# Patient Record
Sex: FEMALE | Race: WHITE | NOT HISPANIC OR LATINO | Employment: OTHER | URBAN - METROPOLITAN AREA
[De-identification: names, ages, dates, MRNs, and addresses within clinical notes are randomized per-mention and may not be internally consistent; named-entity substitution may affect disease eponyms.]

---

## 2017-01-03 DIAGNOSIS — M54.16 CHRONIC LUMBAR RADICULOPATHY: ICD-10-CM

## 2017-01-03 RX ORDER — HYDROCODONE BITARTRATE AND ACETAMINOPHEN 7.5; 325 MG/1; MG/1
1 TABLET ORAL EVERY 6 HOURS PRN
Qty: 60 TABLET | Refills: 0 | OUTPATIENT
Start: 2017-01-03

## 2017-01-03 NOTE — TELEPHONE ENCOUNTER
----- Message from Trice Glass sent at 1/3/2017  1:12 PM EST -----  Regarding: FW: Prescription Question  Contact: [phone]      ----- Message -----     From: Brooklyn Johns     Sent: 1/3/2017  12:33 PM       To: Tai Ordaz [facility]  Subject: Prescription Question                            Please this is a reminder to fill my [facility]. It will be due next week  Thanks  Brooklyn Johns  [id number]  1950

## 2017-01-05 ENCOUNTER — OFFICE VISIT (OUTPATIENT)
Dept: INTERNAL MEDICINE | Facility: CLINIC | Age: 67
End: 2017-01-05

## 2017-01-05 VITALS
BODY MASS INDEX: 47.15 KG/M2 | WEIGHT: 283 LBS | SYSTOLIC BLOOD PRESSURE: 160 MMHG | HEIGHT: 65 IN | DIASTOLIC BLOOD PRESSURE: 98 MMHG

## 2017-01-05 DIAGNOSIS — G47.33 OBSTRUCTIVE SLEEP APNEA SYNDROME: ICD-10-CM

## 2017-01-05 DIAGNOSIS — I10 ESSENTIAL HYPERTENSION: Primary | ICD-10-CM

## 2017-01-05 DIAGNOSIS — M54.16 LUMBAR RADICULOPATHY, CHRONIC: ICD-10-CM

## 2017-01-05 DIAGNOSIS — M54.16 CHRONIC LUMBAR RADICULOPATHY: ICD-10-CM

## 2017-01-05 PROCEDURE — 99213 OFFICE O/P EST LOW 20 MIN: CPT | Performed by: INTERNAL MEDICINE

## 2017-01-05 RX ORDER — HYDROCODONE BITARTRATE AND ACETAMINOPHEN 7.5; 325 MG/1; MG/1
1 TABLET ORAL EVERY 6 HOURS PRN
Qty: 60 TABLET | Refills: 0 | Status: SHIPPED | OUTPATIENT
Start: 2017-01-05 | End: 2017-01-24 | Stop reason: SDUPTHER

## 2017-01-05 NOTE — PROGRESS NOTES
Subjective   Brooklyn Johns is a 66 y.o. female.     Hyperlipidemia   This is a chronic problem. The current episode started more than 1 year ago.   Hypertension   This is a chronic problem. The current episode started more than 1 year ago.        The following portions of the patient's history were reviewed and updated as appropriate: allergies, current medications, past family history, past medical history, past social history, past surgical history and problem list.    Review of Systems   Constitutional: Positive for unexpected weight change.        Only doing fair  Darrick  suddenly last summer aftyer a fall & subarachnoid hemorrhage Having to take care of his estate Very stressful Has new job as  @ [employer] [facility] [place] several days /week   HENT: Negative.    Respiratory: Negative.    Cardiovascular:        BP has been running high   Gastrointestinal: Negative.    Genitourinary: Negative.    Musculoskeletal: Positive for arthralgias (getting more pain & numbnees in hands ) and back pain (Back pain is chronic).       Objective   Physical Exam   Constitutional: She appears well-developed.   HENT:   Head: Normocephalic.   Eyes: EOM are normal.   Neck: Neck supple.   Cardiovascular: Normal rate, regular rhythm and normal heart sounds.    Repeat 170/110   Pulmonary/Chest: Effort normal and breath sounds normal.   Musculoskeletal: Normal range of motion.   Neurological: She is alert.   Vitals reviewed.      Assessment/Plan   Brooklyn was seen today for med management, hyperlipidemia and hypertension.    Diagnoses and all orders for this visit:    Essential hypertension    Lumbar radiculopathy, chronic    Obstructive sleep apnea syndrome      Increase metoprolol to 25 MG QD & increase Altace  to 20 MG QD

## 2017-01-05 NOTE — MR AVS SNAPSHOT
Brooklyn Johns   1/5/2017 2:30 PM   Office Visit    Dept Phone:  [phone]   Encounter #:  [id number]    Provider:  David Dial MD   Department:  [facility] INTERNAL MEDICINE                Your Full Care Plan              Today's Medication Changes          These changes are accurate as of: 1/5/17  3:14 PM.  If you have any questions, ask your nurse or doctor.               Stop taking medication(s)listed here:     cetirizine 10 MG tablet   Commonly known as:  zyrTEC   Stopped by:  David Dial MD                Where to Get Your Medications      You can get these medications from any pharmacy     Bring a paper prescription for each of these medications     HYDROcodone-acetaminophen 7.5-325 MG per tablet                  Your Updated Medication List          This list is accurate as of: 1/5/17  3:14 PM.  Always use your most recent med list.                allopurinol 100 MG tablet   Commonly known as:  ZYLOPRIM   Take 1 tablet by mouth Daily.       aspirin 325 MG tablet       cimetidine 400 MG tablet   Commonly known as:  TAGAMET   TAKE 1 TABLET TWICE A DAY       escitalopram 10 MG tablet   Commonly known as:  LEXAPRO   Take 1 tablet by mouth daily.       HYDROcodone-acetaminophen 7.5-325 MG per tablet   Commonly known as:  NORCO   Take 1 tablet by mouth Every 6 (Six) Hours As Needed for moderate pain (4-6). 1-2 tablet every 6 hours prn moderate pain (4-6)       meloxicam 15 MG tablet   Commonly known as:  MOBIC   Take 1 tablet by mouth Daily.       metoprolol succinate XL 25 MG 24 hr tablet   Commonly known as:  TOPROL-XL   Take 1 tablet by mouth Daily.       ramipril 10 MG capsule   Commonly known as:  ALTACE       rOPINIRole 1 MG tablet   Commonly known as:  REQUIP   Take 1 tablet by mouth 3 (Three) Times a Day. TAKE 3 TABLETS BY MOUTH AT BEDTIME       simvastatin 40 MG tablet   Commonly known as:  ZOCOR       zolpidem 5 MG tablet   Commonly known as:   AMBIEN   Take 1 tablet by mouth Daily.               You Were Diagnosed With        Codes Comments    Essential hypertension    -  Primary ICD-10-CM: I10  ICD-9-CM: 401.9     Lumbar radiculopathy, chronic     ICD-10-CM: M54.16  ICD-9-CM: 724.4     Obstructive sleep apnea syndrome     ICD-10-CM: G47.33  ICD-9-CM: 327.23     Chronic lumbar radiculopathy     ICD-10-CM: M54.16  ICD-9-CM: 724.4       Instructions     None    Patient Instructions History      Upcoming Appointments     Visit Type Date Time Department    OFFICE VISIT 1/5/2017  2:30 PM [facility]    FOLLOW UP 2/7/2017  3:45 PM [employer] Signup     Our records indicate that you have an active [facility] account.    You can view your After Visit Summary by going to [employer] and logging in with your [employer] username and password.  If you don't have a [employer] username and password but a parent or guardian has access to your record, the parent or guardian should login with their own [employer] username and password and access your record to view the After Visit Summary.    If you have questions, you can email [email or web address] or call [phone] to talk to our [employer] staff.  Remember, [employer] is NOT to be used for urgent needs.  For medical emergencies, dial 911.               Other Info from Your Visit           Your Appointments     Feb 07, 2017  3:45 PM EST   Follow Up with David Dial MD   [facility] (--)    [address] [facility]. [address] [place] [id number] [phone]           Arrive 15 minutes prior to appointment.              Allergies     No Known Allergies      Reason for Visit     Med Management Follow-up for continued used of a controlled substance    Hyperlipidemia Follow-up    Hypertension Follow-up      Vital Signs     Blood Pressure Height Weight Breastfeeding? Body Mass Index Smoking Status    160/98 (BP Location: Left arm, Patient Position:  "Sitting, Cuff Size: Large Adult) 65\" (165.1 cm) 283 lb (128 kg) No 47.09 kg/m2 Never Smoker      Problems and Diagnoses Noted     High blood pressure    Sleep apnea    Lumbar radiculopathy, chronic        Chronic lumbar radiculopathy            "

## 2017-01-23 ENCOUNTER — PATIENT MESSAGE (OUTPATIENT)
Dept: INTERNAL MEDICINE | Facility: CLINIC | Age: 67
End: 2017-01-23

## 2017-01-23 DIAGNOSIS — M54.16 CHRONIC LUMBAR RADICULOPATHY: ICD-10-CM

## 2017-01-23 DIAGNOSIS — G47.00 INSOMNIA, UNSPECIFIED TYPE: ICD-10-CM

## 2017-01-24 RX ORDER — ZOLPIDEM TARTRATE 5 MG/1
5 TABLET ORAL DAILY
Qty: 90 TABLET | Refills: 0 | Status: SHIPPED | OUTPATIENT
Start: 2017-01-24 | End: 2017-01-31 | Stop reason: SDUPTHER

## 2017-01-24 RX ORDER — HYDROCODONE BITARTRATE AND ACETAMINOPHEN 7.5; 325 MG/1; MG/1
1 TABLET ORAL EVERY 6 HOURS PRN
Qty: 60 TABLET | Refills: 0 | Status: SHIPPED | OUTPATIENT
Start: 2017-01-24 | End: 2017-02-17 | Stop reason: SDUPTHER

## 2017-01-24 NOTE — TELEPHONE ENCOUNTER
From: Brooklyn Johns  To: David Dial MD  Sent: 1/23/2017 2:03 PM EST  Subject: Prescription Question    My Amb[facility]. They said they need a new rx and a letter from you for need. Just like last time    Also a reminder that I need a refill for hydrocodone for next week. Can I get that [facility]? Let me know.  Thanks  Priya Johns  [phone]

## 2017-01-31 DIAGNOSIS — G47.00 INSOMNIA, UNSPECIFIED TYPE: ICD-10-CM

## 2017-01-31 RX ORDER — METOPROLOL SUCCINATE 25 MG/1
25 TABLET, EXTENDED RELEASE ORAL DAILY
Qty: 90 TABLET | Refills: 0 | Status: SHIPPED | OUTPATIENT
Start: 2017-01-31 | End: 2018-04-24 | Stop reason: SDUPTHER

## 2017-01-31 RX ORDER — RAMIPRIL 10 MG/1
10 CAPSULE ORAL DAILY
Qty: 90 CAPSULE | Refills: 1 | Status: SHIPPED | OUTPATIENT
Start: 2017-01-31 | End: 2017-03-28 | Stop reason: SDUPTHER

## 2017-01-31 RX ORDER — ZOLPIDEM TARTRATE 5 MG/1
5 TABLET ORAL DAILY
Qty: 90 TABLET | Refills: 0 | Status: SHIPPED | OUTPATIENT
Start: 2017-01-31 | End: 2017-04-07 | Stop reason: SDUPTHER

## 2017-02-10 RX ORDER — CIMETIDINE 400 MG/1
TABLET, FILM COATED ORAL
Qty: 180 TABLET | Refills: 1 | Status: SHIPPED | OUTPATIENT
Start: 2017-02-10 | End: 2017-08-02 | Stop reason: SDUPTHER

## 2017-02-17 DIAGNOSIS — M54.16 CHRONIC LUMBAR RADICULOPATHY: ICD-10-CM

## 2017-02-17 RX ORDER — HYDROCODONE BITARTRATE AND ACETAMINOPHEN 7.5; 325 MG/1; MG/1
1 TABLET ORAL EVERY 6 HOURS PRN
Qty: 60 TABLET | Refills: 0 | Status: SHIPPED | OUTPATIENT
Start: 2017-02-17 | End: 2017-03-10 | Stop reason: SDUPTHER

## 2017-02-17 NOTE — TELEPHONE ENCOUNTER
Pt calling would like refill on rx. Please call when ready for pickup.       Last OV  01/05/17 Last R 01/24/17 # 60   please review med refill and advice         Pt#[phone]

## 2017-02-23 RX ORDER — SIMVASTATIN 40 MG
TABLET ORAL
Qty: 90 TABLET | Refills: 2 | Status: SHIPPED | OUTPATIENT
Start: 2017-02-23 | End: 2018-01-14 | Stop reason: SDUPTHER

## 2017-03-10 ENCOUNTER — OFFICE VISIT (OUTPATIENT)
Dept: INTERNAL MEDICINE | Facility: CLINIC | Age: 67
End: 2017-03-10

## 2017-03-10 VITALS
HEIGHT: 65 IN | OXYGEN SATURATION: 98 % | DIASTOLIC BLOOD PRESSURE: 90 MMHG | BODY MASS INDEX: 47.82 KG/M2 | WEIGHT: 287 LBS | HEART RATE: 64 BPM | SYSTOLIC BLOOD PRESSURE: 160 MMHG

## 2017-03-10 DIAGNOSIS — M51.06 INTERVERTEBRAL DISC DISORDER OF LUMBAR REGION WITH MYELOPATHY: ICD-10-CM

## 2017-03-10 DIAGNOSIS — I10 ESSENTIAL HYPERTENSION: Primary | ICD-10-CM

## 2017-03-10 DIAGNOSIS — M54.16 CHRONIC LUMBAR RADICULOPATHY: ICD-10-CM

## 2017-03-10 PROCEDURE — 99214 OFFICE O/P EST MOD 30 MIN: CPT | Performed by: INTERNAL MEDICINE

## 2017-03-10 RX ORDER — HYDROCODONE BITARTRATE AND ACETAMINOPHEN 7.5; 325 MG/1; MG/1
1 TABLET ORAL EVERY 6 HOURS PRN
Qty: 60 TABLET | Refills: 0 | Status: SHIPPED | OUTPATIENT
Start: 2017-03-10 | End: 2017-03-28 | Stop reason: SDUPTHER

## 2017-03-10 NOTE — PROGRESS NOTES
Subjective   Brooklyn Johns is a 66 y.o. female.     Hypertension   This is a chronic problem. The current episode started more than 1 year ago.   Hyperlipidemia   This is a chronic problem. She is currently on no antihyperlipidemic treatment.   Back Pain          The following portions of the patient's history were reviewed and updated as appropriate: allergies, current medications, past family history, past medical history, past social history, past surgical history and problem list.    Review of Systems   Constitutional:        Doing about the same   HENT: Negative.    Eyes: Negative.    Respiratory: Negative.    Cardiovascular:        BP remains elevated   Gastrointestinal: Negative.    Genitourinary: Negative.    Musculoskeletal: Positive for back pain ( Having more low back & leg pain  Using heat & hydrocodone Hasn't ever tried exercises or PT).       Objective   Physical Exam   Constitutional: She appears well-developed.   HENT:   Head: Normocephalic.   Eyes: EOM are normal.   Neck: Neck supple.   Cardiovascular: Normal rate, regular rhythm and normal heart sounds.    [id number]/90   Pulmonary/Chest: Effort normal and breath sounds normal.   Musculoskeletal: Normal range of motion.   Vitals reviewed.      Assessment/Plan   Brooklyn was seen today for hypertension, hyperlipidemia and back pain.    Diagnoses and all orders for this visit:    Essential hypertension  -     triamterene-hydrochlorothiazide (DYAZIDE) 50-25 MG per capsule; Take 1 capsule by mouth Every Morning.    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 1-2 tablet every 6 hours prn moderate pain (4-6)    Given back exercises

## 2017-03-27 ENCOUNTER — TELEPHONE (OUTPATIENT)
Dept: INTERNAL MEDICINE | Facility: CLINIC | Age: 67
End: 2017-03-27

## 2017-03-27 NOTE — TELEPHONE ENCOUNTER
----- Message from Korin MONI French sent at 3/27/2017 10:41 AM EDT -----  Contact: pt - Dr Dial's pt - RE: Rx refills  Pt calling and would like a refill on Rx      HYDROcodone-acetaminophen (NORCO) 7.5-325 MG per tablet 60 tablet 0 3/10/2017      Sig - Route: Take 1 tablet by mouth Every 6 (Six) Hours As Needed for moderate pain (4-6). 1-2 tablet every 6 hours prn moderate pain (4-6) - Oral    ramipril (ALTACE) 10 MG capsule 90 capsule 1 1/31/2017      Sig - Route: Take 1 capsule by mouth Daily. - Oral    Pt informs that Rx Ramipril was increased to 2 capsules daily. Do no see in chart where Rx was increased. Pt informs is not out of Rx due to taking 2 daily. Please advise. Thanks    Pt # [phone]

## 2017-03-28 DIAGNOSIS — I10 ESSENTIAL HYPERTENSION: Primary | ICD-10-CM

## 2017-03-28 DIAGNOSIS — M54.16 CHRONIC LUMBAR RADICULOPATHY: ICD-10-CM

## 2017-03-28 RX ORDER — RAMIPRIL 10 MG/1
10 CAPSULE ORAL 2 TIMES DAILY
Qty: 60 CAPSULE | Refills: 1 | Status: SHIPPED | OUTPATIENT
Start: 2017-03-28 | End: 2017-07-11 | Stop reason: SDUPTHER

## 2017-03-28 RX ORDER — RAMIPRIL 10 MG/1
10 CAPSULE ORAL DAILY
Qty: 60 CAPSULE | Refills: 1 | Status: SHIPPED | OUTPATIENT
Start: 2017-03-28 | End: 2017-03-28 | Stop reason: SDUPTHER

## 2017-03-28 RX ORDER — HYDROCODONE BITARTRATE AND ACETAMINOPHEN 7.5; 325 MG/1; MG/1
1 TABLET ORAL EVERY 6 HOURS PRN
Qty: 60 TABLET | Refills: 0 | Status: SHIPPED | OUTPATIENT
Start: 2017-03-28 | End: 2017-04-19 | Stop reason: SDUPTHER

## 2017-03-28 NOTE — TELEPHONE ENCOUNTER
Pt informs that Rx Ramipril was increased to 2 capsules daily. Do no see in chart where Rx was increased. Have  you increased her med  ??    Please advise. Thanks     Pt # [phone]   Last OV 03/17   Last R 03/10/17 # 60   please review med refill and advice

## 2017-04-05 ENCOUNTER — TELEPHONE (OUTPATIENT)
Dept: INTERNAL MEDICINE | Facility: CLINIC | Age: 67
End: 2017-04-05

## 2017-04-05 NOTE — TELEPHONE ENCOUNTER
Pharmacy called , they said pt's med  triamterene-hydrochlorothiazide (DYAZIDE) 50-25 MG was not filled, instead they filled 37.5-25 because that what they have available and they said the 50-25 is very rare , asking if you want to keep the dosage or it's ok to switch her to 37.5-25    Please advise

## 2017-04-07 DIAGNOSIS — G47.00 INSOMNIA, UNSPECIFIED TYPE: ICD-10-CM

## 2017-04-07 DIAGNOSIS — I10 ESSENTIAL HYPERTENSION: Primary | ICD-10-CM

## 2017-04-07 RX ORDER — ZOLPIDEM TARTRATE 5 MG/1
5 TABLET ORAL DAILY
Qty: 90 TABLET | Refills: 0
Start: 2017-04-07 | End: 2017-11-10 | Stop reason: SDUPTHER

## 2017-04-07 RX ORDER — TRIAMTERENE AND HYDROCHLOROTHIAZIDE 37.5; 25 MG/1; MG/1
1 TABLET ORAL DAILY
Qty: 30 TABLET | Refills: 3 | Status: SHIPPED | OUTPATIENT
Start: 2017-04-07 | End: 2017-09-11 | Stop reason: SDUPTHER

## 2017-04-19 DIAGNOSIS — M54.16 CHRONIC LUMBAR RADICULOPATHY: ICD-10-CM

## 2017-04-19 RX ORDER — HYDROCODONE BITARTRATE AND ACETAMINOPHEN 7.5; 325 MG/1; MG/1
1 TABLET ORAL EVERY 6 HOURS PRN
Qty: 60 TABLET | Refills: 0 | Status: SHIPPED | OUTPATIENT
Start: 2017-04-19 | End: 2017-05-22 | Stop reason: SDUPTHER

## 2017-04-19 NOTE — TELEPHONE ENCOUNTER
----- Message from Prisca Garcia sent at 4/19/2017 10:11 AM EDT -----  Contact: pt  Pt calling would like refill on rx. Please call when ready for pickup.    HYDROcodone-acetaminophen (NORCO) 7.5-325 MG per tablet    Pt#  [phone]  Last R 03/28/17 # 60  Last OV 03/10/17  FLORY DONE TODAY

## 2017-05-22 DIAGNOSIS — M54.16 CHRONIC LUMBAR RADICULOPATHY: ICD-10-CM

## 2017-05-22 RX ORDER — HYDROCODONE BITARTRATE AND ACETAMINOPHEN 7.5; 325 MG/1; MG/1
1 TABLET ORAL EVERY 6 HOURS PRN
Qty: 60 TABLET | Refills: 0 | Status: SHIPPED | OUTPATIENT
Start: 2017-05-22 | End: 2017-06-20 | Stop reason: SDUPTHER

## 2017-06-20 DIAGNOSIS — M54.16 CHRONIC LUMBAR RADICULOPATHY: ICD-10-CM

## 2017-06-20 RX ORDER — HYDROCODONE BITARTRATE AND ACETAMINOPHEN 7.5; 325 MG/1; MG/1
1 TABLET ORAL EVERY 6 HOURS PRN
Qty: 60 TABLET | Refills: 0 | Status: SHIPPED | OUTPATIENT
Start: 2017-06-20 | End: 2017-07-17 | Stop reason: SDUPTHER

## 2017-06-20 NOTE — TELEPHONE ENCOUNTER
Pt calling and would like a refill on Rx  HYDROcodone-acetaminophen (NORCO) 7.5-325 MG per      Last OV  03/10/17  Last refill 05/22/17   please review med refill and advice     Pt # [phone]

## 2017-06-21 DIAGNOSIS — G25.81 RESTLESS LEGS: ICD-10-CM

## 2017-06-21 RX ORDER — ROPINIROLE 1 MG/1
TABLET, FILM COATED ORAL
Qty: 270 TABLET | Refills: 0 | Status: SHIPPED | OUTPATIENT
Start: 2017-06-21 | End: 2017-09-19 | Stop reason: SDUPTHER

## 2017-06-27 ENCOUNTER — APPOINTMENT (OUTPATIENT)
Dept: WOMENS IMAGING | Facility: HOSPITAL | Age: 67
End: 2017-06-27

## 2017-06-27 ENCOUNTER — OFFICE VISIT (OUTPATIENT)
Dept: INTERNAL MEDICINE | Facility: CLINIC | Age: 67
End: 2017-06-27

## 2017-06-27 VITALS
BODY MASS INDEX: 47.82 KG/M2 | SYSTOLIC BLOOD PRESSURE: 124 MMHG | HEIGHT: 65 IN | WEIGHT: 287 LBS | DIASTOLIC BLOOD PRESSURE: 66 MMHG

## 2017-06-27 DIAGNOSIS — G47.30 SLEEP APNEA, UNSPECIFIED TYPE: ICD-10-CM

## 2017-06-27 DIAGNOSIS — Z00.00 WELCOME TO MEDICARE PREVENTIVE VISIT: Primary | ICD-10-CM

## 2017-06-27 DIAGNOSIS — I10 ESSENTIAL HYPERTENSION: ICD-10-CM

## 2017-06-27 DIAGNOSIS — Z12.31 SCREENING MAMMOGRAM, ENCOUNTER FOR: ICD-10-CM

## 2017-06-27 DIAGNOSIS — M51.16 LUMBAR DISC DISEASE WITH RADICULOPATHY: ICD-10-CM

## 2017-06-27 DIAGNOSIS — G25.81 RLS (RESTLESS LEGS SYNDROME): ICD-10-CM

## 2017-06-27 DIAGNOSIS — I25.10 CHRONIC CORONARY ARTERY DISEASE: ICD-10-CM

## 2017-06-27 LAB
ALBUMIN SERPL-MCNC: 4.1 G/DL (ref 3.4–4.6)
ALBUMIN/GLOB SERPL: 1.6 G/DL
ALP SERPL-CCNC: 143 U/L (ref 46–116)
ALT SERPL W P-5'-P-CCNC: 27 U/L (ref 14–59)
ANION GAP SERPL CALCULATED.3IONS-SCNC: 10 MMOL/L
AST SERPL-CCNC: 29 U/L (ref 7–37)
BASOPHILS # BLD AUTO: 0.01 10*3/MM3 (ref 0–0.2)
BASOPHILS NFR BLD AUTO: 0.2 % (ref 0–2)
BILIRUB SERPL-MCNC: 0.4 MG/DL (ref 0.2–1)
BUN BLD-MCNC: 32 MG/DL (ref 6–22)
BUN/CREAT SERPL: 18.5 (ref 7–25)
CALCIUM SPEC-SCNC: 9.1 MG/DL (ref 8.6–10.5)
CHLORIDE SERPL-SCNC: 109 MMOL/L (ref 95–107)
CHOLEST SERPL-MCNC: 159 MG/DL (ref 0–200)
CO2 SERPL-SCNC: 23 MMOL/L (ref 23–32)
CREAT BLD-MCNC: 1.73 MG/DL (ref 0.55–1.02)
DEPRECATED RDW RBC AUTO: 52.4 FL (ref 37–54)
EOSINOPHIL # BLD AUTO: 0.13 10*3/MM3 (ref 0–0.7)
EOSINOPHIL NFR BLD AUTO: 2.9 % (ref 0–5)
ERYTHROCYTE [DISTWIDTH] IN BLOOD BY AUTOMATED COUNT: 15.3 % (ref 11.5–15)
GFR SERPL CREATININE-BSD FRML MDRD: 29 ML/MIN/1.73
GLOBULIN UR ELPH-MCNC: 2.5 GM/DL
GLUCOSE BLD-MCNC: 105 MG/DL (ref 70–100)
HCT VFR BLD AUTO: 35.9 % (ref 34.1–44.9)
HDLC SERPL-MCNC: 57 MG/DL (ref 40–81)
HGB BLD-MCNC: 10.9 G/DL (ref 11.2–15.7)
LDLC SERPL CALC-MCNC: 81 MG/DL (ref 0–100)
LDLC/HDLC SERPL: 1.41 {RATIO}
LYMPHOCYTES # BLD AUTO: 1.16 10*3/MM3 (ref 0.8–7)
LYMPHOCYTES NFR BLD AUTO: 26 % (ref 10–60)
MCH RBC QN AUTO: 29.1 PG (ref 26–34)
MCHC RBC AUTO-ENTMCNC: 30.4 G/DL (ref 31–37)
MCV RBC AUTO: 95.7 FL (ref 80–100)
MONOCYTES # BLD AUTO: 0.44 10*3/MM3 (ref 0–1)
MONOCYTES NFR BLD AUTO: 9.9 % (ref 0–13)
NEUTROPHILS # BLD AUTO: 2.72 10*3/MM3 (ref 1–11)
NEUTROPHILS NFR BLD AUTO: 61 % (ref 30–85)
PLATELET # BLD AUTO: 162 10*3/MM3 (ref 150–450)
PMV BLD AUTO: 11.7 FL (ref 6–12)
POTASSIUM BLD-SCNC: 5.6 MMOL/L (ref 3.3–5.3)
PROT SERPL-MCNC: 6.6 G/DL (ref 6.3–8.4)
RBC # BLD AUTO: 3.75 10*6/MM3 (ref 3.93–5.22)
SODIUM BLD-SCNC: 142 MMOL/L (ref 136–145)
T4 FREE SERPL-MCNC: 0.92 NG/DL (ref 0.93–1.7)
TRIGL SERPL-MCNC: 107 MG/DL (ref 0–150)
TSH SERPL DL<=0.05 MIU/L-ACNC: 2.62 MIU/ML (ref 0.4–4.2)
VLDLC SERPL-MCNC: 21.4 MG/DL
WBC NRBC COR # BLD: 4.46 10*3/MM3 (ref 5–10)

## 2017-06-27 PROCEDURE — 71020 XR CHEST 2 VW: CPT | Performed by: INTERNAL MEDICINE

## 2017-06-27 PROCEDURE — 71020 CHG CHEST X-RAY 2 VW: CPT | Performed by: RADIOLOGY

## 2017-06-27 PROCEDURE — 93000 ELECTROCARDIOGRAM COMPLETE: CPT | Performed by: INTERNAL MEDICINE

## 2017-06-27 PROCEDURE — 80053 COMPREHEN METABOLIC PANEL: CPT | Performed by: INTERNAL MEDICINE

## 2017-06-27 PROCEDURE — 80061 LIPID PANEL: CPT | Performed by: INTERNAL MEDICINE

## 2017-06-27 PROCEDURE — G0438 PPPS, INITIAL VISIT: HCPCS | Performed by: INTERNAL MEDICINE

## 2017-06-27 PROCEDURE — 85025 COMPLETE CBC W/AUTO DIFF WBC: CPT | Performed by: INTERNAL MEDICINE

## 2017-06-27 PROCEDURE — 36415 COLL VENOUS BLD VENIPUNCTURE: CPT | Performed by: INTERNAL MEDICINE

## 2017-06-27 PROCEDURE — 84443 ASSAY THYROID STIM HORMONE: CPT | Performed by: INTERNAL MEDICINE

## 2017-07-03 ENCOUNTER — TELEPHONE (OUTPATIENT)
Dept: INTERNAL MEDICINE | Facility: CLINIC | Age: 67
End: 2017-07-03

## 2017-07-11 ENCOUNTER — TELEPHONE (OUTPATIENT)
Dept: INTERNAL MEDICINE | Facility: CLINIC | Age: 67
End: 2017-07-11

## 2017-07-11 DIAGNOSIS — M54.16 CHRONIC LUMBAR RADICULOPATHY: ICD-10-CM

## 2017-07-11 DIAGNOSIS — I10 ESSENTIAL HYPERTENSION: ICD-10-CM

## 2017-07-11 RX ORDER — RAMIPRIL 10 MG/1
10 CAPSULE ORAL 2 TIMES DAILY
Qty: 180 CAPSULE | Refills: 3 | Status: SHIPPED | OUTPATIENT
Start: 2017-07-11 | End: 2018-03-12 | Stop reason: SDUPTHER

## 2017-07-11 NOTE — TELEPHONE ENCOUNTER
Contact: Patient  Patient called needing refill on     ramipril (ALTACE) 10 MG capsule;    Patient is to have follow-up labs on or around 07/27/2017.  Will need orders in chart.  Dr. Dial noted to recheck in a month on her lab report but no orders in there.     Mild anemia is present.  There is mild impairment of your kidney function with a creatinine  being 1.7.  Stop taking meloxicam and make sure  you are well hydrated.  Would like to  recheck these values in approximately a month.     Patient:  [phone]

## 2017-07-17 DIAGNOSIS — M54.16 CHRONIC LUMBAR RADICULOPATHY: ICD-10-CM

## 2017-07-17 RX ORDER — HYDROCODONE BITARTRATE AND ACETAMINOPHEN 7.5; 325 MG/1; MG/1
1 TABLET ORAL EVERY 6 HOURS PRN
Qty: 60 TABLET | Refills: 0 | Status: SHIPPED | OUTPATIENT
Start: 2017-07-17 | End: 2017-08-16 | Stop reason: SDUPTHER

## 2017-07-17 NOTE — TELEPHONE ENCOUNTER
Patient called requesting refill on     HYDROcodone-acetaminophen (NORCO) 7.5-325 MG per tablet  Last OV  06/27  Last refill 06/20  zach done 07/20  please review med refill and advise     Patient:  [phone]

## 2017-07-21 DIAGNOSIS — N18.30 CHRONIC KIDNEY DISEASE, STAGE 3 (MODERATE): ICD-10-CM

## 2017-07-21 DIAGNOSIS — D64.9 ANEMIA, UNSPECIFIED TYPE: Primary | ICD-10-CM

## 2017-07-26 ENCOUNTER — LAB (OUTPATIENT)
Dept: INTERNAL MEDICINE | Facility: CLINIC | Age: 67
End: 2017-07-26

## 2017-07-26 DIAGNOSIS — N18.30 CHRONIC KIDNEY DISEASE, STAGE 3 (MODERATE): ICD-10-CM

## 2017-07-26 DIAGNOSIS — D64.9 ANEMIA, UNSPECIFIED TYPE: ICD-10-CM

## 2017-07-26 LAB
ALBUMIN SERPL-MCNC: 3.9 G/DL (ref 3.4–4.6)
ALBUMIN/GLOB SERPL: 1.5 G/DL
ALP SERPL-CCNC: 135 U/L (ref 46–116)
ALT SERPL W P-5'-P-CCNC: 19 U/L (ref 14–59)
ANION GAP SERPL CALCULATED.3IONS-SCNC: 12 MMOL/L
AST SERPL-CCNC: 17 U/L (ref 7–37)
BASOPHILS # BLD AUTO: 0.03 10*3/MM3 (ref 0–0.2)
BASOPHILS NFR BLD AUTO: 0.7 % (ref 0–2)
BILIRUB SERPL-MCNC: 0.4 MG/DL (ref 0.2–1)
BUN BLD-MCNC: 26 MG/DL (ref 6–22)
BUN/CREAT SERPL: 18.3 (ref 7–25)
CALCIUM SPEC-SCNC: 8.8 MG/DL (ref 8.6–10.5)
CHLORIDE SERPL-SCNC: 106 MMOL/L (ref 95–107)
CO2 SERPL-SCNC: 21 MMOL/L (ref 23–32)
CREAT BLD-MCNC: 1.42 MG/DL (ref 0.55–1.02)
DEPRECATED RDW RBC AUTO: 45.1 FL (ref 37–54)
EOSINOPHIL # BLD AUTO: 0.14 10*3/MM3 (ref 0–0.7)
EOSINOPHIL NFR BLD AUTO: 3.4 % (ref 0–5)
ERYTHROCYTE [DISTWIDTH] IN BLOOD BY AUTOMATED COUNT: 13.6 % (ref 11.5–15)
FERRITIN SERPL-MCNC: 63.49 NG/ML (ref 13–150)
FOLATE SERPL-MCNC: 8.05 NG/ML (ref 4.78–24.2)
GFR SERPL CREATININE-BSD FRML MDRD: 37 ML/MIN/1.73
GLOBULIN UR ELPH-MCNC: 2.6 GM/DL
GLUCOSE BLD-MCNC: 99 MG/DL (ref 70–100)
HCT VFR BLD AUTO: 33.7 % (ref 34.1–44.9)
HGB BLD-MCNC: 10.7 G/DL (ref 11.2–15.7)
IRON SATN MFR SERPL: 28 % (ref 20–50)
IRON SERPL-MCNC: 114 MCG/DL (ref 37–145)
LOPINAVIR ISLT PHENOTYP: 287 MCG/DL
LYMPHOCYTES # BLD AUTO: 1.17 10*3/MM3 (ref 0.8–7)
LYMPHOCYTES NFR BLD AUTO: 28.2 % (ref 10–60)
MCH RBC QN AUTO: 30.2 PG (ref 26–34)
MCHC RBC AUTO-ENTMCNC: 31.8 G/DL (ref 31–37)
MCV RBC AUTO: 95.2 FL (ref 80–100)
MONOCYTES # BLD AUTO: 0.33 10*3/MM3 (ref 0–1)
MONOCYTES NFR BLD AUTO: 8 % (ref 0–13)
NEUTROPHILS # BLD AUTO: 2.48 10*3/MM3 (ref 1–11)
NEUTROPHILS NFR BLD AUTO: 59.7 % (ref 30–85)
PLATELET # BLD AUTO: 163 10*3/MM3 (ref 150–450)
PMV BLD AUTO: 11 FL (ref 6–12)
POTASSIUM BLD-SCNC: 4.8 MMOL/L (ref 3.3–5.3)
PROT SERPL-MCNC: 6.5 G/DL (ref 6.3–8.4)
RBC # BLD AUTO: 3.54 10*6/MM3 (ref 3.93–5.22)
SODIUM BLD-SCNC: 139 MMOL/L (ref 136–145)
TIBC SERPL-MCNC: 401 MCG/DL
VIT B12 SERPL-MCNC: 148 PG/ML (ref 211–946)
WBC NRBC COR # BLD: 4.15 10*3/MM3 (ref 5–10)

## 2017-07-26 PROCEDURE — 85025 COMPLETE CBC W/AUTO DIFF WBC: CPT | Performed by: INTERNAL MEDICINE

## 2017-07-26 PROCEDURE — 80053 COMPREHEN METABOLIC PANEL: CPT | Performed by: INTERNAL MEDICINE

## 2017-08-02 RX ORDER — CIMETIDINE 400 MG/1
TABLET, FILM COATED ORAL
Qty: 180 TABLET | Refills: 0 | Status: SHIPPED | OUTPATIENT
Start: 2017-08-02 | End: 2017-10-31 | Stop reason: SDUPTHER

## 2017-08-14 ENCOUNTER — TELEPHONE (OUTPATIENT)
Dept: INTERNAL MEDICINE | Facility: CLINIC | Age: 67
End: 2017-08-14

## 2017-08-14 NOTE — TELEPHONE ENCOUNTER
Pt calling and would like a refill on Rx      HYDROcodone-acetaminophen (NORCO) 7.5-325 MG per tablet 60 tablet      Last OV  06/27  Last refill 07/17  zach done 07/20  please review med refill and advise       Pt # [phone]

## 2017-08-16 DIAGNOSIS — M54.16 CHRONIC LUMBAR RADICULOPATHY: ICD-10-CM

## 2017-08-16 RX ORDER — HYDROCODONE BITARTRATE AND ACETAMINOPHEN 7.5; 325 MG/1; MG/1
1 TABLET ORAL EVERY 6 HOURS PRN
Qty: 60 TABLET | Refills: 0 | Status: SHIPPED | OUTPATIENT
Start: 2017-08-16 | End: 2017-09-18 | Stop reason: SDUPTHER

## 2017-09-11 DIAGNOSIS — I10 ESSENTIAL HYPERTENSION: ICD-10-CM

## 2017-09-11 RX ORDER — TRIAMTERENE AND HYDROCHLOROTHIAZIDE 37.5; 25 MG/1; MG/1
TABLET ORAL
Qty: 30 TABLET | Refills: 2 | Status: SHIPPED | OUTPATIENT
Start: 2017-09-11 | End: 2018-01-14 | Stop reason: SDUPTHER

## 2017-09-18 DIAGNOSIS — M54.16 CHRONIC LUMBAR RADICULOPATHY: ICD-10-CM

## 2017-09-18 RX ORDER — HYDROCODONE BITARTRATE AND ACETAMINOPHEN 7.5; 325 MG/1; MG/1
1 TABLET ORAL EVERY 6 HOURS PRN
Qty: 60 TABLET | Refills: 0 | Status: SHIPPED | OUTPATIENT
Start: 2017-09-18 | End: 2017-10-16 | Stop reason: SDUPTHER

## 2017-09-18 NOTE — TELEPHONE ENCOUNTER
----- Message from Crys Franklin sent at 9/18/2017 11:40 AM EDT -----  Contact: Patient  Patient called requesting refill on     HYDROcodone-acetaminophen (NORCO) 7.5-325 MG per tablet    Please call when ready to be picked up.      Patient:  [phone]  Last OV 06/27   Last refill 08/16   please review med refill and advise

## 2017-09-19 DIAGNOSIS — G25.81 RESTLESS LEGS: ICD-10-CM

## 2017-09-19 RX ORDER — ROPINIROLE 1 MG/1
TABLET, FILM COATED ORAL
Qty: 270 TABLET | Refills: 0 | Status: SHIPPED | OUTPATIENT
Start: 2017-09-19 | End: 2017-12-18 | Stop reason: SDUPTHER

## 2017-10-02 ENCOUNTER — OFFICE VISIT (OUTPATIENT)
Dept: INTERNAL MEDICINE | Facility: CLINIC | Age: 67
End: 2017-10-02

## 2017-10-02 ENCOUNTER — APPOINTMENT (OUTPATIENT)
Dept: WOMENS IMAGING | Facility: HOSPITAL | Age: 67
End: 2017-10-02

## 2017-10-02 VITALS
DIASTOLIC BLOOD PRESSURE: 72 MMHG | SYSTOLIC BLOOD PRESSURE: 130 MMHG | WEIGHT: 286 LBS | HEIGHT: 65 IN | BODY MASS INDEX: 47.65 KG/M2

## 2017-10-02 DIAGNOSIS — M15.9 PRIMARY OSTEOARTHRITIS INVOLVING MULTIPLE JOINTS: ICD-10-CM

## 2017-10-02 DIAGNOSIS — I25.10 CHRONIC CORONARY ARTERY DISEASE: ICD-10-CM

## 2017-10-02 DIAGNOSIS — G47.33 OBSTRUCTIVE SLEEP APNEA SYNDROME: ICD-10-CM

## 2017-10-02 DIAGNOSIS — E78.2 MIXED HYPERLIPIDEMIA: ICD-10-CM

## 2017-10-02 DIAGNOSIS — Z12.31 SCREENING MAMMOGRAM, ENCOUNTER FOR: ICD-10-CM

## 2017-10-02 DIAGNOSIS — I10 ESSENTIAL HYPERTENSION: Primary | ICD-10-CM

## 2017-10-02 PROCEDURE — 99214 OFFICE O/P EST MOD 30 MIN: CPT | Performed by: INTERNAL MEDICINE

## 2017-10-02 PROCEDURE — G0202 SCR MAMMO BI INCL CAD: HCPCS | Performed by: RADIOLOGY

## 2017-10-02 PROCEDURE — G0202 SCR MAMMO BI INCL CAD: HCPCS | Performed by: INTERNAL MEDICINE

## 2017-10-02 NOTE — PROGRESS NOTES
Subjective   Brooklyn Johns is a 66 y.o. female.     Hyperlipidemia   This is a chronic problem. The current episode started more than 1 year ago. Pertinent negatives include no chest pain.   Hypertension   This is a chronic problem. The current episode started more than 1 year ago. Pertinent negatives include no chest pain or palpitations.        The following portions of the patient's history were reviewed and updated as appropriate: allergies, current medications, past family history, past medical history, past social history, past surgical history and problem list.    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    Eyes: Negative.    Respiratory: Negative.    Cardiovascular: Negative for chest pain and palpitations.   Gastrointestinal: Negative.    Genitourinary: Negative.    Musculoskeletal: Positive for arthralgias (Usual aches & pains).        Having pain & decreased strength in L hand   Neurological: Negative.        Objective   Physical Exam   Constitutional: She is oriented to person, place, and time. She appears well-developed.   HENT:   Head: Normocephalic.   Eyes: EOM are normal.   Neck: Neck supple.   Cardiovascular: Normal rate, regular rhythm and normal heart sounds.    Repeat 130/70   Pulmonary/Chest: Effort normal and breath sounds normal.   Musculoskeletal: Normal range of motion.   Tender to palpation base L thumb   Neurological: She is alert and oriented to person, place, and time.   Skin: Skin is warm and dry.   Vitals reviewed.      Assessment/Plan   Brooklyn was seen today for hyperlipidemia and hypertension.    Diagnoses and all orders for this visit:    Essential hypertension    Mixed hyperlipidemia    Chronic coronary artery disease    Obstructive sleep apnea syndrome    Primary osteoarthritis involving multiple joints

## 2017-10-03 RX ORDER — ESCITALOPRAM OXALATE 10 MG/1
TABLET ORAL
Qty: 90 TABLET | Refills: 3 | Status: SHIPPED | OUTPATIENT
Start: 2017-10-03 | End: 2017-12-08 | Stop reason: SDUPTHER

## 2017-10-16 DIAGNOSIS — M54.16 CHRONIC LUMBAR RADICULOPATHY: ICD-10-CM

## 2017-10-16 NOTE — TELEPHONE ENCOUNTER
----- Message from Crys Franklin sent at 10/16/2017 12:28 PM EDT -----  Contact: Patient  Patient called requesting refill on     HYDROcodone-acetaminophen (NORCO) 7.5-325 MG per tablet    Please call when ready to be picked up.      Patient:  [phone]  Last OV  10/02  Last refill 09/18  zach done 17/14   please review med refill and advise

## 2017-10-17 RX ORDER — HYDROCODONE BITARTRATE AND ACETAMINOPHEN 7.5; 325 MG/1; MG/1
1 TABLET ORAL EVERY 6 HOURS PRN
Qty: 60 TABLET | Refills: 0 | Status: SHIPPED | OUTPATIENT
Start: 2017-10-17 | End: 2017-11-15 | Stop reason: SDUPTHER

## 2017-10-31 RX ORDER — CIMETIDINE 400 MG/1
TABLET, FILM COATED ORAL
Qty: 180 TABLET | Refills: 1 | Status: SHIPPED | OUTPATIENT
Start: 2017-10-31 | End: 2018-06-06 | Stop reason: SDUPTHER

## 2017-11-08 ENCOUNTER — TELEPHONE (OUTPATIENT)
Dept: ORTHOPEDIC SURGERY | Facility: CLINIC | Age: 67
End: 2017-11-08

## 2017-11-10 DIAGNOSIS — G47.00 INSOMNIA, UNSPECIFIED TYPE: ICD-10-CM

## 2017-11-13 RX ORDER — ZOLPIDEM TARTRATE 5 MG/1
5 TABLET ORAL DAILY
Qty: 90 TABLET | Refills: 0
Start: 2017-11-13 | End: 2017-11-28 | Stop reason: SDUPTHER

## 2017-11-15 DIAGNOSIS — M54.16 CHRONIC LUMBAR RADICULOPATHY: ICD-10-CM

## 2017-11-15 NOTE — TELEPHONE ENCOUNTER
Patient called requesting refill.  Please call when ready for .    HYDROcodone-acetaminophen (NORCO) 7.5-325 MG per tablet    Last OV  10/02  Last refill 10/17  zach done today   please review med refill and advise       Pt# [phone]

## 2017-11-16 RX ORDER — HYDROCODONE BITARTRATE AND ACETAMINOPHEN 7.5; 325 MG/1; MG/1
1 TABLET ORAL EVERY 6 HOURS PRN
Qty: 60 TABLET | Refills: 0 | Status: SHIPPED | OUTPATIENT
Start: 2017-11-16 | End: 2017-12-13 | Stop reason: SDUPTHER

## 2017-11-20 ENCOUNTER — OFFICE VISIT (OUTPATIENT)
Dept: ORTHOPEDIC SURGERY | Facility: CLINIC | Age: 67
End: 2017-11-20

## 2017-11-20 VITALS — TEMPERATURE: 98.1 F | HEIGHT: 65 IN | WEIGHT: 286 LBS | BODY MASS INDEX: 47.65 KG/M2

## 2017-11-20 DIAGNOSIS — M19.032 LOCALIZED PRIMARY OSTEOARTHROSIS OF CARPOMETACARPAL JOINT OF LEFT WRIST: ICD-10-CM

## 2017-11-20 DIAGNOSIS — S69.92XA HAND INJURY, LEFT, INITIAL ENCOUNTER: Primary | ICD-10-CM

## 2017-11-20 PROCEDURE — 20600 DRAIN/INJ JOINT/BURSA W/O US: CPT | Performed by: ORTHOPAEDIC SURGERY

## 2017-11-20 PROCEDURE — 73130 X-RAY EXAM OF HAND: CPT | Performed by: ORTHOPAEDIC SURGERY

## 2017-11-20 PROCEDURE — 99214 OFFICE O/P EST MOD 30 MIN: CPT | Performed by: ORTHOPAEDIC SURGERY

## 2017-11-21 RX ADMIN — BUPIVACAINE HYDROCHLORIDE 1 ML: 5 INJECTION, SOLUTION PERINEURAL at 14:18

## 2017-11-21 RX ADMIN — LIDOCAINE HYDROCHLORIDE 1 ML: 10 INJECTION, SOLUTION INFILTRATION; PERINEURAL at 14:18

## 2017-11-21 RX ADMIN — METHYLPREDNISOLONE ACETATE 80 MG: 80 INJECTION, SUSPENSION INTRA-ARTICULAR; INTRALESIONAL; INTRAMUSCULAR; SOFT TISSUE at 14:18

## 2017-11-21 NOTE — PROGRESS NOTES
Patient: Brooklyn Johns    YOB: 1950    Medical Record Number: [id number]    Chief Complaints:  Left hand pain    History of Present Illness:     66 y.o. female patient who presents for evaluation of her left hand.  She fell approximately 3 weeks ago and twisted the hand awkwardly.  She has been having increased discomfort at the base of the thumb ever since.  She does admit that she had some intermittent pain at the base of the thumb prior to this incident though.  She describes her pain as varying in severity from mild to severe, depending on her activity.  Her pain is intermittent and stabbing.  She has noticed some swelling at the base of her thumb.  Rest, ice, and anti-inflammatories have all helped somewhat.  Fortunately, she is right-hand-dominant.    Allergies: No Known Allergies    Home Medications:    Current Outpatient Prescriptions:   •  allopurinol (ZYLOPRIM) 100 MG tablet, Take 1 tablet by mouth Daily., Disp: 90 tablet, Rfl: 3  •  aspirin 325 MG tablet, Take 325 mg by mouth daily., Disp: , Rfl:   •  cimetidine (TAGAMET) 400 MG tablet, TAKE 1 TABLET TWICE A DAY, Disp: 180 tablet, Rfl: 1  •  escitalopram (LEXAPRO) 10 MG tablet, TAKE 1 TABLET DAILY, Disp: 90 tablet, Rfl: 3  •  HYDROcodone-acetaminophen (NORCO) 7.5-325 MG per tablet, Take 1 tablet by mouth Every 6 (Six) Hours As Needed for Moderate Pain ., Disp: 60 tablet, Rfl: 0  •  metoprolol succinate XL (TOPROL-XL) 25 MG 24 hr tablet, Take 1 tablet by mouth Daily., Disp: 90 tablet, Rfl: 0  •  ramipril (ALTACE) 10 MG capsule, Take 1 capsule by mouth 2 (Two) Times a Day., Disp: 180 capsule, Rfl: 3  •  rOPINIRole (REQUIP) 1 MG tablet, TAKE 3 TABLETS AT BEDTIME, Disp: 270 tablet, Rfl: 0  •  simvastatin (ZOCOR) 40 MG tablet, TAKE ONE TABLET BY MOUTH DAILY, Disp: 90 tablet, Rfl: 2  •  zolpidem (AMBIEN) 5 MG tablet, Take 1 tablet by mouth Daily., Disp: 90 tablet, Rfl: 0  •  triamterene-hydrochlorothiazide (MAXZIDE-25) 37.5-25 MG per  tablet, TAKE ONE TABLET BY MOUTH DAILY, Disp: 30 tablet, Rfl: 2    Past Medical History:   Diagnosis Date   • Adductor tendinitis    • Allergic    • Anemia    • Anxiety    • Asthma    • Bronchitis    • CAD (coronary artery disease)    • Degeneration of lumbar or lumbosacral intervertebral disc    • Depression    • Gout    • History of DVT (deep vein thrombosis)    • Hyperlipidemia    • Hypertension    • Insomnia    • Insomnia secondary to anxiety    • 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    • Osteoarthritis    • Osteopenia    • Rhinitis, allergic    • RLS (restless legs syndrome)    • Sinus disorder        Past Surgical History:   Procedure Laterality Date   • CHOLECYSTECTOMY     • CORONARY ANGIOPLASTY WITH STENT PLACEMENT     • GASTRIC BYPASS     • HYSTERECTOMY      Total   • KNEE ACL RECONSTRUCTION     • SINUS SURGERY      x 2   • TOTAL KNEE ARTHROPLASTY Bilateral        Social History     Occupational History   • Not on file.     Social History Main Topics   • Smoking status: Never Smoker   • Smokeless tobacco: Never Used   • Alcohol use Yes      Comment: social   • Drug use: Yes     Special: Hydrocodone   • Sexual activity: No      Social History     Social History Narrative       Family History   Problem Relation Age of Onset   • Breast cancer Mother    • Cancer Mother      bladder   • Lung cancer Father    • Breast cancer Sister    • Hypertension Sister    • Heart disease Sister    • Breast cancer Sister    • Hypertension Sister    • Heart disease Sister        Review of Systems:      Constitutional: Denies fever, shaking or chills   Eyes: Denies change in visual acuity   HEENT: Denies nasal congestion or sore throat   Respiratory: Denies cough or shortness of breath   Cardiovascular: Denies chest pain or edema  Endocrine: Denies tremors, palpitations, intolerance of heat or cold, polyuria, polydipsia.  GI: Denies abdominal pain, nausea, vomiting, bloody stools or diarrhea  : Denies  "frequency, urgency, incontinence, retention, or nocturia.  Musculoskeletal: Denies numbness tingling or loss of motor function except as above  Integument: Denies rash, lesion or ulceration   Neurologic: Denies headache or focal weakness, deficits  Heme: Denies epistaxis, spontaneous or excessive bleeding, epistaxis, hematuria, melena, fatigue, enlarged or tender lymph nodes.      All other pertinent positives and negatives as noted above in HPI.    Physical Exam: 66 y.o. female  Vitals:    11/20/17 1539   Temp: 98.1 °F (36.7 °C)   TempSrc: Temporal Artery    Weight: 286 lb (130 kg)   Height: 65\" (165.1 cm)       General:  Patient is awake and alert.  Appears in no acute distress or discomfort.    Psych:  Affect and demeanor are appropriate.    Eyes:  Conjunctiva and sclera appear grossly normal.  Eyes track well and EOM seem to be intact.    Ears:  No gross abnormalities.  Hearing adequate for the exam.    Cardiovascular:  Regular rate and rhythm.    Lungs:  Good chest expansion.  Breathing unlabored.    Extremities:  The left hand is examined.  Skin is benign.  No swellings, masses, or atrophy.  Moderate tenderness at the base of the left thumb.  Positive thumb grind test.  Good  and pinch strength.  Intact sensation throughout the hand and fingers.  Brisk capillary refill.    Imaging:  AP, oblique, and lateral views of the left hand are ordered by myself and reviewed to evaluate the patient's complaint.  No comparison films are immediately available.  The x-rays show severe carpometacarpal osteoarthritis at the base of the left thumb.  There is bone-on-bone degeneration, and subchondral cyst formation and osteophyte formation.  She also has what appears to be advanced degenerative changes of the proximal interphalangeal joints of all fingers as well as the IP joint of her thumb.    Assessment/Plan:  Left thumb carpometacarpal osteoarthritis    We discussed options for her.  I recommended an injection.  The " risks, benefits, and alternatives were discussed.  She consented to proceed.  Going forward, she will follow up with me as needed.  If her symptoms persist or recur, I told her that I will be happy to see her back at any point.    Small Joint Arthrocentesis  Site marked: site marked  Timeout: Immediately prior to procedure a time out was called to verify the correct patient, procedure, equipment, support staff and site/side marked as required   Supporting Documentation  Indications: pain   Procedure Details  Location: thumb - L thumb CMC  Preparation: Patient was prepped and draped in the usual sterile fashion  Needle size: 25 G  Approach: lateral  Medications administered: 1 mL lidocaine 1 %; 80 mg methylPREDNISolone acetate 80 MG/ML; 1 mL bupivacaine 0.5 %  Patient tolerance: patient tolerated the procedure well with no immediate complications        El Griffith MD    11/20/2017

## 2017-11-26 RX ORDER — METHYLPREDNISOLONE ACETATE 80 MG/ML
80 INJECTION, SUSPENSION INTRA-ARTICULAR; INTRALESIONAL; INTRAMUSCULAR; SOFT TISSUE
Status: COMPLETED | OUTPATIENT
Start: 2017-11-21 | End: 2017-11-21

## 2017-11-26 RX ORDER — BUPIVACAINE HYDROCHLORIDE 5 MG/ML
1 INJECTION, SOLUTION PERINEURAL
Status: COMPLETED | OUTPATIENT
Start: 2017-11-21 | End: 2017-11-21

## 2017-11-26 RX ORDER — LIDOCAINE HYDROCHLORIDE 10 MG/ML
1 INJECTION, SOLUTION INFILTRATION; PERINEURAL
Status: COMPLETED | OUTPATIENT
Start: 2017-11-21 | End: 2017-11-21

## 2017-11-28 DIAGNOSIS — G47.00 INSOMNIA, UNSPECIFIED TYPE: ICD-10-CM

## 2017-11-28 RX ORDER — ZOLPIDEM TARTRATE 5 MG/1
5 TABLET ORAL DAILY
Qty: 90 TABLET | Refills: 0 | Status: SHIPPED | OUTPATIENT
Start: 2017-11-28 | End: 2018-05-07 | Stop reason: SDUPTHER

## 2017-11-28 NOTE — TELEPHONE ENCOUNTER
. Please approve Ambien 5mg per pt request. Rx needed PA and now approved so I will [facility] it to [facility] for [facility] days supply.  We did not print on 11/13, was just refill form from [employer]    Please advise

## 2017-12-08 ENCOUNTER — OFFICE VISIT (OUTPATIENT)
Dept: ORTHOPEDIC SURGERY | Facility: CLINIC | Age: 67
End: 2017-12-08

## 2017-12-08 VITALS — HEIGHT: 65 IN | BODY MASS INDEX: 47.48 KG/M2 | TEMPERATURE: 97.8 F | WEIGHT: 285 LBS

## 2017-12-08 DIAGNOSIS — Z96.653 HISTORY OF TOTAL KNEE ARTHROPLASTY, BILATERAL: Primary | ICD-10-CM

## 2017-12-08 DIAGNOSIS — Z91.81 HISTORY OF FALL: ICD-10-CM

## 2017-12-08 DIAGNOSIS — G89.29 CHRONIC PAIN OF LEFT KNEE: ICD-10-CM

## 2017-12-08 DIAGNOSIS — M25.562 CHRONIC PAIN OF LEFT KNEE: ICD-10-CM

## 2017-12-08 DIAGNOSIS — F32.89 OTHER DEPRESSION: Primary | ICD-10-CM

## 2017-12-08 PROCEDURE — 99214 OFFICE O/P EST MOD 30 MIN: CPT | Performed by: NURSE PRACTITIONER

## 2017-12-08 PROCEDURE — 73562 X-RAY EXAM OF KNEE 3: CPT | Performed by: NURSE PRACTITIONER

## 2017-12-08 RX ORDER — ESCITALOPRAM OXALATE 10 MG/1
10 TABLET ORAL DAILY
Qty: 90 TABLET | Refills: 3 | Status: SHIPPED | OUTPATIENT
Start: 2017-12-08 | End: 2018-12-03 | Stop reason: SDUPTHER

## 2017-12-08 NOTE — PROGRESS NOTES
Patient: Brooklyn Johns  YOB: 1950 67 y.o. female  Medical Record Number: [id number]    Chief Complaints:   Chief Complaint   Patient presents with   • Left Knee - Follow-up, Edema, Pain       History of Present Illness:Brooklyn Johns is a 67 y.o. female who presents With complaints of left anterior knee pain.  Patient had a left total knee replacement in 2014 and was doing fine until a recent fall.  Presently 2 months ago she had a direct fall onto her left knee and his had continued knee pain since.  She describes the pain as a moderate to severe intermittent stabbing type pain with occasional redness bruising and swelling worse with standing driving walking and most certainly going up and down stairs.  She is tried anti-inflammatories ice and rest with minimal improvement.    Allergies: No Known Allergies    Medications:   Current Outpatient Prescriptions   Medication Sig Dispense Refill   • allopurinol (ZYLOPRIM) 100 MG tablet Take 1 tablet by mouth Daily. 90 tablet 3   • aspirin 325 MG tablet Take 325 mg by mouth daily.     • cimetidine (TAGAMET) 400 MG tablet TAKE 1 TABLET TWICE A  tablet 1   • escitalopram (LEXAPRO) 10 MG tablet Take 1 tablet by mouth Daily. 90 tablet 3   • HYDROcodone-acetaminophen (NORCO) 7.5-325 MG per tablet Take 1 tablet by mouth Every 6 (Six) Hours As Needed for Moderate Pain . 60 tablet 0   • metoprolol succinate XL (TOPROL-XL) 25 MG 24 hr tablet Take 1 tablet by mouth Daily. 90 tablet 0   • ramipril (ALTACE) 10 MG capsule Take 1 capsule by mouth 2 (Two) Times a Day. 180 capsule 3   • rOPINIRole (REQUIP) 1 MG tablet TAKE 3 TABLETS AT BEDTIME 270 tablet 0   • simvastatin (ZOCOR) 40 MG tablet TAKE ONE TABLET BY MOUTH DAILY 90 tablet 2   • triamterene-hydrochlorothiazide (MAXZIDE-25) 37.5-25 MG per tablet TAKE ONE TABLET BY MOUTH DAILY 30 tablet 2   • zolpidem (AMBIEN) 5 MG tablet Take 1 tablet by mouth Daily. 90 tablet 0     No current  "facility-administered medications for this visit.          The following portions of the patient's history were reviewed and updated as appropriate: allergies, current medications, past family history, past medical history, past social history, past surgical history and problem list.    Review of Systems:   A 14 point review of systems was performed. All systems negative except pertinent positives/negative listed in HPI above    Physical Exam:   Vitals:    12/08/17 1639   Temp: 97.8 °F (36.6 °C)   TempSrc: Temporal Artery    Weight: 129 kg (285 lb)   Height: 165.1 cm (65\")       General: A and O x 3, ASA, NAD    SCLERA:    Normal    DENTITION:   Normal  Skin clear no unusual lesions noted  Left knee patient has a well-healed midline surgical incision noted, she is nontender palpation has 110° flexion neutral in extension with no instability calf is soft and nontender    Radiology:  Xrays 3views (ap,lateral, sunrise) bilateral knees were ordered and reviewed today secondary to previous surgery and recent fall show well-placed well-positioned bilateral total knee replacements.  Comparative views are unchanged     Assessment/Plan:  Status post bilateral TKA with recent left knee contusion    We will proceed with total body bone scan attention to left knee to rule out any issues with the replacement.  If that is negative then we will try some physical therapy.  The patient is unable to take anti-inflammatories at this time since she was recently taken off of them because of medical issues.  "

## 2017-12-13 DIAGNOSIS — M54.16 CHRONIC LUMBAR RADICULOPATHY: ICD-10-CM

## 2017-12-13 RX ORDER — HYDROCODONE BITARTRATE AND ACETAMINOPHEN 7.5; 325 MG/1; MG/1
1 TABLET ORAL EVERY 6 HOURS PRN
Qty: 60 TABLET | Refills: 0 | Status: SHIPPED | OUTPATIENT
Start: 2017-12-13 | End: 2018-01-15 | Stop reason: SDUPTHER

## 2017-12-13 NOTE — TELEPHONE ENCOUNTER
----- Message from Brooklyn Johns sent at 12/12/2017 11:35 AM EST -----  Regarding: Prescription Question  Contact: [phone]  I am getting ready to got on vacation for a couple of weeks and my RX for [place] will come due while I'm gone.  Can I get it a little early?  I'll be leaving Thursday, Dec 14.  Thanks  Priya Johns

## 2017-12-18 DIAGNOSIS — G25.81 RESTLESS LEGS: ICD-10-CM

## 2017-12-18 RX ORDER — ROPINIROLE 1 MG/1
TABLET, FILM COATED ORAL
Qty: 270 TABLET | Refills: 0 | Status: SHIPPED | OUTPATIENT
Start: 2017-12-18 | End: 2018-03-12 | Stop reason: SDUPTHER

## 2017-12-20 ENCOUNTER — HOSPITAL ENCOUNTER (OUTPATIENT)
Dept: NUCLEAR MEDICINE | Facility: HOSPITAL | Age: 67
Discharge: HOME | End: 2017-12-20

## 2017-12-20 DIAGNOSIS — Z91.81 HISTORY OF FALL: ICD-10-CM

## 2017-12-20 PROCEDURE — 0 TECHNETIUM MEDRONATE KIT: Performed by: NURSE PRACTITIONER

## 2017-12-20 PROCEDURE — A9503 TC99M MEDRONATE: HCPCS | Performed by: NURSE PRACTITIONER

## 2017-12-20 PROCEDURE — 78306 BONE IMAGING WHOLE BODY: CPT

## 2017-12-20 RX ORDER — TC 99M MEDRONATE 20 MG/10ML
20.4 INJECTION, POWDER, LYOPHILIZED, FOR SOLUTION INTRAVENOUS
Status: COMPLETED | OUTPATIENT
Start: 2017-12-20 | End: 2017-12-20

## 2017-12-20 RX ADMIN — Medication 20.4 MILLICURIE: at 11:22

## 2017-12-26 ENCOUNTER — TELEPHONE (OUTPATIENT)
Dept: ORTHOPEDIC SURGERY | Facility: CLINIC | Age: 67
End: 2017-12-26

## 2017-12-26 NOTE — TELEPHONE ENCOUNTER
----- Message from Brooklyn Johns sent at 12/24/2017  1:05 PM EST -----  Regarding: Test Results Question  Contact: [phone]  Could you please call for an update on my bone scan?  I would like Dr Griffith to look at the foot bones also the hand bones. He has seen me for my hand arthritis but I really need help with my feet too  Thanks    Priya Johns

## 2017-12-26 NOTE — TELEPHONE ENCOUNTER
I left a message with Mrs. Johns's voicemail detailing her bone scan findings.  I see nothing loose or damaged based on the bone scan and x-rays.  She had voiced a desire to see Dr. Griffith over some hand or foot issues that she we could set that up if she desires.

## 2018-01-14 DIAGNOSIS — I10 ESSENTIAL HYPERTENSION: ICD-10-CM

## 2018-01-14 DIAGNOSIS — M10.9 GOUT, UNSPECIFIED CAUSE, UNSPECIFIED CHRONICITY, UNSPECIFIED SITE: ICD-10-CM

## 2018-01-15 DIAGNOSIS — M54.16 CHRONIC LUMBAR RADICULOPATHY: ICD-10-CM

## 2018-01-15 RX ORDER — HYDROCODONE BITARTRATE AND ACETAMINOPHEN 7.5; 325 MG/1; MG/1
1 TABLET ORAL EVERY 6 HOURS PRN
Qty: 60 TABLET | Refills: 0 | Status: SHIPPED | OUTPATIENT
Start: 2018-01-15 | End: 2018-02-14 | Stop reason: SDUPTHER

## 2018-01-15 RX ORDER — ALLOPURINOL 100 MG/1
TABLET ORAL
Qty: 90 TABLET | Refills: 2 | Status: SHIPPED | OUTPATIENT
Start: 2018-01-15 | End: 2018-10-26 | Stop reason: SDUPTHER

## 2018-01-15 RX ORDER — TRIAMTERENE AND HYDROCHLOROTHIAZIDE 37.5; 25 MG/1; MG/1
TABLET ORAL
Qty: 90 TABLET | Refills: 1 | Status: SHIPPED | OUTPATIENT
Start: 2018-01-15 | End: 2018-07-24 | Stop reason: SDUPTHER

## 2018-01-15 RX ORDER — SIMVASTATIN 40 MG
TABLET ORAL
Qty: 90 TABLET | Refills: 1 | Status: SHIPPED | OUTPATIENT
Start: 2018-01-15 | End: 2018-07-06 | Stop reason: SDUPTHER

## 2018-01-15 NOTE — TELEPHONE ENCOUNTER
----- Message from Brooklyn Johns sent at 1/12/2018  6:53 PM EST -----  Regarding: Prescription Question  Contact: [phone]  Can I please get a refill on my Norco next week?  Thanks  Priya Johns

## 2018-02-12 DIAGNOSIS — M54.16 CHRONIC LUMBAR RADICULOPATHY: ICD-10-CM

## 2018-02-14 ENCOUNTER — OFFICE VISIT (OUTPATIENT)
Dept: INTERNAL MEDICINE | Facility: CLINIC | Age: 68
End: 2018-02-14

## 2018-02-14 VITALS
SYSTOLIC BLOOD PRESSURE: 110 MMHG | BODY MASS INDEX: 47.48 KG/M2 | HEIGHT: 65 IN | DIASTOLIC BLOOD PRESSURE: 60 MMHG | WEIGHT: 285 LBS

## 2018-02-14 DIAGNOSIS — M54.16 CHRONIC LUMBAR RADICULOPATHY: ICD-10-CM

## 2018-02-14 DIAGNOSIS — M15.9 PRIMARY OSTEOARTHRITIS INVOLVING MULTIPLE JOINTS: ICD-10-CM

## 2018-02-14 DIAGNOSIS — I10 ESSENTIAL HYPERTENSION: Primary | ICD-10-CM

## 2018-02-14 DIAGNOSIS — I25.10 CHRONIC CORONARY ARTERY DISEASE: ICD-10-CM

## 2018-02-14 DIAGNOSIS — E78.2 MIXED HYPERLIPIDEMIA: ICD-10-CM

## 2018-02-14 LAB
ALBUMIN SERPL-MCNC: 4.4 G/DL (ref 3.5–5.2)
ALBUMIN/GLOB SERPL: 1.8 G/DL
ALP SERPL-CCNC: 133 U/L (ref 39–117)
ALT SERPL-CCNC: 13 U/L (ref 1–33)
AST SERPL-CCNC: 16 U/L (ref 1–32)
BASOPHILS # BLD AUTO: 0.02 10*3/MM3 (ref 0–0.2)
BASOPHILS NFR BLD AUTO: 0.4 % (ref 0–2)
BILIRUB SERPL-MCNC: 0.4 MG/DL (ref 0.1–1.2)
BUN SERPL-MCNC: 25 MG/DL (ref 8–23)
BUN/CREAT SERPL: 17 (ref 7–25)
CALCIUM SERPL-MCNC: 8.7 MG/DL (ref 8.6–10.5)
CHLORIDE SERPL-SCNC: 104 MMOL/L (ref 98–107)
CHOLEST SERPL-MCNC: 165 MG/DL (ref 0–200)
CO2 SERPL-SCNC: 20.3 MMOL/L (ref 22–29)
CREAT SERPL-MCNC: 1.47 MG/DL (ref 0.57–1)
DEPRECATED RDW RBC AUTO: 53.2 FL (ref 37–54)
EOSINOPHIL # BLD AUTO: 0.08 10*3/MM3 (ref 0–0.7)
EOSINOPHIL NFR BLD AUTO: 1.6 % (ref 0–5)
ERYTHROCYTE [DISTWIDTH] IN BLOOD BY AUTOMATED COUNT: 15.4 % (ref 11.5–15)
GLOBULIN SER CALC-MCNC: 2.5 GM/DL
GLUCOSE SERPL-MCNC: 101 MG/DL (ref 65–99)
HCT VFR BLD AUTO: 35.7 % (ref 34.1–44.9)
HDLC SERPL-MCNC: 68 MG/DL (ref 40–60)
HGB BLD-MCNC: 11.2 G/DL (ref 11.2–15.7)
LDLC SERPL CALC-MCNC: 78 MG/DL (ref 0–100)
LYMPHOCYTES # BLD AUTO: 1.18 10*3/MM3 (ref 0.8–7)
LYMPHOCYTES NFR BLD AUTO: 23.1 % (ref 10–60)
MCH RBC QN AUTO: 30.5 PG (ref 26–34)
MCHC RBC AUTO-ENTMCNC: 31.4 G/DL (ref 31–37)
MCV RBC AUTO: 97.3 FL (ref 80–100)
MONOCYTES # BLD AUTO: 0.43 10*3/MM3 (ref 0–1)
MONOCYTES NFR BLD AUTO: 8.4 % (ref 0–13)
NEUTROPHILS # BLD AUTO: 3.39 10*3/MM3 (ref 1–11)
NEUTROPHILS NFR BLD AUTO: 66.5 % (ref 30–85)
PLATELET # BLD AUTO: 184 10*3/MM3 (ref 150–450)
PMV BLD AUTO: 11.8 FL (ref 6–12)
POTASSIUM SERPL-SCNC: 5.2 MMOL/L (ref 3.5–5.2)
PROT SERPL-MCNC: 6.9 G/DL (ref 6–8.5)
RBC # BLD AUTO: 3.67 10*6/MM3 (ref 3.93–5.22)
SODIUM SERPL-SCNC: 138 MMOL/L (ref 136–145)
T4 FREE SERPL-MCNC: 0.97 NG/DL (ref 0.93–1.7)
TRIGL SERPL-MCNC: 96 MG/DL (ref 0–150)
TSH SERPL-ACNC: 3.12 MIU/ML (ref 0.27–4.2)
VLDLC SERPL-MCNC: 19.2 MG/DL (ref 5–40)
WBC NRBC COR # BLD: 5.1 10*3/MM3 (ref 5–10)

## 2018-02-14 PROCEDURE — 85025 COMPLETE CBC W/AUTO DIFF WBC: CPT | Performed by: INTERNAL MEDICINE

## 2018-02-14 PROCEDURE — 99214 OFFICE O/P EST MOD 30 MIN: CPT | Performed by: INTERNAL MEDICINE

## 2018-02-14 PROCEDURE — 36415 COLL VENOUS BLD VENIPUNCTURE: CPT | Performed by: INTERNAL MEDICINE

## 2018-02-14 RX ORDER — HYDROCODONE BITARTRATE AND ACETAMINOPHEN 7.5; 325 MG/1; MG/1
1 TABLET ORAL EVERY 6 HOURS PRN
Qty: 60 TABLET | Refills: 0 | Status: SHIPPED | OUTPATIENT
Start: 2018-02-14 | End: 2018-03-08 | Stop reason: SDUPTHER

## 2018-02-14 RX ORDER — AMOXICILLIN 500 MG/1
CAPSULE ORAL
COMMUNITY
Start: 2018-02-12 | End: 2018-05-15

## 2018-02-14 NOTE — PROGRESS NOTES
Subjective   Brooklyn Johns is a 67 y.o. female.     Coronary Artery Disease   Presents for follow-up visit. Pertinent negatives include no chest pain or palpitations.   Insomnia   This is a chronic problem. The current episode started more than 1 year ago. Pertinent negatives include no chest pain.        The following portions of the patient's history were reviewed and updated as appropriate: allergies, current medications, past family history, past medical history, past social history, past surgical history and problem list.    Review of Systems   Constitutional:        Here for F/U  Working as  @ [employer]   HENT: Positive for mouth sores (Just had root canal Has done well).    Eyes: Negative.    Respiratory: Negative.    Cardiovascular: Negative for chest pain and palpitations.   Gastrointestinal: Negative.    Genitourinary: Negative.    Musculoskeletal:        Seeing Dr Griffith for arthritis Taking ibuprofen OTC   Neurological: Negative.    Psychiatric/Behavioral: The patient has insomnia.        Objective   Physical Exam   Constitutional: She is oriented to person, place, and time. She appears well-developed.   HENT:   Head: Normocephalic.   Eyes: EOM are normal.   Neck: Neck supple.   Cardiovascular: Normal rate, regular rhythm and normal heart sounds.    Repeat 110/60   Pulmonary/Chest: Effort normal and breath sounds normal.   Musculoskeletal: Normal range of motion.   Neurological: She is alert and oriented to person, place, and time.   Vitals reviewed.      Assessment/Plan   Brooklyn was seen today for coronary artery disease and insomnia.    Diagnoses and all orders for this visit:    Essential hypertension  -     CBC Auto Differential; Future  -     Comprehensive Metabolic Panel; Future  -     TSH; Future  -     T4, Free; Future    Chronic coronary artery disease    Mixed hyperlipidemia  -     Lipid Panel; Future    Primary osteoarthritis involving multiple joints    Chronic lumbar  radiculopathy  -     HYDROcodone-acetaminophen (NORCO) 7.5-325 MG per tablet; Take 1 tablet by mouth Every 6 (Six) Hours As Needed for Moderate Pain .

## 2018-03-08 DIAGNOSIS — M54.16 CHRONIC LUMBAR RADICULOPATHY: ICD-10-CM

## 2018-03-08 NOTE — TELEPHONE ENCOUNTER
----- Message from Brooklyn Johns sent at 3/8/2018 11:38 AM EST -----  Regarding: Prescription Question  Contact: [phone]  Please renew my hydocodone rx.  Thanks  Priya Johns

## 2018-03-09 RX ORDER — HYDROCODONE BITARTRATE AND ACETAMINOPHEN 7.5; 325 MG/1; MG/1
1 TABLET ORAL EVERY 6 HOURS PRN
Qty: 60 TABLET | Refills: 0 | Status: SHIPPED | OUTPATIENT
Start: 2018-03-09 | End: 2018-04-10 | Stop reason: SDUPTHER

## 2018-03-11 DIAGNOSIS — M54.16 CHRONIC LUMBAR RADICULOPATHY: ICD-10-CM

## 2018-03-11 DIAGNOSIS — I10 ESSENTIAL HYPERTENSION: ICD-10-CM

## 2018-03-12 ENCOUNTER — TELEPHONE (OUTPATIENT)
Dept: ORTHOPEDIC SURGERY | Facility: CLINIC | Age: 68
End: 2018-03-12

## 2018-03-12 ENCOUNTER — TELEPHONE (OUTPATIENT)
Dept: INTERNAL MEDICINE | Facility: CLINIC | Age: 68
End: 2018-03-12

## 2018-03-12 DIAGNOSIS — I10 ESSENTIAL HYPERTENSION: ICD-10-CM

## 2018-03-12 DIAGNOSIS — M54.16 CHRONIC LUMBAR RADICULOPATHY: ICD-10-CM

## 2018-03-12 DIAGNOSIS — G25.81 RESTLESS LEGS: ICD-10-CM

## 2018-03-12 RX ORDER — ROPINIROLE 1 MG/1
3 TABLET, FILM COATED ORAL
Qty: 270 TABLET | Refills: 3 | Status: SHIPPED | OUTPATIENT
Start: 2018-03-12 | End: 2019-03-04 | Stop reason: SDUPTHER

## 2018-03-12 RX ORDER — RAMIPRIL 10 MG/1
CAPSULE ORAL
Qty: 60 CAPSULE | Refills: 0 | Status: SHIPPED | OUTPATIENT
Start: 2018-03-12 | End: 2018-03-12 | Stop reason: SDUPTHER

## 2018-03-12 NOTE — TELEPHONE ENCOUNTER
----- Message from Radha Lobato sent at 3/12/2018  9:47 AM EDT -----  Pt said she sent a my chart message requesting ROPINIROLE refill to go to her [email or web address] and [facility].  Ropinirole was not sent Ramipril  10 MG capsule was sent in error.    Pt would like Ropinirole 1 MG tablet Sig: TAKE 3 TABLETS AT BEDTIME Sent to [email or web address] for a 30 day supply and a Rx to mail order at [facility].    Pt would also like to check on her refill for HYDROcodone-acetaminophen 7.5-325 MG pt said it was supposed to be ready today.  Please advise  Pt# [phone]

## 2018-03-12 NOTE — TELEPHONE ENCOUNTER
Patient has been informed medication is ready to be picked up, we [facility] and called 14 day to local pharmacy

## 2018-03-12 NOTE — TELEPHONE ENCOUNTER
----- Message from Brooklyn Johns sent at 3/12/2018 10:36 AM EDT -----  Regarding: Visit Follow-Up Question  Contact: [phone]  MY CHART MESSAGE:      Right knee continues to get worse major  pain when trying to unbend it or stand on   even walking stairs is difficult again.  Please set up an appointment as soon as I can!.  Priya Johns

## 2018-03-23 ENCOUNTER — LAB (OUTPATIENT)
Dept: LAB | Facility: HOSPITAL | Age: 68
End: 2018-03-23

## 2018-03-23 ENCOUNTER — OFFICE VISIT (OUTPATIENT)
Dept: ORTHOPEDIC SURGERY | Facility: CLINIC | Age: 68
End: 2018-03-23

## 2018-03-23 VITALS — HEIGHT: 65 IN | TEMPERATURE: 98.1 F | BODY MASS INDEX: 48.82 KG/M2 | WEIGHT: 293 LBS

## 2018-03-23 DIAGNOSIS — Z96.653 HISTORY OF TOTAL KNEE ARTHROPLASTY, BILATERAL: Primary | ICD-10-CM

## 2018-03-23 DIAGNOSIS — M25.561 ACUTE PAIN OF RIGHT KNEE: ICD-10-CM

## 2018-03-23 LAB
CRP SERPL-MCNC: 0.16 MG/DL (ref 0–0.5)
ERYTHROCYTE [SEDIMENTATION RATE] IN BLOOD: 25 MM/HR (ref 0–30)

## 2018-03-23 PROCEDURE — 99214 OFFICE O/P EST MOD 30 MIN: CPT | Performed by: NURSE PRACTITIONER

## 2018-03-23 PROCEDURE — 85652 RBC SED RATE AUTOMATED: CPT

## 2018-03-23 PROCEDURE — 36415 COLL VENOUS BLD VENIPUNCTURE: CPT

## 2018-03-23 PROCEDURE — 86140 C-REACTIVE PROTEIN: CPT

## 2018-03-23 PROCEDURE — 73562 X-RAY EXAM OF KNEE 3: CPT | Performed by: NURSE PRACTITIONER

## 2018-03-23 NOTE — PROGRESS NOTES
Patient: Brooklyn Johns  YOB: 1950 67 y.o. female  Medical Record Number: [id number]    Chief Complaints:   Chief Complaint   Patient presents with   • Right Knee - Follow-up, Pain       History of Present Illness:Brooklyn Johns is a 67 y.o. female who presentsWith complaints of significant right knee pain.  Patient reports she had a right total knee replacement about 3 years ago with a subsequent dehiscence about a month later after fall.  She did have a repeat surgery at that point.  Patient reports right knee has been sore since but she has had increased pain over the last couple of months, it is now a severe constant pain worse with any walking or standing.  She denies any fever or chills.    Allergies: No Known Allergies    Medications:   Current Outpatient Prescriptions   Medication Sig Dispense Refill   • allopurinol (ZYLOPRIM) 100 MG tablet TAKE ONE TABLET BY MOUTH DAILY 90 tablet 2   • aspirin 325 MG tablet Take 325 mg by mouth daily.     • cimetidine (TAGAMET) 400 MG tablet TAKE 1 TABLET TWICE A  tablet 1   • escitalopram (LEXAPRO) 10 MG tablet Take 1 tablet by mouth Daily. 90 tablet 3   • HYDROcodone-acetaminophen (NORCO) 7.5-325 MG per tablet Take 1 tablet by mouth Every 6 (Six) Hours As Needed for Moderate Pain . 60 tablet 0   • metoprolol succinate XL (TOPROL-XL) 25 MG 24 hr tablet Take 1 tablet by mouth Daily. 90 tablet 0   • rOPINIRole (REQUIP) 1 MG tablet Take 3 tablets by mouth every night at bedtime. Take 1 hour before bedtime. 270 tablet 3   • simvastatin (ZOCOR) 40 MG tablet TAKE ONE TABLET BY MOUTH DAILY 90 tablet 1   • triamterene-hydrochlorothiazide (MAXZIDE-25) 37.5-25 MG per tablet TAKE ONE TABLET BY MOUTH DAILY 90 tablet 1   • zolpidem (AMBIEN) 5 MG tablet Take 1 tablet by mouth Daily. 90 tablet 0   • amoxicillin (AMOXIL) 500 MG capsule        No current facility-administered medications for this visit.          The following portions of the patient's  "history were reviewed and updated as appropriate: allergies, current medications, past family history, past medical history, past social history, past surgical history and problem list.    Review of Systems:   A 14 point review of systems was performed. All systems negative except pertinent positives/negative listed in HPI above    Physical Exam:   Vitals:    03/23/18 1415   Temp: 98.1 °F (36.7 °C)   TempSrc: Temporal Artery    Weight: 134 kg (296 lb 6.4 oz)   Height: 165.1 cm (65\")       General: A and O x 3, ASA, NAD    SCLERA:    Normal    DENTITION:   Normal  Skin clear no unusual lesions noted  Right knee patient has well-healed surgical incision noted with 110° flexion neutral and extension with no instability no unusual erythema ecchymosis or warmth.  Calf is soft and nontender    Radiology:  Xrays 3views (ap,lateral, sunrise) right knee were ordered and reviewed today secondary to pain show well-placed well-positioned right total knee replacement.  Comparative views are unchanged     Assessment/Plan:  Painful right total knee replacement with history of dehiscence    We will proceed with CRP sedimentation rate as well as total body bone scan attention to right knee and patient will follow-up with Dr. Trujillo within the next couple weeks regarding results and treatment options.  "

## 2018-03-28 ENCOUNTER — TELEPHONE (OUTPATIENT)
Dept: INTERNAL MEDICINE | Facility: CLINIC | Age: 68
End: 2018-03-28

## 2018-03-28 NOTE — TELEPHONE ENCOUNTER
----- Message from Brooklyn Johns sent at 3/28/2018  4:03 AM EDT -----  Regarding: Non-Urgent Medical Question  Contact: [phone]  Can you schedule me for labs.  I think I have a UTI   frequent urge incontinence also sometimes burning during urination.  This has been going on about a month but at first I thought it was getting better and just a fluke.  But it seems to be getting worse.i am off on Wednesday and Friday this week.  Thanks  Priya Johns

## 2018-03-29 ENCOUNTER — OFFICE VISIT (OUTPATIENT)
Dept: INTERNAL MEDICINE | Facility: CLINIC | Age: 68
End: 2018-03-29

## 2018-03-29 VITALS
WEIGHT: 293 LBS | BODY MASS INDEX: 48.82 KG/M2 | HEART RATE: 59 BPM | HEIGHT: 65 IN | SYSTOLIC BLOOD PRESSURE: 110 MMHG | OXYGEN SATURATION: 100 % | DIASTOLIC BLOOD PRESSURE: 80 MMHG

## 2018-03-29 DIAGNOSIS — I10 ESSENTIAL HYPERTENSION: ICD-10-CM

## 2018-03-29 DIAGNOSIS — M15.9 PRIMARY OSTEOARTHRITIS INVOLVING MULTIPLE JOINTS: ICD-10-CM

## 2018-03-29 DIAGNOSIS — R30.0 DYSURIA: Primary | ICD-10-CM

## 2018-03-29 LAB
BACTERIA UR QL AUTO: ABNORMAL /HPF
BILIRUB UR QL STRIP: NEGATIVE
CLARITY UR: CLEAR
COLOR UR: YELLOW
GLUCOSE UR STRIP-MCNC: NEGATIVE MG/DL
HGB UR QL STRIP.AUTO: ABNORMAL
HYALINE CASTS UR QL AUTO: ABNORMAL /LPF
KETONES UR QL STRIP: NEGATIVE
LEUKOCYTE ESTERASE UR QL STRIP.AUTO: ABNORMAL
MUCOUS THREADS URNS QL MICRO: ABNORMAL /HPF
NITRITE UR QL STRIP: NEGATIVE
PH UR STRIP.AUTO: <=5 [PH] (ref 5–8)
PROT UR QL STRIP: NEGATIVE
RBC # UR: ABNORMAL /HPF
REF LAB TEST METHOD: ABNORMAL
SP GR UR STRIP: 1.02 (ref 1–1.03)
SQUAMOUS #/AREA URNS HPF: ABNORMAL /HPF
UROBILINOGEN UR QL STRIP: ABNORMAL
WBC UR QL AUTO: ABNORMAL /HPF

## 2018-03-29 PROCEDURE — 81001 URINALYSIS AUTO W/SCOPE: CPT | Performed by: INTERNAL MEDICINE

## 2018-03-29 PROCEDURE — 99213 OFFICE O/P EST LOW 20 MIN: CPT | Performed by: INTERNAL MEDICINE

## 2018-03-29 RX ORDER — SULFAMETHOXAZOLE AND TRIMETHOPRIM 800; 160 MG/1; MG/1
1 TABLET ORAL 2 TIMES DAILY
Qty: 10 TABLET | Refills: 0 | Status: SHIPPED | OUTPATIENT
Start: 2018-03-29 | End: 2018-05-15

## 2018-03-29 NOTE — PROGRESS NOTES
Subjective   Brooklyn Johns is a 67 y.o. female.     Urinary Tract Infection    This is a new problem. The current episode started 1 to 4 weeks ago. The problem has been gradually worsening. Associated symptoms include urgency ( Has been having urgency for past 3 weeks Comes & goes  Now more persistent wearing pads). Pertinent negatives include no chills or nausea.        The following portions of the patient's history were reviewed and updated as appropriate: allergies, current medications, past family history, past medical history, past social history, past surgical history and problem list.    Review of Systems   Constitutional: Negative for chills and fever.   HENT: Negative.    Eyes: Negative.    Cardiovascular:        BP has been on low side    Gastrointestinal: Negative for nausea.   Genitourinary: Positive for urgency ( Has been having urgency for past 3 weeks Comes & goes  Now more persistent wearing pads).   Musculoskeletal: Positive for arthralgias ( Usual arthritis complaints).   Neurological: Negative.    Psychiatric/Behavioral: Negative.        Objective   Physical Exam   Constitutional: She is oriented to person, place, and time. She appears well-developed.   HENT:   Head: Normocephalic.   Eyes: EOM are normal.   Neck: Neck supple.   Cardiovascular: Normal rate and normal heart sounds.    Repeat 120/70   Pulmonary/Chest: Effort normal.   Musculoskeletal: Normal range of motion.   Neurological: She is alert and oriented to person, place, and time.   Skin: Skin is warm and dry.       Assessment/Plan   Brooklyn was seen today for urinary tract infection.    Diagnoses and all orders for this visit:    Dysuria  -     Urinalysis With / Microscopic If Indicated - Urine, Clean Catch  -     Urinalysis, Microscopic Only - Urine, Clean Catch; Future  -     Urinalysis, Microscopic Only - Urine, Clean Catch  -     sulfamethoxazole-trimethoprim (BACTRIM DS,SEPTRA DS) 800-160 MG per tablet; Take 1 tablet by  mouth 2 (Two) Times a Day.  -     Urine Culture - Urine, Urine, Clean Catch; Future  -     Cancel: Urine Culture - Urine, Urine, Clean Catch  -     Urine Culture - Urine, Urine, Clean Catch; Future  -     Urine Culture - Urine, Urine, Clean Catch    Essential hypertension    Primary osteoarthritis involving multiple joints

## 2018-04-04 ENCOUNTER — HOSPITAL ENCOUNTER (OUTPATIENT)
Dept: NUCLEAR MEDICINE | Facility: HOSPITAL | Age: 68
Discharge: HOME | End: 2018-04-04

## 2018-04-04 DIAGNOSIS — M25.561 ACUTE PAIN OF RIGHT KNEE: ICD-10-CM

## 2018-04-04 PROCEDURE — 78306 BONE IMAGING WHOLE BODY: CPT

## 2018-04-04 PROCEDURE — A9503 TC99M MEDRONATE: HCPCS | Performed by: NURSE PRACTITIONER

## 2018-04-04 PROCEDURE — 0 TECHNETIUM MEDRONATE KIT: Performed by: NURSE PRACTITIONER

## 2018-04-04 RX ORDER — TC 99M MEDRONATE 20 MG/10ML
21.1 INJECTION, POWDER, LYOPHILIZED, FOR SOLUTION INTRAVENOUS
Status: COMPLETED | OUTPATIENT
Start: 2018-04-04 | End: 2018-04-04

## 2018-04-04 RX ADMIN — Medication 21.1 MILLICURIE: at 08:32

## 2018-04-05 LAB
BACTERIA UR CULT: ABNORMAL
Lab: ABNORMAL
Lab: ABNORMAL
SUSCEPTIBILITY TESTING: ABNORMAL

## 2018-04-06 ENCOUNTER — TELEPHONE (OUTPATIENT)
Dept: INTERNAL MEDICINE | Facility: CLINIC | Age: 68
End: 2018-04-06

## 2018-04-06 NOTE — TELEPHONE ENCOUNTER
----- Message from Brooklyn Johns sent at 4/6/2018  7:09 AM EDT -----  Regarding: Non-Urgent Medical Question  Contact: [phone]  Please include in my records that I just received the Hep A and B injection from  health department.  4/5/18.     Could someone call and explain my test results?  I have not had as many incontinent  days since finishing the meds.  No major urges either.  Just a few times with leakage.  Seems I am on the mend.    Also a reminder about needing a refill for Norco next week  Thanks   Priya Johns

## 2018-04-09 DIAGNOSIS — N39.0 URINARY TRACT INFECTION WITHOUT HEMATURIA, SITE UNSPECIFIED: Primary | ICD-10-CM

## 2018-04-09 RX ORDER — CIPROFLOXACIN 500 MG/1
500 TABLET, FILM COATED ORAL EVERY 12 HOURS SCHEDULED
Qty: 14 TABLET | Refills: 0 | Status: SHIPPED | OUTPATIENT
Start: 2018-04-09 | End: 2018-05-15

## 2018-04-10 DIAGNOSIS — M54.16 CHRONIC LUMBAR RADICULOPATHY: ICD-10-CM

## 2018-04-10 RX ORDER — HYDROCODONE BITARTRATE AND ACETAMINOPHEN 7.5; 325 MG/1; MG/1
1 TABLET ORAL EVERY 6 HOURS PRN
Qty: 60 TABLET | Refills: 0 | Status: SHIPPED | OUTPATIENT
Start: 2018-04-10 | End: 2018-05-07 | Stop reason: SDUPTHER

## 2018-04-13 ENCOUNTER — OFFICE VISIT (OUTPATIENT)
Dept: ORTHOPEDIC SURGERY | Facility: CLINIC | Age: 68
End: 2018-04-13

## 2018-04-13 VITALS — BODY MASS INDEX: 47.09 KG/M2 | HEIGHT: 66 IN | TEMPERATURE: 97.6 F | WEIGHT: 293 LBS

## 2018-04-13 DIAGNOSIS — Z96.653 STATUS POST BILATERAL KNEE REPLACEMENTS: Primary | ICD-10-CM

## 2018-04-13 PROCEDURE — 99213 OFFICE O/P EST LOW 20 MIN: CPT | Performed by: ORTHOPAEDIC SURGERY

## 2018-04-13 NOTE — PROGRESS NOTES
Patient: Brooklyn Johns  YOB: 1950 67 y.o. female  Medical Record Number: [id number]    Chief Complaints:   Chief Complaint   Patient presents with   • Right Knee - Follow-up       History of Present Illness:Brooklyn Johns is a 67 y.o. female who presents for follow-up of  Both knees.  She has pain about the medial and anterior aspect of both knees.  Both knees been replaced.  She has undergone 2 bone scans for persistent discomfort.  She describes mild to moderate discomfort with ambulation or arising from a chair.  It has progressed slowly over the past year or so.  Unfortunate she has gained a considerable amount of weight at this time.    Allergies: No Known Allergies    Medications:   Current Outpatient Prescriptions   Medication Sig Dispense Refill   • allopurinol (ZYLOPRIM) 100 MG tablet TAKE ONE TABLET BY MOUTH DAILY 90 tablet 2   • amoxicillin (AMOXIL) 500 MG capsule      • aspirin 325 MG tablet Take 325 mg by mouth daily.     • cimetidine (TAGAMET) 400 MG tablet TAKE 1 TABLET TWICE A  tablet 1   • ciprofloxacin (CIPRO) 500 MG tablet Take 1 tablet by mouth Every 12 (Twelve) Hours. 14 tablet 0   • escitalopram (LEXAPRO) 10 MG tablet Take 1 tablet by mouth Daily. 90 tablet 3   • HYDROcodone-acetaminophen (NORCO) 7.5-325 MG per tablet Take 1 tablet by mouth Every 6 (Six) Hours As Needed for Moderate Pain . 60 tablet 0   • metoprolol succinate XL (TOPROL-XL) 25 MG 24 hr tablet Take 1 tablet by mouth Daily. 90 tablet 0   • rOPINIRole (REQUIP) 1 MG tablet Take 3 tablets by mouth every night at bedtime. Take 1 hour before bedtime. 270 tablet 3   • simvastatin (ZOCOR) 40 MG tablet TAKE ONE TABLET BY MOUTH DAILY 90 tablet 1   • sulfamethoxazole-trimethoprim (BACTRIM DS,SEPTRA DS) 800-160 MG per tablet Take 1 tablet by mouth 2 (Two) Times a Day. 10 tablet 0   • triamterene-hydrochlorothiazide (MAXZIDE-25) 37.5-25 MG per tablet TAKE ONE TABLET BY MOUTH DAILY 90 tablet 1   • zolpidem  "(AMBIEN) 5 MG tablet Take 1 tablet by mouth Daily. 90 tablet 0     No current facility-administered medications for this visit.          The following portions of the patient's history were reviewed and updated as appropriate: allergies, current medications, past family history, past medical history, past social history, past surgical history and problem list.    Review of Systems:   A 14 point review of systems was performed. All systems negative except pertinent positives/negative listed in HPI above    Physical Exam:   Vitals:    04/13/18 1544   Temp: 97.6 °F (36.4 °C)   Weight: 133 kg (294 lb)   Height: 167.6 cm (66\")       General: A and O x 3, ASA, NAD    SCLERA:    Normal    DENTITION:   Normal  Knee:  bilateral    ALIGNMENT:     Neutral  ,   Patella  tracks  Midline without crepitance    GAIT:    Slow but appears Nonantalgic    SKIN:    Well healed midline incision, no erythema or fluctuance    RANGE OF MOTION:   0  -  120   DEG    STRENGTH:   5  / 5    LIGAMENTS:    No varus / valgus instability.   No  Flexion   instability.       DISTAL PULSES:    Paplable    DISTAL SENSATION :   Intact    LYMPHATICS:     No   lymphadenopathy    OTHER:     No   Effusion      Calf soft / nontender ,   Negative Toñito's sign            Radiology:  Xrays 3views both knees (ap,lateral, sunrise) taken previously demonstrating a well positioned knee replacements without evidence of wear, loosening or osteolysis.  There is slight varus positioning of the tibial component less than 3°.  Some of the apparent varus alignment may be malrotation on the x-rays.    I reviewed a bone scan which shows some mild increase uptake about the medial aspect of the left knee and perhaps the patella on the right knee.  This is not excessive and overall does not appear consistent with loose implant      Assessment/Plan:  Bilateral knee pain.  I think her major issue is her excessive weight which is placing stress on under the tibial baseplate and " patella.  I've recommended aggressive weight loss and quad strengthening.  Certainly bracing and injections and further surgery is not an option of this point.  I like to see her back in 1 year with repeat x-rays.

## 2018-04-24 RX ORDER — METOPROLOL SUCCINATE 25 MG/1
TABLET, EXTENDED RELEASE ORAL
Qty: 90 TABLET | Refills: 0 | Status: SHIPPED | OUTPATIENT
Start: 2018-04-24 | End: 2018-07-24 | Stop reason: SDUPTHER

## 2018-05-07 DIAGNOSIS — M54.16 CHRONIC LUMBAR RADICULOPATHY: ICD-10-CM

## 2018-05-07 DIAGNOSIS — G47.00 INSOMNIA, UNSPECIFIED TYPE: ICD-10-CM

## 2018-05-07 RX ORDER — HYDROCODONE BITARTRATE AND ACETAMINOPHEN 7.5; 325 MG/1; MG/1
1 TABLET ORAL EVERY 6 HOURS PRN
Qty: 60 TABLET | Refills: 0 | Status: SHIPPED | OUTPATIENT
Start: 2018-05-07 | End: 2018-06-01 | Stop reason: SDUPTHER

## 2018-05-07 NOTE — TELEPHONE ENCOUNTER
----- Message from Brooklyn Johns sent at 5/6/2018  5:05 PM EDT -----  Regarding: Prescription Question  Contact: [phone]  Can I get my RX for [place] on Monday please.  Might need to go out of town on Tuesday.  Thanks  Priya Johns  Last OV  03/29  zach done 01/16  Last refill 04/10   please review med refill and advise

## 2018-05-08 RX ORDER — ZOLPIDEM TARTRATE 5 MG/1
5 TABLET ORAL DAILY
Qty: 90 TABLET | Refills: 0
Start: 2018-05-08 | End: 2018-08-01 | Stop reason: SDUPTHER

## 2018-05-15 ENCOUNTER — OFFICE VISIT (OUTPATIENT)
Dept: INTERNAL MEDICINE | Facility: CLINIC | Age: 68
End: 2018-05-15

## 2018-05-15 VITALS
OXYGEN SATURATION: 98 % | BODY MASS INDEX: 48.82 KG/M2 | DIASTOLIC BLOOD PRESSURE: 70 MMHG | HEART RATE: 63 BPM | SYSTOLIC BLOOD PRESSURE: 120 MMHG | WEIGHT: 293 LBS | HEIGHT: 65 IN

## 2018-05-15 DIAGNOSIS — E78.2 MIXED HYPERLIPIDEMIA: ICD-10-CM

## 2018-05-15 DIAGNOSIS — I25.10 CHRONIC CORONARY ARTERY DISEASE: ICD-10-CM

## 2018-05-15 DIAGNOSIS — M15.9 PRIMARY OSTEOARTHRITIS INVOLVING MULTIPLE JOINTS: ICD-10-CM

## 2018-05-15 DIAGNOSIS — I10 ESSENTIAL HYPERTENSION: Primary | ICD-10-CM

## 2018-05-15 PROCEDURE — 99214 OFFICE O/P EST MOD 30 MIN: CPT | Performed by: INTERNAL MEDICINE

## 2018-05-15 NOTE — PROGRESS NOTES
Subjective   Brooklyn Johns is a 67 y.o. female.     Hypertension   This is a chronic problem. The current episode started more than 1 year ago. Pertinent negatives include no chest pain or palpitations.        The following portions of the patient's history were reviewed and updated as appropriate: allergies, current medications, past family history, past medical history, past social history, past surgical history and problem list.    Review of Systems   Constitutional:        Generally doing better   HENT: Negative.    Eyes: Negative.    Respiratory: Negative.    Cardiovascular: Negative for chest pain and palpitations.   Gastrointestinal: Negative.    Genitourinary: Positive for urinary incontinence (  No longer having incontinence ).   Musculoskeletal: Positive for arthralgias (Ses  Dr Hipolito Trujillo for F/U knee replacements  Having more pain in knees ).   Neurological: Negative.        Objective   Physical Exam   Constitutional: She is oriented to person, place, and time. She appears well-developed.   HENT:   Head: Normocephalic.   Eyes: EOM are normal.   Neck: Neck supple.   Cardiovascular: Normal rate, regular rhythm and normal heart sounds.    Repeat 120/60   Pulmonary/Chest: Effort normal and breath sounds normal.   Musculoskeletal: Normal range of motion.   Neurological: She is alert and oriented to person, place, and time.         Assessment/Plan   Brooklyn was seen today for hyperlipidemia and hypertension.    Diagnoses and all orders for this visit:    Essential hypertension    Chronic coronary artery disease    Mixed hyperlipidemia    Primary osteoarthritis involving multiple joints

## 2018-05-29 DIAGNOSIS — M54.16 CHRONIC LUMBAR RADICULOPATHY: ICD-10-CM

## 2018-06-01 ENCOUNTER — LAB (OUTPATIENT)
Dept: INTERNAL MEDICINE | Facility: CLINIC | Age: 68
End: 2018-06-01

## 2018-06-01 DIAGNOSIS — Z79.899 NEED FOR PROPHYLACTIC CHEMOTHERAPY: Primary | ICD-10-CM

## 2018-06-01 DIAGNOSIS — M54.16 CHRONIC LUMBAR RADICULOPATHY: ICD-10-CM

## 2018-06-01 DIAGNOSIS — Z79.899 ENCOUNTER FOR LONG-TERM (CURRENT) USE OF MEDICATIONS: ICD-10-CM

## 2018-06-01 DIAGNOSIS — Z79.899 ENCOUNTER FOR LONG-TERM (CURRENT) USE OF MEDICATIONS: Primary | ICD-10-CM

## 2018-06-01 RX ORDER — HYDROCODONE BITARTRATE AND ACETAMINOPHEN 7.5; 325 MG/1; MG/1
1 TABLET ORAL 2 TIMES DAILY PRN
Qty: 60 TABLET | Refills: 0 | Status: SHIPPED | OUTPATIENT
Start: 2018-06-01 | End: 2018-07-03 | Stop reason: SDUPTHER

## 2018-06-05 LAB — CONV REPORT SUMMARY: NORMAL

## 2018-06-06 RX ORDER — CIMETIDINE 400 MG/1
TABLET, FILM COATED ORAL
Qty: 180 TABLET | Refills: 1 | Status: SHIPPED | OUTPATIENT
Start: 2018-06-06 | End: 2018-12-03 | Stop reason: SDUPTHER

## 2018-06-24 ENCOUNTER — PATIENT MESSAGE (OUTPATIENT)
Dept: ORTHOPEDIC SURGERY | Facility: CLINIC | Age: 68
End: 2018-06-24

## 2018-06-25 ENCOUNTER — TELEPHONE (OUTPATIENT)
Dept: ORTHOPEDIC SURGERY | Facility: CLINIC | Age: 68
End: 2018-06-25

## 2018-06-25 DIAGNOSIS — M25.562 ACUTE PAIN OF BOTH KNEES: ICD-10-CM

## 2018-06-25 DIAGNOSIS — M25.561 ACUTE PAIN OF BOTH KNEES: ICD-10-CM

## 2018-06-25 DIAGNOSIS — Z96.653 STATUS POST BILATERAL KNEE REPLACEMENTS: Primary | ICD-10-CM

## 2018-06-25 NOTE — TELEPHONE ENCOUNTER
----- Message from Brooklyn Johns sent at 6/24/2018 11:52 AM EDT -----  Regarding: Referral Request  Contact: [phone]  MY CHAT MESSAGE:    I am trying to lose weight but knees still really hurt.  Could I get a referral to PT at [facility].  It would be great to use the therapy pool to help me get more active.  Thanks  Priya Johns  [phone]

## 2018-06-29 ENCOUNTER — HOSPITAL ENCOUNTER (OUTPATIENT)
Dept: PHYSICAL THERAPY | Facility: HOSPITAL | Age: 68
Setting detail: THERAPIES SERIES
Discharge: HOME | End: 2018-06-29
Attending: ORTHOPAEDIC SURGERY

## 2018-06-29 DIAGNOSIS — Z96.653 TOTAL KNEE REPLACEMENT STATUS, BILATERAL: Primary | ICD-10-CM

## 2018-06-29 DIAGNOSIS — R26.2 DIFFICULTY WALKING: ICD-10-CM

## 2018-06-29 DIAGNOSIS — IMO0001 CLASS 3 OBESITY DUE TO EXCESS CALORIES WITHOUT SERIOUS COMORBIDITY WITH BODY MASS INDEX (BMI) OF 40.0 TO 44.9 IN ADULT: ICD-10-CM

## 2018-06-29 PROCEDURE — G8979 MOBILITY GOAL STATUS: HCPCS

## 2018-06-29 PROCEDURE — 97161 PT EVAL LOW COMPLEX 20 MIN: CPT | Performed by: PHYSICAL THERAPIST

## 2018-06-29 PROCEDURE — G8978 MOBILITY CURRENT STATUS: HCPCS

## 2018-06-29 NOTE — THERAPY EVALUATION
Outpatient Physical Therapy Ortho Initial Evaluation  [facility]     Patient Name: Brooklyn Johns  : 1950  MRN: [id number]  Today's Date: 2018      Visit Date: 2018    Patient Active Problem List   Diagnosis   • Abnormal electrocardiogram   • Chronic coronary artery disease   • Hypertension   • Sleep apnea   • Hyperlipidemia        Past Medical History:   Diagnosis Date   • Adductor tendinitis    • Allergic    • Anemia    • Anxiety    • Asthma    • Bronchitis    • CAD (coronary artery disease)    • Degeneration of lumbar or lumbosacral intervertebral disc    • Depression    • Gout    • History of DVT (deep vein thrombosis)    • Hyperlipidemia    • Hypertension    • Insomnia    • Insomnia secondary to anxiety    • 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    • Osteoarthritis    • Osteopenia    • Rhinitis, allergic    • RLS (restless legs syndrome)    • Sinus disorder    • Sleep apnea         Past Surgical History:   Procedure Laterality Date   • CHOLECYSTECTOMY     • CORONARY ANGIOPLASTY WITH STENT PLACEMENT     • GASTRIC BYPASS     • HYSTERECTOMY      Total   • KNEE ACL RECONSTRUCTION     • SINUS SURGERY      x 2   • TOTAL KNEE ARTHROPLASTY Bilateral        Visit Dx:     ICD-10-CM ICD-9-CM   1. Total knee replacement status, bilateral Z96.653 V43.65   2. Difficulty walking R26.2 719.7   3. Class 3 obesity due to excess calories without serious comorbidity with body mass index (BMI) of 40.0 to 44.9 in adult E66.09 278.00    Z68.41 V85.41             Patient History     Row Name 18 1800             History    Chief Complaint Difficulty Walking;Difficulty with daily activities;Joint stiffness;Pain  -ZK      Type of Pain Knee pain;Ankle pain;Neck pain;Hand pain   primary issue L medial and R lateral knee.   -ZK      Date Current Problem(s) Began 14  -ZK      Brief Description of Current Complaint 67 year old with bilateral knee pain. s/p L TKA in  of  "2014 did well normal SNF and OP PT. June 2015 underwent R TKA with SNF and OP but when she went home fell and slipped on water causing her R knee to \"open up.\" Dehesion of incision required soft tissue repair and wound care and this caused a big set back in her mobility and life. Pt unable to return to work as a  then lost her spouse in 2016 thus major changes in her life. Gained an extreme amount of weight and now has to work part time but long days sitting at a desk create knee swelling. Returned to ortho recently due to ongoing c/o \"cold spots\" and 10/10 pain at times. SED rate and total body bone scan WNL with exception of ankle OA. Prior fusion L ankle 15 years ago and significant moderate pes planus R ankle but no plans for fusion. Hopes to have help with ex program, wt loss, and pain control via water and land based PT.    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   -ZK      What clinical tests have you had for this problem? --   see EPIC for bone scan report  -ZK      Are you or can you be pregnant No  -ZK         Pain     Pain Location Knee  -ZK      Pain at Present 4  -ZK      Pain at Best 3  -ZK      Pain at Worst 10  -ZK      What Performance Factors Make the Current Problem(s) WORSE? kneeling, squatting  -ZK      What Performance Factors Make the Current Problem(s) BETTER? can't get relief  -ZK         Fall Risk Assessment    Any falls in the past year: No  -ZK      Does patient have a fear of falling No  -ZK         Daily Activities    Primary Language English  -ZK      Barriers to learning None  -ZK      Pt Participated in POC and Goals No  -ZK         Safety    Are " you being hurt, hit, or frightened by anyone at home or in your life? No  -ZK      Are you being neglected by a caregiver No  -ZK        User Key  (r) = Recorded By, (t) = Taken By, (c) = Cosigned By    Initials Name Provider Type    ZK Zorre Zeno Kimura, PT Physical Therapist                PT Ortho     Row Name 06/29/18 1800       Subjective Comments    Subjective Comments MD recommended minimal WB type ex program  -ZK       Posture/Observations    Alignment Options Pes Planus   knees in good alignment. mod pes planus R ankle  -ZK       Special Tests/Palpation    Special Tests/Palpation Knee   mild crepitus. no palpable cool spots to touch  -ZK       General ROM    RT Lower Ext Rt Knee Extension/Flexion  -ZK    LT Lower Ext Lt Knee Extension/Flexion  -ZK    GENERAL ROM COMMENTS --   mod loss of normal R ankle inversion due to planus  -ZK       Right Lower Ext    Rt Knee Extension/Flexion AROM --   0-120  -ZK       Left Lower Ext    Lt Knee Extension/Flexion AROM 0-115  -ZK    LT Lower Extremity Comments mod pain end range L > R knee  -ZK       MMT (Manual Muscle Testing)    Additional Documentation --   4- SLR and hip abd bilat.   -ZK       Gait/Stairs Assessment/Training    Deviations/Abnormal Patterns (Gait) base of support, wide;gait speed decreased;karlo decreased;stride length decreased   mild hyperextension of knees in stance phase.   -ZK    Comment (Gait/Stairs) can manage steps to pool with rail. R ankle needing support for stronger pronation counter of shoe, possibly firm insole or jer type brace?  -ZK      User Key  (r) = Recorded By, (t) = Taken By, (c) = Cosigned By    Initials Name Provider Type    ZK Zorre Zeno Kimura, PT Physical Therapist                                  PT OP Goals     Row Name 06/29/18 1800          PT Short Term Goals    STG Date to Achieve 07/29/18  -ZK     STG 1 pt to tolerate 45 minutes pool ex with no increase in joint pain 6-24 hours post rx  -ZK     STG 1 Progress New  " -ZK     STG 2 pt to tolerate 45 minutes of land based PT with no increase in knee joint pain 6-24 hours post rx.   -ZK     STG 2 Progress New  -ZK     STG 3 pt to increase knee ROM by 5 degrees bilaterally  -ZK     STG 3 Progress New  -ZK     STG 4 pt to report changes in pain with worse pain at 7/10   -ZK     STG 4 Progress New  -ZK        Long Term Goals    LTG Date to Achieve 08/29/18  -ZK     LTG 1 KOS score to improve from 50 to < 40% by dc  -ZK     LTG 2 5xsts to improve from 20 to < 16\" by dc  -ZK     LTG 3 pt to obtain good shoes for walking and control of ankle pronation right foot  -ZK     LTG 4 pt to report nice steady drop in body weight from 290 towards 270# per her goal.   -ZK     LTG 5 pt to be indep with water and land based ex and HEP for ongoing health  -ZK        Time Calculation    PT Goal Re-Cert Due Date 09/29/18  -ZK       User Key  (r) = Recorded By, (t) = Taken By, (c) = Cosigned By    Initials Name Provider Type    Z Zorre Zeno Kimura, PT Physical Therapist                PT Assessment/Plan     Row Name 06/29/18 8393          PT Assessment    Functional Limitations Impaired locomotion;Limitations in community activities;Limitations in functional capacity and performance;Performance in leisure activities  -ZK     Impairments Locomotion;Pain;Range of motion;Muscle strength  -ZK     Assessment Comments pt with good Left knee post TKA in 2014 but fall post L TKA in 2015 resulted in surgical repair and set back. Also had a DVT in the LE and injured her R groin thus gait and indep was hindered to quite a degree. Now with c/o deep pain and a cool feeling in her joint. No signs of infection per recent blood work and bone scan thus pt admits her joint pain has a lot to do with weight gain after the loss of her spose plus the fall complications. Pt eager and willing to work hard at ex and hopes she can get active without increasing her pain. Prognosis appears good at this time but she admits " limited financial ability to pay for ongoing gym membership unless she can find an affordable pool/gym. This may affect how we tailor her HEP.   -NEENA     Please refer to paper survey for additional self-reported information Yes  -ZK     Rehab Potential Good  -NEENA     Patient/caregiver participated in establishment of treatment plan and goals Yes  -ZK     Patient would benefit from skilled therapy intervention Yes  -ZK        PT Plan    PT Frequency 2x/week  -ZK     Predicted Duration of Therapy Intervention (Therapy Eval) 8-12 weeks  -ZK     Planned CPT's? PT EVAL LOW COMPLEXITY: 30083;PT THER PROC EA 15 MIN: 71950;PT AQUATIC THERAPY EA 15 MIN: 35857;PT MANUAL THERAPY EA 15 MIN: 05672;PT NEUROMUSC RE-EDUCATION EA 15 MIN: 15284  -NEENA       User Key  (r) = Recorded By, (t) = Taken By, (c) = Cosigned By    Initials Name Provider Type    ZK Zorre Zeno Kimura, PT Physical Therapist                  Exercises     Row Name 06/29/18 1800             Subjective Comments    Subjective Comments MD recommended minimal WB type ex program  -NEENA        User Key  (r) = Recorded By, (t) = Taken By, (c) = Cosigned By    Initials Name Provider Type    ZK Zorre Zeno Kimura, PT Physical Therapist                        Outcome Measure Options: 5x Sit to Stand, Knee Outcome Score- ADL  5 Times Sit to Stand  5 Times Sit to Stand (seconds): 20 seconds  5 Times Sit to Stand Comments: hands on armrests of chair  Knee Outcome Score  Knee Outcome Score Comments: 50%      Time Calculation:     Therapy Suggested Charges     Code   Minutes Charges    None             Start Time: 1445  Stop Time: 1530  Time Calculation (min): 45 min  Total Timed Code Minutes- PT: 45 minute(s)     Therapy Charges for Today     Code Description Service Date Service Provider Modifiers Qty    27597909269  PT AQUA EVAL LOW COMPLEXITY 3 6/29/2018 Zorre Zeno Kimura, PT GP 1          PT G-Codes  Outcome Measure Options: 5x Sit to Stand, Knee Outcome Score- ADL         Jemima  Zeno Kimura, PT  6/29/2018

## 2018-07-03 ENCOUNTER — HOSPITAL ENCOUNTER (OUTPATIENT)
Dept: PHYSICAL THERAPY | Facility: HOSPITAL | Age: 68
Setting detail: THERAPIES SERIES
Discharge: HOME | End: 2018-07-03
Attending: ORTHOPAEDIC SURGERY

## 2018-07-03 DIAGNOSIS — M54.16 CHRONIC LUMBAR RADICULOPATHY: ICD-10-CM

## 2018-07-03 PROCEDURE — 97110 THERAPEUTIC EXERCISES: CPT | Performed by: PHYSICAL THERAPIST

## 2018-07-03 RX ORDER — HYDROCODONE BITARTRATE AND ACETAMINOPHEN 7.5; 325 MG/1; MG/1
1 TABLET ORAL 2 TIMES DAILY PRN
Qty: 60 TABLET | Refills: 0 | Status: SHIPPED | OUTPATIENT
Start: 2018-07-03 | End: 2018-07-31 | Stop reason: SDUPTHER
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
## 2018-07-05 NOTE — THERAPY TREATMENT NOTE
Outpatient Physical Therapy Ortho Treatment Note  [facility]     Patient Name: Brooklyn Johns  : 1950  MRN: [id number]  Today's Date: 2018      Visit Date: 2018    Visit Dx:  No diagnosis found.    Patient Active Problem List   Diagnosis   • Abnormal electrocardiogram   • Chronic coronary artery disease   • Hypertension   • Sleep apnea   • Hyperlipidemia        Past Medical History:   Diagnosis Date   • Adductor tendinitis    • Allergic    • Anemia    • Anxiety    • Asthma    • Bronchitis    • CAD (coronary artery disease)    • Degeneration of lumbar or lumbosacral intervertebral disc    • Depression    • Gout    • History of DVT (deep vein thrombosis)    • Hyperlipidemia    • Hypertension    • Insomnia    • Insomnia secondary to anxiety    • 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    • Osteoarthritis    • Osteopenia    • Rhinitis, allergic    • RLS (restless legs syndrome)    • Sinus disorder    • Sleep apnea         Past Surgical History:   Procedure Laterality Date   • CHOLECYSTECTOMY     • CORONARY ANGIOPLASTY WITH STENT PLACEMENT     • GASTRIC BYPASS     • HYSTERECTOMY      Total   • KNEE ACL RECONSTRUCTION     • SINUS SURGERY      x 2   • TOTAL KNEE ARTHROPLASTY Bilateral                   progressed with LE strengthening per tolerance.  no increase in pain/ fatigued at conclusion                    Exercises     Row Name 18 1300             Total Minutes    63459 - PT Therapeutic Exercise Minutes 45  -DH         Exercise 1    Exercise Name 1 see flow sheet  -      Cueing 1 Verbal;Tactile  -        User Key  (r) = Recorded By, (t) = Taken By, (c) = Cosigned By    Initials Name Provider Type    CHEKO Lobato, PT Physical Therapist                                            Time Calculation:   Start Time: 1545  Stop Time: 1630  Time Calculation (min): 45 min  Therapy Suggested Charges     Code   Minutes Charges    53605 (CPT®)  Pt Neuromusc Re  Education Ea 15 Min      55963 (CPT®) Hc Pt Ther Proc Ea 15 Min 45 3    80477 (CPT®) Hc Gait Training Ea 15 Min      06201 (CPT®) Hc Pt Therapeutic Act Ea 15 Min      50585 (CPT®) Hc Pt Manual Therapy Ea 15 Min      00170 (CPT®) Hc Pt Ther Massage- Per 15 Min      99718 (CPT®) Hc Pt Iontophoresis Ea 15 Min      27773 (CPT®) Hc Pt Elec Stim Ea-Per 15 Min      61996 (CPT®) Hc Pt Ultrasound Ea 15 Min      46057 (CPT®) Hc Pt Self Care/Mgmt/Train Ea 15 Min      Total  45 3                      Donis Lobato, PT  7/5/2018

## 2018-07-06 RX ORDER — SIMVASTATIN 40 MG
TABLET ORAL
Qty: 90 TABLET | Refills: 0 | Status: SHIPPED | OUTPATIENT
Start: 2018-07-06 | End: 2018-10-08 | Stop reason: SDUPTHER

## 2018-07-11 ENCOUNTER — APPOINTMENT (OUTPATIENT)
Dept: PHYSICAL THERAPY | Facility: HOSPITAL | Age: 68
End: 2018-07-11
Attending: ORTHOPAEDIC SURGERY

## 2018-07-13 ENCOUNTER — HOSPITAL ENCOUNTER (OUTPATIENT)
Dept: PHYSICAL THERAPY | Facility: HOSPITAL | Age: 68
Setting detail: THERAPIES SERIES
Discharge: HOME | End: 2018-07-13
Attending: ORTHOPAEDIC SURGERY

## 2018-07-13 DIAGNOSIS — IMO0001 CLASS 3 OBESITY DUE TO EXCESS CALORIES WITHOUT SERIOUS COMORBIDITY WITH BODY MASS INDEX (BMI) OF 40.0 TO 44.9 IN ADULT: ICD-10-CM

## 2018-07-13 DIAGNOSIS — Z96.653 TOTAL KNEE REPLACEMENT STATUS, BILATERAL: Primary | ICD-10-CM

## 2018-07-13 DIAGNOSIS — R26.2 DIFFICULTY WALKING: ICD-10-CM

## 2018-07-13 PROCEDURE — 97110 THERAPEUTIC EXERCISES: CPT | Performed by: PHYSICAL THERAPIST

## 2018-07-13 NOTE — THERAPY TREATMENT NOTE
Outpatient Physical Therapy Ortho Treatment Note  [facility]     Patient Name: Brooklyn Johns  : 1950  MRN: [id number]  Today's Date: 2018      Visit Date: 2018    Visit Dx:    ICD-10-CM ICD-9-CM   1. Total knee replacement status, bilateral Z96.653 V43.65   2. Difficulty walking R26.2 719.7   3. Class 3 obesity due to excess calories without serious comorbidity with body mass index (BMI) of 40.0 to 44.9 in adult (CMS/[facility]) E66.09 278.00    Z68.41 V85.41       Patient Active Problem List   Diagnosis   • Abnormal electrocardiogram   • Chronic coronary artery disease   • Hypertension   • Sleep apnea   • Hyperlipidemia        Past Medical History:   Diagnosis Date   • Adductor tendinitis    • Allergic    • Anemia    • Anxiety    • Asthma    • Bronchitis    • CAD (coronary artery disease)    • Degeneration of lumbar or lumbosacral intervertebral disc    • Depression    • Gout    • History of DVT (deep vein thrombosis)    • Hyperlipidemia    • Hypertension    • Insomnia    • Insomnia secondary to anxiety    • 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    • Osteoarthritis    • Osteopenia    • Rhinitis, allergic    • RLS (restless legs syndrome)    • Sinus disorder    • Sleep apnea         Past Surgical History:   Procedure Laterality Date   • CHOLECYSTECTOMY     • CORONARY ANGIOPLASTY WITH STENT PLACEMENT     • GASTRIC BYPASS     • HYSTERECTOMY      Total   • KNEE ACL RECONSTRUCTION     • SINUS SURGERY      x 2   • TOTAL KNEE ARTHROPLASTY Bilateral                PT Assessment/Plan     Row Name 18 1169          PT Assessment    Assessment Comments Madalyn tolerated treatment well.  She progressed therapeutic exercises without great problem but will need to be monitored for her reaction 24-48 hours after today's session.  -MP        PT Plan    PT Plan Comments Progress as indicated.  -MP       User Key  (r) = Recorded By, (t) = Taken By, (c) = Cosigned By    Initials Name  "Provider Type    WINSTON Rodriguez, PT Physical Therapist                Exercises     Row Name 07/13/18 1700             Subjective Comments    Subjective Comments Priya reports no major issues with her last PT session.  -MP         Subjective Pain    Pre-Treatment Pain Level 3  -MP      Post-Treatment Pain Level 5  -MP         Total Minutes    01179 - PT Therapeutic Exercise Minutes 45  -MP         Exercise 1    Exercise Name 1 Refer to land flow sheet  -MP      Additional Comments Progress therapeutic exercises with TKE, grey theraband. x 20 B, lateral step outs, red theraband 10 x 2 B and hip abduction with [place] machine 45 lbs. 15 x 2.  She also did 5 minutes on the NuStep.  -MP        User Key  (r) = Recorded By, (t) = Taken By, (c) = Cosigned By    Initials Name Provider Type    WINSTON Rodriguez, PT Physical Therapist                  PT OP Goals     Row Name 07/13/18 1700          PT Short Term Goals    STG Date to Achieve 07/29/18  -MP     STG 1 pt to tolerate 45 minutes pool ex with no increase in joint pain 6-24 hours post rx  -MP     STG 1 Progress Ongoing  -MP     STG 2 pt to tolerate 45 minutes of land based PT with no increase in knee joint pain 6-24 hours post rx.   -MP     STG 2 Progress Ongoing  -MP     STG 3 pt to increase knee ROM by 5 degrees bilaterally  -MP     STG 3 Progress Ongoing  -MP     STG 4 pt to report changes in pain with worse pain at 7/10   -MP     STG 4 Progress Ongoing  -MP        Long Term Goals    LTG Date to Achieve 08/29/18  -MP     LTG 1 KOS score to improve from 50 to < 40% by dc  -MP     LTG 1 Progress Ongoing  -MP     LTG 2 5xsts to improve from 20 to < 16\" by dc  -MP     LTG 2 Progress Ongoing  -MP     LTG 3 pt to obtain good shoes for walking and control of ankle pronation right foot  -MP     LTG 3 Progress Ongoing  -MP     LTG 4 pt to report nice steady drop in body weight from 290 towards 270# per her goal.   -MP     LTG 4 Progress Ongoing  -MP     LTG 5 pt to be indep " with water and land based ex and HEP for ongoing health  -MP     LTG 5 Progress Ongoing  -MP       User Key  (r) = Recorded By, (t) = Taken By, (c) = Cosigned By    Initials Name Provider Type    MP Remigio Rodriguez PT Physical Therapist          Therapy Education  Education Details: Progressed her HEP with TKE.  Blue theraband was issued.  Given: HEP  Program: New  How Provided: Written  Provided to: Patient  Level of Understanding: Teach back education performed     Time Calculation:   Start Time: 1515  Stop Time: 1600  Time Calculation (min): 45 min  Therapy Suggested Charges     Code   Minutes Charges    73209 (CPT®) Hc Pt Neuromusc Re Education Ea 15 Min      82723 (CPT®) Hc Pt Ther Proc Ea 15 Min 45 3    04843 (CPT®) Hc Gait Training Ea 15 Min      48686 (CPT®) Hc Pt Therapeutic Act Ea 15 Min      84313 (CPT®) Hc Pt Manual Therapy Ea 15 Min      21399 (CPT®) Hc Pt Ther Massage- Per 15 Min      70956 (CPT®) Hc Pt Iontophoresis Ea 15 Min      43646 (CPT®) Hc Pt Elec Stim Ea-Per 15 Min      50575 (CPT®) Hc Pt Ultrasound Ea 15 Min      59215 (CPT®) Hc Pt Self Care/Mgmt/Train Ea 15 Min      Total  45 3        Therapy Charges for Today     Code Description Service Date Service Provider Modifiers Qty    91147239594 HC PT THER PROC EA 15 MIN 7/13/2018 Remigio Rodriguez PT GP 3            Remigio Rodriguez PT  7/13/2018

## 2018-07-16 ENCOUNTER — HOSPITAL ENCOUNTER (OUTPATIENT)
Dept: PHYSICAL THERAPY | Facility: HOSPITAL | Age: 68
Setting detail: THERAPIES SERIES
Discharge: HOME | End: 2018-07-16
Attending: ORTHOPAEDIC SURGERY

## 2018-07-16 DIAGNOSIS — R26.2 DIFFICULTY WALKING: Primary | ICD-10-CM

## 2018-07-16 DIAGNOSIS — Z96.653 TOTAL KNEE REPLACEMENT STATUS, BILATERAL: ICD-10-CM

## 2018-07-16 PROCEDURE — 97110 THERAPEUTIC EXERCISES: CPT | Performed by: PHYSICAL THERAPIST

## 2018-07-17 NOTE — THERAPY TREATMENT NOTE
Outpatient Physical Therapy Ortho Treatment Note  [facility]     Patient Name: Brooklyn Johns  : 1950  MRN: [id number]  Today's Date: 2018      Visit Date: 2018    Visit Dx:    ICD-10-CM ICD-9-CM   1. Difficulty walking R26.2 719.7   2. Total knee replacement status, bilateral Z96.653 V43.65       Patient Active Problem List   Diagnosis   • Abnormal electrocardiogram   • Chronic coronary artery disease   • Hypertension   • Sleep apnea   • Hyperlipidemia        Past Medical History:   Diagnosis Date   • Adductor tendinitis    • Allergic    • Anemia    • Anxiety    • Asthma    • Bronchitis    • CAD (coronary artery disease)    • Degeneration of lumbar or lumbosacral intervertebral disc    • Depression    • Gout    • History of DVT (deep vein thrombosis)    • Hyperlipidemia    • Hypertension    • Insomnia    • Insomnia secondary to anxiety    • 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    • Osteoarthritis    • Osteopenia    • Rhinitis, allergic    • RLS (restless legs syndrome)    • Sinus disorder    • Sleep apnea         Past Surgical History:   Procedure Laterality Date   • CHOLECYSTECTOMY     • CORONARY ANGIOPLASTY WITH STENT PLACEMENT     • GASTRIC BYPASS     • HYSTERECTOMY      Total   • KNEE ACL RECONSTRUCTION     • SINUS SURGERY      x 2   • TOTAL KNEE ARTHROPLASTY Bilateral                                      Exercises     Row Name 18 1000             Subjective Comments    Subjective Comments compliant with HEP  -DH         Subjective Pain    Pre-Treatment Pain Level 3  -DH      Post-Treatment Pain Level 3  -DH         Total Minutes    20416 - PT Therapeutic Exercise Minutes 40  -DH         Exercise 1    Exercise Name 1 see flow sheet  -      Cueing 1 Verbal;Tactile  -        User Key  (r) = Recorded By, (t) = Taken By, (c) = Cosigned By    Initials Name Provider Type     Donis Lobato, PT Physical Therapist                                             Time Calculation:   Start Time: 1500  Stop Time: 1545  Time Calculation (min): 45 min  Therapy Suggested Charges     Code   Minutes Charges    95401 (CPT®) Hc Pt Neuromusc Re Education Ea 15 Min      22867 (CPT®) Hc Pt Ther Proc Ea 15 Min 40 3    66644 (CPT®) Hc Gait Training Ea 15 Min      20663 (CPT®) Hc Pt Therapeutic Act Ea 15 Min      61690 (CPT®) Hc Pt Manual Therapy Ea 15 Min      75065 (CPT®) Hc Pt Ther Massage- Per 15 Min      11655 (CPT®) Hc Pt Iontophoresis Ea 15 Min      28375 (CPT®) Hc Pt Elec Stim Ea-Per 15 Min      92024 (CPT®) Hc Pt Ultrasound Ea 15 Min      45232 (CPT®) Hc Pt Self Care/Mgmt/Train Ea 15 Min      Total  40 3        Therapy Charges for Today     Code Description Service Date Service Provider Modifiers Qty    93052012604 HC PT THER PROC EA 15 MIN 7/16/2018 Donis Lobato, PT GP 3                    Donis Lobato, PT  7/17/2018

## 2018-07-24 ENCOUNTER — TELEPHONE (OUTPATIENT)
Dept: ORTHOPEDIC SURGERY | Facility: CLINIC | Age: 68
End: 2018-07-24

## 2018-07-24 DIAGNOSIS — I10 ESSENTIAL HYPERTENSION: ICD-10-CM

## 2018-07-24 RX ORDER — TRIAMTERENE AND HYDROCHLOROTHIAZIDE 37.5; 25 MG/1; MG/1
TABLET ORAL
Qty: 90 TABLET | Refills: 0 | Status: SHIPPED | OUTPATIENT
Start: 2018-07-24 | End: 2019-01-03 | Stop reason: SDUPTHER

## 2018-07-24 RX ORDER — TRIAMTERENE AND HYDROCHLOROTHIAZIDE 37.5; 25 MG/1; MG/1
TABLET ORAL
Qty: 90 TABLET | Refills: 0 | Status: SHIPPED | OUTPATIENT
Start: 2018-07-24 | End: 2018-07-24 | Stop reason: SDUPTHER

## 2018-07-24 RX ORDER — METOPROLOL SUCCINATE 25 MG/1
TABLET, EXTENDED RELEASE ORAL
Qty: 90 TABLET | Refills: 0 | Status: SHIPPED | OUTPATIENT
Start: 2018-07-24 | End: 2019-01-03 | Stop reason: SDUPTHER

## 2018-07-24 RX ORDER — METOPROLOL SUCCINATE 25 MG/1
TABLET, EXTENDED RELEASE ORAL
Qty: 90 TABLET | Refills: 0 | Status: SHIPPED | OUTPATIENT
Start: 2018-07-24 | End: 2018-08-20 | Stop reason: SDUPTHER

## 2018-07-24 NOTE — TELEPHONE ENCOUNTER
We haven't seen the patient about 4 months.  I'm not sure why returning to physical therapy changes her work status.  You please clarify.  Thank you

## 2018-07-25 NOTE — TELEPHONE ENCOUNTER
Spoke with patient and advised a current work status is difficult to do since we have not seen her for 4 months.  A note could be given for her work status as of last exam date if needed but doesn't sound like what her employer is requesting.  She advised PT was for knee exercises and to be active to assist with weight loss which doesn't seem to alter her work status unless she needs to be seen for recent injury or problem that may alter her work status.  Advised her to contact me back if any further questions. No note written for patient today.

## 2018-07-30 ENCOUNTER — HOSPITAL ENCOUNTER (OUTPATIENT)
Dept: PHYSICAL THERAPY | Facility: HOSPITAL | Age: 68
Setting detail: THERAPIES SERIES
Discharge: HOME | End: 2018-07-30
Attending: ORTHOPAEDIC SURGERY

## 2018-07-30 DIAGNOSIS — R26.2 DIFFICULTY WALKING: Primary | ICD-10-CM

## 2018-07-30 DIAGNOSIS — Z96.653 TOTAL KNEE REPLACEMENT STATUS, BILATERAL: ICD-10-CM

## 2018-07-30 DIAGNOSIS — IMO0001 CLASS 3 OBESITY DUE TO EXCESS CALORIES WITHOUT SERIOUS COMORBIDITY WITH BODY MASS INDEX (BMI) OF 40.0 TO 44.9 IN ADULT: ICD-10-CM

## 2018-07-30 PROCEDURE — 97113 AQUATIC THERAPY/EXERCISES: CPT | Performed by: PHYSICAL THERAPIST

## 2018-07-30 NOTE — THERAPY TREATMENT NOTE
Outpatient Physical Therapy Ortho Treatment Note  [facility]     Patient Name: Brooklyn Johns  : 1950  MRN: [id number]  Today's Date: 2018      Visit Date: 2018    Visit Dx:    ICD-10-CM ICD-9-CM   1. Difficulty walking R26.2 719.7   2. Total knee replacement status, bilateral Z96.653 V43.65   3. Class 3 obesity due to excess calories without serious comorbidity with body mass index (BMI) of 40.0 to 44.9 in adult (CMS/[facility]) E66.09 278.00    Z68.41 V85.41       Patient Active Problem List   Diagnosis   • Abnormal electrocardiogram   • Chronic coronary artery disease   • Hypertension   • Sleep apnea   • Hyperlipidemia        Past Medical History:   Diagnosis Date   • Adductor tendinitis    • Allergic    • Anemia    • Anxiety    • Asthma    • Bronchitis    • CAD (coronary artery disease)    • Degeneration of lumbar or lumbosacral intervertebral disc    • Depression    • Gout    • History of DVT (deep vein thrombosis)    • Hyperlipidemia    • Hypertension    • Insomnia    • Insomnia secondary to anxiety    • 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 Skilled therapist present for instruction of proper technique and activation of core musculature. Educated on the potential for soreness after therapy session. Suggested use of ice if so.   -CK       User Key  (r) = Recorded By, (t) = Taken By, (c) = Cosigned By     Initials Name Provider Type    CK Tobias Francois PT Physical Therapist                    Exercises     Row Name 07/30/18 1600             Subjective Comments    Subjective Comments I feel like i am moving in the right direction with therapy.   -CK         Subjective Pain    Able to rate subjective pain? yes  -CK      Pre-Treatment Pain Level 2  -CK      Post-Treatment Pain Level 2  -CK         Aquatics LE    Water Walk forward;side;backward   x 3 with TA  -CK      Stretch 1 B calf stretch 2 x 20 sec  -CK      Stretch 2 B HS/adductor/sciatic stretch w SN  -CK      Stretch 3 B piriformis stretch 2 x 20 sec   noodle assist-SN  -CK      Stretch Other 1 B quad stretch 2 x 20 with SN  -CK      Abdominals noodle   LN x 15  -CK      Hip Abd/Add B 15x  -CK      March in Place performed walking x 3 with TA  -CK      Toe/Heel Raises tip toe/tandem walking x 2 laps  -CK      Uni-Squat B leg press, SN x 15  -CK      Uni-Clock sit to stands x 10 *gluteal squeeze at top  -CK      Bicycle seated x 2 min  -CK      Flutter/Scissor -/30  -CK        User Key  (r) = Recorded By, (t) = Taken By, (c) = Cosigned By    Initials Name Provider Type    CK Tobias Francois PT Physical Therapist                                            Time Calculation:   Start Time: 1600  Stop Time: 1645  Time Calculation (min): 45 min  Total Timed Code Minutes- PT: 45 minute(s)  Therapy Suggested Charges     Code   Minutes Charges    None           Therapy Charges for Today     Code Description Service Date Service Provider Modifiers Qty    28827580829 HC PT AQUATIC THERAPY EA 15 MIN 7/30/2018 Tobias Francois PT GP 3                    Tobias Francois PT  7/30/2018

## 2018-07-31 DIAGNOSIS — M54.16 CHRONIC LUMBAR RADICULOPATHY: ICD-10-CM

## 2018-07-31 RX ORDER — HYDROCODONE BITARTRATE AND ACETAMINOPHEN 7.5; 325 MG/1; MG/1
1 TABLET ORAL 2 TIMES DAILY PRN
Qty: 60 TABLET | Refills: 0 | Status: SHIPPED | OUTPATIENT
Start: 2018-07-31 | End: 2018-08-27 | Stop reason: SDUPTHER

## 2018-07-31 NOTE — TELEPHONE ENCOUNTER
Last OV  05/15  Next OV 08/20  Last refill 07/03 # 60 tabs   zach 05/30  please review med refill and advise

## 2018-07-31 NOTE — TELEPHONE ENCOUNTER
----- Message from Brooklyn Johns to David Dial MD sent at 7/30/2018  8:10 AM -----   May I get a refill for my Norco, please?  Thanks  Priya Johns

## 2018-08-01 ENCOUNTER — HOSPITAL ENCOUNTER (OUTPATIENT)
Dept: PHYSICAL THERAPY | Facility: HOSPITAL | Age: 68
Setting detail: THERAPIES SERIES
Discharge: HOME | End: 2018-08-01
Attending: ORTHOPAEDIC SURGERY

## 2018-08-01 DIAGNOSIS — Z96.653 TOTAL KNEE REPLACEMENT STATUS, BILATERAL: ICD-10-CM

## 2018-08-01 DIAGNOSIS — G47.00 INSOMNIA, UNSPECIFIED TYPE: ICD-10-CM

## 2018-08-01 DIAGNOSIS — R26.2 DIFFICULTY WALKING: Primary | ICD-10-CM

## 2018-08-01 DIAGNOSIS — IMO0001 CLASS 3 OBESITY DUE TO EXCESS CALORIES WITHOUT SERIOUS COMORBIDITY WITH BODY MASS INDEX (BMI) OF 40.0 TO 44.9 IN ADULT: ICD-10-CM

## 2018-08-01 PROCEDURE — 97113 AQUATIC THERAPY/EXERCISES: CPT | Performed by: PHYSICAL THERAPIST

## 2018-08-01 RX ORDER — ZOLPIDEM TARTRATE 5 MG/1
5 TABLET ORAL DAILY
Qty: 90 TABLET | Refills: 0
Start: 2018-08-01 | End: 2018-10-29 | Stop reason: SDUPTHER

## 2018-08-01 NOTE — THERAPY TREATMENT NOTE
Outpatient Physical Therapy Ortho Treatment Note  [facility]     Patient Name: Brooklyn Johns  : 1950  MRN: [id number]  Today's Date: 2018      Visit Date: 2018    Visit Dx:    ICD-10-CM ICD-9-CM   1. Difficulty walking R26.2 719.7   2. Total knee replacement status, bilateral Z96.653 V43.65   3. Class 3 obesity due to excess calories without serious comorbidity with body mass index (BMI) of 40.0 to 44.9 in adult (CMS/[facility]) E66.09 278.00    Z68.41 V85.41       Patient Active Problem List   Diagnosis   • Abnormal electrocardiogram   • Chronic coronary artery disease   • Hypertension   • Sleep apnea   • Hyperlipidemia        Past Medical History:   Diagnosis Date   • Adductor tendinitis    • Allergic    • Anemia    • Anxiety    • Asthma    • Bronchitis    • CAD (coronary artery disease)    • Degeneration of lumbar or lumbosacral intervertebral disc    • Depression    • Gout    • History of DVT (deep vein thrombosis)    • Hyperlipidemia    • Hypertension    • Insomnia    • Insomnia secondary to anxiety    • 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 Continued with basic program. Added a few new exercises. Instructed all activities to be performed in a pain free range. Will continue to progress as appropriate.  -CK       User Key  (r) = Recorded By, (t) = Taken By, (c) = Cosigned  By    Initials Name Provider Type    Tobias Neumann, NIK Physical Therapist                    Exercises     Row Name 08/01/18 1600             Subjective Comments    Subjective Comments I was sore after first session. My muscles let me know we worked.   -CK         Subjective Pain    Able to rate subjective pain? yes  -CK      Pre-Treatment Pain Level 0  -CK      Post-Treatment Pain Level 0  -CK         Aquatics LE    Water Walk forward;side;backward   x 3 with TA  -CK      Stretch 1 B calf stretch 2 x 20 sec  -CK      Stretch 2 B HS/adductor/sciatic stretch w LN x12  -CK      Stretch 3 B piriformis stretch 2 x 20 sec   noodle assist-SN  -CK      Stretch Other 1 B quad stretch 2 x 20 with SN  -CK      Abdominals noodle   LN x 15  -CK      Hip Abd/Add B 15x  -CK      March in Place performed walking x 3 with TA  -CK      Toe/Heel Raises tip toe/tandem walking x 2 laps  -CK      Uni-Squat B leg press, blue ring x 15  -CK      Uni-Clock sit to stands x 10 *gluteal squeeze at top  -CK      Step Ups B hip circles cw/ccw 10/10   -CK      Bicycle seated x 2 min  -CK      Flutter/Scissor -/30  -CK        User Key  (r) = Recorded By, (t) = Taken By, (c) = Cosigned By    Initials Name Provider Type    Tobias Nuemann PT Physical Therapist                               PT OP Goals     Row Name 08/01/18 1700          PT Short Term Goals    STG Date to Achieve 07/29/18  -CK     STG 1 pt to tolerate 45 minutes pool ex with no increase in joint pain 6-24 hours post rx  -CK     STG 1 Progress Ongoing  -CK     STG 1 Progress Comments reports appropriate soreness after initiating aquatic therapy  -CK     STG 2 pt to tolerate 45 minutes of land based PT with no increase in knee joint pain 6-24 hours post rx.   -CK     STG 2 Progress Ongoing  -CK     STG 3 pt to increase knee ROM by 5 degrees bilaterally  -CK     STG 3 Progress Ongoing  -CK     STG 4 pt to report changes in pain with worse pain at 7/10   -CK     STG 4 Progress  "Ongoing  -CK     STG 4 Progress Comments 0/10 today, fluctuates daily depending on activity  -CK        Long Term Goals    LTG Date to Achieve 08/29/18  -CK     LTG 1 KOS score to improve from 50 to < 40% by dc  -CK     LTG 1 Progress Ongoing  -CK     LTG 2 5xsts to improve from 20 to < 16\" by dc  -CK     LTG 2 Progress Ongoing  -CK     LTG 3 pt to obtain good shoes for walking and control of ankle pronation right foot  -CK     LTG 3 Progress Ongoing  -CK     LTG 4 pt to report nice steady drop in body weight from 290 towards 270# per her goal.   -CK     LTG 4 Progress Ongoing  -CK     LTG 5 pt to be indep with water and land based ex and HEP for ongoing health  -CK     LTG 5 Progress Ongoing  -CK     LTG 5 Progress Comments initiated aqua program Monday  -CK       User Key  (r) = Recorded By, (t) = Taken By, (c) = Cosigned By    Initials Name Provider Type    CK Tobias Francois, PT Physical Therapist                         Time Calculation:   Start Time: 1600  Stop Time: 1645  Time Calculation (min): 45 min  Total Timed Code Minutes- PT: 45 minute(s)  Therapy Suggested Charges     Code   Minutes Charges    None           Therapy Charges for Today     Code Description Service Date Service Provider Modifiers Qty    96794372944 HC PT AQUATIC THERAPY EA 15 MIN 8/1/2018 Tobias Francois PT GP 3                    Tobias Francois PT  8/1/2018     "

## 2018-08-06 ENCOUNTER — HOSPITAL ENCOUNTER (OUTPATIENT)
Dept: PHYSICAL THERAPY | Facility: HOSPITAL | Age: 68
Setting detail: THERAPIES SERIES
Discharge: HOME | End: 2018-08-06
Attending: ORTHOPAEDIC SURGERY

## 2018-08-06 DIAGNOSIS — IMO0001 CLASS 3 OBESITY DUE TO EXCESS CALORIES WITHOUT SERIOUS COMORBIDITY WITH BODY MASS INDEX (BMI) OF 40.0 TO 44.9 IN ADULT: ICD-10-CM

## 2018-08-06 DIAGNOSIS — Z96.653 TOTAL KNEE REPLACEMENT STATUS, BILATERAL: ICD-10-CM

## 2018-08-06 DIAGNOSIS — R26.2 DIFFICULTY WALKING: Primary | ICD-10-CM

## 2018-08-06 PROCEDURE — 97113 AQUATIC THERAPY/EXERCISES: CPT | Performed by: PHYSICAL THERAPIST

## 2018-08-06 NOTE — THERAPY TREATMENT NOTE
Outpatient Physical Therapy Ortho Treatment Note  [facility]     Patient Name: Brooklyn Johns  : 1950  MRN: [id number]  Today's Date: 2018      Visit Date: 2018    Visit Dx:    ICD-10-CM ICD-9-CM   1. Difficulty walking R26.2 719.7   2. Total knee replacement status, bilateral Z96.653 V43.65   3. Class 3 obesity due to excess calories without serious comorbidity with body mass index (BMI) of 40.0 to 44.9 in adult (CMS/[facility]) E66.09 278.00    Z68.41 V85.41       Patient Active Problem List   Diagnosis   • Abnormal electrocardiogram   • Chronic coronary artery disease   • Hypertension   • Sleep apnea   • Hyperlipidemia        Past Medical History:   Diagnosis Date   • Adductor tendinitis    • Allergic    • Anemia    • Anxiety    • Asthma    • Bronchitis    • CAD (coronary artery disease)    • Degeneration of lumbar or lumbosacral intervertebral disc    • Depression    • Gout    • History of DVT (deep vein thrombosis)    • Hyperlipidemia    • Hypertension    • Insomnia    • Insomnia secondary to anxiety    • 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    • Osteoarthritis    • Osteopenia    • Rhinitis, allergic    • RLS (restless legs syndrome)    • Sinus disorder    • Sleep apnea         Past Surgical History:   Procedure Laterality Date   • CHOLECYSTECTOMY     • CORONARY ANGIOPLASTY WITH STENT PLACEMENT     • GASTRIC BYPASS     • HYSTERECTOMY      Total   • KNEE ACL RECONSTRUCTION     • SINUS SURGERY      x 2   • TOTAL KNEE ARTHROPLASTY Bilateral                              PT Assessment/Plan     Row Name 18 1659          PT Assessment    Assessment Comments Increased pain in low back and knees since last treatment due to unforeseen circumstances secondary to changing her tire. Majority of session performed in deeper water for decompression purposes. Held on adding additional exercises today. Skilled therapist present for instruction of proper technique with all  current exercises. Patient appropriate for progression as symptoms decrease.  -CK       User Key  (r) = Recorded By, (t) = Taken By, (c) = Cosigned By    Initials Name Provider Type    Tobias Neumann PT Physical Therapist                    Exercises     Row Name 08/06/18 1600             Subjective Comments    Subjective Comments I had a rough weekend. I had to change my tire by myself and it required a lot of squatting. My back didnt care for that.  -CK         Subjective Pain    Able to rate subjective pain? yes  -CK      Pre-Treatment Pain Level 5  -CK      Post-Treatment Pain Level 5  -CK      Subjective Pain Comment 5/10 knee, ‘rest of body” 8/10  -CK         Aquatics LE    Water Walk forward;side;backward   x 3 with TA  -CK      Stretch 1 B calf stretch 3 x 20 sec  -CK      Stretch 2 B HS/adductor/sciatic stretch w LN x12  -CK      Stretch 3 B piriformis stretch 3 x 20 sec   noodle assist-SN  -CK      Stretch Other 1 B quad stretch 2 x 20 with SN  -CK      Vertical Traction x 4 min  -CK      Abdominals noodle   LN x 20  -CK      Hip Abd/Add B 15x  -CK      March in Place performed walking x 3 with TA  -CK      Toe/Heel Raises tip toe/tandem walking x 2 laps  -CK      Uni-Squat B leg press, blue ring x 15  -CK      Uni-Clock sit to stands x 10 *gluteal squeeze at top  -CK      Step Ups B hip circles cw/ccw 10/10   -CK      Bicycle seated x 2 min  -CK      Flutter/Scissor -/30  -CK        User Key  (r) = Recorded By, (t) = Taken By, (c) = Cosigned By    Initials Name Provider Type    Tobias Neumann PT Physical Therapist                                            Time Calculation:   Start Time: 1600  Stop Time: 1645  Time Calculation (min): 45 min  Total Timed Code Minutes- PT: 45 minute(s)  Therapy Suggested Charges     Code   Minutes Charges    None           Therapy Charges for Today     Code Description Service Date Service Provider Modifiers Qty    45658877990  PT AQUATIC THERAPY EA 15 MIN  8/6/2018 Tobias Francois, PT GP 3                    Tobias CARRERA. Priscila, PT  8/6/2018

## 2018-08-07 DIAGNOSIS — M54.16 CHRONIC LUMBAR RADICULOPATHY: ICD-10-CM

## 2018-08-07 DIAGNOSIS — I10 ESSENTIAL HYPERTENSION: ICD-10-CM

## 2018-08-07 RX ORDER — RAMIPRIL 10 MG/1
CAPSULE ORAL
Qty: 180 CAPSULE | Refills: 3 | Status: SHIPPED | OUTPATIENT
Start: 2018-08-07 | End: 2019-08-15 | Stop reason: SDUPTHER

## 2018-08-08 ENCOUNTER — HOSPITAL ENCOUNTER (OUTPATIENT)
Dept: PHYSICAL THERAPY | Facility: HOSPITAL | Age: 68
Setting detail: THERAPIES SERIES
End: 2018-08-08
Attending: ORTHOPAEDIC SURGERY

## 2018-08-20 ENCOUNTER — OFFICE VISIT (OUTPATIENT)
Dept: INTERNAL MEDICINE | Facility: CLINIC | Age: 68
End: 2018-08-20

## 2018-08-20 VITALS
BODY MASS INDEX: 48.82 KG/M2 | WEIGHT: 293 LBS | OXYGEN SATURATION: 96 % | SYSTOLIC BLOOD PRESSURE: 130 MMHG | HEART RATE: 56 BPM | HEIGHT: 65 IN | DIASTOLIC BLOOD PRESSURE: 80 MMHG

## 2018-08-20 DIAGNOSIS — I10 ESSENTIAL HYPERTENSION: Primary | ICD-10-CM

## 2018-08-20 DIAGNOSIS — E78.2 MIXED HYPERLIPIDEMIA: ICD-10-CM

## 2018-08-20 DIAGNOSIS — M51.06 INTERVERTEBRAL LUMBAR DISC DISORDER WITH MYELOPATHY, LUMBAR REGION: ICD-10-CM

## 2018-08-20 DIAGNOSIS — I25.10 CHRONIC CORONARY ARTERY DISEASE: ICD-10-CM

## 2018-08-20 PROCEDURE — 99214 OFFICE O/P EST MOD 30 MIN: CPT | Performed by: INTERNAL MEDICINE

## 2018-08-20 NOTE — PROGRESS NOTES
Subjective   Brooklyn Johns is a 67 y.o. female.     Diabetes   She presents for her follow-up diabetic visit. She has type 2 diabetes mellitus. Pertinent negatives for diabetes include no chest pain.        The following portions of the patient's history were reviewed and updated as appropriate: allergies, current medications, past medical history, past social history, past surgical history and problem list.    Review of Systems   Constitutional: Positive for unexpected weight change (Fluctuates).        Here for F/U [place] as  @ [facility]   HENT: Negative.    Eyes: Negative.    Respiratory: Negative.    Cardiovascular: Negative for chest pain and palpitations.        Seldom checks BP   Gastrointestinal: Negative.    Genitourinary: Negative.    Musculoskeletal: Positive for arthralgias (Sees Dr Hipolito Trujillo for knees & was going to PT Is doing better currently).   Neurological: Negative.        Objective   Physical Exam   Constitutional: She appears well-developed.   HENT:   Head: Normocephalic.   Eyes: EOM are normal.   Neck: Neck supple.   Cardiovascular: Normal rate, regular rhythm and normal heart sounds.    Repeat 134/80   Pulmonary/Chest: Effort normal and breath sounds normal.   Musculoskeletal: Normal range of motion.   Neurological: She is alert.   Skin: Skin is warm.   Vitals reviewed.      Assessment/Plan   Brooklyn was seen today for hypertension, hyperlipidemia, insomnia and numbness.    Diagnoses and all orders for this visit:    Essential hypertension    Chronic coronary artery disease    Mixed hyperlipidemia    Intervertebral lumbar disc disorder with myelopathy, lumbar region

## 2018-08-27 DIAGNOSIS — M54.16 CHRONIC LUMBAR RADICULOPATHY: ICD-10-CM

## 2018-08-31 RX ORDER — HYDROCODONE BITARTRATE AND ACETAMINOPHEN 7.5; 325 MG/1; MG/1
1 TABLET ORAL 2 TIMES DAILY PRN
Qty: 60 TABLET | Refills: 0 | Status: SHIPPED | OUTPATIENT
Start: 2018-08-31 | End: 2018-09-28 | Stop reason: SDUPTHER

## 2018-09-28 DIAGNOSIS — M54.16 CHRONIC LUMBAR RADICULOPATHY: ICD-10-CM

## 2018-09-28 RX ORDER — HYDROCODONE BITARTRATE AND ACETAMINOPHEN 7.5; 325 MG/1; MG/1
1 TABLET ORAL 2 TIMES DAILY PRN
Qty: 60 TABLET | Refills: 0 | Status: SHIPPED | OUTPATIENT
Start: 2018-09-28 | End: 2018-10-26 | Stop reason: SDUPTHER

## 2018-09-28 NOTE — TELEPHONE ENCOUNTER
----- Message from Brooklyn Johns sent at 9/28/2018  6:26 AM EDT -----  Regarding: Prescription Question  Contact: [phone]  Could I get a refill on my Norco, please?  Thanks  Priya Johns  [phone]    Last OV   08/20  Last refill 08/31  FLORY done today  please review med refill and advise

## 2018-10-08 RX ORDER — SIMVASTATIN 40 MG
TABLET ORAL
Qty: 90 TABLET | Refills: 0 | Status: SHIPPED | OUTPATIENT
Start: 2018-10-08 | End: 2019-01-21 | Stop reason: SDUPTHER

## 2018-10-19 ENCOUNTER — HOSPITAL ENCOUNTER (EMERGENCY)
Facility: HOSPITAL | Age: 68
Discharge: HOME | End: 2018-10-19
Attending: EMERGENCY MEDICINE | Admitting: EMERGENCY MEDICINE

## 2018-10-19 ENCOUNTER — APPOINTMENT (OUTPATIENT)
Dept: GENERAL RADIOLOGY | Facility: HOSPITAL | Age: 68
End: 2018-10-19

## 2018-10-19 ENCOUNTER — APPOINTMENT (OUTPATIENT)
Dept: CARDIOLOGY | Facility: HOSPITAL | Age: 68
End: 2018-10-19
Attending: EMERGENCY MEDICINE

## 2018-10-19 VITALS
HEIGHT: 66 IN | OXYGEN SATURATION: 97 % | WEIGHT: 293 LBS | DIASTOLIC BLOOD PRESSURE: 66 MMHG | TEMPERATURE: 97.4 F | HEART RATE: 77 BPM | RESPIRATION RATE: 17 BRPM | SYSTOLIC BLOOD PRESSURE: 128 MMHG | BODY MASS INDEX: 47.09 KG/M2

## 2018-10-19 DIAGNOSIS — I82.4Z9 DEEP VEIN THROMBOSIS (DVT) OF DISTAL VEIN OF LOWER EXTREMITY, UNSPECIFIED CHRONICITY, UNSPECIFIED LATERALITY (HCC): Primary | ICD-10-CM

## 2018-10-19 DIAGNOSIS — M79.671 RIGHT FOOT PAIN: ICD-10-CM

## 2018-10-19 LAB
ANION GAP SERPL CALCULATED.3IONS-SCNC: 11.4 MMOL/L
BASOPHILS # BLD AUTO: 0.01 10*3/MM3 (ref 0–0.2)
BASOPHILS NFR BLD AUTO: 0.2 % (ref 0–1.5)
BH CV LOW VAS RIGHT GASTRONEMIUS VESSEL: 1
BH CV LOW VAS RIGHT GREATER SAPH BK VESSEL: 1
BH CV LOW VAS RIGHT POSTERIOR TIBIAL VESSEL: 1
BH CV LOWER VASCULAR LEFT COMMON FEMORAL AUGMENT: NORMAL
BH CV LOWER VASCULAR LEFT COMMON FEMORAL COMPETENT: NORMAL
BH CV LOWER VASCULAR LEFT COMMON FEMORAL COMPRESS: NORMAL
BH CV LOWER VASCULAR LEFT COMMON FEMORAL PHASIC: NORMAL
BH CV LOWER VASCULAR LEFT COMMON FEMORAL SPONT: NORMAL
BH CV LOWER VASCULAR RIGHT COMMON FEMORAL AUGMENT: NORMAL
BH CV LOWER VASCULAR RIGHT COMMON FEMORAL COMPETENT: NORMAL
BH CV LOWER VASCULAR RIGHT COMMON FEMORAL COMPRESS: NORMAL
BH CV LOWER VASCULAR RIGHT COMMON FEMORAL PHASIC: NORMAL
BH CV LOWER VASCULAR RIGHT COMMON FEMORAL SPONT: NORMAL
BH CV LOWER VASCULAR RIGHT DISTAL FEMORAL COMPRESS: NORMAL
BH CV LOWER VASCULAR RIGHT GASTRONEMIUS COMPRESS: NORMAL
BH CV LOWER VASCULAR RIGHT GASTRONEMIUS THROMBUS: NORMAL
BH CV LOWER VASCULAR RIGHT GREATER SAPH AK COMPRESS: NORMAL
BH CV LOWER VASCULAR RIGHT GREATER SAPH BK COMPRESS: NORMAL
BH CV LOWER VASCULAR RIGHT GREATER SAPH BK THROMBUS: NORMAL
BH CV LOWER VASCULAR RIGHT MID FEMORAL AUGMENT: NORMAL
BH CV LOWER VASCULAR RIGHT MID FEMORAL COMPETENT: NORMAL
BH CV LOWER VASCULAR RIGHT MID FEMORAL COMPRESS: NORMAL
BH CV LOWER VASCULAR RIGHT MID FEMORAL PHASIC: NORMAL
BH CV LOWER VASCULAR RIGHT MID FEMORAL SPONT: NORMAL
BH CV LOWER VASCULAR RIGHT PERONEAL COMPRESS: NORMAL
BH CV LOWER VASCULAR RIGHT POPLITEAL AUGMENT: NORMAL
BH CV LOWER VASCULAR RIGHT POPLITEAL COMPETENT: NORMAL
BH CV LOWER VASCULAR RIGHT POPLITEAL COMPRESS: NORMAL
BH CV LOWER VASCULAR RIGHT POPLITEAL PHASIC: NORMAL
BH CV LOWER VASCULAR RIGHT POPLITEAL SPONT: NORMAL
BH CV LOWER VASCULAR RIGHT POSTERIOR TIBIAL COMPRESS: NORMAL
BH CV LOWER VASCULAR RIGHT POSTERIOR TIBIAL THROMBUS: NORMAL
BH CV LOWER VASCULAR RIGHT PROXIMAL FEMORAL COMPRESS: NORMAL
BH CV LOWER VASCULAR RIGHT SAPHENOFEMORAL JUNCTION AUGMENT: NORMAL
BH CV LOWER VASCULAR RIGHT SAPHENOFEMORAL JUNCTION COMPRESS: NORMAL
BH CV LOWER VASCULAR RIGHT SAPHENOFEMORAL JUNCTION PHASIC: NORMAL
BH CV LOWER VASCULAR RIGHT SAPHENOFEMORAL JUNCTION SPONT: NORMAL
BUN BLD-MCNC: 26 MG/DL (ref 8–23)
BUN/CREAT SERPL: 13.8 (ref 7–25)
CALCIUM SPEC-SCNC: 8.6 MG/DL (ref 8.6–10.5)
CHLORIDE SERPL-SCNC: 106 MMOL/L (ref 98–107)
CO2 SERPL-SCNC: 19.6 MMOL/L (ref 22–29)
CREAT BLD-MCNC: 1.88 MG/DL (ref 0.57–1)
DEPRECATED RDW RBC AUTO: 48.8 FL (ref 37–54)
EOSINOPHIL # BLD AUTO: 0.16 10*3/MM3 (ref 0–0.7)
EOSINOPHIL NFR BLD AUTO: 3 % (ref 0.3–6.2)
ERYTHROCYTE [DISTWIDTH] IN BLOOD BY AUTOMATED COUNT: 13.5 % (ref 11.7–13)
GFR SERPL CREATININE-BSD FRML MDRD: 27 ML/MIN/1.73
GLUCOSE BLD-MCNC: 101 MG/DL (ref 65–99)
HCT VFR BLD AUTO: 31.7 % (ref 35.6–45.5)
HGB BLD-MCNC: 9.8 G/DL (ref 11.9–15.5)
IMM GRANULOCYTES # BLD: 0 10*3/MM3 (ref 0–0.03)
IMM GRANULOCYTES NFR BLD: 0 % (ref 0–0.5)
LYMPHOCYTES # BLD AUTO: 1.44 10*3/MM3 (ref 0.9–4.8)
LYMPHOCYTES NFR BLD AUTO: 26.7 % (ref 19.6–45.3)
MCH RBC QN AUTO: 30.4 PG (ref 26.9–32)
MCHC RBC AUTO-ENTMCNC: 30.9 G/DL (ref 32.4–36.3)
MCV RBC AUTO: 98.4 FL (ref 80.5–98.2)
MONOCYTES # BLD AUTO: 0.32 10*3/MM3 (ref 0.2–1.2)
MONOCYTES NFR BLD AUTO: 5.9 % (ref 5–12)
NEUTROPHILS # BLD AUTO: 3.47 10*3/MM3 (ref 1.9–8.1)
NEUTROPHILS NFR BLD AUTO: 64.2 % (ref 42.7–76)
PLATELET # BLD AUTO: 175 10*3/MM3 (ref 140–500)
PMV BLD AUTO: 11 FL (ref 6–12)
POTASSIUM BLD-SCNC: 4.7 MMOL/L (ref 3.5–5.2)
RBC # BLD AUTO: 3.22 10*6/MM3 (ref 3.9–5.2)
SODIUM BLD-SCNC: 137 MMOL/L (ref 136–145)
WBC NRBC COR # BLD: 5.4 10*3/MM3 (ref 4.5–10.7)

## 2018-10-19 PROCEDURE — 73630 X-RAY EXAM OF FOOT: CPT

## 2018-10-19 PROCEDURE — 73610 X-RAY EXAM OF ANKLE: CPT

## 2018-10-19 PROCEDURE — 85025 COMPLETE CBC W/AUTO DIFF WBC: CPT | Performed by: EMERGENCY MEDICINE

## 2018-10-19 PROCEDURE — 93971 EXTREMITY STUDY: CPT

## 2018-10-19 PROCEDURE — 80048 BASIC METABOLIC PNL TOTAL CA: CPT | Performed by: EMERGENCY MEDICINE

## 2018-10-19 PROCEDURE — 99284 EMERGENCY DEPT VISIT MOD MDM: CPT

## 2018-10-19 RX ORDER — ACETAMINOPHEN 500 MG
1000 TABLET ORAL ONCE
Status: COMPLETED | OUTPATIENT
Start: 2018-10-19 | End: 2018-10-19

## 2018-10-19 RX ADMIN — ACETAMINOPHEN 1000 MG: 500 TABLET, FILM COATED ORAL at 18:19

## 2018-10-19 RX ADMIN — RIVAROXABAN 15 MG: 15 TABLET, FILM COATED ORAL at 21:41

## 2018-10-19 NOTE — ED NOTES
Patient reports right foot pain for 3 days with no known injury     Crystal Velez  10/19/18 2820

## 2018-10-19 NOTE — PROGRESS NOTES
RLEV doppler completed.  Preliminary results:  RT leg is positive for new DVT and STP below the knee with no extension into the popliteal.  Results given to Dr. Tom.

## 2018-10-19 NOTE — ED PROVIDER NOTES
" EMERGENCY DEPARTMENT ENCOUNTER    CHIEF COMPLAINT  Chief Complaint: foot pain  History given by: Patient  History limited by: none  Room Number: 35/35  PMD: David Dial MD      HPI:  Pt is a 67 y.o. female who presents complaining of foot pain that began 3 days ago. Pt states that pain began while she was cleaning her house on Tuesday and has worsened since. 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 Pt describes pain as a \"throbbing\" when sitting and sharp when standing on it. Pt states pain is significantly worse when she first wakes up but continues with ambulation.    Duration:  3 days  Onset: gradual  Timing: intermittent  Location: R foot  Radiation: radiates from foot to ankle  Quality: sharp when standing or walking  Intensity/Severity: moderate  Progression: pain has worsened since it began  Associated Symptoms: swelling of ankle  Aggravating Factors: standing/walking  Alleviating Factors: sitting/laying down  Previous Episodes: No previous episodes  Treatment before arrival: No treatment before arrival    PAST MEDICAL HISTORY  Active Ambulatory Problems     Diagnosis Date Noted   • Abnormal electrocardiogram 07/11/2016   • Chronic coronary artery disease 07/11/2016   • Hypertension 07/11/2016   • Sleep apnea 07/11/2016   • Hyperlipidemia 07/11/2016     Resolved Ambulatory Problems     Diagnosis Date Noted   • No Resolved Ambulatory Problems     Past Medical History:   Diagnosis Date   • Adductor tendinitis    • Allergic    • Anemia    • Anxiety    • Asthma    • Bronchitis    • CAD (coronary artery disease)    • Degeneration of lumbar or lumbosacral intervertebral disc    • Depression    • Gout    • History of DVT (deep vein thrombosis)    • Hyperlipidemia    • Hypertension    • Insomnia    • Insomnia secondary to anxiety    • 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    • Osteoarthritis    • Osteopenia  "   • Rhinitis, allergic    • RLS (restless legs syndrome)    • Sinus disorder    • Sleep apnea        PAST SURGICAL HISTORY  Past Surgical History:   Procedure Laterality Date   • CHOLECYSTECTOMY     • CORONARY ANGIOPLASTY WITH STENT PLACEMENT     • GASTRIC BYPASS     • HYSTERECTOMY      Total   • KNEE ACL RECONSTRUCTION     • SINUS SURGERY      x 2   • TOTAL KNEE ARTHROPLASTY Bilateral        FAMILY HISTORY  Family History   Problem Relation Age of Onset   • Breast cancer Mother    • Cancer Mother         bladder   • Lung cancer Father    • Breast cancer Sister    • Hypertension Sister    • Heart disease Sister    • Breast cancer Sister    • Hypertension Sister    • Heart disease Sister        SOCIAL HISTORY  Social History     Social History   • Marital status:      Spouse name: N/A   • Number of children: N/A   • Years of education: N/A     Occupational History   • Not on file.     Social History Main Topics   • Smoking status: Never Smoker   • Smokeless tobacco: Never Used   • Alcohol use Yes      Comment: social   • Drug use: Yes     Types: Hydrocodone   • Sexual activity: No     Other Topics Concern   • Not on file     Social History Narrative   • No narrative on file       ALLERGIES  Patient has no known allergies.    REVIEW OF SYSTEMS  Review of Systems   Constitutional: Negative for fever.   HENT: Negative for sore throat.    Eyes: Negative.    Respiratory: Negative for cough and shortness of breath.    Cardiovascular: Negative for chest pain.   Gastrointestinal: Negative for abdominal pain, diarrhea and vomiting.   Genitourinary: Negative for dysuria.   Musculoskeletal: Negative for neck pain.        Right foot pain, right ankle pain and swelling     Skin: Negative for rash.   Allergic/Immunologic: Negative.    Neurological: Negative for weakness, numbness and headaches.   Hematological: Negative.    Psychiatric/Behavioral: Negative.    All other systems reviewed and are negative.      PHYSICAL  EXAM  ED Triage Vitals   Temp Heart Rate Resp BP SpO2   10/19/18 1726 10/19/18 1726 10/19/18 1726 10/19/18 1732 10/19/18 1726   97.4 °F (36.3 °C) 108 16 148/84 98 %      Temp src Heart Rate Source Patient Position BP Location FiO2 (%)   -- -- -- -- --              Physical Exam   Constitutional: She is oriented to person, place, and time. No distress.   HENT:   Head: Normocephalic and atraumatic.   Eyes: Pupils are equal, round, and reactive to light. EOM are normal.   Neck: Normal range of motion. Neck supple.   Cardiovascular: Normal rate, regular rhythm and normal heart sounds.    Pulmonary/Chest: Effort normal and breath sounds normal. No respiratory distress.   Abdominal: Soft. There is no tenderness. There is no rebound and no guarding.   Musculoskeletal: Normal range of motion. She exhibits edema.        Right ankle: Tenderness. Medial malleolus tenderness found.   Questionable edema to the RLE. Pain is on plantar surface, and starts medially right below medial malleolus and extends plantarly to base of 4th and 5th toe. NVI. Repoducible w/ palpation.    Neurological: She is alert and oriented to person, place, and time. She has normal sensation and normal strength.   Skin: Skin is warm and dry. No rash noted.   Psychiatric: Mood and affect normal.   Vitals reviewed.    RADIOLOGY  XR Foot 3+ View Right   Final Result   Ankle and foot degenerative change as described above.   XR Ankle 3+ View Right   Final Result   Ankle and foot degenerative change as described above.        I ordered the above noted radiological studies. Reviewed by me in PACS.       PROCEDURES  Procedures      PROGRESS AND CONSULTS  ED Course as of Oct 19 2136   Fri Oct 19, 2018   2038 History of anemia in the past no recent hemoglobin values in the computer.  Patient reports no active bleeding from rectum, or bladder.  Patient denies any black stool as well.  Patient has been on Xarelto before and is tolerated well and the past.  Patient  does have a history of anemia that she reports she is unaware of her last hemoglobin. Hemoglobin: (!) 9.8 [MM]      ED Course User Index  [MM] Remigio Tom MD   1935  Rechecked with Pt who is resting comfortable. Discussed doppler showing DVT in the RLE. Questioned pt about prior anticoagulant use. Pt advised that she's used Xarelto before and tolerated it well. Pt told the plan to wait for additional tests to result. Pt understands plan. All questions answered.    2039  BP- 148/84 HR- 108 Temp- 97.4 °F (36.3 °C) O2 sat- 98%  Rechecked the patient who is in NAD and is resting comfortably. Discussed the plan to administer the first dose of anticoagulant in the ED and then d/c w/ rx for prescribed anticoagulant. Pt instructed to take Xarelto as prescribed and f/u w/ her PCP as needed. Pt understands and agrees with the plan, all questions answered.    2111  Spoke with Pharmacist about pt's GFR being 27 and needing anticoagulation. They advised to put in consult and they will return the call after investigating.    2129  Discussed the pt with pharmacist Vern. She advised that the pt's creatinine clearance is 41.3 and needs to be above 30 to be treated with Xarelto. She recommends starting the pt on 15 mg BID for 21 days and 20 mg QD.    2135  BP- 148/84 HR- 108 Temp- 97.4 °F (36.3 °C) O2 sat- 98%  Rechecked the patient who is in NAD and is resting comfortably. Discussed the consult with the pharmacist and the plan to administer Xarelto and d/c w/ rx for Xarelto and her to f/u w/ her PCP. Offered pt a walking boot to help w/ the pain. She accepted. Pt understands and agrees with the plan, all questions answered.      MEDICAL DECISION MAKING  Results were reviewed/discussed with the patient and they were also made aware of online access. Pt also made aware that some labs, such as cultures, will not be resulted during ER visit and follow up with PMD is necessary.     MDM  Number of Diagnoses or Management  Options  Deep vein thrombosis (DVT) of distal vein of lower extremity, unspecified chronicity, unspecified laterality (CMS/HCC):   Right foot pain:      Amount and/or Complexity of Data Reviewed  Tests in the radiology section of CPT®: reviewed (Doppler shows superficial and DVT in the RLE  XR ankle-negative  XR foot-negative)  Decide to obtain previous medical records or to obtain history from someone other than the patient: yes  Discuss the patient with other providers: yes (Vern, pharmacist)           DIAGNOSIS  Final diagnoses:   Deep vein thrombosis (DVT) of distal vein of lower extremity, unspecified chronicity, unspecified laterality (CMS/HCC)   Right foot pain       DISPOSITION  DISCHARGE    Patient discharged in stable condition.    Reviewed implications of results, diagnosis, meds, responsibility to follow up, warning signs and symptoms of possible worsening, potential complications and reasons to return to ER.    Patient/Family voiced understanding of above instructions.    Discussed plan for discharge, as there is no emergent indication for admission. Patient referred to primary care provider for BP management due to today's BP. Pt/family is agreeable and understands need for follow up and repeat testing.  Pt is aware that discharge does not mean that nothing is wrong but it indicates no emergency is present that requires admission and they must continue care with follow-up as given below or physician of their choice.     FOLLOW-UP  David Dial MD  [facility] [id number] [phone]    Schedule an appointment as soon as possible for a visit   As needed         Medication List      No changes were made to your prescriptions during this visit.       Latest Documented Vital Signs:  As of 9:36 PM  BP- 148/84 HR- 108 Temp- 97.4 °F (36.3 °C) O2 sat- 98%    --  Documentation assistance provided by venancio Bonner and Arlene Dill for Dr. Tom.  Information recorded by  the scribe was done at my direction and has been verified and validated by me.     Arlene Dill  10/19/18 2136       Remigio Tom MD  10/19/18 2234

## 2018-10-20 NOTE — PROGRESS NOTES
Pharmacy Consult for Anticoagulant Recommendation    Pharmacy consulted regarding options for possible anticoagulation for acute right calf DVT treatment by Dr. Tom.    Pt is a 67 y.o. female with a CrCl of 41.3 mL/min and weight=136kg.    After discussion with physician it was decided to start patient on Xarelto treatment dose with starter pack.    Thanks,  Vern Downey, FeliceD

## 2018-10-24 ENCOUNTER — TELEPHONE (OUTPATIENT)
Dept: INTERNAL MEDICINE | Facility: CLINIC | Age: 68
End: 2018-10-24

## 2018-10-24 DIAGNOSIS — I82.599 CHRONIC DEEP VEIN THROMBOSIS (DVT) OF OTHER VEIN OF LOWER EXTREMITY, UNSPECIFIED LATERALITY (HCC): Primary | ICD-10-CM

## 2018-10-24 NOTE — TELEPHONE ENCOUNTER
Pt was returning phone call   rivaroxaban (XARELTO) 15 MG tablet   She takes it one for once a day.   [facility]

## 2018-10-24 NOTE — TELEPHONE ENCOUNTER
Called pt to ask her how she takes her XARELTO 15 mg tab, it has 2 different directions on there once daily and 2 times with meal, asked to call me jada before [facility]. Her refill on her Norco will have to wait till Friday, she is due on the 28 which is Sunday. Will send request to the covering physician.

## 2018-10-26 ENCOUNTER — LAB (OUTPATIENT)
Dept: INTERNAL MEDICINE | Facility: CLINIC | Age: 68
End: 2018-10-26

## 2018-10-26 DIAGNOSIS — M54.16 CHRONIC LUMBAR RADICULOPATHY: ICD-10-CM

## 2018-10-26 DIAGNOSIS — M10.9 GOUT, UNSPECIFIED CAUSE, UNSPECIFIED CHRONICITY, UNSPECIFIED SITE: ICD-10-CM

## 2018-10-26 DIAGNOSIS — Z79.899 ENCOUNTER FOR LONG-TERM (CURRENT) USE OF MEDICATIONS: ICD-10-CM

## 2018-10-26 DIAGNOSIS — Z79.899 ENCOUNTER FOR LONG-TERM (CURRENT) USE OF MEDICATIONS: Primary | ICD-10-CM

## 2018-10-26 RX ORDER — ALLOPURINOL 100 MG/1
TABLET ORAL
Qty: 90 TABLET | Refills: 1 | Status: SHIPPED | OUTPATIENT
Start: 2018-10-26 | End: 2019-04-05 | Stop reason: SDUPTHER

## 2018-10-26 RX ORDER — HYDROCODONE BITARTRATE AND ACETAMINOPHEN 7.5; 325 MG/1; MG/1
1 TABLET ORAL 2 TIMES DAILY PRN
Qty: 60 TABLET | Refills: 0 | Status: SHIPPED | OUTPATIENT
Start: 2018-10-26

## 2018-10-26 NOTE — TELEPHONE ENCOUNTER
----- Message from Brooklyn Johns sent at 10/25/2018  6:18 PM EDT -----  Regarding: Prescription Question  Contact: [phone]  May I please get a refill for Norco today?    I have used more then usual because of my foot problem.  Thanks,  Priya Johns

## 2018-10-26 NOTE — TELEPHONE ENCOUNTER
Last OV   08/20    Next OV not scheduled     FLORY done in tray    Last refill 09/28, pt is due sunday  please review med refill and advise

## 2018-10-29 DIAGNOSIS — G47.00 INSOMNIA, UNSPECIFIED TYPE: ICD-10-CM

## 2018-10-29 RX ORDER — ZOLPIDEM TARTRATE 5 MG/1
5 TABLET ORAL DAILY
Qty: 90 TABLET | Refills: 0
Start: 2018-10-29 | End: 2018-10-30 | Stop reason: SDUPTHER

## 2018-10-30 DIAGNOSIS — G47.00 INSOMNIA, UNSPECIFIED TYPE: ICD-10-CM

## 2018-10-30 RX ORDER — ZOLPIDEM TARTRATE 5 MG/1
5 TABLET ORAL DAILY
Qty: 90 TABLET | Refills: 0 | Status: SHIPPED | OUTPATIENT
Start: 2018-10-30 | End: 2019-06-17

## 2018-11-03 LAB — CONV REPORT SUMMARY: NORMAL

## 2018-11-05 ENCOUNTER — DOCUMENTATION (OUTPATIENT)
Dept: PHYSICAL THERAPY | Facility: HOSPITAL | Age: 68
End: 2018-11-05

## 2018-11-05 NOTE — THERAPY DISCHARGE NOTE
"     Outpatient Physical Therapy Discharge Summary         Patient Name: Brooklyn Johns  : 1950  MRN: [id number]    Today's Date: 2018    Visit Dx:  No diagnosis found.          PT OP Goals     Row Name 18 1600          PT Short Term Goals    STG Date to Achieve 18  -CK     STG 1 pt to tolerate 45 minutes pool ex with no increase in joint pain 6-24 hours post rx  -CK     STG 1 Progress Partially Met  -CK     STG 2 pt to tolerate 45 minutes of land based PT with no increase in knee joint pain 6-24 hours post rx.   -CK     STG 2 Progress Comments unable to assess  -CK     STG 3 pt to increase knee ROM by 5 degrees bilaterally  -CK     STG 3 Progress Not Met  -CK     STG 4 pt to report changes in pain with worse pain at 7/10   -CK        Long Term Goals    LTG Date to Achieve 18  -CK     LTG 1 KOS score to improve from 50 to < 40% by dc  -CK     LTG 1 Progress Comments unable to assess  -CK     LTG 2 5xsts to improve from 20 to < 16\" by dc  -CK     LTG 2 Progress Comments unable to assess  -CK     LTG 3 pt to obtain good shoes for walking and control of ankle pronation right foot  -CK     LTG 3 Progress Comments unable to assess  -CK     LTG 4 pt to report nice steady drop in body weight from 290 towards 270# per her goal.   -CK     LTG 4 Progress Comments unable to assess  -CK     LTG 5 pt to be indep with water and land based ex and HEP for ongoing health  -CK     LTG 5 Progress Comments unable to assess  -CK       User Key  (r) = Recorded By, (t) = Taken By, (c) = Cosigned By    Initials Name Provider Type    Tobias Neumann, PT Physical Therapist          OP PT Discharge Summary  Date of Discharge: 18  Reason for Discharge: other (comment) (Patient did not return for further therapy services)  Outcomes Achieved: Patient able to partially acheive established goals  Discharge Instructions/Additional Comments: Ms. Johns was seen for 7 skilled aquatic therapy sessions. " Last seen on 8/6/18. Did not return for further therapy sessions. Discharge at this time.       Time Calculation:        Therapy Suggested Charges     Code   Minutes Charges    None                       Tobias Francois, PT  11/5/2018

## 2018-11-27 ENCOUNTER — PATIENT MESSAGE (OUTPATIENT)
Dept: INTERNAL MEDICINE | Facility: CLINIC | Age: 68
End: 2018-11-27

## 2018-11-27 NOTE — TELEPHONE ENCOUNTER
Called and discussed with patient. Advised Ms. Johns that we could no longer fill her Norco secondary to two negative drug screens. She states that's what happens when you run out before the refill is due. Advised patient that it should remain in system and be detected 2-4 days in urine. Could be she runs out and goes several days without it. She has a follow up with Radha. Will discuss other pain control options at that time.

## 2018-11-27 NOTE — TELEPHONE ENCOUNTER
From: Brooklyn Johns  To: David Dial MD  Sent: 11/27/2018 4:46 AM EST  Subject: Prescription Question    May I please have my refill of Norco for this month.?  Thanks  Also I had my TB test at the [facility] in October. It was neg. I also had flu shot there in October.  If someone can make note of that on my file I'd appreciate it.  Thanks  Priya Johns

## 2018-12-03 DIAGNOSIS — F32.89 OTHER DEPRESSION: ICD-10-CM

## 2018-12-04 RX ORDER — CIMETIDINE 400 MG/1
TABLET, FILM COATED ORAL
Qty: 180 TABLET | Refills: 0 | Status: SHIPPED | OUTPATIENT
Start: 2018-12-04 | End: 2019-06-17

## 2018-12-04 RX ORDER — ESCITALOPRAM OXALATE 10 MG/1
TABLET ORAL
Qty: 90 TABLET | Refills: 0 | Status: SHIPPED | OUTPATIENT
Start: 2018-12-04 | End: 2019-03-04 | Stop reason: SDUPTHER

## 2018-12-05 ENCOUNTER — OFFICE VISIT (OUTPATIENT)
Dept: INTERNAL MEDICINE | Facility: CLINIC | Age: 68
End: 2018-12-05

## 2018-12-05 VITALS
OXYGEN SATURATION: 99 % | SYSTOLIC BLOOD PRESSURE: 124 MMHG | BODY MASS INDEX: 45.64 KG/M2 | DIASTOLIC BLOOD PRESSURE: 72 MMHG | HEART RATE: 85 BPM | WEIGHT: 284 LBS | HEIGHT: 66 IN

## 2018-12-05 DIAGNOSIS — M54.2 CHRONIC NECK AND BACK PAIN: ICD-10-CM

## 2018-12-05 DIAGNOSIS — I10 ESSENTIAL HYPERTENSION: ICD-10-CM

## 2018-12-05 DIAGNOSIS — M54.9 CHRONIC NECK AND BACK PAIN: ICD-10-CM

## 2018-12-05 DIAGNOSIS — I82.599 CHRONIC DEEP VEIN THROMBOSIS (DVT) OF OTHER VEIN OF LOWER EXTREMITY, UNSPECIFIED LATERALITY (HCC): ICD-10-CM

## 2018-12-05 DIAGNOSIS — G89.29 CHRONIC NECK AND BACK PAIN: ICD-10-CM

## 2018-12-05 DIAGNOSIS — Z11.59 SCREENING FOR VIRAL DISEASE: ICD-10-CM

## 2018-12-05 DIAGNOSIS — M10.9 GOUT, UNSPECIFIED CAUSE, UNSPECIFIED CHRONICITY, UNSPECIFIED SITE: Primary | ICD-10-CM

## 2018-12-05 DIAGNOSIS — Z23 NEED FOR PNEUMOCOCCAL VACCINATION: ICD-10-CM

## 2018-12-05 DIAGNOSIS — G47.33 OBSTRUCTIVE SLEEP APNEA SYNDROME: ICD-10-CM

## 2018-12-05 DIAGNOSIS — I25.10 CHRONIC CORONARY ARTERY DISEASE: ICD-10-CM

## 2018-12-05 DIAGNOSIS — E78.2 MIXED HYPERLIPIDEMIA: ICD-10-CM

## 2018-12-05 DIAGNOSIS — Z78.0 POSTMENOPAUSAL: ICD-10-CM

## 2018-12-05 DIAGNOSIS — R20.2 PARESTHESIAS: ICD-10-CM

## 2018-12-05 LAB
ALBUMIN SERPL-MCNC: 4.7 G/DL (ref 3.5–5.2)
ALBUMIN/GLOB SERPL: 2.4 G/DL
ALP SERPL-CCNC: 151 U/L (ref 39–117)
ALT SERPL-CCNC: 15 U/L (ref 1–33)
AST SERPL-CCNC: 18 U/L (ref 1–32)
BASOPHILS # BLD AUTO: 0.03 10*3/MM3 (ref 0–0.2)
BASOPHILS NFR BLD AUTO: 0.5 % (ref 0–2)
BILIRUB SERPL-MCNC: 0.2 MG/DL (ref 0.1–1.2)
BUN SERPL-MCNC: 31 MG/DL (ref 8–23)
BUN/CREAT SERPL: 15.7 (ref 7–25)
CALCIUM SERPL-MCNC: 8.7 MG/DL (ref 8.6–10.5)
CHLORIDE SERPL-SCNC: 114 MMOL/L (ref 98–107)
CO2 SERPL-SCNC: 17.1 MMOL/L (ref 22–29)
CREAT SERPL-MCNC: 1.97 MG/DL (ref 0.57–1)
DEPRECATED RDW RBC AUTO: 48.2 FL (ref 37–54)
EOSINOPHIL # BLD AUTO: 0.15 10*3/MM3 (ref 0–0.7)
EOSINOPHIL NFR BLD AUTO: 2.4 % (ref 0–5)
ERYTHROCYTE [DISTWIDTH] IN BLOOD BY AUTOMATED COUNT: 13.4 % (ref 11.5–15)
GLOBULIN SER CALC-MCNC: 2 GM/DL
GLUCOSE SERPL-MCNC: 108 MG/DL (ref 65–99)
HCT VFR BLD AUTO: 34.6 % (ref 34.1–44.9)
HGB BLD-MCNC: 10.5 G/DL (ref 11.2–15.7)
LYMPHOCYTES # BLD AUTO: 1.67 10*3/MM3 (ref 0.8–7)
LYMPHOCYTES NFR BLD AUTO: 27.2 % (ref 10–60)
MCH RBC QN AUTO: 31 PG (ref 26–34)
MCHC RBC AUTO-ENTMCNC: 30.3 G/DL (ref 31–37)
MCV RBC AUTO: 102.1 FL (ref 80–100)
MONOCYTES # BLD AUTO: 0.45 10*3/MM3 (ref 0–1)
MONOCYTES NFR BLD AUTO: 7.3 % (ref 0–13)
NEUTROPHILS # BLD AUTO: 3.85 10*3/MM3 (ref 1–11)
NEUTROPHILS NFR BLD AUTO: 62.6 % (ref 30–85)
PLATELET # BLD AUTO: 200 10*3/MM3 (ref 150–450)
PMV BLD AUTO: 11 FL (ref 6–12)
POTASSIUM SERPL-SCNC: 5.8 MMOL/L (ref 3.5–5.2)
PROT SERPL-MCNC: 6.7 G/DL (ref 6–8.5)
RBC # BLD AUTO: 3.39 10*6/MM3 (ref 3.93–5.22)
SODIUM SERPL-SCNC: 145 MMOL/L (ref 136–145)
TSH SERPL-ACNC: 2.77 MIU/ML (ref 0.27–4.2)
URATE SERPL-MCNC: 5.9 MG/DL (ref 2.4–5.7)
VIT B12 SERPL-MCNC: 202 PG/ML (ref 211–946)
WBC NRBC COR # BLD: 6.15 10*3/MM3 (ref 5–10)

## 2018-12-05 PROCEDURE — 99213 OFFICE O/P EST LOW 20 MIN: CPT | Performed by: NURSE PRACTITIONER

## 2018-12-05 PROCEDURE — 85025 COMPLETE CBC W/AUTO DIFF WBC: CPT | Performed by: NURSE PRACTITIONER

## 2018-12-05 PROCEDURE — G0439 PPPS, SUBSEQ VISIT: HCPCS | Performed by: NURSE PRACTITIONER

## 2018-12-05 PROCEDURE — 36415 COLL VENOUS BLD VENIPUNCTURE: CPT | Performed by: NURSE PRACTITIONER

## 2018-12-05 NOTE — PROGRESS NOTES
QUICK REFERENCE INFORMATION:  The ABCs of the Annual Wellness Visit    Subsequent Medicare Wellness Visit    HEALTH RISK ASSESSMENT    1950    Recent Hospitalizations:  Recently treated at the following:  [facility].  Seen in ER 10/19/18 for DVT of left lower extremity      Current Medical Providers:  Patient Care Team:  David Dial MD as PCP - General  David Dial MD as PCP - Family Medicine        Smoking Status:  Social History     Tobacco Use   Smoking Status Never Smoker   Smokeless Tobacco Never Used       Alcohol Consumption:  Social History     Substance and Sexual Activity   Alcohol Use Yes   • Types: 1 Glasses of wine per week    Comment: social       Depression Screen:   PHQ-2/PHQ-9 Depression Screening 12/5/2018   Little interest or pleasure in doing things 0   Feeling down, depressed, or hopeless 0   Trouble falling or staying asleep, or sleeping too much 3   Feeling tired or having little energy 3   Poor appetite or overeating 0   Feeling bad about yourself - or that you are a failure or have let yourself or your family down 0   Trouble concentrating on things, such as reading the newspaper or watching television 0   Moving or speaking so slowly that other people could have noticed. Or the opposite - being so fidgety or restless that you have been moving around a lot more than usual 0   Thoughts that you would be better off dead, or of hurting yourself in some way 0   Total Score 6   If you checked off any problems, how difficult have these problems made it for you to do your work, take care of things at home, or get along with other people? Not difficult at all       Health Habits and Functional and Cognitive Screening:  Functional & Cognitive Status 12/5/2018   Do you have difficulty preparing food and eating? No   Do you have difficulty bathing yourself, getting dressed or grooming yourself? No   Do you have difficulty using the toilet? No   Do you have difficulty  moving around from place to place? No   Do you have trouble with steps or getting out of a bed or a chair? No   In the past year have you fallen or experienced a near fall? Yes   Current Diet Well Balanced Diet   Dental Exam Up to date   Eye Exam Up to date   Exercise (times per week) 3 times per week   Current Exercise Activities Include Walking   Do you need help using the phone?  No   Are you deaf or do you have serious difficulty hearing?  No   Do you need help with transportation? No   Do you need help shopping? No   Do you need help preparing meals?  No   Do you need help with housework?  No   Do you need help with laundry? No   Do you need help taking your medications? No   Do you need help managing money? No   Do you ever drive or ride in a car without wearing a seat belt? No   Have you felt unusual stress, anger or loneliness in the last month? No   Who do you live with? Alone   If you need help, do you have trouble finding someone available to you? No   Have you been bothered in the last four weeks by sexual problems? No   Do you have difficulty concentrating, remembering or making decisions? No           Does the patient have evidence of cognitive impairment? No    Aspirin use counseling: Does not need ASA (and currently is not on it)      Recent Lab Results:  CMP:  Lab Results   Component Value Date     (H) 12/05/2018    BUN 31 (H) 12/05/2018    CREATININE 1.97 (H) 12/05/2018    EGFRIFNONA 25 (L) 12/05/2018    EGFRIFAFRI 31 (L) 12/05/2018    BCR 15.7 12/05/2018     12/05/2018    K 5.8 (H) 12/05/2018    CO2 17.1 (L) 12/05/2018    CALCIUM 8.7 12/05/2018    PROTENTOTREF 6.7 12/05/2018    ALBUMIN 4.70 12/05/2018    LABGLOBREF 2.0 12/05/2018    LABIL2 2.4 12/05/2018    BILITOT 0.2 12/05/2018    ALKPHOS 151 (H) 12/05/2018    AST 18 12/05/2018    ALT 15 12/05/2018     Lipid Panel:  Lab Results   Component Value Date    CHOL 159 06/27/2017    TRIG 96 02/14/2018    HDL 68 (H) 02/14/2018    VLDL 19.2  02/14/2018    LDLHDL 1.41 06/27/2017     HbA1c:       Visual Acuity:  No exam data present    Age-appropriate Screening Schedule:  Refer to the list below for future screening recommendations based on patient's age, sex and/or medical conditions. Orders for these recommended tests are listed in the plan section. The patient has been provided with a written plan.    Health Maintenance   Topic Date Due   • TDAP/TD VACCINES (1 - Tdap) 12/05/1969   • DXA SCAN  07/25/2016   • ZOSTER VACCINE (1 of 2) 12/14/2019 (Originally 12/5/2000)   • LIPID PANEL  02/14/2019   • MAMMOGRAM  10/02/2019   • COLONOSCOPY  10/16/2019   • INFLUENZA VACCINE  Completed   • PNEUMOCOCCAL VACCINES (65+ LOW/MEDIUM RISK)  Completed        Subjective   History of Present Illness    Brooklyn Johns is a 68 y.o. female who presents for an Subsequent Wellness Visit.    The following portions of the patient's history were reviewed and updated as appropriate: allergies, current medications, past family history, past medical history, past social history, past surgical history and problem list.    Outpatient Medications Prior to Visit   Medication Sig Dispense Refill   • allopurinol (ZYLOPRIM) 100 MG tablet TAKE ONE TABLET BY MOUTH DAILY 90 tablet 1   • cimetidine (TAGAMET) 400 MG tablet TAKE 1 TABLET TWICE A  tablet 0   • escitalopram (LEXAPRO) 10 MG tablet TAKE 1 TABLET DAILY 90 tablet 0   • HYDROcodone-acetaminophen (NORCO) 7.5-325 MG per tablet Take 1 tablet by mouth 2 (Two) Times a Day As Needed for Moderate Pain . 60 tablet 0   • metoprolol succinate XL (TOPROL-XL) 25 MG 24 hr tablet TAKE ONE TABLET BY MOUTH DAILY 90 tablet 0   • ramipril (ALTACE) 10 MG capsule TAKE 1 CAPSULE TWICE A  capsule 3   • rivaroxaban (XARELTO) 15 MG tablet Take 1 tablet by mouth Daily. 90 tablet 2   • rOPINIRole (REQUIP) 1 MG tablet Take 3 tablets by mouth every night at bedtime. Take 1 hour before bedtime. 270 tablet 3   • simvastatin (ZOCOR) 40 MG tablet  "TAKE ONE TABLET BY MOUTH DAILY 90 tablet 0   • triamterene-hydrochlorothiazide (MAXZIDE-25) 37.5-25 MG per tablet TAKE ONE TABLET BY MOUTH DAILY 90 tablet 0   • zolpidem (AMBIEN) 5 MG tablet Take 1 tablet by mouth Daily. 90 tablet 0     No facility-administered medications prior to visit.        Patient Active Problem List   Diagnosis   • Abnormal electrocardiogram   • Chronic coronary artery disease   • Hypertension   • Sleep apnea   • Hyperlipidemia   • Arthritis   • Chronic neck and back pain   • Crushing injury of left foot and ankle   • Cyst near tailbone       Advance Care Planning:  has an advance directive - a copy HAS NOT been provided    Identification of Risk Factors:  Risk factors include: weight , unhealthy diet, cardiovascular risk, inactivity, increased fall risk and chronic pain.    Review of Systems    Compared to one year ago, the patient feels her physical health is worse.  C/o worsening pain which is diffuse (bilateral feet, knees, back and neck)  Compared to one year ago, the patient feels her mental health is the same.    Objective     Physical Exam    Vitals:    12/05/18 1116   BP: 124/72   BP Location: Left arm   Patient Position: Sitting   Cuff Size: Adult   Pulse: 85   SpO2: 99%   Weight: 129 kg (284 lb)   Height: 167.6 cm (66\")   PainSc:   8   PainLoc: Hand  Comment: Knees feet hand back pain       Patient's Body mass index is 45.84 kg/m². BMI is above normal parameters. Recommendations include: nutrition counseling.      Assessment/Plan   Patient Self-Management and Personalized Health Advice  The patient has been provided with information about: diet, exercise, weight management, the relationship between weight and GERD and fall prevention and preventive services including:   · Fall Risk assessment done, Pneumococcal vaccine , Zostavax vaccine (Herpes Zoster).    Visit Diagnoses:    ICD-10-CM ICD-9-CM   1. Gout, unspecified cause, unspecified chronicity, unspecified site M10.9 274.9   2. " Mixed hyperlipidemia E78.2 272.2   3. Chronic deep vein thrombosis (DVT) of other vein of lower extremity, unspecified laterality (CMS/HCC) I82.599 453.50   4. Chronic coronary artery disease I25.10 414.00   5. Essential hypertension I10 401.9   6. Screening for viral disease Z11.59 V73.99   7. Postmenopausal Z78.0 V49.81   8. Paresthesias R20.2 782.0   9. Need for pneumococcal vaccination Z23 V03.82       Orders Placed This Encounter   Procedures   • DEXA Bone Density Axial     Standing Status:   Future     Standing Expiration Date:   12/5/2019     Order Specific Question:   Reason for Exam:     Answer:   screening   • CBC Auto Differential     Standing Status:   Future     Number of Occurrences:   1     Standing Expiration Date:   12/5/2019   • Comprehensive Metabolic Panel     Standing Status:   Future     Number of Occurrences:   1     Standing Expiration Date:   12/5/2019   • TSH     Standing Status:   Future     Number of Occurrences:   1     Standing Expiration Date:   12/5/2019   • Uric Acid     Standing Status:   Future     Number of Occurrences:   1     Standing Expiration Date:   12/5/2019   • Hepatitis C Antibody     Standing Status:   Future     Number of Occurrences:   1     Standing Expiration Date:   12/5/2019   • Vitamin B12     Standing Status:   Future     Number of Occurrences:   1     Standing Expiration Date:   12/5/2019       Outpatient Encounter Medications as of 12/5/2018   Medication Sig Dispense Refill   • allopurinol (ZYLOPRIM) 100 MG tablet TAKE ONE TABLET BY MOUTH DAILY 90 tablet 1   • cimetidine (TAGAMET) 400 MG tablet TAKE 1 TABLET TWICE A  tablet 0   • escitalopram (LEXAPRO) 10 MG tablet TAKE 1 TABLET DAILY 90 tablet 0   • HYDROcodone-acetaminophen (NORCO) 7.5-325 MG per tablet Take 1 tablet by mouth 2 (Two) Times a Day As Needed for Moderate Pain . 60 tablet 0   • metoprolol succinate XL (TOPROL-XL) 25 MG 24 hr tablet TAKE ONE TABLET BY MOUTH DAILY 90 tablet 0   • ramipril  (ALTACE) 10 MG capsule TAKE 1 CAPSULE TWICE A  capsule 3   • rivaroxaban (XARELTO) 15 MG tablet Take 1 tablet by mouth Daily. 90 tablet 2   • rOPINIRole (REQUIP) 1 MG tablet Take 3 tablets by mouth every night at bedtime. Take 1 hour before bedtime. 270 tablet 3   • simvastatin (ZOCOR) 40 MG tablet TAKE ONE TABLET BY MOUTH DAILY 90 tablet 0   • triamterene-hydrochlorothiazide (MAXZIDE-25) 37.5-25 MG per tablet TAKE ONE TABLET BY MOUTH DAILY 90 tablet 0   • zolpidem (AMBIEN) 5 MG tablet Take 1 tablet by mouth Daily. 90 tablet 0     Facility-Administered Encounter Medications as of 12/5/2018   Medication Dose Route Frequency Provider Last Rate Last Dose   • [COMPLETED] pneumococcal polysaccharide 23-valent (PNEUMOVAX-23) vaccine 0.5 mL  0.5 mL Intramuscular Once Radha House APRN   0.5 mL at 12/05/18 1207       Reviewed use of high risk medication in the elderly: yes  Reviewed for potential of harmful drug interactions in the elderly: yes    Follow Up:  Return in about 3 months (around 3/5/2019) for Physician.     An After Visit Summary and PPPS with all of these plans were given to the patient.

## 2018-12-06 ENCOUNTER — TELEPHONE (OUTPATIENT)
Dept: INTERNAL MEDICINE | Facility: CLINIC | Age: 68
End: 2018-12-06

## 2018-12-06 DIAGNOSIS — M51.06 INTERVERTEBRAL LUMBAR DISC DISORDER WITH MYELOPATHY, LUMBAR REGION: Primary | ICD-10-CM

## 2018-12-06 LAB — HCV AB S/CO SERPL IA: 0.1 S/CO RATIO (ref 0–0.9)
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
## 2018-12-06 NOTE — TELEPHONE ENCOUNTER
----- Message from Luna Henry sent at 12/6/2018 12:44 PM EST -----  Contact: pt  Pt would like a referral with pain management.  For leg pain      Pt# [phone]

## 2018-12-10 DIAGNOSIS — N18.4 CKD (CHRONIC KIDNEY DISEASE) STAGE 4, GFR 15-29 ML/MIN (HCC): Primary | ICD-10-CM

## 2018-12-14 ENCOUNTER — CLINICAL SUPPORT (OUTPATIENT)
Dept: INTERNAL MEDICINE | Facility: CLINIC | Age: 68
End: 2018-12-14

## 2018-12-14 DIAGNOSIS — E53.8 B12 DEFICIENCY: Primary | ICD-10-CM

## 2018-12-14 PROCEDURE — 96372 THER/PROPH/DIAG INJ SC/IM: CPT | Performed by: INTERNAL MEDICINE

## 2018-12-14 RX ORDER — CYANOCOBALAMIN 1000 UG/ML
1000 INJECTION, SOLUTION INTRAMUSCULAR; SUBCUTANEOUS
Status: DISCONTINUED | OUTPATIENT
Start: 2018-12-14 | End: 2018-12-28

## 2018-12-14 RX ADMIN — CYANOCOBALAMIN 1000 MCG: 1000 INJECTION, SOLUTION INTRAMUSCULAR; SUBCUTANEOUS at 14:08

## 2018-12-21 ENCOUNTER — CLINICAL SUPPORT (OUTPATIENT)
Dept: INTERNAL MEDICINE | Facility: CLINIC | Age: 68
End: 2018-12-21

## 2018-12-21 DIAGNOSIS — E53.8 B12 DEFICIENCY: Primary | ICD-10-CM

## 2018-12-21 PROCEDURE — 96372 THER/PROPH/DIAG INJ SC/IM: CPT | Performed by: INTERNAL MEDICINE

## 2018-12-21 RX ORDER — CYANOCOBALAMIN 1000 UG/ML
1000 INJECTION, SOLUTION INTRAMUSCULAR; SUBCUTANEOUS
Status: DISCONTINUED | OUTPATIENT
Start: 2018-12-21 | End: 2018-12-28

## 2018-12-21 RX ADMIN — CYANOCOBALAMIN 1000 MCG: 1000 INJECTION, SOLUTION INTRAMUSCULAR; SUBCUTANEOUS at 14:57

## 2018-12-28 ENCOUNTER — CLINICAL SUPPORT (OUTPATIENT)
Dept: INTERNAL MEDICINE | Facility: CLINIC | Age: 68
End: 2018-12-28

## 2018-12-28 DIAGNOSIS — E53.8 B12 DEFICIENCY: Primary | ICD-10-CM

## 2018-12-28 PROCEDURE — 96372 THER/PROPH/DIAG INJ SC/IM: CPT | Performed by: INTERNAL MEDICINE

## 2018-12-28 RX ORDER — CYANOCOBALAMIN 1000 UG/ML
1000 INJECTION, SOLUTION INTRAMUSCULAR; SUBCUTANEOUS
Status: DISCONTINUED | OUTPATIENT
Start: 2018-12-28 | End: 2019-02-08

## 2018-12-28 RX ADMIN — CYANOCOBALAMIN 1000 MCG: 1000 INJECTION, SOLUTION INTRAMUSCULAR; SUBCUTANEOUS at 10:48

## 2019-01-03 DIAGNOSIS — I10 ESSENTIAL HYPERTENSION: ICD-10-CM

## 2019-01-03 RX ORDER — METOPROLOL SUCCINATE 25 MG/1
TABLET, EXTENDED RELEASE ORAL
Qty: 72 TABLET | Refills: 0 | Status: SHIPPED | OUTPATIENT
Start: 2019-01-03 | End: 2019-04-05 | Stop reason: SDUPTHER

## 2019-01-03 RX ORDER — TRIAMTERENE AND HYDROCHLOROTHIAZIDE 37.5; 25 MG/1; MG/1
TABLET ORAL
Qty: 72 TABLET | Refills: 0 | Status: SHIPPED | OUTPATIENT
Start: 2019-01-03 | End: 2019-10-18

## 2019-01-04 ENCOUNTER — CLINICAL SUPPORT (OUTPATIENT)
Dept: INTERNAL MEDICINE | Facility: CLINIC | Age: 69
End: 2019-01-04

## 2019-01-04 DIAGNOSIS — E53.8 B12 DEFICIENCY: Primary | ICD-10-CM

## 2019-01-04 PROCEDURE — 96372 THER/PROPH/DIAG INJ SC/IM: CPT | Performed by: INTERNAL MEDICINE

## 2019-01-04 RX ORDER — CYANOCOBALAMIN 1000 UG/ML
1000 INJECTION, SOLUTION INTRAMUSCULAR; SUBCUTANEOUS
Status: DISCONTINUED | OUTPATIENT
Start: 2019-01-04 | End: 2019-02-08

## 2019-01-04 RX ADMIN — CYANOCOBALAMIN 1000 MCG: 1000 INJECTION, SOLUTION INTRAMUSCULAR; SUBCUTANEOUS at 12:49

## 2019-01-07 ENCOUNTER — HOSPITAL ENCOUNTER (OUTPATIENT)
Dept: PAIN MEDICINE | Facility: HOSPITAL | Age: 69
Discharge: HOME | End: 2019-01-07
Admitting: ANESTHESIOLOGY

## 2019-01-07 ENCOUNTER — ANESTHESIA (OUTPATIENT)
Dept: PAIN MEDICINE | Facility: HOSPITAL | Age: 69
End: 2019-01-07

## 2019-01-07 ENCOUNTER — ANESTHESIA EVENT (OUTPATIENT)
Dept: PAIN MEDICINE | Facility: HOSPITAL | Age: 69
End: 2019-01-07

## 2019-01-07 DIAGNOSIS — M51.06 INTERVERTEBRAL LUMBAR DISC DISORDER WITH MYELOPATHY, LUMBAR REGION: ICD-10-CM

## 2019-01-07 PROCEDURE — G0463 HOSPITAL OUTPT CLINIC VISIT: HCPCS

## 2019-01-07 NOTE — ANESTHESIA PROCEDURE NOTES
This patient was a consult only and no procedure was performed today.  She prefers to return to [facility] management for future management because she is interested in both medication management and procedures.

## 2019-01-07 NOTE — H&P
[facility]    History and Physical    Patient Name: Brooklyn Johns  :  1950  MRN:  [id number]  Date of Admission: 2019    Subjective     Patient is a 68 y.o. female presents with chief complaint of chronic, severe, waxing and waning neck, low back and hand: bilateral pain.  Onset of symptoms was gradual starting a few years ago.  Symptoms are associated/aggravated by activity, sitting, standing or walking for more than a few minutes. Symptoms improve with pain medication.    This pleasant lady unfortunately has pain throughout multiple places throughout her entire body.  At this point I am not ready to give her the diagnosis of fibromyalgia based on my physical exam.  However this is a possibility that she has an atypical presentation.  She did not meet the 11 positive points for formal diagnosis of fibromyalgia.  She tells me that she has had prior pain management treatment at [facility].  They were performing injections and radiofrequency lesioning for her in addition to medication management for multiple classes including opioid medications.  This patient tells me that she is interested in resuming her medication management and is also willing to consider interventional procedures.  Because our clinic is not currently prescribing opioid pain medication, and because I do not usually prescribe that anyway, I think it is in her best interest to return to [facility] pain management where she has a good relationship.  This is her preference as well.  She did not have any weakness on my exam today.    The following portions of the patients history were reviewed and updated as appropriate: current medications, allergies, past medical history, past surgical history, past family history, past social history and problem list                Objective     Past Medical History:   Past Medical History:   Diagnosis Date   • Adductor tendinitis    • ADHD (attention deficit hyperactivity disorder)    •  Allergic    • Anemia    • Anxiety    • Asthma    • Bronchitis    • CAD (coronary artery disease)    • Cataract     Removed by Dr Plasencia   • Deep vein thrombosis (CMS/HCC) 2015. 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    • Low back pain 1980   • Obesity 1950   • Osteoarthritis    • Osteopenia    • Rhinitis, allergic    • RLS (restless legs syndrome)    • Sinus disorder    • Sleep apnea      Past Surgical History:   Past Surgical History:   Procedure Laterality Date   • APPENDECTOMY  1984   • BARIATRIC SURGERY  1984   • CARDIAC CATHETERIZATION  1999   • CHOLECYSTECTOMY     • COLON SURGERY     • COLONOSCOPY  1990   • CORONARY ANGIOPLASTY WITH STENT PLACEMENT     • CORONARY STENT PLACEMENT     • EYE SURGERY     • FRACTURE SURGERY     • GASTRIC BYPASS     • HYSTERECTOMY      Total   • JOINT REPLACEMENT  Lft knee 2014  rht knee 2015   • KNEE ACL RECONSTRUCTION     • SINUS SURGERY      x 2   • TOTAL KNEE ARTHROPLASTY Bilateral      Family History:   Family History   Problem Relation Age of Onset   • Breast cancer Mother    • Cancer Mother         bladder   • Arthritis Mother    • Hyperlipidemia Mother    • Stroke Mother    • Lung cancer Father    • Arthritis Father    • Cancer Father    • Hyperlipidemia Father    • Breast cancer Sister    • Hypertension Sister    • Heart disease Sister    • Breast cancer Sister    • Hypertension Sister    • Heart disease Sister    • Arthritis Sister    • Cancer Sister    • Cancer Sister    • Diabetes Sister    • Cancer Maternal Aunt    • Diabetes Paternal Uncle      Social History:   Social History     Tobacco Use   • Smoking status: Never  Smoker   • Smokeless tobacco: Never Used   Substance Use Topics   • Alcohol use: Yes     Types: 1 Glasses of wine per week     Comment: social   • Drug use: Yes     Types: Hydrocodone       Vital Signs Range for the last 24 hours  Temperature:     Temp Source:     BP:     Pulse:     Respirations:     SPO2:     O2 Amount (l/min):     O2 Devices     Weight:           --------------------------------------------------------------------------------    Current Outpatient Medications   Medication Sig Dispense Refill   • allopurinol (ZYLOPRIM) 100 MG tablet TAKE ONE TABLET BY MOUTH DAILY 90 tablet 1   • cimetidine (TAGAMET) 400 MG tablet TAKE 1 TABLET TWICE A  tablet 0   • escitalopram (LEXAPRO) 10 MG tablet TAKE 1 TABLET DAILY 90 tablet 0   • HYDROcodone-acetaminophen (NORCO) 7.5-325 MG per tablet Take 1 tablet by mouth 2 (Two) Times a Day As Needed for Moderate Pain . 60 tablet 0   • metoprolol succinate XL (TOPROL-XL) 25 MG 24 hr tablet TAKE ONE TABLET BY MOUTH DAILY 72 tablet 0   • ramipril (ALTACE) 10 MG capsule TAKE 1 CAPSULE TWICE A  capsule 3   • rivaroxaban (XARELTO) 15 MG tablet Take 1 tablet by mouth Daily. 90 tablet 2   • rOPINIRole (REQUIP) 1 MG tablet Take 3 tablets by mouth every night at bedtime. Take 1 hour before bedtime. 270 tablet 3   • simvastatin (ZOCOR) 40 MG tablet TAKE ONE TABLET BY MOUTH DAILY 90 tablet 0   • triamterene-hydrochlorothiazide (MAXZIDE-25) 37.5-25 MG per tablet TAKE ONE TABLET BY MOUTH DAILY 72 tablet 0   • zolpidem (AMBIEN) 5 MG tablet Take 1 tablet by mouth Daily. 90 tablet 0     Current Facility-Administered Medications   Medication Dose Route Frequency Provider Last Rate Last Dose   • cyanocobalamin injection 1,000 mcg  1,000 mcg Intramuscular Q28 Days Lizet Granger MD   1,000 mcg at 12/28/18 1048   • cyanocobalamin injection 1,000 mcg  1,000 mcg Intramuscular Q28 Days David Dial MD   1,000 mcg at 01/04/19 3175        --------------------------------------------------------------------------------  Assessment/Plan      Anesthesia Evaluation     Patient summary reviewed   NPO Solid Status: > 8 hours  NPO Liquid Status: > 2 hours    Pain impairs ability to perform ADLs: Dressing, Housekeeping, Driving, Working, Sleeping and Exercise/Activity  Modalities previously tried to control pain with limited effectiveness within the last 4-6 weeks: Ice, Rest, Heat, OTC medications and Prescription medications     Airway   Mallampati: II  Dental      Pulmonary     breath sounds clear to auscultation  (+) asthma, sleep apnea,   (-) shortness of breath  Cardiovascular   Exercise tolerance: good (4-7 METS)    Rate: normal    (+) hypertension, valvular problems/murmurs, CAD, DVT, hyperlipidemia,   (-) angina, BARAJAS      Neuro/Psych  (+) headaches, psychiatric history,     (-) normal sensory deficit, left straight leg raise test, right straight leg raise test    PE Comment: She is not having any neurological symptoms radiating down her arms or her legs today.  She does report at time some numbness radiating down into her hands.  GI/Hepatic/Renal/Endo    (+) morbid obesity, GERD,      Musculoskeletal     (+) neck pain,       PE comment: Decreased ROM. TTP bilateral paraspinal muscles.  Bilateral trapezius muscles are also tender to palpation.  Lumbar range of motion is decreased with for flexion and extension as well as lateral rotation with extension with positive facet loading.  Abdominal     Abdomen: soft.   Substance History      OB/GYN          Other   (+) arthritis                Diagnosis and Plan    Treatment Plan  ASA 3             Diagnosis     * Lumbar spondylosis [M47.816]     * Lumbar stenosis [M48.061]     * Knee pain, bilateral [M25.561, M25.562]     * Cervicalgia [M54.2]

## 2019-01-14 ENCOUNTER — TRANSCRIBE ORDERS (OUTPATIENT)
Dept: ADMINISTRATIVE | Facility: HOSPITAL | Age: 69
End: 2019-01-14

## 2019-01-14 DIAGNOSIS — N18.4 CHRONIC KIDNEY DISEASE, STAGE 4 (SEVERE) (HCC): Primary | ICD-10-CM

## 2019-01-16 ENCOUNTER — HOSPITAL ENCOUNTER (OUTPATIENT)
Dept: ULTRASOUND IMAGING | Facility: HOSPITAL | Age: 69
Discharge: HOME | End: 2019-01-16
Attending: INTERNAL MEDICINE | Admitting: INTERNAL MEDICINE

## 2019-01-16 DIAGNOSIS — N18.4 CHRONIC KIDNEY DISEASE, STAGE 4 (SEVERE) (HCC): ICD-10-CM

## 2019-01-16 PROCEDURE — 76775 US EXAM ABDO BACK WALL LIM: CPT

## 2019-01-21 RX ORDER — SIMVASTATIN 40 MG
TABLET ORAL
Qty: 90 TABLET | Refills: 0 | Status: SHIPPED | OUTPATIENT
Start: 2019-01-21 | End: 2019-04-22 | Stop reason: SDUPTHER

## 2019-02-08 ENCOUNTER — CLINICAL SUPPORT (OUTPATIENT)
Dept: INTERNAL MEDICINE | Facility: CLINIC | Age: 69
End: 2019-02-08

## 2019-02-08 DIAGNOSIS — E53.8 B12 DEFICIENCY: Primary | ICD-10-CM

## 2019-02-08 PROCEDURE — 96372 THER/PROPH/DIAG INJ SC/IM: CPT | Performed by: INTERNAL MEDICINE

## 2019-02-08 RX ORDER — CYANOCOBALAMIN 1000 UG/ML
1000 INJECTION, SOLUTION INTRAMUSCULAR; SUBCUTANEOUS
Status: DISCONTINUED | OUTPATIENT
Start: 2019-02-08 | End: 2020-09-16 | Stop reason: HOSPADM

## 2019-02-08 RX ADMIN — CYANOCOBALAMIN 1000 MCG: 1000 INJECTION, SOLUTION INTRAMUSCULAR; SUBCUTANEOUS at 15:15

## 2019-03-04 ENCOUNTER — TELEPHONE (OUTPATIENT)
Dept: INTERNAL MEDICINE | Facility: CLINIC | Age: 69
End: 2019-03-04

## 2019-03-04 DIAGNOSIS — F32.89 OTHER DEPRESSION: ICD-10-CM

## 2019-03-04 DIAGNOSIS — G25.81 RESTLESS LEGS: ICD-10-CM

## 2019-03-04 RX ORDER — ROPINIROLE 1 MG/1
3 TABLET, FILM COATED ORAL
Qty: 40 TABLET | Refills: 0 | Status: SHIPPED | OUTPATIENT
Start: 2019-03-04 | End: 2019-05-31 | Stop reason: SDUPTHER

## 2019-03-04 RX ORDER — ESCITALOPRAM OXALATE 10 MG/1
10 TABLET ORAL DAILY
Qty: 90 TABLET | Refills: 0 | Status: SHIPPED | OUTPATIENT
Start: 2019-03-04 | End: 2019-06-06 | Stop reason: SDUPTHER

## 2019-03-04 RX ORDER — ROPINIROLE 1 MG/1
3 TABLET, FILM COATED ORAL
Qty: 270 TABLET | Refills: 0 | Status: SHIPPED | OUTPATIENT
Start: 2019-03-04 | End: 2019-03-04 | Stop reason: SDUPTHER

## 2019-03-04 NOTE — TELEPHONE ENCOUNTER
----- Message from Luna Henry sent at 3/4/2019  2:15 PM EST -----  Contact: pt  Pt is calling for a refill     rOPINIRole (REQUIP) 1 MG tablet    Patient requests RX SENT TO   [facility]  for [facility] - [place], [place] - [address] [place] [address] - [phone] [facility] [phone] FX    Caller# [phone]     Please and thank you.    Would need short supply sent to [facility].  [facility] [address] AT [facility] & ([address] - [phone] [facility] [phone] FX

## 2019-04-05 DIAGNOSIS — M10.9 GOUT, UNSPECIFIED CAUSE, UNSPECIFIED CHRONICITY, UNSPECIFIED SITE: ICD-10-CM

## 2019-04-05 RX ORDER — METOPROLOL SUCCINATE 25 MG/1
25 TABLET, EXTENDED RELEASE ORAL DAILY
Qty: 30 TABLET | Refills: 0 | Status: SHIPPED | OUTPATIENT
Start: 2019-04-05 | End: 2019-05-29 | Stop reason: SDUPTHER

## 2019-04-05 RX ORDER — ALLOPURINOL 100 MG/1
100 TABLET ORAL DAILY
Qty: 90 TABLET | Refills: 0 | Status: SHIPPED | OUTPATIENT
Start: 2019-04-05 | End: 2019-07-08 | Stop reason: SDUPTHER

## 2019-04-10 ENCOUNTER — CLINICAL SUPPORT (OUTPATIENT)
Dept: INTERNAL MEDICINE | Facility: CLINIC | Age: 69
End: 2019-04-10

## 2019-04-10 DIAGNOSIS — E53.8 B12 DEFICIENCY: Primary | ICD-10-CM

## 2019-04-10 DIAGNOSIS — Z78.0 POSTMENOPAUSAL: ICD-10-CM

## 2019-04-10 PROCEDURE — 77080 DXA BONE DENSITY AXIAL: CPT | Performed by: NURSE PRACTITIONER

## 2019-04-10 PROCEDURE — 96372 THER/PROPH/DIAG INJ SC/IM: CPT | Performed by: NURSE PRACTITIONER

## 2019-04-10 RX ORDER — CYANOCOBALAMIN 1000 UG/ML
1000 INJECTION, SOLUTION INTRAMUSCULAR; SUBCUTANEOUS
Status: DISCONTINUED | OUTPATIENT
Start: 2019-04-10 | End: 2020-09-16 | Stop reason: HOSPADM

## 2019-04-10 RX ADMIN — CYANOCOBALAMIN 1000 MCG: 1000 INJECTION, SOLUTION INTRAMUSCULAR; SUBCUTANEOUS at 11:44

## 2019-04-12 ENCOUNTER — OFFICE VISIT (OUTPATIENT)
Dept: ORTHOPEDIC SURGERY | Facility: CLINIC | Age: 69
End: 2019-04-12

## 2019-04-12 VITALS — HEIGHT: 65 IN | TEMPERATURE: 97.4 F | BODY MASS INDEX: 45.98 KG/M2 | WEIGHT: 276 LBS

## 2019-04-12 DIAGNOSIS — Z96.651 HISTORY OF TOTAL KNEE ARTHROPLASTY, RIGHT: Primary | ICD-10-CM

## 2019-04-12 PROCEDURE — 99212 OFFICE O/P EST SF 10 MIN: CPT | Performed by: ORTHOPAEDIC SURGERY

## 2019-04-12 PROCEDURE — 73562 X-RAY EXAM OF KNEE 3: CPT | Performed by: ORTHOPAEDIC SURGERY

## 2019-04-12 NOTE — PROGRESS NOTES
"Brooklyn Johns : 1950 MRN: [id number] DATE: 2019    Chief Complaint:  Follow up right total knee      SUBJECTIVE:Patient returns today for  1 year follow up of right total knee replacement. Patient reports doing well with no unusual complaints. Denies any limitations due to the knee.    OBJECTIVE:    Temp 97.4 °F (36.3 °C) (Temporal)   Ht 165.1 cm (65\")   Wt 125 kg (276 lb)   BMI 45.93 kg/m²   Family History   Problem Relation Age of Onset   • Breast cancer Mother    • Cancer Mother         bladder   • Arthritis Mother    • Hyperlipidemia Mother    • Stroke Mother    • Lung cancer Father    • Arthritis Father    • Cancer Father    • Hyperlipidemia Father    • Breast cancer Sister    • Hypertension Sister    • Heart disease Sister    • Breast cancer Sister    • Hypertension Sister    • Heart disease Sister    • Arthritis Sister    • Cancer Sister    • Cancer Sister    • Diabetes Sister    • Cancer Maternal Aunt    • Diabetes Paternal Uncle      Past Medical History:   Diagnosis Date   • Adductor tendinitis    • ADHD (attention deficit hyperactivity disorder)    • Allergic    • Anemia    • Anxiety    • Asthma    • Bronchitis    • CAD (coronary artery disease)    • Cataract     Removed by Dr Plasencia   • Deep vein thrombosis (CMS/HCC) . Again in 2018   • Degeneration of lumbar or lumbosacral intervertebral disc    • Depression    • GERD (gastroesophageal reflux disease)    • Gout    • Headache    • Heart murmur    • History of DVT (deep vein thrombosis)    • Hyperlipidemia    • Hypertension    • Insomnia    • Insomnia secondary to anxiety    • 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    • Low back pain    • Obesity    • Osteoarthritis    • Osteopenia    • Rhinitis, allergic    • RLS (restless legs syndrome)    • Sinus disorder    • Sleep apnea      Past Surgical History:   Procedure Laterality Date   • APPENDECTOMY     • BARIATRIC SURGERY   "   • CARDIAC CATHETERIZATION  1999   • CHOLECYSTECTOMY     • COLON SURGERY     • COLONOSCOPY  1990   • CORONARY ANGIOPLASTY WITH STENT PLACEMENT     • CORONARY STENT PLACEMENT     • EYE SURGERY     • FRACTURE SURGERY     • GASTRIC BYPASS     • HYSTERECTOMY      Total   • JOINT REPLACEMENT  Lft knee 2014  rht knee 2015   • KNEE ACL RECONSTRUCTION     • SINUS SURGERY      x 2   • TOTAL KNEE ARTHROPLASTY Bilateral      Social History     Socioeconomic History   • Marital status:      Spouse name: Not on file   • Number of children: Not on file   • Years of education: Not on file   • Highest education level: Not on file   Tobacco Use   • Smoking status: Never Smoker   • Smokeless tobacco: Never Used   Substance and Sexual Activity   • Alcohol use: Yes     Types: 1 Glasses of wine per week     Comment: social   • Drug use: Yes     Types: Hydrocodone   • Sexual activity: No       Review of Systems: 14 point review of systems performed pertinent positives and negatives discussed above, all other systems are negative    Exam:. The incision is well healed. Range of motion is measured at 0 to 120. The calf is soft and nontender with a negative Homans sign. Alignment is neutral. Good quad strength. There is no evidence of varus/valgus or flexion instability. No effusion. Intact to light touch with palpable distal pulses.     DIAGNOSTIC STUDIES  Xrays: 3 views(AP bilateral knees, lateral right, and sunrise bilateral knees) were ordered and reviewed for evaluation of right knee replacement. They demonstrate a well positioned, well aligned knee replacement without complicating factors noted. In comparison with previous films there has been no change.    ASSESSMENT:   Annual follow up right knee replacement. doing well       PLAN:    Continue activities as tolerated  Follow up LYNN Trujillo MD  4/12/2019

## 2019-04-22 RX ORDER — SIMVASTATIN 40 MG
40 TABLET ORAL DAILY
Qty: 90 TABLET | Refills: 1 | Status: SHIPPED | OUTPATIENT
Start: 2019-04-22 | End: 2019-11-13 | Stop reason: SDUPTHER

## 2019-05-14 NOTE — TELEPHONE ENCOUNTER
----- Message from Fozia Peck MA sent at 7/3/2017 11:38 AM EDT -----  Pt calling for lab results.  [facility] call    Pt#[phone]  
Called pt about labs and mailed her copy of labs.  
80

## 2019-05-29 RX ORDER — METOPROLOL SUCCINATE 25 MG/1
25 TABLET, EXTENDED RELEASE ORAL DAILY
Qty: 90 TABLET | Refills: 1 | Status: SHIPPED | OUTPATIENT
Start: 2019-05-29 | End: 2019-11-08 | Stop reason: SDUPTHER

## 2019-05-29 RX ORDER — RANITIDINE 150 MG/1
150 CAPSULE ORAL 2 TIMES DAILY
Qty: 180 CAPSULE | Refills: 1 | Status: SHIPPED | OUTPATIENT
Start: 2019-05-29 | End: 2019-11-08 | Stop reason: SDUPTHER

## 2019-05-31 DIAGNOSIS — G25.81 RESTLESS LEGS: ICD-10-CM

## 2019-05-31 RX ORDER — ROPINIROLE 1 MG/1
TABLET, FILM COATED ORAL
Qty: 12 TABLET | Refills: 0 | Status: SHIPPED | OUTPATIENT
Start: 2019-05-31 | End: 2019-08-23 | Stop reason: SDUPTHER

## 2019-06-01 DIAGNOSIS — G25.81 RESTLESS LEGS: ICD-10-CM

## 2019-06-03 RX ORDER — ROPINIROLE 1 MG/1
TABLET, FILM COATED ORAL
Qty: 270 TABLET | Refills: 0 | Status: SHIPPED | OUTPATIENT
Start: 2019-06-03 | End: 2019-06-17 | Stop reason: SDUPTHER

## 2019-06-06 DIAGNOSIS — F32.89 OTHER DEPRESSION: ICD-10-CM

## 2019-06-06 RX ORDER — ESCITALOPRAM OXALATE 10 MG/1
TABLET ORAL
Qty: 90 TABLET | Refills: 0 | Status: SHIPPED | OUTPATIENT
Start: 2019-06-06 | End: 2019-08-31 | Stop reason: SDUPTHER

## 2019-06-14 ENCOUNTER — HOSPITAL ENCOUNTER (OUTPATIENT)
Dept: GENERAL RADIOLOGY | Facility: HOSPITAL | Age: 69
Discharge: HOME | End: 2019-06-14
Admitting: PHYSICIAN ASSISTANT

## 2019-06-14 DIAGNOSIS — M53.3 DISORDER OF SACRUM: ICD-10-CM

## 2019-06-14 PROCEDURE — 72220 X-RAY EXAM SACRUM TAILBONE: CPT

## 2019-06-17 ENCOUNTER — OFFICE VISIT (OUTPATIENT)
Dept: INTERNAL MEDICINE | Facility: CLINIC | Age: 69
End: 2019-06-17

## 2019-06-17 VITALS
HEIGHT: 66 IN | WEIGHT: 272 LBS | BODY MASS INDEX: 43.71 KG/M2 | HEART RATE: 62 BPM | OXYGEN SATURATION: 97 % | DIASTOLIC BLOOD PRESSURE: 86 MMHG | SYSTOLIC BLOOD PRESSURE: 136 MMHG

## 2019-06-17 DIAGNOSIS — N18.4 CKD (CHRONIC KIDNEY DISEASE) STAGE 4, GFR 15-29 ML/MIN (HCC): ICD-10-CM

## 2019-06-17 DIAGNOSIS — I25.10 CHRONIC CORONARY ARTERY DISEASE: ICD-10-CM

## 2019-06-17 DIAGNOSIS — E53.8 B12 DEFICIENCY: ICD-10-CM

## 2019-06-17 DIAGNOSIS — E66.01 MORBIDLY OBESE (HCC): ICD-10-CM

## 2019-06-17 DIAGNOSIS — M10.9 GOUT, UNSPECIFIED CAUSE, UNSPECIFIED CHRONICITY, UNSPECIFIED SITE: ICD-10-CM

## 2019-06-17 DIAGNOSIS — I10 ESSENTIAL HYPERTENSION: Primary | ICD-10-CM

## 2019-06-17 DIAGNOSIS — E78.2 MIXED HYPERLIPIDEMIA: ICD-10-CM

## 2019-06-17 DIAGNOSIS — G25.81 RESTLESS LEGS: ICD-10-CM

## 2019-06-17 DIAGNOSIS — Z86.718 HISTORY OF RECURRENT DEEP VEIN THROMBOSIS (DVT): ICD-10-CM

## 2019-06-17 DIAGNOSIS — M53.3 SACRAL PAIN: ICD-10-CM

## 2019-06-17 PROCEDURE — 99214 OFFICE O/P EST MOD 30 MIN: CPT | Performed by: NURSE PRACTITIONER

## 2019-06-17 RX ORDER — SODIUM BICARBONATE 650 MG/1
650 TABLET ORAL 2 TIMES DAILY
COMMUNITY
End: 2022-08-11 | Stop reason: SDUPTHER

## 2019-06-17 RX ORDER — CALCITRIOL 0.25 UG/1
0.25 CAPSULE, LIQUID FILLED ORAL SEE ADMIN INSTRUCTIONS
COMMUNITY
End: 2021-02-26 | Stop reason: SDUPTHER

## 2019-06-17 RX ORDER — HYDROCHLOROTHIAZIDE 25 MG/1
25 TABLET ORAL DAILY
COMMUNITY
End: 2019-10-18 | Stop reason: SDUPTHER

## 2019-06-18 PROBLEM — Z86.718 HISTORY OF RECURRENT DEEP VEIN THROMBOSIS (DVT): Status: ACTIVE | Noted: 2019-06-18

## 2019-06-18 PROBLEM — M47.817 LUMBOSACRAL SPONDYLOSIS WITHOUT MYELOPATHY: Status: ACTIVE | Noted: 2019-06-18

## 2019-06-18 PROBLEM — E66.01 MORBIDLY OBESE: Status: ACTIVE | Noted: 2019-06-18

## 2019-06-18 NOTE — PROGRESS NOTES
Subjective   Brooklyn Johns is a 68 y.o. female who presents for f/u regarding HTN, hyperlipidemia and increased lumbar and sacral and coccyx pain.    She c/o increased pain in the coccyx area for the past several weeks, referred for xray of coccyx and sacrum by pain management which does not show any acute abnl. She was told she has a cyst on her sacral area a couple of weeks ago.      Hypertension   This is a chronic problem. The current episode started more than 1 year ago. The problem is unchanged. The problem is controlled. Pertinent negatives include no chest pain, headaches or palpitations. Current antihypertension treatment includes diuretics and beta blockers. The current treatment provides significant improvement. There are no compliance problems.    Hyperlipidemia   Associated symptoms include myalgias. Pertinent negatives include no chest pain.   Gout   Associated symptoms include arthralgias, fatigue and myalgias. Pertinent negatives include no abdominal pain, chest pain, chills, congestion, coughing, fever, headaches, joint swelling, nausea, sore throat, vomiting or weakness.        The following portions of the patient's history were reviewed and updated as appropriate: allergies, current medications, past social history and problem list.    Past Medical History:   Diagnosis Date   • Adductor tendinitis    • ADHD (attention deficit hyperactivity disorder)    • Allergic    • Anemia    • Anxiety    • Asthma    • Bronchitis    • CAD (coronary artery disease)    • Cataract     Removed by Dr Plasencia   • Deep vein thrombosis (CMS/[facility]) 2015. Again in 2018   • Degeneration of lumbar or lumbosacral intervertebral disc    • Depression    • GERD (gastroesophageal reflux disease) 1975   • Gout    • Headache 1986   • Heart murmur 1990   • History of DVT (deep vein thrombosis)    • Hyperlipidemia    • Hypertension    • Insomnia    • Insomnia secondary to anxiety    • Intervertebral disc disorder of lumbar  region with myelopathy    • Low back pain 1980   • Obesity 1950   • Osteoarthritis    • Osteopenia    • Rhinitis, allergic    • RLS (restless legs syndrome)    • Sinus disorder    • Sleep apnea          Current Outpatient Medications:   •  allopurinol (ZYLOPRIM) 100 MG tablet, Take 1 tablet by mouth Daily., Disp: 90 tablet, Rfl: 0  •  calcitriol (ROCALTROL) 0.25 MCG capsule, Take 0.25 mcg by mouth Every Other Day., Disp: , Rfl:   •  escitalopram (LEXAPRO) 10 MG tablet, TAKE 1 TABLET DAILY, Disp: 90 tablet, Rfl: 0  •  hydrochlorothiazide (HYDRODIURIL) 25 MG tablet, Take 25 mg by mouth Daily., Disp: , Rfl:   •  HYDROcodone-acetaminophen (NORCO) 7.5-325 MG per tablet, Take 1 tablet by mouth 2 (Two) Times a Day As Needed for Moderate Pain ., Disp: 60 tablet, Rfl: 0  •  metoprolol succinate XL (TOPROL-XL) 25 MG 24 hr tablet, Take 1 tablet by mouth Daily., Disp: 90 tablet, Rfl: 1  •  ramipril (ALTACE) 10 MG capsule, TAKE 1 CAPSULE TWICE A DAY, Disp: 180 capsule, Rfl: 3  •  ranitidine (ZANTAC) 150 MG capsule, Take 1 capsule by mouth 2 (Two) Times a Day., Disp: 180 capsule, Rfl: 1  •  rivaroxaban (XARELTO) 15 MG tablet, Take 1 tablet by mouth Daily., Disp: 90 tablet, Rfl: 2  •  rOPINIRole (REQUIP) 1 MG tablet, TAKE THREE TABLETS BY MOUTH EVERY NIGHT AT BEDTIME, Disp: 12 tablet, Rfl: 0  •  simvastatin (ZOCOR) 40 MG tablet, Take 1 tablet by mouth Daily., Disp: 90 tablet, Rfl: 1  •  sodium bicarbonate 650 MG tablet, Take 650 mg by mouth 2 (Two) Times a Day., Disp: , Rfl:   •  triamterene-hydrochlorothiazide (MAXZIDE-25) 37.5-25 MG per tablet, TAKE ONE TABLET BY MOUTH DAILY, Disp: 72 tablet, Rfl: 0    Current Facility-Administered Medications:   •  cyanocobalamin injection 1,000 mcg, 1,000 mcg, Intramuscular, Q28 Days, Berna Dhaliwal MD, 1,000 mcg at 02/08/19 1515  •  cyanocobalamin injection 1,000 mcg, 1,000 mcg, Intramuscular, Q28 Days, Radha House, APRN, 1,000 mcg at 04/10/19 1144    No Known Allergies    Review of  "Systems   Constitutional: Positive for fatigue. Negative for chills, fever and unexpected weight change.   HENT: Negative for congestion, ear pain, postnasal drip, sinus pressure, sore throat and trouble swallowing.    Eyes: Negative for visual disturbance.   Respiratory: Negative for cough, chest tightness and wheezing.    Cardiovascular: Negative for chest pain, palpitations and leg swelling.   Gastrointestinal: Negative for abdominal pain, blood in stool, nausea and vomiting.   Genitourinary: Negative for dysuria, frequency and urgency.   Musculoskeletal: Positive for arthralgias, back pain, gout and myalgias. Negative for joint swelling.        C/o restless legs, improved with current dose of Requip   Skin: Negative for color change.   Neurological: Negative for syncope, weakness and headaches.   Hematological: Does not bruise/bleed easily.       Objective   Vitals:    06/17/19 1330   BP: 136/86   Pulse: 62   SpO2: 97%   Weight: 123 kg (272 lb)   Height: 167.6 cm (65.98\")     Physical Exam   Constitutional: She appears well-developed and well-nourished. She is cooperative. She does not have a sickly appearance. She does not appear ill.   HENT:   Head: Normocephalic.   Right Ear: Hearing, tympanic membrane and external ear normal.   Left Ear: Hearing, tympanic membrane and external ear normal.   Nose: Nose normal. No mucosal edema, rhinorrhea, sinus tenderness or nasal deformity. Right sinus exhibits no maxillary sinus tenderness and no frontal sinus tenderness. Left sinus exhibits no maxillary sinus tenderness and no frontal sinus tenderness.   Mouth/Throat: Oropharynx is clear and moist and mucous membranes are normal. Normal dentition.   Eyes: Conjunctivae and lids are normal. Right eye exhibits no discharge and no exudate. Left eye exhibits no discharge and no exudate.   Neck: Trachea normal. Carotid bruit is not present. No edema present. No thyroid mass present.   Cardiovascular: Regular rhythm, normal " heart sounds and normal pulses.   No murmur heard.  Pulmonary/Chest: Breath sounds normal. No respiratory distress. She has no decreased breath sounds. She has no wheezes. She has no rhonchi. She has no rales.   Abdominal: Soft. There is no tenderness.   Musculoskeletal:        Lumbar back: She exhibits tenderness.   Lymphadenopathy:        Head (right side): No submental, no submandibular, no tonsillar, no preauricular, no posterior auricular and no occipital adenopathy present.        Head (left side): No submental, no submandibular, no tonsillar, no preauricular, no posterior auricular and no occipital adenopathy present.   Neurological: She is alert.   Skin: Skin is warm, dry and intact. No cyanosis. Nails show no clubbing.       Assessment/Plan   Brooklyn was seen today for hypertension, hyperlipidemia and gout.    Diagnoses and all orders for this visit:    Essential hypertension  Comments:  stable on current meds  Orders:  -     CBC Auto Differential; Future  -     Comprehensive Metabolic Panel; Future  -     TSH; Future    Mixed hyperlipidemia  Comments:  managed on Simvastatin  Orders:  -     Lipid Panel; Future    CKD (chronic kidney disease) stage 4, GFR 15-29 ml/min (CMS/[facility])  Comments:  evaluated by nephrology, ultrasound of kidneys performed 1/2019    Restless legs  Comments:  improved with Requip    B12 deficiency  Comments:  has received B12 inj, recheck levels today  Orders:  -     Vitamin B12; Future    Chronic coronary artery disease    History of recurrent deep vein thrombosis (DVT)  Comments:  DVT RLE 7/28/15 and 10/19/18, now managed on Xarelto    Gout, unspecified cause, unspecified chronicity, unspecified site  Comments:  recheck uric acid  Orders:  -     Uric Acid; Future    Sacral pain  Comments:  increased pain with sitting, hx cyst in area previously-will send for MRI to further evaluate  Orders:  -     MRI Lumbar Spine Without Contrast; Future    Morbidly obese  (CMS/[facility])  Comments:  weight is steadily decreasing (has lost 18# since 10/2018)

## 2019-06-28 ENCOUNTER — LAB (OUTPATIENT)
Dept: INTERNAL MEDICINE | Facility: CLINIC | Age: 69
End: 2019-06-28

## 2019-06-28 ENCOUNTER — TELEPHONE (OUTPATIENT)
Dept: INTERNAL MEDICINE | Facility: CLINIC | Age: 69
End: 2019-06-28

## 2019-06-28 DIAGNOSIS — E53.8 B12 DEFICIENCY: ICD-10-CM

## 2019-06-28 DIAGNOSIS — I10 ESSENTIAL HYPERTENSION: ICD-10-CM

## 2019-06-28 DIAGNOSIS — E78.2 MIXED HYPERLIPIDEMIA: ICD-10-CM

## 2019-06-28 DIAGNOSIS — M10.9 GOUT, UNSPECIFIED CAUSE, UNSPECIFIED CHRONICITY, UNSPECIFIED SITE: ICD-10-CM

## 2019-06-28 LAB
ALBUMIN SERPL-MCNC: 4.1 G/DL (ref 3.5–5.2)
ALBUMIN/GLOB SERPL: 1.9 G/DL
ALP SERPL-CCNC: 142 U/L (ref 39–117)
ALT SERPL-CCNC: 7 U/L (ref 1–33)
AST SERPL-CCNC: 11 U/L (ref 1–32)
BASOPHILS # BLD AUTO: 0.03 10*3/MM3 (ref 0–0.2)
BASOPHILS NFR BLD AUTO: 0.7 % (ref 0–1.5)
BILIRUB SERPL-MCNC: 0.4 MG/DL (ref 0.2–1.2)
BUN SERPL-MCNC: 37 MG/DL (ref 8–23)
BUN/CREAT SERPL: 20 (ref 7–25)
CALCIUM SERPL-MCNC: 8.8 MG/DL (ref 8.6–10.5)
CHLORIDE SERPL-SCNC: 108 MMOL/L (ref 98–107)
CHOLEST SERPL-MCNC: 158 MG/DL (ref 0–200)
CO2 SERPL-SCNC: 24.1 MMOL/L (ref 22–29)
CREAT SERPL-MCNC: 1.85 MG/DL (ref 0.57–1)
EOSINOPHIL # BLD AUTO: 0.13 10*3/MM3 (ref 0–0.4)
EOSINOPHIL # BLD AUTO: 3 % (ref 0.3–6.2)
ERYTHROCYTE [DISTWIDTH] IN BLOOD BY AUTOMATED COUNT: 13.3 % (ref 12.3–15.4)
GLOBULIN SER CALC-MCNC: 2.2 GM/DL
GLUCOSE SERPL-MCNC: 107 MG/DL (ref 65–99)
HCT VFR BLD AUTO: 34.2 % (ref 34–46.6)
HDLC SERPL-MCNC: 53 MG/DL (ref 40–60)
HGB BLD-MCNC: 10.6 G/DL (ref 12–15.9)
IMM GRANULOCYTES # BLD: 0.01 10*3/MM3 (ref 0–0.05)
IMM GRANULOCYTES NFR BLD: 0.2 % (ref 0–0.5)
LDLC SERPL CALC-MCNC: 77 MG/DL (ref 0–100)
LYMPHOCYTES # BLD AUTO: 1.16 10*3/MM3 (ref 0.7–3.1)
LYMPHOCYTES NFR BLD AUTO: 26.7 % (ref 19.6–45.3)
MCH RBC QN AUTO: 30.6 PG (ref 26.6–33)
MCHC RBC AUTO-ENTMCNC: 31 G/DL (ref 31.5–35.7)
MCV RBC AUTO: 98.8 FL (ref 79–97)
MONOCYTES # BLD AUTO: 0.35 10*3/MM3 (ref 0.1–0.9)
MONOCYTES NFR BLD AUTO: 8 % (ref 5–12)
NEUTROPHILS # BLD AUTO: 2.67 10*3/MM3 (ref 1.7–7)
NEUTROPHILS NFR BLD AUTO: 61.4 % (ref 42.7–76)
NRBC BLD AUTO-RTO: 0.2 /100 WBC (ref 0–0.2)
PLATELET # BLD AUTO: 162 10*3/MM3 (ref 140–450)
POTASSIUM SERPL-SCNC: 4.5 MMOL/L (ref 3.5–5.2)
PROT SERPL-MCNC: 6.3 G/DL (ref 6–8.5)
RBC # BLD AUTO: 3.46 10*6/MM3 (ref 3.77–5.28)
SODIUM SERPL-SCNC: 144 MMOL/L (ref 136–145)
TRIGL SERPL-MCNC: 142 MG/DL (ref 0–150)
TSH SERPL-ACNC: 2.01 MIU/ML (ref 0.27–4.2)
URATE SERPL-MCNC: 6.6 MG/DL (ref 2.4–5.7)
VIT B12 SERPL-MCNC: 279 PG/ML (ref 211–946)
VLDLC SERPL-MCNC: 28.4 MG/DL
WBC # BLD AUTO: 4.35 10*3/MM3 (ref 3.4–10.8)

## 2019-06-28 NOTE — TELEPHONE ENCOUNTER
----- Message from Jordyn Gould sent at 6/28/2019  9:34 AM EDT -----  Ms. Johns was in this morning for her lab work.  She has her MRI tomorrow, June 29th.  Her next appointment with Radha is in October, per her last note.  Patient wants to know if she needs to be seen before that.    Patient call back number:  [phone]    Thank you,    Jordyn

## 2019-07-03 ENCOUNTER — TELEPHONE (OUTPATIENT)
Dept: INTERNAL MEDICINE | Facility: CLINIC | Age: 69
End: 2019-07-03

## 2019-07-03 NOTE — TELEPHONE ENCOUNTER
----- Message from Luna Henry sent at 7/3/2019  1:07 PM EDT -----  Contact: Pt   Pt is calling to see if she still needs to come in today, they have ordered additional imaging and scheduled for 7/12.  today's appointment was to go over MRI results.  Should she wait, and come in after additional views.      Pt#[phone]

## 2019-07-08 DIAGNOSIS — M10.9 GOUT, UNSPECIFIED CAUSE, UNSPECIFIED CHRONICITY, UNSPECIFIED SITE: ICD-10-CM

## 2019-07-08 RX ORDER — ALLOPURINOL 100 MG/1
100 TABLET ORAL DAILY
Qty: 90 TABLET | Refills: 0 | Status: SHIPPED | OUTPATIENT
Start: 2019-07-08 | End: 2019-10-14 | Stop reason: SDUPTHER

## 2019-07-17 ENCOUNTER — OFFICE VISIT (OUTPATIENT)
Dept: INTERNAL MEDICINE | Facility: CLINIC | Age: 69
End: 2019-07-17

## 2019-07-17 VITALS
SYSTOLIC BLOOD PRESSURE: 132 MMHG | HEIGHT: 66 IN | OXYGEN SATURATION: 99 % | WEIGHT: 275 LBS | DIASTOLIC BLOOD PRESSURE: 82 MMHG | BODY MASS INDEX: 44.2 KG/M2 | HEART RATE: 73 BPM

## 2019-07-17 DIAGNOSIS — M51.06 INTERVERTEBRAL LUMBAR DISC DISORDER WITH MYELOPATHY, LUMBAR REGION: Primary | ICD-10-CM

## 2019-07-17 PROCEDURE — 99213 OFFICE O/P EST LOW 20 MIN: CPT | Performed by: NURSE PRACTITIONER

## 2019-07-18 DIAGNOSIS — M53.3 SACRAL PAIN: ICD-10-CM

## 2019-07-18 NOTE — PROGRESS NOTES
Subjective   Brooklyn Johns is a 68 y.o. female who presents for f/u regarding MRI and low back/sacral pain.    She presents for f/u regarding chronic low back pain, she is followed by pain management but c/o increased pain over the past couple of months. States pain is 6-7 daily with occasionally as severe as 10 on a scale of 1-10. She has been told she has a cyst on her spine so MRI of lumbar and sacral area were obtained to further evaluate.      Back Pain   This is a recurrent problem. The current episode started more than 1 month ago. The problem occurs constantly. The problem has been rapidly worsening since onset. The pain is present in the gluteal and sacro-iliac. The quality of the pain is described as aching, shooting and stabbing. The pain radiates to the left thigh and right thigh. The pain is at a severity of 8/10. The pain is the same all the time. The symptoms are aggravated by position, sitting and standing. Stiffness is present in the morning. Associated symptoms include leg pain. Pertinent negatives include no abdominal pain, bladder incontinence, bowel incontinence, chest pain, dysuria, fever, headaches, numbness, paresis, paresthesias, pelvic pain, perianal numbness, tingling, weakness or weight loss. Risk factors include lack of exercise, obesity and sedentary lifestyle.        The following portions of the patient's history were reviewed and updated as appropriate: allergies, current medications, past social history and problem list.    Past Medical History:   Diagnosis Date   • Adductor tendinitis    • ADHD (attention deficit hyperactivity disorder)    • Allergic    • Anemia    • Anxiety    • Asthma    • Bronchitis    • CAD (coronary artery disease)    • Cataract     Removed by Dr Plasencia   • Deep vein thrombosis (CMS/[facility]) 2015. 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    • Low back pain 1980   • Obesity 1950   • Osteoarthritis    • Osteopenia    • Rhinitis, allergic    • RLS (restless legs syndrome)    • Sinus disorder    • Sleep apnea          Current Outpatient Medications:   •  allopurinol (ZYLOPRIM) 100 MG tablet, Take 1 tablet by mouth Daily., Disp: 90 tablet, Rfl: 0  •  calcitriol (ROCALTROL) 0.25 MCG capsule, Take 0.25 mcg by mouth Every Other Day., Disp: , Rfl:   •  escitalopram (LEXAPRO) 10 MG tablet, TAKE 1 TABLET DAILY, Disp: 90 tablet, Rfl: 0  •  hydrochlorothiazide (HYDRODIURIL) 25 MG tablet, Take 25 mg by mouth Daily., Disp: , Rfl:   •  HYDROcodone-acetaminophen (NORCO) 7.5-325 MG per tablet, Take 1 tablet by mouth 2 (Two) Times a Day As Needed for Moderate Pain ., Disp: 60 tablet, Rfl: 0  •  metoprolol succinate XL (TOPROL-XL) 25 MG 24 hr tablet, Take 1 tablet by mouth Daily., Disp: 90 tablet, Rfl: 1  •  ramipril (ALTACE) 10 MG capsule, TAKE 1 CAPSULE TWICE A DAY, Disp: 180 capsule, Rfl: 3  •  ranitidine (ZANTAC) 150 MG capsule, Take 1 capsule by mouth 2 (Two) Times a Day., Disp: 180 capsule, Rfl: 1  •  rivaroxaban (XARELTO) 15 MG tablet, Take 1 tablet by mouth Daily., Disp: 90 tablet, Rfl: 2  •  rOPINIRole (REQUIP) 1 MG tablet, TAKE THREE TABLETS BY MOUTH EVERY NIGHT AT BEDTIME, Disp: 12 tablet, Rfl: 0  •  simvastatin (ZOCOR) 40 MG tablet, Take 1 tablet by mouth Daily., Disp: 90 tablet, Rfl: 1  •  sodium bicarbonate 650 MG tablet, Take 650 mg by mouth 2 (Two) Times a Day., Disp: , Rfl:   •  triamterene-hydrochlorothiazide (MAXZIDE-25) 37.5-25 MG per tablet, TAKE ONE TABLET BY MOUTH DAILY, Disp: 72 tablet, Rfl: 0    Current Facility-Administered Medications:   •  cyanocobalamin injection 1,000 mcg, 1,000 mcg, Intramuscular, Q28 Days,  "Berna Dhaliwal MD, 1,000 mcg at 02/08/19 1515  •  cyanocobalamin injection 1,000 mcg, 1,000 mcg, Intramuscular, Q28 Days, Radha House APRN, 1,000 mcg at 04/10/19 1144    No Known Allergies    Review of Systems   Constitutional: Positive for activity change. Negative for appetite change, chills, fever, unexpected weight change and weight loss.   HENT: Negative for ear pain, sinus pressure and sore throat.    Eyes: Negative for visual disturbance.   Respiratory: Negative for cough, shortness of breath and wheezing.    Cardiovascular: Negative for chest pain, palpitations and leg swelling.   Gastrointestinal: Negative for abdominal pain, blood in stool, bowel incontinence, constipation, diarrhea, nausea and vomiting.   Genitourinary: Negative for bladder incontinence, decreased urine volume, difficulty urinating (no change in bladder function), dysuria, frequency, genital sores, hematuria, pelvic pain and urgency.   Musculoskeletal: Positive for back pain and gait problem.   Skin: Negative for rash.   Neurological: Negative for dizziness, tingling, syncope, weakness, light-headedness, numbness, headaches and paresthesias.   Psychiatric/Behavioral: Negative for dysphoric mood.       Objective   Vitals:    07/17/19 0924   BP: 132/82   BP Location: Left arm   Patient Position: Sitting   Cuff Size: Adult   Pulse: 73   SpO2: 99%   Weight: 125 kg (275 lb)   Height: 167.6 cm (65.98\")     Physical Exam   Constitutional: She appears well-developed and well-nourished. She is cooperative. She does not have a sickly appearance. She does not appear ill.   HENT:   Head: Normocephalic.   Right Ear: Hearing, tympanic membrane and external ear normal.   Left Ear: Hearing, tympanic membrane and external ear normal.   Nose: Nose normal. No mucosal edema, rhinorrhea, sinus tenderness or nasal deformity. Right sinus exhibits no maxillary sinus tenderness and no frontal sinus tenderness. Left sinus exhibits no maxillary sinus " tenderness and no frontal sinus tenderness.   Mouth/Throat: Oropharynx is clear and moist and mucous membranes are normal. Normal dentition.   Eyes: Conjunctivae and lids are normal. Right eye exhibits no discharge and no exudate. Left eye exhibits no discharge and no exudate.   Neck: Trachea normal. Carotid bruit is not present. No edema present. No thyroid mass present.   Cardiovascular: Regular rhythm, normal heart sounds and normal pulses.   No murmur heard.  Pulmonary/Chest: Breath sounds normal. No respiratory distress. She has no decreased breath sounds. She has no wheezes. She has no rhonchi. She has no rales.   Musculoskeletal:        Lumbar back: She exhibits tenderness and spasm.   Lymphadenopathy:        Head (right side): No submental, no submandibular, no tonsillar, no preauricular, no posterior auricular and no occipital adenopathy present.        Head (left side): No submental, no submandibular, no tonsillar, no preauricular, no posterior auricular and no occipital adenopathy present.   Neurological: She is alert. No sensory deficit.   Skin: Skin is warm, dry and intact. No cyanosis. Nails show no clubbing.       Assessment/Plan   Brooklyn was seen today for discuss mri results.    Diagnoses and all orders for this visit:    Intervertebral lumbar disc disorder with myelopathy, lumbar region    Reviewed MRI of lumbar spine obtained 6/29 with patient which showed multilevel degenerative disc and facet disease, most prominent at L5-S1. However, no presacral masses or lesions were noted on MRI.

## 2019-08-09 DIAGNOSIS — I82.599 CHRONIC DEEP VEIN THROMBOSIS (DVT) OF OTHER VEIN OF LOWER EXTREMITY, UNSPECIFIED LATERALITY (HCC): ICD-10-CM

## 2019-08-14 RX ORDER — METOPROLOL SUCCINATE 25 MG/1
TABLET, EXTENDED RELEASE ORAL
Qty: 30 TABLET | Refills: 0 | Status: SHIPPED | OUTPATIENT
Start: 2019-08-14 | End: 2019-10-18 | Stop reason: SDUPTHER

## 2019-08-15 DIAGNOSIS — I10 ESSENTIAL HYPERTENSION: ICD-10-CM

## 2019-08-15 DIAGNOSIS — M54.16 CHRONIC LUMBAR RADICULOPATHY: ICD-10-CM

## 2019-08-15 RX ORDER — RAMIPRIL 10 MG/1
10 CAPSULE ORAL 2 TIMES DAILY
Qty: 180 CAPSULE | Refills: 3 | Status: SHIPPED | OUTPATIENT
Start: 2019-08-15 | End: 2020-02-28 | Stop reason: SDUPTHER

## 2019-08-23 DIAGNOSIS — G25.81 RESTLESS LEGS: ICD-10-CM

## 2019-08-23 RX ORDER — ROPINIROLE 1 MG/1
3 TABLET, FILM COATED ORAL NIGHTLY
Qty: 90 TABLET | Refills: 1 | Status: SHIPPED | OUTPATIENT
Start: 2019-08-23 | End: 2019-08-23 | Stop reason: SDUPTHER

## 2019-08-23 RX ORDER — ROPINIROLE 1 MG/1
3 TABLET, FILM COATED ORAL NIGHTLY
Qty: 270 TABLET | Refills: 1 | Status: SHIPPED | OUTPATIENT
Start: 2019-08-23 | End: 2019-12-06 | Stop reason: SDUPTHER

## 2019-08-23 NOTE — TELEPHONE ENCOUNTER
Pt sent a [employer] request to ask for this to be sent to [facility].  Will you please clarify the directions?

## 2019-08-31 DIAGNOSIS — F32.89 OTHER DEPRESSION: ICD-10-CM

## 2019-09-03 RX ORDER — ESCITALOPRAM OXALATE 10 MG/1
TABLET ORAL
Qty: 90 TABLET | Refills: 4 | Status: SHIPPED | OUTPATIENT
Start: 2019-09-03 | End: 2020-11-27

## 2019-10-03 ENCOUNTER — TELEPHONE (OUTPATIENT)
Dept: INTERNAL MEDICINE | Facility: CLINIC | Age: 69
End: 2019-10-03

## 2019-10-03 NOTE — TELEPHONE ENCOUNTER
Called and left voice mail with [facility], advised to send form over for instructions regarding nerve block procedure. Will wait for forms.

## 2019-10-03 NOTE — TELEPHONE ENCOUNTER
Regarding: FW: Prescription Question  Contact: [phone]  Please check with [facility] regarding this.  ----- Message -----  From: Radha Hylton LPN  Sent: 10/3/2019  10:15 AM  To: ULICES Joshua  Subject: FW: Prescription Question                            ----- Message -----  From: Brooklyn Johns  Sent: 10/3/2019  10:03 AM  To: [facility]  Subject: Prescription Question                            ----- Message from Mychart, Generic sent at 10/3/2019 10:03 AM EDT -----    [facility] needs a verification from you that they can stop my Xeralto for 10 days to prep me for a nerve block.  Please send them an OK.  Thanks, Priya Johns [phone]

## 2019-10-14 DIAGNOSIS — M10.9 GOUT, UNSPECIFIED CAUSE, UNSPECIFIED CHRONICITY, UNSPECIFIED SITE: ICD-10-CM

## 2019-10-14 RX ORDER — ALLOPURINOL 100 MG/1
100 TABLET ORAL DAILY
Qty: 90 TABLET | Refills: 0 | Status: SHIPPED | OUTPATIENT
Start: 2019-10-14 | End: 2020-01-06

## 2019-10-18 ENCOUNTER — OFFICE VISIT (OUTPATIENT)
Dept: INTERNAL MEDICINE | Facility: CLINIC | Age: 69
End: 2019-10-18

## 2019-10-18 VITALS
WEIGHT: 279 LBS | BODY MASS INDEX: 44.84 KG/M2 | HEIGHT: 66 IN | DIASTOLIC BLOOD PRESSURE: 78 MMHG | SYSTOLIC BLOOD PRESSURE: 118 MMHG | HEART RATE: 90 BPM | OXYGEN SATURATION: 98 %

## 2019-10-18 DIAGNOSIS — I10 ESSENTIAL HYPERTENSION: Primary | ICD-10-CM

## 2019-10-18 DIAGNOSIS — M54.16 CHRONIC LUMBAR RADICULOPATHY: ICD-10-CM

## 2019-10-18 DIAGNOSIS — M10.9 GOUT, UNSPECIFIED CAUSE, UNSPECIFIED CHRONICITY, UNSPECIFIED SITE: ICD-10-CM

## 2019-10-18 DIAGNOSIS — Z23 NEED FOR VACCINATION: ICD-10-CM

## 2019-10-18 DIAGNOSIS — I82.599 CHRONIC DEEP VEIN THROMBOSIS (DVT) OF OTHER VEIN OF LOWER EXTREMITY, UNSPECIFIED LATERALITY (HCC): ICD-10-CM

## 2019-10-18 DIAGNOSIS — N18.4 CKD (CHRONIC KIDNEY DISEASE) STAGE 4, GFR 15-29 ML/MIN (HCC): ICD-10-CM

## 2019-10-18 DIAGNOSIS — E53.8 B12 DEFICIENCY: ICD-10-CM

## 2019-10-18 DIAGNOSIS — Z12.11 SCREENING FOR COLON CANCER: ICD-10-CM

## 2019-10-18 LAB
ALBUMIN SERPL-MCNC: 4.7 G/DL (ref 3.5–5.2)
ALBUMIN/GLOB SERPL: 2.4 G/DL
ALP SERPL-CCNC: 153 U/L (ref 39–117)
ALT SERPL-CCNC: 11 U/L (ref 1–33)
AST SERPL-CCNC: 14 U/L (ref 1–32)
BASOPHILS # BLD AUTO: 0.03 10*3/MM3 (ref 0–0.2)
BASOPHILS NFR BLD AUTO: 0.6 % (ref 0–1.5)
BILIRUB SERPL-MCNC: 0.4 MG/DL (ref 0.2–1.2)
BUN SERPL-MCNC: 22 MG/DL (ref 8–23)
BUN/CREAT SERPL: 14 (ref 7–25)
CALCIUM SERPL-MCNC: 8.5 MG/DL (ref 8.6–10.5)
CHLORIDE SERPL-SCNC: 106 MMOL/L (ref 98–107)
CO2 SERPL-SCNC: 23.5 MMOL/L (ref 22–29)
CREAT SERPL-MCNC: 1.57 MG/DL (ref 0.57–1)
EOSINOPHIL # BLD AUTO: 0.13 10*3/MM3 (ref 0–0.4)
EOSINOPHIL # BLD AUTO: 2.7 % (ref 0.3–6.2)
ERYTHROCYTE [DISTWIDTH] IN BLOOD BY AUTOMATED COUNT: 12.7 % (ref 12.3–15.4)
GLOBULIN SER CALC-MCNC: 2 GM/DL
GLUCOSE SERPL-MCNC: 106 MG/DL (ref 65–99)
HCT VFR BLD AUTO: 33.1 % (ref 34–46.6)
HGB BLD-MCNC: 10.9 G/DL (ref 12–15.9)
IMM GRANULOCYTES # BLD: 0.01 10*3/MM3 (ref 0–0.05)
IMM GRANULOCYTES NFR BLD: 0.2 % (ref 0–0.5)
LYMPHOCYTES # BLD AUTO: 1.38 10*3/MM3 (ref 0.7–3.1)
LYMPHOCYTES NFR BLD AUTO: 29.1 % (ref 19.6–45.3)
MCH RBC QN AUTO: 31.1 PG (ref 26.6–33)
MCHC RBC AUTO-ENTMCNC: 32.9 G/DL (ref 31.5–35.7)
MCV RBC AUTO: 94.3 FL (ref 79–97)
MONOCYTES # BLD AUTO: 0.44 10*3/MM3 (ref 0.1–0.9)
MONOCYTES NFR BLD AUTO: 9.3 % (ref 5–12)
NEUTROPHILS # BLD AUTO: 2.76 10*3/MM3 (ref 1.7–7)
NEUTROPHILS NFR BLD AUTO: 58.1 % (ref 42.7–76)
NRBC BLD AUTO-RTO: 0 /100 WBC (ref 0–0.2)
PLATELET # BLD AUTO: 162 10*3/MM3 (ref 140–450)
POTASSIUM SERPL-SCNC: 4.6 MMOL/L (ref 3.5–5.2)
PROT SERPL-MCNC: 6.7 G/DL (ref 6–8.5)
RBC # BLD AUTO: 3.51 10*6/MM3 (ref 3.77–5.28)
SODIUM SERPL-SCNC: 145 MMOL/L (ref 136–145)
URATE SERPL-MCNC: 6.8 MG/DL (ref 2.4–5.7)
WBC # BLD AUTO: 4.75 10*3/MM3 (ref 3.4–10.8)

## 2019-10-18 PROCEDURE — 99214 OFFICE O/P EST MOD 30 MIN: CPT | Performed by: NURSE PRACTITIONER

## 2019-10-18 PROCEDURE — 90653 IIV ADJUVANT VACCINE IM: CPT | Performed by: NURSE PRACTITIONER

## 2019-10-18 PROCEDURE — 96372 THER/PROPH/DIAG INJ SC/IM: CPT | Performed by: NURSE PRACTITIONER

## 2019-10-18 PROCEDURE — G0008 ADMIN INFLUENZA VIRUS VAC: HCPCS | Performed by: NURSE PRACTITIONER

## 2019-10-18 RX ORDER — CHLORTHALIDONE 25 MG/1
TABLET ORAL
Qty: 45 TABLET | Refills: 5
Start: 2019-10-18 | End: 2022-08-06 | Stop reason: SDUPTHER

## 2019-10-18 RX ORDER — CYANOCOBALAMIN 1000 UG/ML
1000 INJECTION, SOLUTION INTRAMUSCULAR; SUBCUTANEOUS
Status: DISCONTINUED | OUTPATIENT
Start: 2019-10-18 | End: 2020-09-16 | Stop reason: HOSPADM

## 2019-10-18 RX ADMIN — CYANOCOBALAMIN 1000 MCG: 1000 INJECTION, SOLUTION INTRAMUSCULAR; SUBCUTANEOUS at 14:06

## 2019-10-19 LAB — VIT B12 SERPL-MCNC: 227 PG/ML (ref 211–946)

## 2019-10-20 PROBLEM — N18.4 CKD (CHRONIC KIDNEY DISEASE) STAGE 4, GFR 15-29 ML/MIN: Status: ACTIVE | Noted: 2019-10-20

## 2019-10-24 ENCOUNTER — TELEPHONE (OUTPATIENT)
Dept: INTERNAL MEDICINE | Facility: CLINIC | Age: 69
End: 2019-10-24

## 2019-10-24 NOTE — TELEPHONE ENCOUNTER
----- Message from Radha Lobato sent at 10/24/2019 12:40 PM EDT -----  Jacob from [facility] is calling for Cardiac clearance for pt to have a epidural steroid injection. They need clearance that pt can come off her Xarelto 3 days prior to epidural injection.  Please fax note to [phone]  Please advise.      Jacob at [facility]- [phone]

## 2019-11-05 ENCOUNTER — PREP FOR SURGERY (OUTPATIENT)
Dept: OTHER | Facility: HOSPITAL | Age: 69
End: 2019-11-05

## 2019-11-05 DIAGNOSIS — Z12.11 SCREENING FOR COLON CANCER: Primary | ICD-10-CM

## 2019-11-06 PROBLEM — Z12.11 SCREENING FOR COLON CANCER: Status: ACTIVE | Noted: 2019-11-06

## 2019-11-08 RX ORDER — RANITIDINE 150 MG/1
CAPSULE ORAL
Qty: 180 CAPSULE | Refills: 4 | Status: SHIPPED | OUTPATIENT
Start: 2019-11-08 | End: 2020-05-29 | Stop reason: RX

## 2019-11-08 RX ORDER — METOPROLOL SUCCINATE 25 MG/1
TABLET, EXTENDED RELEASE ORAL
Qty: 90 TABLET | Refills: 4 | Status: SHIPPED | OUTPATIENT
Start: 2019-11-08 | End: 2021-01-31

## 2019-11-13 RX ORDER — SIMVASTATIN 40 MG
TABLET ORAL
Qty: 90 TABLET | Refills: 0 | Status: SHIPPED | OUTPATIENT
Start: 2019-11-13 | End: 2020-02-12

## 2019-12-06 ENCOUNTER — TELEPHONE (OUTPATIENT)
Dept: INTERNAL MEDICINE | Facility: CLINIC | Age: 69
End: 2019-12-06

## 2019-12-06 DIAGNOSIS — G25.81 RESTLESS LEGS: ICD-10-CM

## 2019-12-06 DIAGNOSIS — I82.599 CHRONIC DEEP VEIN THROMBOSIS (DVT) OF OTHER VEIN OF LOWER EXTREMITY, UNSPECIFIED LATERALITY (HCC): ICD-10-CM

## 2019-12-06 RX ORDER — ROPINIROLE 1 MG/1
3 TABLET, FILM COATED ORAL NIGHTLY
Qty: 42 TABLET | Refills: 0 | Status: SHIPPED | OUTPATIENT
Start: 2019-12-06 | End: 2020-02-19

## 2019-12-06 NOTE — TELEPHONE ENCOUNTER
----- Message from Lindy Reed sent at 12/6/2019  4:25 PM EST -----  The patient is requesting a short term refill of her rivaroxaban (XARELTO) 15 MG tablet and rOPINIRole (REQUIP) 1 MG tablet medications while she waits for [facility] to send her 90 day refills. She has had some trouble with them so she's not sure when they will get to her. Her call back is [phone]. Thank you.     NANETTE JOHNSTON[address] [place] [address] BYPASS AT [facility] & (HAYDE POLLACK) - [phone]  - [phone] FX

## 2019-12-23 ENCOUNTER — OFFICE VISIT (OUTPATIENT)
Dept: INTERNAL MEDICINE | Facility: CLINIC | Age: 69
End: 2019-12-23

## 2019-12-23 ENCOUNTER — APPOINTMENT (OUTPATIENT)
Dept: WOMENS IMAGING | Facility: HOSPITAL | Age: 69
End: 2019-12-23

## 2019-12-23 DIAGNOSIS — Z12.31 ENCOUNTER FOR SCREENING MAMMOGRAM FOR BREAST CANCER: ICD-10-CM

## 2019-12-23 DIAGNOSIS — Z12.31 ENCOUNTER FOR SCREENING MAMMOGRAM FOR BREAST CANCER: Primary | ICD-10-CM

## 2019-12-23 PROCEDURE — 77067 SCR MAMMO BI INCL CAD: CPT | Performed by: NURSE PRACTITIONER

## 2019-12-23 PROCEDURE — 77067 SCR MAMMO BI INCL CAD: CPT | Performed by: RADIOLOGY

## 2019-12-26 ENCOUNTER — DOCUMENTATION (OUTPATIENT)
Dept: INTERNAL MEDICINE | Facility: CLINIC | Age: 69
End: 2019-12-26

## 2019-12-26 DIAGNOSIS — N64.89 BREAST ASYMMETRY: Primary | ICD-10-CM

## 2019-12-26 NOTE — PROGRESS NOTES
Spoke with pt regarding need for diagnostic breast testing. She is scheduled at [facility] on 1-9-20 at 10AM.  RANDY Cooley)(MONI),ADELINET

## 2019-12-27 ENCOUNTER — ANESTHESIA (OUTPATIENT)
Dept: GASTROENTEROLOGY | Facility: HOSPITAL | Age: 69
End: 2019-12-27

## 2019-12-27 ENCOUNTER — ANESTHESIA EVENT (OUTPATIENT)
Dept: GASTROENTEROLOGY | Facility: HOSPITAL | Age: 69
End: 2019-12-27

## 2019-12-27 ENCOUNTER — HOSPITAL ENCOUNTER (OUTPATIENT)
Facility: HOSPITAL | Age: 69
Setting detail: HOSPITAL OUTPATIENT SURGERY
Discharge: HOME | End: 2019-12-27
Attending: SURGERY | Admitting: SURGERY

## 2019-12-27 VITALS
WEIGHT: 283 LBS | HEIGHT: 66 IN | SYSTOLIC BLOOD PRESSURE: 134 MMHG | DIASTOLIC BLOOD PRESSURE: 81 MMHG | OXYGEN SATURATION: 97 % | RESPIRATION RATE: 17 BRPM | HEART RATE: 78 BPM | BODY MASS INDEX: 45.48 KG/M2

## 2019-12-27 DIAGNOSIS — Z12.11 SCREENING FOR COLON CANCER: ICD-10-CM

## 2019-12-27 PROCEDURE — 88305 TISSUE EXAM BY PATHOLOGIST: CPT | Performed by: SURGERY

## 2019-12-27 PROCEDURE — 45380 COLONOSCOPY AND BIOPSY: CPT | Performed by: SURGERY

## 2019-12-27 PROCEDURE — 25010000002 PROPOFOL 10 MG/ML EMULSION: Performed by: NURSE ANESTHETIST, CERTIFIED REGISTERED

## 2019-12-27 PROCEDURE — 25010000002 ONDANSETRON PER 1 MG: Performed by: NURSE ANESTHETIST, CERTIFIED REGISTERED

## 2019-12-27 PROCEDURE — S0260 H&P FOR SURGERY: HCPCS | Performed by: SURGERY

## 2019-12-27 RX ORDER — SODIUM CHLORIDE, SODIUM LACTATE, POTASSIUM CHLORIDE, CALCIUM CHLORIDE 600; 310; 30; 20 MG/100ML; MG/100ML; MG/100ML; MG/100ML
30 INJECTION, SOLUTION INTRAVENOUS CONTINUOUS PRN
Status: DISCONTINUED | OUTPATIENT
Start: 2019-12-27 | End: 2019-12-27

## 2019-12-27 RX ORDER — PROPOFOL 10 MG/ML
VIAL (ML) INTRAVENOUS AS NEEDED
Status: DISCONTINUED | OUTPATIENT
Start: 2019-12-27 | End: 2019-12-27 | Stop reason: SURG

## 2019-12-27 RX ORDER — ONDANSETRON 2 MG/ML
INJECTION INTRAMUSCULAR; INTRAVENOUS AS NEEDED
Status: DISCONTINUED | OUTPATIENT
Start: 2019-12-27 | End: 2019-12-27 | Stop reason: SURG

## 2019-12-27 RX ORDER — GLYCOPYRROLATE 0.2 MG/ML
INJECTION INTRAMUSCULAR; INTRAVENOUS AS NEEDED
Status: DISCONTINUED | OUTPATIENT
Start: 2019-12-27 | End: 2019-12-27 | Stop reason: SURG

## 2019-12-27 RX ORDER — PROPOFOL 10 MG/ML
VIAL (ML) INTRAVENOUS CONTINUOUS PRN
Status: DISCONTINUED | OUTPATIENT
Start: 2019-12-27 | End: 2019-12-27 | Stop reason: SURG

## 2019-12-27 RX ORDER — LIDOCAINE HYDROCHLORIDE 20 MG/ML
INJECTION, SOLUTION INFILTRATION; PERINEURAL AS NEEDED
Status: DISCONTINUED | OUTPATIENT
Start: 2019-12-27 | End: 2019-12-27 | Stop reason: SURG

## 2019-12-27 RX ORDER — SODIUM CHLORIDE 9 MG/ML
30 INJECTION, SOLUTION INTRAVENOUS CONTINUOUS PRN
Status: DISCONTINUED | OUTPATIENT
Start: 2019-12-27 | End: 2019-12-27 | Stop reason: HOSPADM

## 2019-12-27 RX ORDER — SODIUM CHLORIDE, SODIUM LACTATE, POTASSIUM CHLORIDE, CALCIUM CHLORIDE 600; 310; 30; 20 MG/100ML; MG/100ML; MG/100ML; MG/100ML
INJECTION, SOLUTION INTRAVENOUS CONTINUOUS PRN
Status: DISCONTINUED | OUTPATIENT
Start: 2019-12-27 | End: 2019-12-27

## 2019-12-27 RX ADMIN — PROPOFOL 50 MG: 10 INJECTION, EMULSION INTRAVENOUS at 08:47

## 2019-12-27 RX ADMIN — LIDOCAINE HYDROCHLORIDE 40 MG: 20 INJECTION, SOLUTION INFILTRATION; PERINEURAL at 08:47

## 2019-12-27 RX ADMIN — SODIUM CHLORIDE 30 ML/HR: 9 INJECTION, SOLUTION INTRAVENOUS at 08:38

## 2019-12-27 RX ADMIN — ONDANSETRON HYDROCHLORIDE 4 MG: 2 SOLUTION INTRAMUSCULAR; INTRAVENOUS at 08:44

## 2019-12-27 RX ADMIN — GLYCOPYRROLATE 0.2 MG: 0.2 INJECTION INTRAMUSCULAR; INTRAVENOUS at 08:45

## 2019-12-27 RX ADMIN — PROPOFOL 300 MCG/KG/MIN: 10 INJECTION, EMULSION INTRAVENOUS at 08:47

## 2019-12-27 NOTE — H&P
Patient Care Team:  Radha House APRN as PCP - General (Internal Medicine)    Chief complaint:  Need for screening colonoscopy    Subjective     History of Present Illness     The patient is a pleasant 69-year-old white female who presents for screening colonoscopy.  She believes her last colonoscopy was 10 years ago.  She has no previous history of colonic polyps.  She has no significant GI symptoms.    Review of Systems   Constitutional: Negative for fatigue and fever.   Respiratory: Negative for chest tightness and shortness of breath.    Cardiovascular: Negative for chest pain and palpitations.   Gastrointestinal: Negative for abdominal pain, blood in stool, constipation, diarrhea, nausea and vomiting.        Past Medical History:   Diagnosis Date   • Adductor tendinitis    • ADHD (attention deficit hyperactivity disorder)    • Allergic    • Anemia    • Anxiety    • Asthma    • Bronchitis    • CAD (coronary artery disease)    • Cataract     Removed by Dr Plasencia   • Deep vein thrombosis (CMS/HCC) 2015. 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    • Kidney disease     pt states stage 4 kidney disease   • Low back pain 1980   • Obesity 1950   • Osteoarthritis    • Osteopenia    • Rhinitis, allergic    • RLS (restless legs syndrome)    • Sinus disorder    • Sleep apnea      Past Surgical History:   Procedure Laterality Date   • ANKLE FUSION Left    • APPENDECTOMY  1984   • BARIATRIC SURGERY  1984   • CARDIAC CATHETERIZATION  1999   • CHOLECYSTECTOMY     • COLONOSCOPY  1990   • CORONARY ANGIOPLASTY WITH STENT PLACEMENT     • CORONARY STENT PLACEMENT     • EYE  SURGERY     • GASTRIC BYPASS     • HYSTERECTOMY      Total   • JOINT REPLACEMENT  Lft knee 2014  rht knee 2015   • KNEE ACL RECONSTRUCTION     • SINUS SURGERY      x 2   • TOTAL KNEE ARTHROPLASTY Bilateral      Family History   Problem Relation Age of Onset   • Breast cancer Mother    • Cancer Mother         bladder   • Arthritis Mother    • Hyperlipidemia Mother    • Stroke Mother    • Lung cancer Father    • Arthritis Father    • Cancer Father    • Hyperlipidemia Father    • Breast cancer Sister    • Hypertension Sister    • Heart disease Sister    • Breast cancer Sister    • Hypertension Sister    • Heart disease Sister    • Arthritis Sister    • Cancer Sister    • Cancer Sister    • Diabetes Sister    • Cancer Maternal Aunt    • Diabetes Paternal Uncle      Social History     Tobacco Use   • Smoking status: Never Smoker   • Smokeless tobacco: Never Used   Substance Use Topics   • Alcohol use: Yes     Types: 1 Glasses of wine per week     Comment: social   • Drug use: Yes     Types: Hydrocodone     Allergies:  Patient has no known allergies.    Objective      Vital Signs  Heart Rate:  [61] 61  Resp:  [12] 12  BP: (139)/(64) 139/64    Physical Exam   Constitutional: She is oriented to person, place, and time. She appears well-developed and well-nourished.  Non-toxic appearance.   Eyes: EOM are normal. No scleral icterus.   Pulmonary/Chest: Effort normal. No respiratory distress.   Abdominal: Soft. Normal appearance. There is no tenderness.   Neurological: She is alert and oriented to person, place, and time.   Skin: Skin is warm and dry.   Psychiatric: She has a normal mood and affect. Her behavior is normal. Judgment and thought content normal.       Results Review:   I reviewed the patient's new clinical results.      Assessment/Plan       Screening for colon cancer      Assessment & Plan     1.  Need for screening colonoscopy: Plan colonoscopy.  The patient understands the indications, alternatives, risks, and  benefits of the procedure and wishes to proceed.    I discussed the patients findings and my recommendations with patient    Jessee Birch Jr., MD  12/27/19  8:44 AM

## 2019-12-27 NOTE — OP NOTE
Surgeon:     Jessee Birch Jr., M.D.    Preoperative Diagnosis:     1.  Need for screening colonoscopy    Postoperative Diagnosis:     1.  Diverticulosis  2.  Right colon polyp    Procedure Performed:     1.  Colonoscopy with biopsy of right colon polyp    Indications:     The patient is a pleasant 69-year-old female who presents for screening colonoscopy.  The patient understands the indications, alternatives, risks, and benefits of the procedure and wishes to proceed.    Procedure:     The patient was identified, taken to the endoscopy suite, and place in the left side down decubitus procedure.  The patient underwent a MAC anesthesia and was appropriately monitored through the case by the anesthesia personnel.  A rectal exam was performed that was normal.  The colonoscope was introduced into the rectum and advanced very carefully to the cecum that was identified by the cecal strap, ileocecal valve, and the appendiceal orifice.  The scope was then slowly withdrawn with careful circumferential examination of the mucosa performed.  The bowel prep was good allowing adequate visualization of mucosal surfaces.  The scope was withdrawn.  The patient tolerated the procedure well and was transferred the recovery area in stable condition.    Findings:    There was diverticulosis involving the sigmoid colon with no inflammatory changes.    There was a 6 mm polyp in the right colon that was completely removed by cold biopsy forceps and retrieved.    Recommendations:     1.  High-fiber diet.  2.  Await pathology results from the polypectomy.  3.  Repeat colonoscopy in 5 years.    Jessee Birch Jr., M.D.

## 2019-12-27 NOTE — ANESTHESIA PREPROCEDURE EVALUATION
Anesthesia Evaluation     Patient summary reviewed and Nursing notes reviewed                Airway   Mallampati: I  TM distance: >3 FB  Neck ROM: full  No difficulty expected  Dental - normal exam     Pulmonary - normal exam   (+) asthma,sleep apnea,   Cardiovascular - normal exam    (+) hypertension, valvular problems/murmurs, CAD, DVT, hyperlipidemia,       Neuro/Psych  (+) headaches, psychiatric history,     GI/Hepatic/Renal/Endo    (+) obesity, morbid obesity, GERD,  renal disease,     Musculoskeletal     (+) neck pain,   Abdominal  - normal exam    Bowel sounds: normal.   Substance History - negative use     OB/GYN negative ob/gyn ROS         Other   arthritis,                      Anesthesia Plan    ASA 3     MAC       Anesthetic plan, all risks, benefits, and alternatives have been provided, discussed and informed consent has been obtained with: patient.

## 2019-12-30 LAB
CYTO UR: NORMAL
LAB AP CASE REPORT: NORMAL
PATH REPORT.FINAL DX SPEC: NORMAL
PATH REPORT.GROSS SPEC: NORMAL

## 2020-01-04 DIAGNOSIS — M10.9 GOUT, UNSPECIFIED CAUSE, UNSPECIFIED CHRONICITY, UNSPECIFIED SITE: ICD-10-CM

## 2020-01-06 RX ORDER — ALLOPURINOL 100 MG/1
TABLET ORAL
Qty: 90 TABLET | Refills: 1 | Status: SHIPPED | OUTPATIENT
Start: 2020-01-06 | End: 2020-04-06

## 2020-01-20 ENCOUNTER — TELEPHONE (OUTPATIENT)
Dept: SURGERY | Facility: CLINIC | Age: 70
End: 2020-01-20

## 2020-01-20 NOTE — TELEPHONE ENCOUNTER
Called and left  for patient informing her of her benign 12/27/19 pathology as well as the repeat colonoscopy in 5 years. Recall placed.

## 2020-01-20 NOTE — TELEPHONE ENCOUNTER
----- Message from Jessee Birch Jr., MD sent at 1/16/2020  8:50 AM EST -----  Please contact this patient to inform that the polyp is benign and a repeat colonoscopy is needed in 5 years.  Please place in recall for a colonoscopy in 5 years.  Thanks.

## 2020-01-24 ENCOUNTER — APPOINTMENT (OUTPATIENT)
Dept: WOMENS IMAGING | Facility: HOSPITAL | Age: 70
End: 2020-01-24

## 2020-01-24 PROCEDURE — G0279 TOMOSYNTHESIS, MAMMO: HCPCS | Performed by: RADIOLOGY

## 2020-01-24 PROCEDURE — 77065 DX MAMMO INCL CAD UNI: CPT | Performed by: RADIOLOGY

## 2020-01-27 DIAGNOSIS — N64.89 BREAST ASYMMETRY: ICD-10-CM

## 2020-02-12 RX ORDER — SIMVASTATIN 40 MG
TABLET ORAL
Qty: 90 TABLET | Refills: 0 | Status: SHIPPED | OUTPATIENT
Start: 2020-02-12 | End: 2020-02-12

## 2020-02-12 RX ORDER — SIMVASTATIN 40 MG
TABLET ORAL
Qty: 90 TABLET | Refills: 1 | Status: SHIPPED | OUTPATIENT
Start: 2020-02-12 | End: 2020-12-26

## 2020-02-19 DIAGNOSIS — G25.81 RESTLESS LEGS: ICD-10-CM

## 2020-02-19 RX ORDER — ROPINIROLE 1 MG/1
TABLET, FILM COATED ORAL
Qty: 270 TABLET | Refills: 4 | Status: SHIPPED | OUTPATIENT
Start: 2020-02-19 | End: 2020-11-23

## 2020-02-28 ENCOUNTER — OFFICE VISIT (OUTPATIENT)
Dept: INTERNAL MEDICINE | Facility: CLINIC | Age: 70
End: 2020-02-28

## 2020-02-28 VITALS
HEART RATE: 62 BPM | DIASTOLIC BLOOD PRESSURE: 64 MMHG | HEIGHT: 66 IN | BODY MASS INDEX: 46.12 KG/M2 | SYSTOLIC BLOOD PRESSURE: 114 MMHG | WEIGHT: 287 LBS | OXYGEN SATURATION: 99 %

## 2020-02-28 DIAGNOSIS — I10 ESSENTIAL HYPERTENSION: Primary | ICD-10-CM

## 2020-02-28 DIAGNOSIS — E66.01 MORBIDLY OBESE (HCC): ICD-10-CM

## 2020-02-28 DIAGNOSIS — I82.599 CHRONIC DEEP VEIN THROMBOSIS (DVT) OF OTHER VEIN OF LOWER EXTREMITY, UNSPECIFIED LATERALITY (HCC): ICD-10-CM

## 2020-02-28 DIAGNOSIS — N18.4 CKD (CHRONIC KIDNEY DISEASE) STAGE 4, GFR 15-29 ML/MIN (HCC): ICD-10-CM

## 2020-02-28 DIAGNOSIS — E53.8 B12 DEFICIENCY: ICD-10-CM

## 2020-02-28 DIAGNOSIS — M54.16 CHRONIC LUMBAR RADICULOPATHY: ICD-10-CM

## 2020-02-28 PROBLEM — Z12.11 SCREENING FOR COLON CANCER: Status: RESOLVED | Noted: 2019-11-06 | Resolved: 2020-02-28

## 2020-02-28 PROCEDURE — G0439 PPPS, SUBSEQ VISIT: HCPCS | Performed by: NURSE PRACTITIONER

## 2020-02-28 PROCEDURE — 96372 THER/PROPH/DIAG INJ SC/IM: CPT | Performed by: NURSE PRACTITIONER

## 2020-02-28 PROCEDURE — 99214 OFFICE O/P EST MOD 30 MIN: CPT | Performed by: NURSE PRACTITIONER

## 2020-02-28 RX ORDER — CYANOCOBALAMIN 1000 UG/ML
1000 INJECTION, SOLUTION INTRAMUSCULAR; SUBCUTANEOUS
Status: DISCONTINUED | OUTPATIENT
Start: 2020-02-28 | End: 2020-09-16 | Stop reason: HOSPADM

## 2020-02-28 RX ORDER — RAMIPRIL 10 MG/1
10 CAPSULE ORAL DAILY
Qty: 90 CAPSULE | Refills: 3
Start: 2020-02-28 | End: 2020-08-10

## 2020-02-28 RX ADMIN — CYANOCOBALAMIN 1000 MCG: 1000 INJECTION, SOLUTION INTRAMUSCULAR; SUBCUTANEOUS at 11:13

## 2020-02-28 NOTE — PROGRESS NOTES
The ABCs of the Annual Wellness Visit  Subsequent Medicare Wellness Visit    Chief Complaint   Patient presents with   • Medicare Wellness-subsequent   • Hypertension       Subjective   History of Present Illness:  Brooklyn Johns is a 69 y.o. female who presents for a Subsequent Medicare Wellness Visit.    HEALTH RISK ASSESSMENT    Recent Hospitalizations:  No hospitalization(s) within the last year.    Current Medical Providers:  Patient Care Team:  Radha House APRN as PCP - General (Internal Medicine)    Smoking Status:  Social History     Tobacco Use   Smoking Status Never Smoker   Smokeless Tobacco Never Used       Alcohol Consumption:  Social History     Substance and Sexual Activity   Alcohol Use Yes   • Types: 1 Glasses of wine per week    Comment: social       Depression Screen:   PHQ-2/PHQ-9 Depression Screening 2/28/2020   Little interest or pleasure in doing things 0   Feeling down, depressed, or hopeless 1   Trouble falling or staying asleep, or sleeping too much 3   Feeling tired or having little energy 1   Poor appetite or overeating 0   Feeling bad about yourself - or that you are a failure or have let yourself or your family down 0   Trouble concentrating on things, such as reading the newspaper or watching television 0   Moving or speaking so slowly that other people could have noticed. Or the opposite - being so fidgety or restless that you have been moving around a lot more than usual 0   Thoughts that you would be better off dead, or of hurting yourself in some way 0   Total Score 5   If you checked off any problems, how difficult have these problems made it for you to do your work, take care of things at home, or get along with other people? Not difficult at all       Fall Risk Screen:  STEADI Fall Risk Assessment was completed, and patient is at LOW risk for falls.Assessment completed on:2/28/2020    Health Habits and Functional and Cognitive Screening:  Functional & Cognitive  Status 2/28/2020   Do you have difficulty preparing food and eating? No   Do you have difficulty bathing yourself, getting dressed or grooming yourself? No   Do you have difficulty using the toilet? No   Do you have difficulty moving around from place to place? No   Do you have trouble with steps or getting out of a bed or a chair? No   Current Diet Well Balanced Diet   Dental Exam Up to date   Eye Exam Up to date   Exercise (times per week) 5 times per week   Current Exercise Activities Include Walking   Do you need help using the phone?  No   Are you deaf or do you have serious difficulty hearing?  No   Do you need help with transportation? No   Do you need help shopping? No   Do you need help preparing meals?  No   Do you need help with housework?  No   Do you need help with laundry? No   Do you need help taking your medications? No   Do you need help managing money? No   Do you ever drive or ride in a car without wearing a seat belt? No   Have you felt unusual stress, anger or loneliness in the last month? No   Who do you live with? Alone   If you need help, do you have trouble finding someone available to you? No   Have you been bothered in the last four weeks by sexual problems? No   Do you have difficulty concentrating, remembering or making decisions? No         Does the patient have evidence of cognitive impairment? No    Asprin use counseling:Does not need ASA (and currently is not on it)    Age-appropriate Screening Schedule:  Refer to the list below for future screening recommendations based on patient's age, sex and/or medical conditions. Orders for these recommended tests are listed in the plan section. The patient has been provided with a written plan.    Health Maintenance   Topic Date Due   • TDAP/TD VACCINES (1 - Tdap) [birth year]/05/1961   • ZOSTER VACCINE (1 of 2) 02/28/2025 (Originally 12/5/2000)   • LIPID PANEL  06/28/2020   • MAMMOGRAM  01/24/2021   • DXA SCAN  04/10/2021   • COLONOSCOPY  01/20/2025    • PNEUMOCOCCAL VACCINE (65+ HIGH RISK)  Completed   • INFLUENZA VACCINE  Completed          The following portions of the patient's history were reviewed and updated as appropriate: allergies, current medications, past family history, past medical history, past social history, past surgical history and problem list.    Outpatient Medications Prior to Visit   Medication Sig Dispense Refill   • allopurinol (ZYLOPRIM) 100 MG tablet TAKE ONE TABLET BY MOUTH DAILY 90 tablet 1   • calcitriol (ROCALTROL) 0.25 MCG capsule Take 0.25 mcg by mouth Every Other Day.     • chlorthalidone (HYGROTON) 25 MG tablet Every other day 45 tablet 5   • escitalopram (LEXAPRO) 10 MG tablet TAKE 1 TABLET DAILY 90 tablet 4   • HYDROcodone-acetaminophen (NORCO) 7.5-325 MG per tablet Take 1 tablet by mouth 2 (Two) Times a Day As Needed for Moderate Pain . 60 tablet 0   • metoprolol succinate XL (TOPROL-XL) 25 MG 24 hr tablet TAKE 1 TABLET DAILY 90 tablet 4   • raNITIdine (ZANTAC) 150 MG capsule TAKE 1 CAPSULE TWICE A  capsule 4   • rivaroxaban (XARELTO) 15 MG tablet Take 1 tablet by mouth Daily. 14 tablet 0   • rOPINIRole (REQUIP) 1 MG tablet TAKE 3 TABLETS EVERY NIGHT TAKE 1 HOUR BEFORE BEDTIME 270 tablet 4   • simvastatin (ZOCOR) 40 MG tablet TAKE ONE TABLET BY MOUTH DAILY 90 tablet 1   • sodium bicarbonate 650 MG tablet Take 650 mg by mouth 2 (Two) Times a Day.     • ramipril (ALTACE) 10 MG capsule Take 1 capsule by mouth 2 (Two) Times a Day. 180 capsule 3     Facility-Administered Medications Prior to Visit   Medication Dose Route Frequency Provider Last Rate Last Dose   • cyanocobalamin injection 1,000 mcg  1,000 mcg Intramuscular Q28 Days Berna Dhaliwal MD   1,000 mcg at 02/08/19 1515   • cyanocobalamin injection 1,000 mcg  1,000 mcg Intramuscular Q28 Days Radha House APRN   1,000 mcg at 04/10/19 1144   • cyanocobalamin injection 1,000 mcg  1,000 mcg Intramuscular Q28 Days Radha House APRN   1,000 mcg at 10/18/19 1406  "      Patient Active Problem List   Diagnosis   • Abnormal electrocardiogram   • Chronic coronary artery disease   • Hypertension   • Sleep apnea   • Hyperlipidemia   • Arthritis   • Chronic neck and back pain   • Crushing injury of left foot and ankle   • Cyst near tailbone   • Lumbosacral spondylosis without myelopathy   • History of recurrent deep vein thrombosis (DVT)   • Morbidly obese (CMS/[facility])   • CKD (chronic kidney disease) stage 4, GFR 15-29 ml/min (CMS/[facility])   • Chronic deep vein thrombosis (DVT) of lower extremity (CMS/[facility])       Advanced Care Planning:  ACP discussion was held with the patient during this visit. Patient has an advance directive, copy requested.    Review of Systems    Compared to one year ago, the patient feels her physical health is the same.  Compared to one year ago, the patient feels her mental health is the same.    Reviewed chart for potential of high risk medication in the elderly: yes  Reviewed chart for potential of harmful drug interactions in the elderly:yes    Objective         Vitals:    02/28/20 1016 02/28/20 1104   BP: 100/60 114/64   BP Location: Left arm Left arm   Patient Position: Sitting Sitting   Cuff Size: Adult Large Adult   Pulse: 62    SpO2: 99%    Weight: 130 kg (287 lb)    Height: 167.6 cm (66\")    PainSc:   6    PainLoc: Back        Body mass index is 46.32 kg/m².  Discussed the patient's BMI with her. The BMI is above average; BMI management plan is completed.    Physical Exam          Assessment/Plan   Medicare Risks and Personalized Health Plan  CMS Preventative Services Quick Reference  Cardiovascular risk  Chronic Pain   Immunizations Discussed/Encouraged (specific immunizations; adacel Tdap and Shingrix )    The above risks/problems have been discussed with the patient.  Pertinent information has been shared with the patient in the After Visit Summary.  Follow up plans and orders are seen below in the Assessment/Plan Section.    Diagnoses and all orders " for this visit:    1. Essential hypertension (Primary)  -     ramipril (ALTACE) 10 MG capsule; Take 1 capsule by mouth Daily.  Dispense: 90 capsule; Refill: 3    2. Chronic lumbar radiculopathy    3. CKD (chronic kidney disease) stage 4, GFR 15-29 ml/min (CMS/[facility])    4. B12 deficiency  -     cyanocobalamin injection 1,000 mcg    5. Chronic deep vein thrombosis (DVT) of other vein of lower extremity, unspecified laterality (CMS/H[facility])    6. Morbidly obese (CMS/[facility])      Follow Up:  Return in about 4 months (around 6/28/2020).     An After Visit Summary and PPPS were given to the patient.

## 2020-02-29 PROBLEM — I82.509 CHRONIC DEEP VEIN THROMBOSIS (DVT) OF LOWER EXTREMITY (HCC): Status: ACTIVE | Noted: 2020-02-29

## 2020-02-29 NOTE — PROGRESS NOTES
Subjective   Brooklyn Johns is a 69 y.o. female who presents for f/u regarding restless legs, HTN and CKD.    Her recent (1/2020) colonoscopy showed a benign polyp, 5 year repeat planned.  She c/o discomfort in legs with restlessness and discomfort, she is now taking 1 Requip in the morning, 1 around 2:00pm and 1 at bedtime with good mgmt of sx. She is also followed by pain mgmt for chronic low back pain, currently managed on Hydrocodone.  She c/o intermittent episodes of feeling lightheaded and off balance, has had intermittent episodes of low readings.      Hypertension   This is a chronic problem. The current episode started more than 1 year ago. Progression since onset: BP has been too low. The problem is controlled. Pertinent negatives include no chest pain, headaches, malaise/fatigue, palpitations, peripheral edema or shortness of breath. Current antihypertension treatment includes ACE inhibitors, beta blockers and diuretics. The current treatment provides significant improvement. There are no compliance problems.         The following portions of the patient's history were reviewed and updated as appropriate: allergies, current medications, past social history and problem list.    Past Medical History:   Diagnosis Date   • Adductor tendinitis    • ADHD (attention deficit hyperactivity disorder)    • Allergic    • Anemia    • Anxiety    • Asthma    • Bronchitis    • CAD (coronary artery disease)    • Cataract     Removed by Dr Plasencia   • Deep vein thrombosis (CMS/[facility]) 2015. 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    • Kidney disease     pt states stage 4  kidney disease   • Low back pain 1980   • Obesity 1950   • Osteoarthritis    • Osteopenia    • Rhinitis, allergic    • RLS (restless legs syndrome)    • Sinus disorder    • Sleep apnea          Current Outpatient Medications:   •  allopurinol (ZYLOPRIM) 100 MG tablet, TAKE ONE TABLET BY MOUTH DAILY, Disp: 90 tablet, Rfl: 1  •  calcitriol (ROCALTROL) 0.25 MCG capsule, Take 0.25 mcg by mouth Every Other Day., Disp: , Rfl:   •  chlorthalidone (HYGROTON) 25 MG tablet, Every other day, Disp: 45 tablet, Rfl: 5  •  escitalopram (LEXAPRO) 10 MG tablet, TAKE 1 TABLET DAILY, Disp: 90 tablet, Rfl: 4  •  HYDROcodone-acetaminophen (NORCO) 7.5-325 MG per tablet, Take 1 tablet by mouth 2 (Two) Times a Day As Needed for Moderate Pain ., Disp: 60 tablet, Rfl: 0  •  metoprolol succinate XL (TOPROL-XL) 25 MG 24 hr tablet, TAKE 1 TABLET DAILY, Disp: 90 tablet, Rfl: 4  •  ramipril (ALTACE) 10 MG capsule, Take 1 capsule by mouth Daily., Disp: 90 capsule, Rfl: 3  •  raNITIdine (ZANTAC) 150 MG capsule, TAKE 1 CAPSULE TWICE A DAY, Disp: 180 capsule, Rfl: 4  •  rivaroxaban (XARELTO) 15 MG tablet, Take 1 tablet by mouth Daily., Disp: 14 tablet, Rfl: 0  •  rOPINIRole (REQUIP) 1 MG tablet, TAKE 3 TABLETS EVERY NIGHT TAKE 1 HOUR BEFORE BEDTIME, Disp: 270 tablet, Rfl: 4  •  simvastatin (ZOCOR) 40 MG tablet, TAKE ONE TABLET BY MOUTH DAILY, Disp: 90 tablet, Rfl: 1  •  sodium bicarbonate 650 MG tablet, Take 650 mg by mouth 2 (Two) Times a Day., Disp: , Rfl:     Current Facility-Administered Medications:   •  cyanocobalamin injection 1,000 mcg, 1,000 mcg, Intramuscular, Q28 Days, Berna Dhaliwal MD, 1,000 mcg at 02/08/19 1515  •  cyanocobalamin injection 1,000 mcg, 1,000 mcg, Intramuscular, Q28 Days, Radha House APRN, 1,000 mcg at 04/10/19 1144  •  cyanocobalamin injection 1,000 mcg, 1,000 mcg, Intramuscular, Q28 Days, Radha House, APRN, 1,000 mcg at 10/18/19 1406  •  cyanocobalamin injection 1,000 mcg, 1,000 mcg, Intramuscular, Q28 Days,  "Radha House, APRN, 1,000 mcg at 02/28/20 1113    No Known Allergies    Review of Systems   Constitutional: Negative for chills, fatigue, fever, malaise/fatigue and unexpected weight change.   HENT: Positive for congestion and postnasal drip. Negative for ear pain, sinus pressure, sore throat and trouble swallowing.    Eyes: Negative for visual disturbance.   Respiratory: Negative for cough, chest tightness, shortness of breath and wheezing.    Cardiovascular: Negative for chest pain, palpitations and leg swelling.   Gastrointestinal: Negative for abdominal pain, blood in stool, nausea and vomiting.   Genitourinary: Negative for dysuria, frequency and urgency.   Musculoskeletal: Positive for arthralgias, back pain and myalgias. Negative for joint swelling.   Skin: Negative for color change.   Neurological: Positive for dizziness and light-headedness. Negative for syncope, weakness and headaches.   Hematological: Does not bruise/bleed easily.   Psychiatric/Behavioral: The patient is nervous/anxious (improved with Lexapro).        Objective   Vitals:    02/28/20 1016 02/28/20 1104   BP: 100/60 114/64   BP Location: Left arm Left arm   Patient Position: Sitting Sitting   Cuff Size: Adult Large Adult   Pulse: 62    SpO2: 99%    Weight: 130 kg (287 lb)    Height: 167.6 cm (66\")      Body mass index is 46.32 kg/m².  Physical Exam   Constitutional: She appears well-developed and well-nourished. She is cooperative. She does not have a sickly appearance. She does not appear ill.   HENT:   Head: Normocephalic.   Right Ear: Hearing, tympanic membrane and external ear normal.   Left Ear: Hearing, tympanic membrane and external ear normal.   Nose: Nose normal. No mucosal edema, rhinorrhea, sinus tenderness or nasal deformity. Right sinus exhibits no maxillary sinus tenderness and no frontal sinus tenderness. Left sinus exhibits no maxillary sinus tenderness and no frontal sinus tenderness.   Mouth/Throat: Oropharynx is clear " and moist and mucous membranes are normal. Normal dentition.   Eyes: Conjunctivae and lids are normal. Right eye exhibits no discharge and no exudate. Left eye exhibits no discharge and no exudate.   Neck: Trachea normal. Carotid bruit is not present. No edema present. No thyroid mass present.   Cardiovascular: Regular rhythm, normal heart sounds and normal pulses.   No murmur heard.  Pulmonary/Chest: Breath sounds normal. No respiratory distress. She has no decreased breath sounds. She has no wheezes. She has no rhonchi. She has no rales.   Abdominal: Soft. There is no tenderness.   Lymphadenopathy:        Head (right side): No submental, no submandibular, no tonsillar, no preauricular, no posterior auricular and no occipital adenopathy present.        Head (left side): No submental, no submandibular, no tonsillar, no preauricular, no posterior auricular and no occipital adenopathy present.   Neurological: She is alert.   Skin: Skin is warm, dry and intact. No cyanosis. Nails show no clubbing.       Assessment/Plan   Brooklyn was seen today for medicare wellness-subsequent and hypertension.    Diagnoses and all orders for this visit:    Essential hypertension  -     ramipril (ALTACE) 10 MG capsule; Take 1 capsule by mouth Daily.    Chronic lumbar radiculopathy    CKD (chronic kidney disease) stage 4, GFR 15-29 ml/min (CMS/[facility])    Chronic coronary artery disease    B12 deficiency  -     cyanocobalamin injection 1,000 mcg    Morbidly obese (CMS/[facility])    Chronic deep vein thrombosis (DVT) of other vein of lower extremity, unspecified laterality (CMS/[facility])    1. BP has been low with frequent episodes of feeling lightheaded, decrease Ramipril from 20mg to 10mg and continue to monitor.  2. She is followed by pain mgmt, managed on Hydrocodone.  3. Followed by Dr. Pritchett, recent (2/2020) labs showed stable kidney function.  4. B12 inj administered today.  5. Managed on Xarelto, denies active bleeding.  6. Discussed diet and  exercise program with the goal of weight loss.

## 2020-02-29 NOTE — PATIENT INSTRUCTIONS
Medicare Wellness  Personal Prevention Plan of Service     Date of Office Visit:  2020  Encounter Provider:  ULICES Amezquita  Place of Service:  [facility] INTERNAL MEDICINE  Patient Name: Brooklyn Johns  :  1950    As part of the Medicare Wellness portion of your visit today, we are providing you with this personalized preventive plan of services (PPPS). This plan is based upon recommendations of the [place] Preventive Services Task Force (USPSTF) and the Advisory Committee on Immunization Practices (ACIP).    This lists the preventive care services that should be considered, and provides dates of when you are due. Items listed as completed are up-to-date and do not require any further intervention.    Health Maintenance   Topic Date Due   • TDAP/TD VACCINES (1 - Tdap) 1961   • MEDICARE ANNUAL WELLNESS  2019   • ZOSTER VACCINE (1 of 2) 2025 (Originally 2000)   • LIPID PANEL  2020   • MAMMOGRAM  2021   • DXA SCAN  04/10/2021   • COLONOSCOPY  2025   • PNEUMOCOCCAL VACCINE (65+ HIGH RISK)  Completed   • HEPATITIS C SCREENING  Completed   • INFLUENZA VACCINE  Completed       No orders of the defined types were placed in this encounter.      Return in about 4 months (around 2020).

## 2020-04-04 DIAGNOSIS — M10.9 GOUT, UNSPECIFIED CAUSE, UNSPECIFIED CHRONICITY, UNSPECIFIED SITE: ICD-10-CM

## 2020-04-06 RX ORDER — ALLOPURINOL 100 MG/1
TABLET ORAL
Qty: 90 TABLET | Refills: 1 | Status: SHIPPED | OUTPATIENT
Start: 2020-04-06 | End: 2021-01-21 | Stop reason: SDUPTHER

## 2020-04-21 ENCOUNTER — OFFICE VISIT (OUTPATIENT)
Dept: INTERNAL MEDICINE | Facility: CLINIC | Age: 70
End: 2020-04-21

## 2020-04-21 DIAGNOSIS — S00.412A EAR CANAL ABRASION, LEFT, INITIAL ENCOUNTER: Primary | ICD-10-CM

## 2020-04-21 PROCEDURE — 99422 OL DIG E/M SVC 11-20 MIN: CPT | Performed by: NURSE PRACTITIONER

## 2020-04-21 NOTE — PROGRESS NOTES
Subjective   Brooklyn Johns is a 69 y.o. female who presents for a telephone visit due to left ear pain since last weekend.    She presents due to left ear pain since last weekend.  She has a history of cerumen impaction and put some warm sweet oil in her ears over the weekend.  She was scratching her left ear when she feels like she scratched it with her long fingernail.  She has had intermittent episodes of bleeding from the ear since then.  She also complains of pressure.  Denies fever chills no recent hearing changes.    Earache    Associated symptoms include ear discharge. Pertinent negatives include no abdominal pain, coughing, headaches, sore throat or vomiting.        The following portions of the patient's history were reviewed and updated as appropriate: allergies, current medications, past social history and problem list.    Past Medical History:   Diagnosis Date   • Adductor tendinitis    • ADHD (attention deficit hyperactivity disorder)    • Allergic    • Anemia    • Anxiety    • Asthma    • Bronchitis    • CAD (coronary artery disease)    • Cataract     Removed by Dr Plasencia   • Deep vein thrombosis (CMS/[facility]) 2015. 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    • Kidney disease     pt states stage 4 kidney disease   • Low back pain 1980   • Obesity 1950   • Osteoarthritis    • Osteopenia    • Rhinitis, allergic    • RLS (restless legs syndrome)    • Sinus disorder    • Sleep apnea          Current Outpatient Medications:   •  allopurinol (ZYLOPRIM) 100 MG tablet, TAKE ONE TABLET BY MOUTH DAILY, Disp: 90 tablet, Rfl: 1  •  calcitriol (ROCALTROL) 0.25 MCG  capsule, Take 0.25 mcg by mouth Every Other Day., Disp: , Rfl:   •  chlorthalidone (HYGROTON) 25 MG tablet, Every other day, Disp: 45 tablet, Rfl: 5  •  escitalopram (LEXAPRO) 10 MG tablet, TAKE 1 TABLET DAILY, Disp: 90 tablet, Rfl: 4  •  HYDROcodone-acetaminophen (NORCO) 7.5-325 MG per tablet, Take 1 tablet by mouth 2 (Two) Times a Day As Needed for Moderate Pain ., Disp: 60 tablet, Rfl: 0  •  metoprolol succinate XL (TOPROL-XL) 25 MG 24 hr tablet, TAKE 1 TABLET DAILY, Disp: 90 tablet, Rfl: 4  •  neomycin-polymyxin-hydrocortisone (CORTISPORIN) 3.5-72322-4 otic solution, Administer 3 drops into the left ear 4 (Four) Times a Day., Disp: 10 mL, Rfl: 0  •  ramipril (ALTACE) 10 MG capsule, Take 1 capsule by mouth Daily., Disp: 90 capsule, Rfl: 3  •  raNITIdine (ZANTAC) 150 MG capsule, TAKE 1 CAPSULE TWICE A DAY, Disp: 180 capsule, Rfl: 4  •  rivaroxaban (XARELTO) 15 MG tablet, Take 1 tablet by mouth Daily., Disp: 14 tablet, Rfl: 0  •  rOPINIRole (REQUIP) 1 MG tablet, TAKE 3 TABLETS EVERY NIGHT TAKE 1 HOUR BEFORE BEDTIME, Disp: 270 tablet, Rfl: 4  •  simvastatin (ZOCOR) 40 MG tablet, TAKE ONE TABLET BY MOUTH DAILY, Disp: 90 tablet, Rfl: 1  •  sodium bicarbonate 650 MG tablet, Take 650 mg by mouth 2 (Two) Times a Day., Disp: , Rfl:     Current Facility-Administered Medications:   •  cyanocobalamin injection 1,000 mcg, 1,000 mcg, Intramuscular, Q28 Days, Berna Dhaliwal MD, 1,000 mcg at 02/08/19 1515  •  cyanocobalamin injection 1,000 mcg, 1,000 mcg, Intramuscular, Q28 Days, Radha House APRN, 1,000 mcg at 04/10/19 1144  •  cyanocobalamin injection 1,000 mcg, 1,000 mcg, Intramuscular, Q28 Days, Radha House APRN, 1,000 mcg at 10/18/19 1406  •  cyanocobalamin injection 1,000 mcg, 1,000 mcg, Intramuscular, Q28 Days, Radha House, APRN, 1,000 mcg at 02/28/20 1113    No Known Allergies    Review of Systems   Constitutional: Negative for chills, fatigue, fever and unexpected weight change.   HENT: Positive for ear  discharge, ear pain and postnasal drip. Negative for congestion, sinus pressure, sore throat and trouble swallowing.    Eyes: Negative for visual disturbance.   Respiratory: Negative for cough, chest tightness and wheezing.    Cardiovascular: Negative for chest pain, palpitations and leg swelling.   Gastrointestinal: Negative for abdominal pain, blood in stool, nausea and vomiting.   Genitourinary: Negative for dysuria, frequency and urgency.   Musculoskeletal: Negative for arthralgias and joint swelling.   Skin: Negative for color change.   Neurological: Negative for syncope, weakness and headaches.   Hematological: Does not bruise/bleed easily.       Objective   There were no vitals filed for this visit.  There is no height or weight on file to calculate BMI.  Physical Exam   Psychiatric: She has a normal mood and affect. Her behavior is normal. Judgment and thought content normal.       Assessment/Plan   Brooklyn was seen today for earache.    Diagnoses and all orders for this visit:    Ear canal abrasion, left, initial encounter  -     neomycin-polymyxin-hydrocortisone (CORTISPORIN) 3.5-67967-0 otic solution; Administer 3 drops into the left ear 4 (Four) Times a Day.    Her symptoms are suggestive of an abrasion from her fingernail.  She has had noticed some blood from the ear which has now turned to more dry blood.  Will treat with topical antibiotic and continue to monitor.  She will follow-up with clinic for exam if symptoms persist or worsen.    History and medical judgement obtained from phone conversation with patient. No hands-on physical examination was performed. Approximately 12 minutes spent in chart review and in phone conversation with patient.    You have chosen to receive care through a telephone visit. Do you consent to use a telephone visit for your medical care today? Yes

## 2020-05-06 DIAGNOSIS — I82.599 CHRONIC DEEP VEIN THROMBOSIS (DVT) OF OTHER VEIN OF LOWER EXTREMITY, UNSPECIFIED LATERALITY (HCC): ICD-10-CM

## 2020-05-06 RX ORDER — RIVAROXABAN 15 MG/1
TABLET, FILM COATED ORAL
Qty: 90 TABLET | Refills: 3 | Status: ON HOLD | OUTPATIENT
Start: 2020-05-06 | End: 2020-09-16 | Stop reason: SDUPTHER

## 2020-05-29 DIAGNOSIS — K21.9 GASTROESOPHAGEAL REFLUX DISEASE, ESOPHAGITIS PRESENCE NOT SPECIFIED: Primary | ICD-10-CM

## 2020-05-29 RX ORDER — FAMOTIDINE 40 MG/1
40 TABLET, FILM COATED ORAL DAILY
Qty: 90 TABLET | Refills: 1 | Status: SHIPPED | OUTPATIENT
Start: 2020-05-29 | End: 2020-06-29

## 2020-05-29 NOTE — PROGRESS NOTES
Please notify pt Ranitidine has been recalled, I have sent in a prescription for Famotidine which is within the same class of medication. I have sent this to [facility]. Thanks.

## 2020-06-29 ENCOUNTER — OFFICE VISIT (OUTPATIENT)
Dept: INTERNAL MEDICINE | Facility: CLINIC | Age: 70
End: 2020-06-29

## 2020-06-29 VITALS
HEIGHT: 66 IN | SYSTOLIC BLOOD PRESSURE: 120 MMHG | WEIGHT: 293 LBS | DIASTOLIC BLOOD PRESSURE: 78 MMHG | OXYGEN SATURATION: 99 % | HEART RATE: 96 BPM | BODY MASS INDEX: 47.09 KG/M2

## 2020-06-29 DIAGNOSIS — N18.4 CKD (CHRONIC KIDNEY DISEASE) STAGE 4, GFR 15-29 ML/MIN (HCC): ICD-10-CM

## 2020-06-29 DIAGNOSIS — K21.9 GASTROESOPHAGEAL REFLUX DISEASE, ESOPHAGITIS PRESENCE NOT SPECIFIED: ICD-10-CM

## 2020-06-29 DIAGNOSIS — E79.0 HYPERURICEMIA: ICD-10-CM

## 2020-06-29 DIAGNOSIS — M54.16 CHRONIC LUMBAR RADICULOPATHY: ICD-10-CM

## 2020-06-29 DIAGNOSIS — E78.2 MIXED HYPERLIPIDEMIA: ICD-10-CM

## 2020-06-29 DIAGNOSIS — I10 ESSENTIAL HYPERTENSION: Primary | ICD-10-CM

## 2020-06-29 DIAGNOSIS — D49.89 NEOPLASM OF NECK: ICD-10-CM

## 2020-06-29 LAB
ALBUMIN SERPL-MCNC: 4.4 G/DL (ref 3.5–5.2)
ALBUMIN/GLOB SERPL: 2.3 G/DL
ALP SERPL-CCNC: 155 U/L (ref 39–117)
ALT SERPL-CCNC: 11 U/L (ref 1–33)
AST SERPL-CCNC: 12 U/L (ref 1–32)
BASOPHILS # BLD AUTO: 0.03 10*3/MM3 (ref 0–0.2)
BASOPHILS NFR BLD AUTO: 0.6 % (ref 0–1.5)
BILIRUB SERPL-MCNC: 0.4 MG/DL (ref 0.2–1.2)
BUN SERPL-MCNC: 23 MG/DL (ref 8–23)
BUN/CREAT SERPL: 15.3 (ref 7–25)
CALCIUM SERPL-MCNC: 8.4 MG/DL (ref 8.6–10.5)
CHLORIDE SERPL-SCNC: 110 MMOL/L (ref 98–107)
CHOLEST SERPL-MCNC: 145 MG/DL (ref 0–200)
CO2 SERPL-SCNC: 25.9 MMOL/L (ref 22–29)
CREAT SERPL-MCNC: 1.5 MG/DL (ref 0.57–1)
EOSINOPHIL # BLD AUTO: 0.11 10*3/MM3 (ref 0–0.4)
EOSINOPHIL NFR BLD AUTO: 2.3 % (ref 0.3–6.2)
ERYTHROCYTE [DISTWIDTH] IN BLOOD BY AUTOMATED COUNT: 13.1 % (ref 12.3–15.4)
GLOBULIN SER CALC-MCNC: 1.9 GM/DL
GLUCOSE SERPL-MCNC: 108 MG/DL (ref 65–99)
HCT VFR BLD AUTO: 30.9 % (ref 34–46.6)
HDLC SERPL-MCNC: 61 MG/DL (ref 40–60)
HGB BLD-MCNC: 10.1 G/DL (ref 12–15.9)
IMM GRANULOCYTES # BLD AUTO: 0.01 10*3/MM3 (ref 0–0.05)
IMM GRANULOCYTES NFR BLD AUTO: 0.2 % (ref 0–0.5)
LDLC SERPL CALC-MCNC: 65 MG/DL (ref 0–100)
LYMPHOCYTES # BLD AUTO: 1.2 10*3/MM3 (ref 0.7–3.1)
LYMPHOCYTES NFR BLD AUTO: 25.4 % (ref 19.6–45.3)
MCH RBC QN AUTO: 30.9 PG (ref 26.6–33)
MCHC RBC AUTO-ENTMCNC: 32.7 G/DL (ref 31.5–35.7)
MCV RBC AUTO: 94.5 FL (ref 79–97)
MONOCYTES # BLD AUTO: 0.31 10*3/MM3 (ref 0.1–0.9)
MONOCYTES NFR BLD AUTO: 6.6 % (ref 5–12)
NEUTROPHILS # BLD AUTO: 3.06 10*3/MM3 (ref 1.7–7)
NEUTROPHILS NFR BLD AUTO: 64.9 % (ref 42.7–76)
NRBC BLD AUTO-RTO: 0 /100 WBC (ref 0–0.2)
PLATELET # BLD AUTO: 160 10*3/MM3 (ref 140–450)
POTASSIUM SERPL-SCNC: 4.7 MMOL/L (ref 3.5–5.2)
PROT SERPL-MCNC: 6.3 G/DL (ref 6–8.5)
RBC # BLD AUTO: 3.27 10*6/MM3 (ref 3.77–5.28)
SODIUM SERPL-SCNC: 143 MMOL/L (ref 136–145)
TRIGL SERPL-MCNC: 96 MG/DL (ref 0–150)
TSH SERPL DL<=0.005 MIU/L-ACNC: 3.4 UIU/ML (ref 0.27–4.2)
URATE SERPL-MCNC: 5.6 MG/DL (ref 2.4–5.7)
VLDLC SERPL CALC-MCNC: 19.2 MG/DL
WBC # BLD AUTO: 4.72 10*3/MM3 (ref 3.4–10.8)

## 2020-06-29 PROCEDURE — 99214 OFFICE O/P EST MOD 30 MIN: CPT | Performed by: NURSE PRACTITIONER

## 2020-06-29 RX ORDER — CIMETIDINE 400 MG/1
400 TABLET, FILM COATED ORAL 2 TIMES DAILY
Qty: 180 TABLET | Refills: 0 | Status: SHIPPED | OUTPATIENT
Start: 2020-06-29 | End: 2020-09-28

## 2020-06-30 NOTE — PROGRESS NOTES
Subjective   Brooklyn Johns is a 69 y.o. female who presents for f/u regarding HTN, hyperlipidemia and restless legs.    She was switched from ranitidine to famotidine due to recall.  She continues to complain of daily reflux with breakthrough symptoms.  She did notice improvement in symptoms with Tagamet in the past.  Denies abdominal pain.  She has a history of chronic low back pain is followed by pain management.  She is scheduled for an injection tomorrow which has been helpful in the past.  Her last labs showed a mildly elevated uric acid of 6.8, denies recent gout flares.  She c/o chronic irritation (2 mos) with frequent bleeding from a mole on her neck.    Hypertension   This is a chronic problem. The current episode started more than 1 year ago. The problem is unchanged. The problem is controlled. Pertinent negatives include no chest pain, headaches, malaise/fatigue, palpitations, peripheral edema or shortness of breath. Current antihypertension treatment includes diuretics, ACE inhibitors and beta blockers. The current treatment provides significant improvement. There are no compliance problems.    Hyperlipidemia   This is a chronic problem. The current episode started more than 1 year ago. The problem is controlled. Recent lipid tests were reviewed and are low. She has no history of diabetes or hypothyroidism. Pertinent negatives include no chest pain or shortness of breath. Current antihyperlipidemic treatment includes statins. The current treatment provides significant improvement of lipids. There are no compliance problems.    Back Pain   Pertinent negatives include no abdominal pain, chest pain, dysuria, fever, headaches or weakness.   Heartburn   She reports no abdominal pain, no chest pain, no coughing, no nausea, no sore throat or no wheezing. Pertinent negatives include no fatigue.        The following portions of the patient's history were reviewed and updated as appropriate: allergies,  current medications, past social history and problem list.    Past Medical History:   Diagnosis Date   • Adductor tendinitis    • ADHD (attention deficit hyperactivity disorder)    • Allergic    • Anemia    • Anxiety    • Asthma    • Bronchitis    • CAD (coronary artery disease)    • Cataract     Removed by Dr Plasencia   • Deep vein thrombosis (CMS/HCC) 2015. 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    • Kidney disease     pt states stage 4 kidney disease   • Low back pain 1980   • Obesity 1950   • Osteoarthritis    • Osteopenia    • Rhinitis, allergic    • RLS (restless legs syndrome)    • Sinus disorder    • Sleep apnea          Current Outpatient Medications:   •  allopurinol (ZYLOPRIM) 100 MG tablet, TAKE ONE TABLET BY MOUTH DAILY, Disp: 90 tablet, Rfl: 1  •  calcitriol (ROCALTROL) 0.25 MCG capsule, Take 0.25 mcg by mouth Every Other Day., Disp: , Rfl:   •  chlorthalidone (HYGROTON) 25 MG tablet, Every other day, Disp: 45 tablet, Rfl: 5  •  escitalopram (LEXAPRO) 10 MG tablet, TAKE 1 TABLET DAILY, Disp: 90 tablet, Rfl: 4  •  HYDROcodone-acetaminophen (NORCO) 7.5-325 MG per tablet, Take 1 tablet by mouth 2 (Two) Times a Day As Needed for Moderate Pain ., Disp: 60 tablet, Rfl: 0  •  metoprolol succinate XL (TOPROL-XL) 25 MG 24 hr tablet, TAKE 1 TABLET DAILY, Disp: 90 tablet, Rfl: 4  •  ramipril (ALTACE) 10 MG capsule, Take 1 capsule by mouth Daily., Disp: 90 capsule, Rfl: 3  •  rOPINIRole (REQUIP) 1 MG tablet, TAKE 3 TABLETS EVERY NIGHT TAKE 1 HOUR BEFORE BEDTIME, Disp: 270 tablet, Rfl: 4  •  simvastatin (ZOCOR) 40 MG tablet, TAKE ONE TABLET BY MOUTH DAILY, Disp: 90  tablet, Rfl: 1  •  sodium bicarbonate 650 MG tablet, Take 650 mg by mouth 2 (Two) Times a Day., Disp: , Rfl:   •  XARELTO 15 MG tablet, TAKE 1 TABLET DAILY, Disp: 90 tablet, Rfl: 3  •  cimetidine (TAGAMET) 400 MG tablet, Take 1 tablet by mouth 2 (Two) Times a Day., Disp: 180 tablet, Rfl: 0    Current Facility-Administered Medications:   •  cyanocobalamin injection 1,000 mcg, 1,000 mcg, Intramuscular, Q28 Days, Berna Dhaliwal MD, 1,000 mcg at 02/08/19 1515  •  cyanocobalamin injection 1,000 mcg, 1,000 mcg, Intramuscular, Q28 Days, Radha House APRN, 1,000 mcg at 04/10/19 1144  •  cyanocobalamin injection 1,000 mcg, 1,000 mcg, Intramuscular, Q28 Days, ArnAtul garcíaa K, APRN, 1,000 mcg at 10/18/19 1406  •  cyanocobalamin injection 1,000 mcg, 1,000 mcg, Intramuscular, Q28 Days, ArnRadha garcía, APRN, 1,000 mcg at 02/28/20 1113    No Known Allergies    Review of Systems   Constitutional: Negative for chills, fatigue, fever, malaise/fatigue and unexpected weight change.   HENT: Negative for congestion, ear pain, postnasal drip, sinus pressure, sore throat and trouble swallowing.    Eyes: Negative for visual disturbance.   Respiratory: Negative for cough, chest tightness, shortness of breath and wheezing.    Cardiovascular: Negative for chest pain, palpitations and leg swelling.   Gastrointestinal: Negative for abdominal pain, blood in stool, nausea and vomiting.        +heartburn and indigestion   Genitourinary: Negative for dysuria, frequency and urgency.   Musculoskeletal: Positive for arthralgias and back pain. Negative for joint swelling.        C/o restless legs, improved with Requip   Skin: Positive for color change.   Neurological: Negative for syncope, weakness and headaches.   Hematological: Bruises/bleeds easily.   Psychiatric/Behavioral: Positive for dysphoric mood. The patient is nervous/anxious (improved with Escitalopram).        Objective   Vitals:    06/29/20 0925   BP: 120/78   BP Location: Left arm  "  Patient Position: Sitting   Cuff Size: Large Adult   Pulse: 96   SpO2: 99%   Weight: 134 kg (296 lb)   Height: 167.6 cm (66\")     Body mass index is 47.78 kg/m².  Physical Exam   Constitutional: She appears well-developed and well-nourished. She is cooperative. She does not have a sickly appearance. She does not appear ill.   HENT:   Head: Normocephalic.   Right Ear: Hearing, tympanic membrane and external ear normal.   Left Ear: Hearing, tympanic membrane and external ear normal.   Nose: Nose normal. No mucosal edema, rhinorrhea, sinus tenderness or nasal deformity. Right sinus exhibits no maxillary sinus tenderness and no frontal sinus tenderness. Left sinus exhibits no maxillary sinus tenderness and no frontal sinus tenderness.   Mouth/Throat: Oropharynx is clear and moist and mucous membranes are normal. Normal dentition.   Eyes: Conjunctivae and lids are normal. Right eye exhibits no discharge and no exudate. Left eye exhibits no discharge and no exudate.   Neck: Trachea normal. Carotid bruit is not present. No edema present. No thyroid mass present.   Cardiovascular: Regular rhythm, normal heart sounds and normal pulses.   No murmur heard.  Pulmonary/Chest: Breath sounds normal. No respiratory distress. She has no decreased breath sounds. She has no wheezes. She has no rhonchi. She has no rales.   Abdominal: Soft. Bowel sounds are normal. There is no tenderness.   Lymphadenopathy:        Head (right side): No submental, no submandibular, no tonsillar, no preauricular, no posterior auricular and no occipital adenopathy present.        Head (left side): No submental, no submandibular, no tonsillar, no preauricular, no posterior auricular and no occipital adenopathy present.   Neurological: She is alert.   Skin: Skin is warm, dry and intact. No cyanosis. Nails show no clubbing.   There is an approximate 2 cm papule on the right posterior neck with irritation, lesion is erythematous and is bleeding "       Assessment/Plan   Brooklyn was seen today for hypertension, hyperlipidemia, restless legs syndrome, back pain, heartburn and skin lesion.    Diagnoses and all orders for this visit:    Essential hypertension  -     CBC & Differential  -     Comprehensive Metabolic Panel  -     TSH    Gastroesophageal reflux disease, esophagitis presence not specified  -     cimetidine (TAGAMET) 400 MG tablet; Take 1 tablet by mouth 2 (Two) Times a Day.    CKD (chronic kidney disease) stage 4, GFR 15-29 ml/min (CMS/[facility])    Mixed hyperlipidemia  -     Lipid Panel    Hyperuricemia  -     Uric Acid    Neoplasm of neck  -     Ambulatory Referral to Dermatology    1.  Her blood pressure has significantly improved on current medications which she will continue.  2.  She complains of breakthrough symptoms with famotidine and will be changed to cimetidine instead.  Continue to monitor.  3.  She avoids NSAIDs and tries to increase clear fluids, recheck kidney function today.  4.  She is tolerating simvastatin without myalgias, check lipid panel today.  5.  Recheck uric acid today and if it remains elevated consider titrating allopurinol dose.  6.  Band-Aid applied to lesion on back of neck.  Due to persistent irritation and bleeding I am referring her to dermatology for further evaluation.  7.  She is followed by pain management and has an appointment tomorrow.

## 2020-08-07 ENCOUNTER — OFFICE VISIT (OUTPATIENT)
Dept: INTERNAL MEDICINE | Facility: CLINIC | Age: 70
End: 2020-08-07

## 2020-08-07 ENCOUNTER — APPOINTMENT (OUTPATIENT)
Dept: WOMENS IMAGING | Facility: HOSPITAL | Age: 70
End: 2020-08-07

## 2020-08-07 VITALS
HEIGHT: 66 IN | BODY MASS INDEX: 47.09 KG/M2 | DIASTOLIC BLOOD PRESSURE: 74 MMHG | WEIGHT: 293 LBS | SYSTOLIC BLOOD PRESSURE: 142 MMHG | HEART RATE: 60 BPM | OXYGEN SATURATION: 99 %

## 2020-08-07 DIAGNOSIS — H61.23 IMPACTED CERUMEN OF BOTH EARS: ICD-10-CM

## 2020-08-07 DIAGNOSIS — G47.33 OBSTRUCTIVE SLEEP APNEA SYNDROME: ICD-10-CM

## 2020-08-07 DIAGNOSIS — R06.09 DYSPNEA ON EXERTION: Primary | ICD-10-CM

## 2020-08-07 DIAGNOSIS — I25.10 CORONARY ARTERY DISEASE INVOLVING NATIVE CORONARY ARTERY OF NATIVE HEART WITHOUT ANGINA PECTORIS: ICD-10-CM

## 2020-08-07 PROCEDURE — 71046 X-RAY EXAM CHEST 2 VIEWS: CPT | Performed by: RADIOLOGY

## 2020-08-07 PROCEDURE — 71046 X-RAY EXAM CHEST 2 VIEWS: CPT | Performed by: NURSE PRACTITIONER

## 2020-08-07 PROCEDURE — 69209 REMOVE IMPACTED EAR WAX UNI: CPT | Performed by: NURSE PRACTITIONER

## 2020-08-07 PROCEDURE — 99214 OFFICE O/P EST MOD 30 MIN: CPT | Performed by: NURSE PRACTITIONER

## 2020-08-07 NOTE — PROGRESS NOTES
Procedure   Ear Cerumen Removal  Date/Time: 8/7/2020 4:21 PM  Performed by: Dot Martin MA  Authorized by: Radha House APRN   Consent: Verbal consent obtained.  Consent given by: patient  Patient understanding: patient states understanding of the procedure being performed  Patient consent: the patient's understanding of the procedure matches consent given  Procedure consent: procedure consent matches procedure scheduled  Patient identity confirmed: verbally with patient    Anesthesia:  Local Anesthetic: proparacaine drops  Location details: left ear and right ear  Procedure type: irrigation   Sedation:  Patient sedated: no

## 2020-08-08 NOTE — PROGRESS NOTES
Subjective   Brooklyn Johns is a 69 y.o. female who presents due to dyspnea on exertion with history of CAD.    She has a hx of CAD with stent placement (unaware of date) but unfortunately has been lost to cardiology follow-up (previously seen by Dr. Harrison at [facility]). She c/o dyspnea on exertion for the past month. 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 She denies orthopnea (sleeps in a recliner due to chronic low back pain). Denies palpitations. She does c/o intermittent swelling in her left foot.  She c/o right ear pain and pressure, denies fever or chills.  She does have a history of sleep apnea but unfortunately has not been using her CPAP for some time.  She reports difficulty with the mouthpiece.       The following portions of the patient's history were reviewed and updated as appropriate: allergies, current medications, past social history and problem list.    Past Medical History:   Diagnosis Date   • Adductor tendinitis    • ADHD (attention deficit hyperactivity disorder)    • Allergic    • Anemia    • Anxiety    • Asthma    • Bronchitis    • CAD (coronary artery disease)    • Cataract     Removed by Dr Plasencia   • Deep vein thrombosis (CMS/[facility]) 2015. 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    • Kidney disease     pt states stage 4 kidney disease   • Low back pain 1980   • Obesity 1950   • Osteoarthritis    • Osteopenia    • Rhinitis, allergic    • RLS (restless legs syndrome)    • Sinus disorder    • Sleep apnea          Current Outpatient Medications:   •  allopurinol (ZYLOPRIM) 100 MG tablet, TAKE ONE  TABLET BY MOUTH DAILY, Disp: 90 tablet, Rfl: 1  •  calcitriol (ROCALTROL) 0.25 MCG capsule, Take 0.25 mcg by mouth Every Other Day., Disp: , Rfl:   •  chlorthalidone (HYGROTON) 25 MG tablet, Every other day, Disp: 45 tablet, Rfl: 5  •  cimetidine (TAGAMET) 400 MG tablet, Take 1 tablet by mouth 2 (Two) Times a Day., Disp: 180 tablet, Rfl: 0  •  escitalopram (LEXAPRO) 10 MG tablet, TAKE 1 TABLET DAILY, Disp: 90 tablet, Rfl: 4  •  HYDROcodone-acetaminophen (NORCO) 7.5-325 MG per tablet, Take 1 tablet by mouth 2 (Two) Times a Day As Needed for Moderate Pain ., Disp: 60 tablet, Rfl: 0  •  metoprolol succinate XL (TOPROL-XL) 25 MG 24 hr tablet, TAKE 1 TABLET DAILY, Disp: 90 tablet, Rfl: 4  •  ramipril (ALTACE) 10 MG capsule, Take 1 capsule by mouth Daily., Disp: 90 capsule, Rfl: 3  •  rOPINIRole (REQUIP) 1 MG tablet, TAKE 3 TABLETS EVERY NIGHT TAKE 1 HOUR BEFORE BEDTIME, Disp: 270 tablet, Rfl: 4  •  simvastatin (ZOCOR) 40 MG tablet, TAKE ONE TABLET BY MOUTH DAILY, Disp: 90 tablet, Rfl: 1  •  sodium bicarbonate 650 MG tablet, Take 650 mg by mouth 2 (Two) Times a Day., Disp: , Rfl:   •  XARELTO 15 MG tablet, TAKE 1 TABLET DAILY, Disp: 90 tablet, Rfl: 3    Current Facility-Administered Medications:   •  cyanocobalamin injection 1,000 mcg, 1,000 mcg, Intramuscular, Q28 Days, Berna Dhaliwal MD, 1,000 mcg at 02/08/19 1515  •  cyanocobalamin injection 1,000 mcg, 1,000 mcg, Intramuscular, Q28 Days, Radha House APRN, 1,000 mcg at 04/10/19 1144  •  cyanocobalamin injection 1,000 mcg, 1,000 mcg, Intramuscular, Q28 Days, Radha House APRN, 1,000 mcg at 10/18/19 1406  •  cyanocobalamin injection 1,000 mcg, 1,000 mcg, Intramuscular, Q28 Days, Radha House APRN, 1,000 mcg at 02/28/20 1113    No Known Allergies    Review of Systems   Constitutional: Positive for fatigue. Negative for chills, fever and unexpected weight change.   HENT: Positive for ear pain and postnasal drip. Negative for congestion, sinus pressure, sore  "throat and trouble swallowing.    Eyes: Negative for visual disturbance.   Respiratory: Positive for shortness of breath. Negative for cough, chest tightness and wheezing.    Cardiovascular: Positive for chest pain. Negative for palpitations and leg swelling.   Gastrointestinal: Negative for abdominal pain, blood in stool, nausea and vomiting.   Genitourinary: Negative for dysuria, frequency and urgency.   Musculoskeletal: Negative for arthralgias and joint swelling.   Skin: Negative for color change.   Neurological: Negative for syncope, weakness and headaches.   Hematological: Does not bruise/bleed easily.   Psychiatric/Behavioral: Positive for sleep disturbance.       Objective   Vitals:    08/07/20 1506   BP: 142/74   Pulse: 60   SpO2: 99%   Weight: 135 kg (297 lb)   Height: 167.6 cm (66\")     Body mass index is 47.94 kg/m².  Physical Exam   Constitutional: She appears well-developed and well-nourished. She is cooperative. She does not have a sickly appearance. She does not appear ill.   HENT:   Nose: Nose normal. No mucosal edema, rhinorrhea, sinus tenderness or nasal deformity. Right sinus exhibits no maxillary sinus tenderness and no frontal sinus tenderness. Left sinus exhibits no maxillary sinus tenderness and no frontal sinus tenderness.   Mouth/Throat: Oropharynx is clear and moist and mucous membranes are normal. Normal dentition.   Bilateral cerumen impaction, unable to visualize TMs due to excessive cerumen   Eyes: Conjunctivae and lids are normal. Right eye exhibits no discharge and no exudate. Left eye exhibits no discharge and no exudate.   Neck: Trachea normal. Carotid bruit is not present. No edema present. No thyroid mass present.   Cardiovascular: Regular rhythm, normal heart sounds and normal pulses.   No murmur heard.  Pulmonary/Chest: Breath sounds normal. No respiratory distress. She has no decreased breath sounds. She has no wheezes. She has no rhonchi. She has no rales.   Lymphadenopathy: "        Head (right side): No submental, no submandibular, no tonsillar, no preauricular, no posterior auricular and no occipital adenopathy present.        Head (left side): No submental, no submandibular, no tonsillar, no preauricular, no posterior auricular and no occipital adenopathy present.   Neurological: She is alert.   Skin: Skin is warm, dry and intact. No cyanosis. Nails show no clubbing.       Assessment/Plan   Brooklyn was seen today for exercise intolerance and earache.    Diagnoses and all orders for this visit:    Dyspnea on exertion  -     XR Chest 2 View  -     Ambulatory Referral to Cardiology    Coronary artery disease involving native coronary artery of native heart without angina pectoris  -     Ambulatory Referral to Cardiology    Obstructive sleep apnea syndrome  -     Ambulatory Referral to Sleep Medicine    Impacted cerumen of both ears  -     Ear Cerumen Removal    1&2.  Her chest x-ray shows cardiomegaly which does appear chronic from chest x-ray in 2017-we will send to radiology for review.  I am concerned about her decreased exercise tolerance with known history of CAD.  I will request her previous records from cardiology but feel that she further cardiac work-up and testing to clarify her symptoms.  She is currently managed on Xarelto due to history of chronic DVT.  She is also on simvastatin, recent LDL was 65.  3.  We discussed the importance of CPAP usage for management of sleep apnea.  And going to refer her back to Dr. Childs (has seen in past) for further management.  4.  Ears irrigated and using curette, excessive cerumen removed.  Patient tolerated procedure well reported improvement of her symptoms.

## 2020-08-09 DIAGNOSIS — I10 ESSENTIAL HYPERTENSION: ICD-10-CM

## 2020-08-10 RX ORDER — RAMIPRIL 10 MG/1
CAPSULE ORAL
Qty: 180 CAPSULE | Refills: 1 | Status: SHIPPED | OUTPATIENT
Start: 2020-08-10 | End: 2021-02-05

## 2020-08-14 ENCOUNTER — TELEPHONE (OUTPATIENT)
Dept: INTERNAL MEDICINE | Facility: CLINIC | Age: 70
End: 2020-08-14

## 2020-08-14 NOTE — TELEPHONE ENCOUNTER
Talked to Ms. Johns regarding work letter.  Confirmed what she needs.  Letter will be faxed and emailed after your signature.

## 2020-09-03 ENCOUNTER — OFFICE VISIT (OUTPATIENT)
Dept: CARDIOLOGY | Facility: CLINIC | Age: 70
End: 2020-09-03

## 2020-09-03 ENCOUNTER — LAB (OUTPATIENT)
Dept: LAB | Facility: HOSPITAL | Age: 70
End: 2020-09-03

## 2020-09-03 VITALS
HEART RATE: 66 BPM | BODY MASS INDEX: 47.09 KG/M2 | TEMPERATURE: 96.3 F | DIASTOLIC BLOOD PRESSURE: 82 MMHG | SYSTOLIC BLOOD PRESSURE: 128 MMHG | OXYGEN SATURATION: 97 % | WEIGHT: 293 LBS | HEIGHT: 66 IN

## 2020-09-03 DIAGNOSIS — R94.31 ABNORMAL ELECTROCARDIOGRAM: ICD-10-CM

## 2020-09-03 DIAGNOSIS — I25.10 CHRONIC CORONARY ARTERY DISEASE: ICD-10-CM

## 2020-09-03 DIAGNOSIS — I25.110 CORONARY ARTERY DISEASE INVOLVING NATIVE CORONARY ARTERY OF NATIVE HEART WITH UNSTABLE ANGINA PECTORIS (HCC): ICD-10-CM

## 2020-09-03 DIAGNOSIS — R07.2 PRECORDIAL PAIN: ICD-10-CM

## 2020-09-03 DIAGNOSIS — R07.2 PRECORDIAL PAIN: Primary | ICD-10-CM

## 2020-09-03 DIAGNOSIS — I10 ESSENTIAL HYPERTENSION: ICD-10-CM

## 2020-09-03 LAB
ALBUMIN SERPL-MCNC: 4.1 G/DL (ref 3.5–5.2)
ALBUMIN/GLOB SERPL: 1.7 G/DL
ALP SERPL-CCNC: 153 U/L (ref 39–117)
ALT SERPL W P-5'-P-CCNC: 12 U/L (ref 1–33)
ANION GAP SERPL CALCULATED.3IONS-SCNC: 11.4 MMOL/L (ref 5–15)
AST SERPL-CCNC: 17 U/L (ref 1–32)
BILIRUB SERPL-MCNC: 0.4 MG/DL (ref 0–1.2)
BUN SERPL-MCNC: 24 MG/DL (ref 8–23)
BUN/CREAT SERPL: 12.6 (ref 7–25)
CALCIUM SPEC-SCNC: 7.7 MG/DL (ref 8.6–10.5)
CHLORIDE SERPL-SCNC: 106 MMOL/L (ref 98–107)
CO2 SERPL-SCNC: 24.6 MMOL/L (ref 22–29)
CREAT SERPL-MCNC: 1.91 MG/DL (ref 0.57–1)
DEPRECATED RDW RBC AUTO: 47.4 FL (ref 37–54)
ERYTHROCYTE [DISTWIDTH] IN BLOOD BY AUTOMATED COUNT: 13.3 % (ref 12.3–15.4)
GFR SERPL CREATININE-BSD FRML MDRD: 26 ML/MIN/1.73
GLOBULIN UR ELPH-MCNC: 2.4 GM/DL
GLUCOSE SERPL-MCNC: 77 MG/DL (ref 65–99)
HCT VFR BLD AUTO: 31.5 % (ref 34–46.6)
HGB BLD-MCNC: 9.9 G/DL (ref 12–15.9)
MCH RBC QN AUTO: 30.3 PG (ref 26.6–33)
MCHC RBC AUTO-ENTMCNC: 31.4 G/DL (ref 31.5–35.7)
MCV RBC AUTO: 96.3 FL (ref 79–97)
PLATELET # BLD AUTO: 152 10*3/MM3 (ref 140–450)
PMV BLD AUTO: 10.1 FL (ref 6–12)
POTASSIUM SERPL-SCNC: 4.4 MMOL/L (ref 3.5–5.2)
PROT SERPL-MCNC: 6.5 G/DL (ref 6–8.5)
RBC # BLD AUTO: 3.27 10*6/MM3 (ref 3.77–5.28)
SODIUM SERPL-SCNC: 142 MMOL/L (ref 136–145)
WBC # BLD AUTO: 5.19 10*3/MM3 (ref 3.4–10.8)

## 2020-09-03 PROCEDURE — 99204 OFFICE O/P NEW MOD 45 MIN: CPT | Performed by: INTERNAL MEDICINE

## 2020-09-03 PROCEDURE — 85027 COMPLETE CBC AUTOMATED: CPT

## 2020-09-03 PROCEDURE — 36415 COLL VENOUS BLD VENIPUNCTURE: CPT

## 2020-09-03 PROCEDURE — 80053 COMPREHEN METABOLIC PANEL: CPT

## 2020-09-03 PROCEDURE — 93000 ELECTROCARDIOGRAM COMPLETE: CPT | Performed by: INTERNAL MEDICINE

## 2020-09-03 NOTE — PROGRESS NOTES
PATIENTINFORMATION    Date of Office Visit: 20  Encounter Provider: France Quiroz MD  Place of Service: [facility] CARDIOLOGY  Patient Name: Brooklyn Johns  : 1950    Subjective:         Patient ID: Brooklyn Johns is a 69 y.o. female.      History of Present Illness    This is a lady who has a very remote history of a stent, probably 20 years ago.  She saw Dr. Harrison in , and he followed her.  Her last echo was in 2016, which showed moderate left ventricular hypertrophy, mild right ventricular enlargement, normal LV function with an ejection fraction of 60%, grade 1 diastolic dysfunction and normal wall motion. In 2016, she had a perfusion study that had a fixed lateral wall defect with no ischemia.  Given the normal wall motion on her echo, that would appear to be artifact.       She comes to see me because of chest pain.  She says it has been going on for awhile but has been getting progressively worse and more frequent for the last 2 months.  When she tries to exert herself, she gets a squeezing tightness in her chest.  There is no real associated shortness of breath, nausea or diaphoresis but less and less exertion is causing it, and it is getting more severe.  It does not radiate.      She has risk factors of a previous history of coronary artery disease with a stent, hypertension, hyperlipidemia, obesity, untreated sleep apnea.  She is on Xarelto for a history of DVT.       Review of Systems   Constitution: Positive for malaise/fatigue and weight gain. Negative for fever and weight loss.   HENT: Negative for ear pain, hearing loss, nosebleeds and sore throat.    Eyes: Negative for double vision, pain, vision loss in left eye and vision loss in right eye.   Cardiovascular:        See history of present illness.   Respiratory: Positive for shortness of breath, snoring and wheezing. Negative for cough and sleep disturbances due to  "breathing.    Endocrine: Negative for cold intolerance, heat intolerance and polyuria.   Skin: Negative for itching, poor wound healing and rash.   Musculoskeletal: Positive for joint pain, joint swelling and myalgias.   Gastrointestinal: Positive for diarrhea. Negative for abdominal pain, hematochezia, nausea and vomiting.   Genitourinary: Negative for hematuria and hesitancy.   Neurological: Positive for paresthesias. Negative for numbness and seizures.   Psychiatric/Behavioral: Negative for depression. The patient is not nervous/anxious.            ECG 12 Lead  Date/Time: 9/3/2020 1:44 PM  Performed by: France Quiroz MD  Authorized by: France Quiroz MD   Comparison: compared with previous ECG from 6/27/2017  Similar to previous ECG  Rhythm: sinus rhythm  BPM: 60  Conduction: 1st degree AV block  ST Segments: ST segments normal  T Waves: T waves normal  QRS axis: left    Clinical impression: normal ECG               Objective:     /82 (BP Location: Left arm)   Pulse 66   Temp 96.3 °F (35.7 °C) (Infrared)   Ht 167.6 cm (66\")   Wt 136 kg (300 lb)   SpO2 97%   BMI 48.42 kg/m²  Body mass index is 48.42 kg/m².     Physical Exam   Constitutional: She appears well-developed.   HENT:   Head: Normocephalic and atraumatic.   Eyes: Pupils are equal, round, and reactive to light. Conjunctivae and lids are normal. Lids are everted and swept, no foreign bodies found.   Neck: Normal range of motion. No JVD present. Carotid bruit is not present. No tracheal deviation present. No thyroid mass present.   Cardiovascular: Normal rate, regular rhythm and normal heart sounds.   Pulses:       Dorsalis pedis pulses are 2+ on the right side, and 2+ on the left side.   Pulmonary/Chest: Effort normal and breath sounds normal.   Abdominal: Normal appearance and bowel sounds are normal.   Musculoskeletal: Normal range of motion.   Neurological: She is alert. She has normal strength.   Skin: Skin is warm, dry and intact. "   Psychiatric: She has a normal mood and affect. Her behavior is normal.   Vitals reviewed.      Lab Review:   Component      Latest Ref Rng & Units 6/29/2020   Total Cholesterol      0 - 200 mg/dL 145   Triglycerides      0 - 150 mg/dL 96   HDL Cholesterol      40 - 60 mg/dL 61 (H)   LDL Cholesterol      0 - 100 mg/dL 65   VLDL Cholesterol      mg/dL 19.2         Assessment/Plan:       1.  Chest pain.  This is consistent with angina.  I do not think a stress test is appropriate and I recommend that she proceed to a heart catheterization.  We discussed this today and she is agreeable.  My office will give her a call.    2.  Hypertension.  Blood pressure is well-controlled.    3.  Hyperlipidemia.  Followed by Radha House.    4.  Abnormal EKG with poor R wave progression which I think certainly could be explained by body habitus but we will find out.  She also has a 1st degree AV delay.      5.  Obstructive sleep apnea.  I discussed a home sleep study with her and she is agreeable to try it.  She said that she has not followed up with her sleep medicine doctor in some time and stopped wearing the machine because it was not comfortable.    6.  Obesity.      I will get her set up for that home sleep study and the heart catheterization and then follow up with her after.              Orders Placed This Encounter   Procedures   • CBC (No Diff)     Standing Status:   Future     Standing Expiration Date:   9/3/2021   • Comprehensive Metabolic Panel     Standing Status:   Future     Number of Occurrences:   1     Standing Expiration Date:   9/3/2021        Discharge Medications           Accurate as of September 3, 2020  1:42 PM. If you have any questions, ask your nurse or doctor.               Continue These Medications      Instructions Start Date   allopurinol 100 MG tablet  Commonly known as:  ZYLOPRIM   TAKE ONE TABLET BY MOUTH DAILY      calcitriol 0.25 MCG capsule  Commonly known as:  ROCALTROL   0.25 mcg, Oral,  Every Other Day      chlorthalidone 25 MG tablet  Commonly known as:  HYGROTON   Every other day      cimetidine 400 MG tablet  Commonly known as:  TAGAMET   400 mg, Oral, 2 Times Daily      escitalopram 10 MG tablet  Commonly known as:  LEXAPRO   TAKE 1 TABLET DAILY      HYDROcodone-acetaminophen 7.5-325 MG per tablet  Commonly known as:  NORCO   1 tablet, Oral, 2 Times Daily PRN      metoprolol succinate XL 25 MG 24 hr tablet  Commonly known as:  TOPROL-XL   TAKE 1 TABLET DAILY      ramipril 10 MG capsule  Commonly known as:  ALTACE   TAKE 1 CAPSULE TWICE A DAY      rOPINIRole 1 MG tablet  Commonly known as:  REQUIP   TAKE 3 TABLETS EVERY NIGHT TAKE 1 HOUR BEFORE BEDTIME      simvastatin 40 MG tablet  Commonly known as:  ZOCOR   TAKE ONE TABLET BY MOUTH DAILY      sodium bicarbonate 650 MG tablet   650 mg, Oral, 2 Times Daily      Xarelto 15 MG tablet  Generic drug:  rivaroxaban   TAKE 1 TABLET DAILY                    France Quiroz MD  09/03/20  13:42

## 2020-09-03 NOTE — H&P (VIEW-ONLY)
PATIENTINFORMATION    Date of Office Visit: 20  Encounter Provider: France Quiroz MD  Place of Service: [facility] CARDIOLOGY  Patient Name: Brooklyn Johns  : 1950    Subjective:         Patient ID: Brooklyn Johns is a 69 y.o. female.      History of Present Illness    This is a lady who has a very remote history of a stent, probably 20 years ago.  She saw Dr. Harrison in , and he followed her.  Her last echo was in 2016, which showed moderate left ventricular hypertrophy, mild right ventricular enlargement, normal LV function with an ejection fraction of 60%, grade 1 diastolic dysfunction and normal wall motion. In 2016, she had a perfusion study that had a fixed lateral wall defect with no ischemia.  Given the normal wall motion on her echo, that would appear to be artifact.       She comes to see me because of chest pain.  She says it has been going on for awhile but has been getting progressively worse and more frequent for the last 2 months.  When she tries to exert herself, she gets a squeezing tightness in her chest.  There is no real associated shortness of breath, nausea or diaphoresis but less and less exertion is causing it, and it is getting more severe.  It does not radiate.      She has risk factors of a previous history of coronary artery disease with a stent, hypertension, hyperlipidemia, obesity, untreated sleep apnea.  She is on Xarelto for a history of DVT.       Review of Systems   Constitution: Positive for malaise/fatigue and weight gain. Negative for fever and weight loss.   HENT: Negative for ear pain, hearing loss, nosebleeds and sore throat.    Eyes: Negative for double vision, pain, vision loss in left eye and vision loss in right eye.   Cardiovascular:        See history of present illness.   Respiratory: Positive for shortness of breath, snoring and wheezing. Negative for cough and sleep disturbances due to  "breathing.    Endocrine: Negative for cold intolerance, heat intolerance and polyuria.   Skin: Negative for itching, poor wound healing and rash.   Musculoskeletal: Positive for joint pain, joint swelling and myalgias.   Gastrointestinal: Positive for diarrhea. Negative for abdominal pain, hematochezia, nausea and vomiting.   Genitourinary: Negative for hematuria and hesitancy.   Neurological: Positive for paresthesias. Negative for numbness and seizures.   Psychiatric/Behavioral: Negative for depression. The patient is not nervous/anxious.            ECG 12 Lead  Date/Time: 9/3/2020 1:44 PM  Performed by: France Quiroz MD  Authorized by: France Quiroz MD   Comparison: compared with previous ECG from 6/27/2017  Similar to previous ECG  Rhythm: sinus rhythm  BPM: 60  Conduction: 1st degree AV block  ST Segments: ST segments normal  T Waves: T waves normal  QRS axis: left    Clinical impression: normal ECG               Objective:     /82 (BP Location: Left arm)   Pulse 66   Temp 96.3 °F (35.7 °C) (Infrared)   Ht 167.6 cm (66\")   Wt 136 kg (300 lb)   SpO2 97%   BMI 48.42 kg/m²  Body mass index is 48.42 kg/m².     Physical Exam   Constitutional: She appears well-developed.   HENT:   Head: Normocephalic and atraumatic.   Eyes: Pupils are equal, round, and reactive to light. Conjunctivae and lids are normal. Lids are everted and swept, no foreign bodies found.   Neck: Normal range of motion. No JVD present. Carotid bruit is not present. No tracheal deviation present. No thyroid mass present.   Cardiovascular: Normal rate, regular rhythm and normal heart sounds.   Pulses:       Dorsalis pedis pulses are 2+ on the right side, and 2+ on the left side.   Pulmonary/Chest: Effort normal and breath sounds normal.   Abdominal: Normal appearance and bowel sounds are normal.   Musculoskeletal: Normal range of motion.   Neurological: She is alert. She has normal strength.   Skin: Skin is warm, dry and intact.   "   Psychiatric: She has a normal mood and affect. Her behavior is normal.   Vitals reviewed.      Lab Review:   Component      Latest Ref Rng & Units 6/29/2020   Total Cholesterol      0 - 200 mg/dL 145   Triglycerides      0 - 150 mg/dL 96   HDL Cholesterol      40 - 60 mg/dL 61 (H)   LDL Cholesterol      0 - 100 mg/dL 65   VLDL Cholesterol      mg/dL 19.2         Assessment/Plan:       1.  Chest pain.  This is consistent with angina.  I do not think a stress test is appropriate and I recommend that she proceed to a heart catheterization.  We discussed this today and she is agreeable.  My office will give her a call.    2.  Hypertension.  Blood pressure is well-controlled.    3.  Hyperlipidemia.  Followed by Radha House.    4.  Abnormal EKG with poor R wave progression which I think certainly could be explained by body habitus but we will find out.  She also has a 1st degree AV delay.      5.  Obstructive sleep apnea.  I discussed a home sleep study with her and she is agreeable to try it.  She said that she has not followed up with her sleep medicine doctor in some time and stopped wearing the machine because it was not comfortable.    6.  Obesity.      I will get her set up for that home sleep study and the heart catheterization and then follow up with her after.              Orders Placed This Encounter   Procedures   • CBC (No Diff)     Standing Status:   Future     Standing Expiration Date:   9/3/2021   • Comprehensive Metabolic Panel     Standing Status:   Future     Number of Occurrences:   1     Standing Expiration Date:   9/3/2021        Discharge Medications           Accurate as of September 3, 2020  1:42 PM. If you have any questions, ask your nurse or doctor.               Continue These Medications      Instructions Start Date   allopurinol 100 MG tablet  Commonly known as:  ZYLOPRIM   TAKE ONE TABLET BY MOUTH DAILY      calcitriol 0.25 MCG capsule  Commonly known as:  ROCALTROL   0.25 mcg, Oral,  Every Other Day      chlorthalidone 25 MG tablet  Commonly known as:  HYGROTON   Every other day      cimetidine 400 MG tablet  Commonly known as:  TAGAMET   400 mg, Oral, 2 Times Daily      escitalopram 10 MG tablet  Commonly known as:  LEXAPRO   TAKE 1 TABLET DAILY      HYDROcodone-acetaminophen 7.5-325 MG per tablet  Commonly known as:  NORCO   1 tablet, Oral, 2 Times Daily PRN      metoprolol succinate XL 25 MG 24 hr tablet  Commonly known as:  TOPROL-XL   TAKE 1 TABLET DAILY      ramipril 10 MG capsule  Commonly known as:  ALTACE   TAKE 1 CAPSULE TWICE A DAY      rOPINIRole 1 MG tablet  Commonly known as:  REQUIP   TAKE 3 TABLETS EVERY NIGHT TAKE 1 HOUR BEFORE BEDTIME      simvastatin 40 MG tablet  Commonly known as:  ZOCOR   TAKE ONE TABLET BY MOUTH DAILY      sodium bicarbonate 650 MG tablet   650 mg, Oral, 2 Times Daily      Xarelto 15 MG tablet  Generic drug:  rivaroxaban   TAKE 1 TABLET DAILY                    France Quiroz MD  09/03/20  13:42

## 2020-09-09 PROBLEM — I25.110 CORONARY ARTERY DISEASE INVOLVING NATIVE CORONARY ARTERY OF NATIVE HEART WITH UNSTABLE ANGINA PECTORIS (HCC): Status: ACTIVE | Noted: 2020-09-09

## 2020-09-09 PROBLEM — R07.2 PRECORDIAL PAIN: Status: ACTIVE | Noted: 2020-09-09

## 2020-09-11 ENCOUNTER — TRANSCRIBE ORDERS (OUTPATIENT)
Dept: SLEEP MEDICINE | Facility: HOSPITAL | Age: 70
End: 2020-09-11

## 2020-09-11 DIAGNOSIS — Z01.818 OTHER SPECIFIED PRE-OPERATIVE EXAMINATION: Primary | ICD-10-CM

## 2020-09-14 ENCOUNTER — LAB (OUTPATIENT)
Dept: LAB | Facility: HOSPITAL | Age: 70
End: 2020-09-14

## 2020-09-14 DIAGNOSIS — Z01.818 OTHER SPECIFIED PRE-OPERATIVE EXAMINATION: ICD-10-CM

## 2020-09-14 PROCEDURE — U0004 COV-19 TEST NON-CDC HGH THRU: HCPCS

## 2020-09-14 PROCEDURE — C9803 HOPD COVID-19 SPEC COLLECT: HCPCS

## 2020-09-15 LAB — SARS-COV-2 RNA RESP QL NAA+PROBE: NOT DETECTED

## 2020-09-16 ENCOUNTER — HOSPITAL ENCOUNTER (OUTPATIENT)
Facility: HOSPITAL | Age: 70
Setting detail: HOSPITAL OUTPATIENT SURGERY
Discharge: HOME | End: 2020-09-16
Attending: INTERNAL MEDICINE | Admitting: INTERNAL MEDICINE

## 2020-09-16 VITALS
SYSTOLIC BLOOD PRESSURE: 126 MMHG | BODY MASS INDEX: 45.99 KG/M2 | HEART RATE: 67 BPM | RESPIRATION RATE: 16 BRPM | OXYGEN SATURATION: 97 % | TEMPERATURE: 98 F | WEIGHT: 293 LBS | HEIGHT: 67 IN | DIASTOLIC BLOOD PRESSURE: 71 MMHG

## 2020-09-16 DIAGNOSIS — I25.110 CORONARY ARTERY DISEASE INVOLVING NATIVE CORONARY ARTERY OF NATIVE HEART WITH UNSTABLE ANGINA PECTORIS (HCC): ICD-10-CM

## 2020-09-16 DIAGNOSIS — R07.2 PRECORDIAL PAIN: ICD-10-CM

## 2020-09-16 DIAGNOSIS — I82.599 CHRONIC DEEP VEIN THROMBOSIS (DVT) OF OTHER VEIN OF LOWER EXTREMITY, UNSPECIFIED LATERALITY (HCC): ICD-10-CM

## 2020-09-16 PROCEDURE — 93458 L HRT ARTERY/VENTRICLE ANGIO: CPT | Performed by: INTERNAL MEDICINE

## 2020-09-16 PROCEDURE — 99152 MOD SED SAME PHYS/QHP 5/>YRS: CPT | Performed by: INTERNAL MEDICINE

## 2020-09-16 PROCEDURE — C1894 INTRO/SHEATH, NON-LASER: HCPCS | Performed by: INTERNAL MEDICINE

## 2020-09-16 PROCEDURE — 0 IOPAMIDOL PER 1 ML: Performed by: INTERNAL MEDICINE

## 2020-09-16 PROCEDURE — 25010000002 MIDAZOLAM PER 1 MG: Performed by: INTERNAL MEDICINE

## 2020-09-16 PROCEDURE — 25010000002 FENTANYL CITRATE (PF) 100 MCG/2ML SOLUTION: Performed by: INTERNAL MEDICINE

## 2020-09-16 PROCEDURE — 25010000002 HEPARIN (PORCINE) PER 1000 UNITS: Performed by: INTERNAL MEDICINE

## 2020-09-16 PROCEDURE — C1769 GUIDE WIRE: HCPCS | Performed by: INTERNAL MEDICINE

## 2020-09-16 RX ORDER — LIDOCAINE HYDROCHLORIDE 10 MG/ML
0.1 INJECTION, SOLUTION EPIDURAL; INFILTRATION; INTRACAUDAL; PERINEURAL ONCE AS NEEDED
Status: DISCONTINUED | OUTPATIENT
Start: 2020-09-16 | End: 2020-09-16 | Stop reason: HOSPADM

## 2020-09-16 RX ORDER — FENTANYL CITRATE 50 UG/ML
INJECTION, SOLUTION INTRAMUSCULAR; INTRAVENOUS AS NEEDED
Status: DISCONTINUED | OUTPATIENT
Start: 2020-09-16 | End: 2020-09-16 | Stop reason: HOSPADM

## 2020-09-16 RX ORDER — SODIUM CHLORIDE 9 MG/ML
125 INJECTION, SOLUTION INTRAVENOUS CONTINUOUS
Status: DISCONTINUED | OUTPATIENT
Start: 2020-09-16 | End: 2020-09-16 | Stop reason: HOSPADM

## 2020-09-16 RX ORDER — SODIUM CHLORIDE 0.9 % (FLUSH) 0.9 %
3 SYRINGE (ML) INJECTION EVERY 12 HOURS SCHEDULED
Status: CANCELLED | OUTPATIENT
Start: 2020-09-16

## 2020-09-16 RX ORDER — SODIUM CHLORIDE 0.9 % (FLUSH) 0.9 %
10 SYRINGE (ML) INJECTION AS NEEDED
Status: CANCELLED | OUTPATIENT
Start: 2020-09-16

## 2020-09-16 RX ORDER — ACETAMINOPHEN 325 MG/1
650 TABLET ORAL EVERY 4 HOURS PRN
Status: CANCELLED | OUTPATIENT
Start: 2020-09-16

## 2020-09-16 RX ORDER — SODIUM CHLORIDE 0.9 % (FLUSH) 0.9 %
3 SYRINGE (ML) INJECTION EVERY 12 HOURS SCHEDULED
Status: DISCONTINUED | OUTPATIENT
Start: 2020-09-16 | End: 2020-09-16 | Stop reason: HOSPADM

## 2020-09-16 RX ORDER — SODIUM CHLORIDE 0.9 % (FLUSH) 0.9 %
10 SYRINGE (ML) INJECTION AS NEEDED
Status: DISCONTINUED | OUTPATIENT
Start: 2020-09-16 | End: 2020-09-16 | Stop reason: HOSPADM

## 2020-09-16 RX ORDER — LIDOCAINE HYDROCHLORIDE 20 MG/ML
INJECTION, SOLUTION INFILTRATION; PERINEURAL AS NEEDED
Status: DISCONTINUED | OUTPATIENT
Start: 2020-09-16 | End: 2020-09-16 | Stop reason: HOSPADM

## 2020-09-16 RX ORDER — MIDAZOLAM HYDROCHLORIDE 1 MG/ML
INJECTION INTRAMUSCULAR; INTRAVENOUS AS NEEDED
Status: DISCONTINUED | OUTPATIENT
Start: 2020-09-16 | End: 2020-09-16 | Stop reason: HOSPADM

## 2020-09-16 RX ORDER — SODIUM CHLORIDE 9 MG/ML
75 INJECTION, SOLUTION INTRAVENOUS CONTINUOUS
Status: DISCONTINUED | OUTPATIENT
Start: 2020-09-16 | End: 2020-09-16 | Stop reason: HOSPADM

## 2020-09-16 RX ADMIN — SODIUM CHLORIDE 75 ML/HR: 9 INJECTION, SOLUTION INTRAVENOUS at 08:41

## 2020-09-16 NOTE — DISCHARGE INSTRUCTIONS
[facility]  [address] [place] [address]    Coronary Angiogram (Radial/Ulnar Approach) After Care    Refer to this sheet in the next few weeks. These instructions provide you with information on caring for yourself after your procedure. Your caregiver may also give you more specific instructions. Your treatment has been planned according to current medical practices, but problems sometimes occur. Call your caregiver if you have any problems or questions after your procedure.    Home Care Instructions:  · You may shower the day after the procedure. Remove the bandage (dressing) and gently wash the site with plain soap and water. Gently pat the site dry. You may apply a band aid daily for 2 days if desired.    · Do not apply powder or lotion to the site.  · Do not submerge the affected site in water for 3 to 5 days or until the site is completely healed.   · Do not lift, push or pull anything over 5 pounds for 5 days after your procedure. As a reference, a gallon of milk weighs 8 pounds.   · Inspect the site at least twice daily. You may notice some bruising at the site and it may be tender for 1 to 2 weeks.     · Increase your fluid intake for the next 2 days.    · Keep arm elevated for 24 hours. For the remainder of the day, keep your arm in “Pledge of Allegiance” position when up and about.     · You may drive 24 hours after the procedure unless otherwise instructed by your caregiver.  · Do not operate machinery or power tools for 24 hours.  · A responsible adult should be with you for the first 24 hours after you arrive home. Do not make any important legal decisions or sign legal papers for 24 hours.  Do not drink alcohol for 24 hours.    · Metformin or any medications containing Metformin should not be taken for 48 hours after your procedure.      Call Your Doctor if:   · You have unusual pain at the radial/ulnar (wrist) site.  · You have redness, warmth, swelling, or pain at the  radial/ulnar (wrist) site.  · You have drainage (other than a small amount of blood on the dressing).  · You have chills or a fever > 101.  · Your arm becomes pale or dark, cool, tingly, or numb.  · You have heavy bleeding from the site, hold pressure on the site for 20 minutes.  If the bleeding stops, apply a fresh bandage and call your cardiologist.  However, if you continue to have bleeding, call 911.

## 2020-09-27 DIAGNOSIS — K21.9 GASTROESOPHAGEAL REFLUX DISEASE, ESOPHAGITIS PRESENCE NOT SPECIFIED: ICD-10-CM

## 2020-09-28 RX ORDER — CIMETIDINE 400 MG/1
TABLET, FILM COATED ORAL
Qty: 180 TABLET | Refills: 3 | Status: SHIPPED | OUTPATIENT
Start: 2020-09-28 | End: 2021-10-20

## 2020-10-26 ENCOUNTER — OFFICE VISIT (OUTPATIENT)
Dept: INTERNAL MEDICINE | Facility: CLINIC | Age: 70
End: 2020-10-26

## 2020-10-26 VITALS
HEART RATE: 72 BPM | TEMPERATURE: 98 F | DIASTOLIC BLOOD PRESSURE: 78 MMHG | OXYGEN SATURATION: 97 % | SYSTOLIC BLOOD PRESSURE: 124 MMHG | HEIGHT: 67 IN | WEIGHT: 293 LBS | BODY MASS INDEX: 45.99 KG/M2

## 2020-10-26 DIAGNOSIS — I25.10 CORONARY ARTERY DISEASE INVOLVING NATIVE CORONARY ARTERY OF NATIVE HEART WITHOUT ANGINA PECTORIS: ICD-10-CM

## 2020-10-26 DIAGNOSIS — G47.33 OBSTRUCTIVE SLEEP APNEA SYNDROME: ICD-10-CM

## 2020-10-26 DIAGNOSIS — E53.8 B12 DEFICIENCY: ICD-10-CM

## 2020-10-26 DIAGNOSIS — R06.09 DYSPNEA ON EXERTION: ICD-10-CM

## 2020-10-26 DIAGNOSIS — I10 ESSENTIAL HYPERTENSION: Primary | ICD-10-CM

## 2020-10-26 PROCEDURE — 99214 OFFICE O/P EST MOD 30 MIN: CPT | Performed by: NURSE PRACTITIONER

## 2020-10-26 PROCEDURE — 96372 THER/PROPH/DIAG INJ SC/IM: CPT | Performed by: NURSE PRACTITIONER

## 2020-10-26 RX ORDER — CYANOCOBALAMIN 1000 UG/ML
1000 INJECTION, SOLUTION INTRAMUSCULAR; SUBCUTANEOUS
Status: DISCONTINUED | OUTPATIENT
Start: 2020-10-26 | End: 2022-03-23

## 2020-10-26 RX ORDER — ALBUTEROL SULFATE 90 UG/1
2 AEROSOL, METERED RESPIRATORY (INHALATION) EVERY 4 HOURS PRN
Qty: 18 G | Refills: 0 | Status: SHIPPED | OUTPATIENT
Start: 2020-10-26 | End: 2020-11-23

## 2020-10-26 RX ADMIN — CYANOCOBALAMIN 1000 MCG: 1000 INJECTION, SOLUTION INTRAMUSCULAR; SUBCUTANEOUS at 09:29

## 2020-10-27 NOTE — PROGRESS NOTES
Subjective   Brooklyn Johns is a 69 y.o. female who presents for f/u regarding HTN, B12 deficiency, and c/o dyspnea on exertion.    She underwent a cardiac eval for precordial pain, heart cath showed mild nonobstructive coronary artery disease. She states her precordial pain has improved but does continue to note dyspnea on exertion.  Denies orthopnea.  She is also been reevaluated for sleep apnea and is slowly adjusting to her CPAP.  She is having difficulties with the mask.      Hypertension  This is a chronic problem. The current episode started more than 1 year ago. The problem is unchanged. The problem is controlled. Associated symptoms include neck pain and shortness of breath. Pertinent negatives include no chest pain, headaches or palpitations. Current antihypertension treatment includes diuretics, beta blockers and ACE inhibitors. The current treatment provides significant improvement. There are no compliance problems.    Shortness of Breath  Associated symptoms include neck pain. Pertinent negatives include no abdominal pain, chest pain, ear pain, fever, headaches, leg swelling, sore throat, vomiting or wheezing.        The following portions of the patient's history were reviewed and updated as appropriate: allergies, current medications, past social history and problem list.    Past Medical History:   Diagnosis Date   • Adductor tendinitis    • ADHD (attention deficit hyperactivity disorder)    • Allergic    • Anemia    • Anxiety    • Asthma    • Bronchitis    • CAD (coronary artery disease)    • Cataract     Removed by Dr Plasencia   • Deep vein thrombosis (CMS/[facility]) 2015. Again in 2018   • Degeneration of lumbar or lumbosacral intervertebral disc    • Depression    • GERD (gastroesophageal reflux disease) 1975   • Gout    • Headache 1986   • Heart murmur 1990   • History of DVT (deep vein thrombosis)    • Hyperlipidemia    • Hypertension    • Insomnia    • Insomnia secondary to anxiety    •  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    • Kidney disease     pt states stage 4 kidney disease   • Low back pain 1980   • Obesity 1950   • Osteoarthritis    • Osteopenia    • Rhinitis, allergic    • RLS (restless legs syndrome)    • Sinus disorder    • Sleep apnea          Current Outpatient Medications:   •  allopurinol (ZYLOPRIM) 100 MG tablet, TAKE ONE TABLET BY MOUTH DAILY, Disp: 90 tablet, Rfl: 1  •  calcitriol (ROCALTROL) 0.25 MCG capsule, Take 0.25 mcg by mouth Every Other Day., Disp: , Rfl:   •  chlorthalidone (HYGROTON) 25 MG tablet, Every other day, Disp: 45 tablet, Rfl: 5  •  cimetidine (TAGAMET) 400 MG tablet, TAKE 1 TABLET TWICE A DAY, Disp: 180 tablet, Rfl: 3  •  escitalopram (LEXAPRO) 10 MG tablet, TAKE 1 TABLET DAILY, Disp: 90 tablet, Rfl: 4  •  HYDROcodone-acetaminophen (NORCO) 7.5-325 MG per tablet, Take 1 tablet by mouth 2 (Two) Times a Day As Needed for Moderate Pain ., Disp: 60 tablet, Rfl: 0  •  metoprolol succinate XL (TOPROL-XL) 25 MG 24 hr tablet, TAKE 1 TABLET DAILY, Disp: 90 tablet, Rfl: 4  •  ramipril (ALTACE) 10 MG capsule, TAKE 1 CAPSULE TWICE A DAY, Disp: 180 capsule, Rfl: 1  •  rivaroxaban (Xarelto) 15 MG tablet, Take 1 tablet by mouth Daily. Resume on 9/17/2020, Disp: 90 tablet, Rfl: 3  •  rOPINIRole (REQUIP) 1 MG tablet, TAKE 3 TABLETS EVERY NIGHT TAKE 1 HOUR BEFORE BEDTIME, Disp: 270 tablet, Rfl: 4  •  simvastatin (ZOCOR) 40 MG tablet, TAKE ONE TABLET BY MOUTH DAILY, Disp: 90 tablet, Rfl: 1  •  sodium bicarbonate 650 MG tablet, Take 650 mg by mouth 2 (Two) Times a Day., Disp: , Rfl:   •  albuterol sulfate HFA (Proventil HFA) 108 (90 Base) MCG/ACT inhaler, Inhale 2 puffs Every 4 (Four) Hours As Needed for Wheezing., Disp: 18 g, Rfl: 0    Current Facility-Administered Medications:   •  cyanocobalamin injection 1,000 mcg, 1,000 mcg, Intramuscular, Q28 Days, Radha House APRN, 1,000 mcg at 10/26/20 0929    No Known Allergies    Review of Systems  "  Constitutional: Negative for chills, fatigue, fever and unexpected weight change.   HENT: Negative for congestion, ear pain, postnasal drip, sinus pressure, sore throat and trouble swallowing.    Eyes: Negative for visual disturbance.   Respiratory: Positive for shortness of breath. Negative for cough, chest tightness and wheezing.    Cardiovascular: Negative for chest pain, palpitations and leg swelling.   Gastrointestinal: Negative for abdominal pain, blood in stool, nausea and vomiting.   Genitourinary: Negative for dysuria, frequency and urgency.   Musculoskeletal: Positive for arthralgias, back pain ( Chronic, followed by pain management), neck pain and neck stiffness. Negative for joint swelling.   Skin: Negative for color change.   Neurological: Negative for syncope, weakness and headaches.   Hematological: Does not bruise/bleed easily.       Objective   Vitals:    10/26/20 0855   BP: 124/78   BP Location: Left arm   Patient Position: Sitting   Cuff Size: Large Adult   Pulse: 72   Temp: 98 °F (36.7 °C)   TempSrc: Oral   SpO2: 97%   Weight: 135 kg (298 lb)   Height: 170.2 cm (67\")     Body mass index is 46.67 kg/m².  Physical Exam  Constitutional:       Appearance: She is well-developed. She is not ill-appearing.   HENT:      Head: Normocephalic.      Right Ear: Hearing, tympanic membrane and external ear normal.      Left Ear: Hearing, tympanic membrane and external ear normal.      Nose: Nose normal. No nasal deformity, mucosal edema or rhinorrhea.      Right Sinus: No maxillary sinus tenderness or frontal sinus tenderness.      Left Sinus: No maxillary sinus tenderness or frontal sinus tenderness.      Mouth/Throat:      Dentition: Normal dentition.   Eyes:      General: Lids are normal.         Right eye: No discharge.         Left eye: No discharge.      Conjunctiva/sclera: Conjunctivae normal.      Right eye: No exudate.     Left eye: No exudate.  Neck:      Musculoskeletal: Normal range of motion. No " edema.      Thyroid: No thyroid mass or thyromegaly.      Vascular: No carotid bruit.      Trachea: Trachea normal.   Cardiovascular:      Rate and Rhythm: Regular rhythm.      Pulses: Normal pulses.      Heart sounds: Normal heart sounds. No murmur.   Pulmonary:      Effort: No respiratory distress.      Breath sounds: Normal breath sounds. No decreased breath sounds, wheezing, rhonchi or rales.   Abdominal:      General: Bowel sounds are normal.      Palpations: Abdomen is soft.      Tenderness: There is no abdominal tenderness.   Lymphadenopathy:      Head:      Right side of head: No submental, submandibular, tonsillar, preauricular, posterior auricular or occipital adenopathy.      Left side of head: No submental, submandibular, tonsillar, preauricular, posterior auricular or occipital adenopathy.   Skin:     General: Skin is warm and dry.      Nails: There is no clubbing.     Neurological:      Mental Status: She is alert.   Psychiatric:         Behavior: Behavior is cooperative.         Assessment/Plan   Diagnoses and all orders for this visit:    1. Essential hypertension (Primary)    2. B12 deficiency  -     cyanocobalamin injection 1,000 mcg    3. Coronary artery disease involving native coronary artery of native heart without angina pectoris    4. Obstructive sleep apnea syndrome    5. Dyspnea on exertion  -     albuterol sulfate HFA (Proventil HFA) 108 (90 Base) MCG/ACT inhaler; Inhale 2 puffs Every 4 (Four) Hours As Needed for Wheezing.  Dispense: 18 g; Refill: 0    1. HTN-BP is excellent in the office, continue current medications along with a low-sodium diet.  2. B12 def-Vitamin B12 injection adm in office today.  3. CAD-heart cath September 16 showed mild nonobstructive coronary artery disease.  4.  Sleep apnea-she is slowly adjusting to her mask with her CPAP.  Her precordial pain has resolved but she still notes intermittent shortness of breath which is more with exertion.  She is given an  albuterol inhaler to use as needed.  Consider further evaluation if symptoms persist.  5.

## 2020-11-22 DIAGNOSIS — G25.81 RESTLESS LEGS: ICD-10-CM

## 2020-11-22 DIAGNOSIS — R06.09 DYSPNEA ON EXERTION: ICD-10-CM

## 2020-11-23 RX ORDER — ROPINIROLE 1 MG/1
TABLET, FILM COATED ORAL
Qty: 42 TABLET | Refills: 5 | Status: SHIPPED | OUTPATIENT
Start: 2020-11-23 | End: 2021-02-18 | Stop reason: SDUPTHER

## 2020-11-23 RX ORDER — ALBUTEROL SULFATE 90 UG/1
AEROSOL, METERED RESPIRATORY (INHALATION)
Qty: 8.5 G | Refills: 5 | Status: SHIPPED | OUTPATIENT
Start: 2020-11-23 | End: 2021-10-25

## 2020-11-26 DIAGNOSIS — F32.89 OTHER DEPRESSION: ICD-10-CM

## 2020-11-27 RX ORDER — ESCITALOPRAM OXALATE 10 MG/1
TABLET ORAL
Qty: 90 TABLET | Refills: 3 | Status: SHIPPED | OUTPATIENT
Start: 2020-11-27 | End: 2022-01-21

## 2020-12-26 RX ORDER — SIMVASTATIN 40 MG
TABLET ORAL
Qty: 90 TABLET | Refills: 1 | Status: SHIPPED | OUTPATIENT
Start: 2020-12-26 | End: 2021-06-24

## 2021-01-21 DIAGNOSIS — M10.9 GOUT, UNSPECIFIED CAUSE, UNSPECIFIED CHRONICITY, UNSPECIFIED SITE: ICD-10-CM

## 2021-01-21 RX ORDER — ALLOPURINOL 100 MG/1
100 TABLET ORAL DAILY
Qty: 90 TABLET | Refills: 1 | Status: SHIPPED | OUTPATIENT
Start: 2021-01-21 | End: 2021-06-24

## 2021-01-22 ENCOUNTER — TELEMEDICINE (OUTPATIENT)
Dept: INTERNAL MEDICINE | Facility: CLINIC | Age: 71
End: 2021-01-22

## 2021-01-22 DIAGNOSIS — G47.33 OBSTRUCTIVE SLEEP APNEA SYNDROME: ICD-10-CM

## 2021-01-22 DIAGNOSIS — F51.01 PRIMARY INSOMNIA: Primary | ICD-10-CM

## 2021-01-22 PROCEDURE — 99213 OFFICE O/P EST LOW 20 MIN: CPT | Performed by: NURSE PRACTITIONER

## 2021-01-22 RX ORDER — TRAZODONE HYDROCHLORIDE 50 MG/1
50 TABLET ORAL NIGHTLY
Qty: 30 TABLET | Refills: 1 | Status: SHIPPED | OUTPATIENT
Start: 2021-01-22 | End: 2021-02-26

## 2021-01-22 NOTE — PROGRESS NOTES
Subjective   Brooklyn Johns is a 70 y.o. female who presents for a video visit due to difficulty sleeping.    She c/o difficulty sleeping with frequent awakenings for the past year, she has tried Melatonin without significant improvement in symptoms. She has taken Ambien in the remote past. She does take Hydrocodone as needed for chronic low back pain but states she does not take it consistently at night, denies pain as a factor in her frequent awakenings.       The following portions of the patient's history were reviewed and updated as appropriate: allergies, current medications, past social history and problem list.    Past Medical History:   Diagnosis Date   • Adductor tendinitis    • ADHD (attention deficit hyperactivity disorder)    • Allergic    • Anemia    • Anxiety    • Asthma    • Bronchitis    • CAD (coronary artery disease)    • Cataract     Removed by Dr Plasencia   • Deep vein thrombosis (CMS/[facility]) 2015. 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    • Kidney disease     pt states stage 4 kidney disease   • Low back pain 1980   • Obesity 1950   • Osteoarthritis    • Osteopenia    • Rhinitis, allergic    • RLS (restless legs syndrome)    • Sinus disorder    • Sleep apnea          Current Outpatient Medications:   •  albuterol sulfate  (90 Base) MCG/ACT inhaler, INHALE TWO PUFFS BY MOUTH EVERY 4 HOURS AS NEEDED FOR WHEEZING, Disp: 8.5 g, Rfl: 5  •  allopurinol (ZYLOPRIM) 100 MG tablet, Take 1 tablet by mouth Daily., Disp: 90 tablet, Rfl: 1  •  calcitriol (ROCALTROL) 0.25 MCG capsule, Take 0.25 mcg by mouth Every Other Day., Disp: , Rfl:   •   chlorthalidone (HYGROTON) 25 MG tablet, Every other day, Disp: 45 tablet, Rfl: 5  •  cimetidine (TAGAMET) 400 MG tablet, TAKE 1 TABLET TWICE A DAY, Disp: 180 tablet, Rfl: 3  •  escitalopram (LEXAPRO) 10 MG tablet, TAKE 1 TABLET DAILY, Disp: 90 tablet, Rfl: 3  •  HYDROcodone-acetaminophen (NORCO) 7.5-325 MG per tablet, Take 1 tablet by mouth 2 (Two) Times a Day As Needed for Moderate Pain ., Disp: 60 tablet, Rfl: 0  •  metoprolol succinate XL (TOPROL-XL) 25 MG 24 hr tablet, TAKE 1 TABLET DAILY, Disp: 90 tablet, Rfl: 4  •  ramipril (ALTACE) 10 MG capsule, TAKE 1 CAPSULE TWICE A DAY, Disp: 180 capsule, Rfl: 1  •  rivaroxaban (Xarelto) 15 MG tablet, Take 1 tablet by mouth Daily. Resume on 9/17/2020, Disp: 90 tablet, Rfl: 3  •  rOPINIRole (REQUIP) 1 MG tablet, TAKE THREE TABLETS BY MOUTH EVERY NIGHT AT BEDTIME 1 HOUR BEFORE BEDTIME, Disp: 42 tablet, Rfl: 5  •  simvastatin (ZOCOR) 40 MG tablet, TAKE ONE TABLET BY MOUTH DAILY, Disp: 90 tablet, Rfl: 1  •  sodium bicarbonate 650 MG tablet, Take 650 mg by mouth 2 (Two) Times a Day., Disp: , Rfl:   •  traZODone (DESYREL) 50 MG tablet, Take 1 tablet by mouth Every Night., Disp: 30 tablet, Rfl: 1    Current Facility-Administered Medications:   •  cyanocobalamin injection 1,000 mcg, 1,000 mcg, Intramuscular, Q28 Days, Radha House, APRN, 1,000 mcg at 10/26/20 0929    No Known Allergies    Review of Systems   Constitutional: Negative for chills, fatigue, fever and unexpected weight change.   HENT: Negative for congestion, ear pain, postnasal drip, sinus pressure, sore throat and trouble swallowing.    Eyes: Negative for visual disturbance.   Respiratory: Negative for cough, chest tightness and wheezing.    Cardiovascular: Negative for chest pain, palpitations and leg swelling.   Gastrointestinal: Negative for abdominal pain, blood in stool, nausea and vomiting.   Genitourinary: Negative for dysuria, frequency and urgency.   Musculoskeletal: Negative for arthralgias and joint  swelling.   Skin: Negative for color change.   Neurological: Negative for syncope, weakness and headaches.   Hematological: Does not bruise/bleed easily.   Psychiatric/Behavioral: Positive for sleep disturbance. The patient has insomnia.        Objective   There were no vitals filed for this visit.  There is no height or weight on file to calculate BMI.  Physical Exam  Constitutional:       Appearance: She is well-developed.   HENT:      Head: Normocephalic and atraumatic.   Eyes:      General:         Right eye: No discharge.         Left eye: No discharge.      Conjunctiva/sclera: Conjunctivae normal.   Pulmonary:      Effort: Pulmonary effort is normal.   Neurological:      Mental Status: She is alert and oriented to person, place, and time.   Psychiatric:         Behavior: Behavior normal.         Thought Content: Thought content normal.         Judgment: Judgment normal.         Assessment/Plan   Diagnoses and all orders for this visit:    1. Primary insomnia (Primary)  -     traZODone (DESYREL) 50 MG tablet; Take 1 tablet by mouth Every Night.  Dispense: 30 tablet; Refill: 1    2. Obstructive sleep apnea syndrome    She has tried Melatonin without significant improvement in sx. She has taken Ambien but we will try to avoid that medication due to safety concerns. She will start a low dose of Trazodone and titrate dosage if needed.  She is now using her Bi-pap again but she states this is not contributing to her insomnia.    History and medical judgement obtained from video conversation with patient. No hands-on physical examination was performed. Approximately 12 minutes spent in video conversation with patient.

## 2021-01-31 RX ORDER — METOPROLOL SUCCINATE 25 MG/1
TABLET, EXTENDED RELEASE ORAL
Qty: 90 TABLET | Refills: 3 | Status: SHIPPED | OUTPATIENT
Start: 2021-01-31 | End: 2022-02-03

## 2021-02-05 DIAGNOSIS — I10 ESSENTIAL HYPERTENSION: ICD-10-CM

## 2021-02-05 RX ORDER — RAMIPRIL 10 MG/1
CAPSULE ORAL
Qty: 180 CAPSULE | Refills: 3 | Status: SHIPPED | OUTPATIENT
Start: 2021-02-05 | End: 2021-10-26 | Stop reason: SDUPTHER

## 2021-02-08 ENCOUNTER — TELEPHONE (OUTPATIENT)
Dept: INTERNAL MEDICINE | Facility: CLINIC | Age: 71
End: 2021-02-08

## 2021-02-08 NOTE — TELEPHONE ENCOUNTER
Gave clarification to Dianna at [facility] pharmacy with Radha's directions.

## 2021-02-08 NOTE — TELEPHONE ENCOUNTER
Basilio calling on the directions on the Requip, is this three times or once before bedtime, please advise thanks.

## 2021-02-18 ENCOUNTER — PATIENT MESSAGE (OUTPATIENT)
Dept: INTERNAL MEDICINE | Facility: CLINIC | Age: 71
End: 2021-02-18

## 2021-02-18 DIAGNOSIS — G25.81 RESTLESS LEGS: ICD-10-CM

## 2021-02-18 RX ORDER — ROPINIROLE 1 MG/1
1 TABLET, FILM COATED ORAL NIGHTLY
Qty: 42 TABLET | Refills: 5 | Status: SHIPPED | OUTPATIENT
Start: 2021-02-18 | End: 2021-03-29 | Stop reason: SDUPTHER

## 2021-02-18 NOTE — TELEPHONE ENCOUNTER
From: Brooklyn Johns  To: Radha House, ULICES  Sent: 2/18/2021 10:40 AM EST  Subject: Prescription Question    Need refill for Requip ASAP. Send to [facility] please  Thanks  Priya

## 2021-02-26 ENCOUNTER — APPOINTMENT (OUTPATIENT)
Dept: WOMENS IMAGING | Facility: HOSPITAL | Age: 71
End: 2021-02-26

## 2021-02-26 ENCOUNTER — OFFICE VISIT (OUTPATIENT)
Dept: INTERNAL MEDICINE | Facility: CLINIC | Age: 71
End: 2021-02-26

## 2021-02-26 VITALS
OXYGEN SATURATION: 98 % | TEMPERATURE: 97.8 F | SYSTOLIC BLOOD PRESSURE: 124 MMHG | WEIGHT: 293 LBS | BODY MASS INDEX: 45.99 KG/M2 | HEIGHT: 67 IN | DIASTOLIC BLOOD PRESSURE: 78 MMHG | HEART RATE: 69 BPM

## 2021-02-26 DIAGNOSIS — M25.551 BILATERAL HIP PAIN: ICD-10-CM

## 2021-02-26 DIAGNOSIS — I10 ESSENTIAL HYPERTENSION: ICD-10-CM

## 2021-02-26 DIAGNOSIS — E79.0 HYPERURICEMIA: ICD-10-CM

## 2021-02-26 DIAGNOSIS — E66.01 MORBIDLY OBESE (HCC): ICD-10-CM

## 2021-02-26 DIAGNOSIS — E53.8 B12 DEFICIENCY: ICD-10-CM

## 2021-02-26 DIAGNOSIS — M25.552 BILATERAL HIP PAIN: ICD-10-CM

## 2021-02-26 DIAGNOSIS — I82.599 CHRONIC DEEP VEIN THROMBOSIS (DVT) OF OTHER VEIN OF LOWER EXTREMITY, UNSPECIFIED LATERALITY (HCC): ICD-10-CM

## 2021-02-26 DIAGNOSIS — M47.817 LUMBOSACRAL SPONDYLOSIS WITHOUT MYELOPATHY: Primary | ICD-10-CM

## 2021-02-26 DIAGNOSIS — N18.4 CKD (CHRONIC KIDNEY DISEASE) STAGE 4, GFR 15-29 ML/MIN (HCC): ICD-10-CM

## 2021-02-26 LAB
ALBUMIN SERPL-MCNC: 4.2 G/DL (ref 3.5–5.2)
ALBUMIN/GLOB SERPL: 2 G/DL
ALP SERPL-CCNC: 187 U/L (ref 39–117)
ALT SERPL-CCNC: 12 U/L (ref 1–33)
AST SERPL-CCNC: 15 U/L (ref 1–32)
BASOPHILS # BLD AUTO: 0.02 10*3/MM3 (ref 0–0.2)
BASOPHILS NFR BLD AUTO: 0.4 % (ref 0–1.5)
BILIRUB SERPL-MCNC: 0.4 MG/DL (ref 0–1.2)
BUN SERPL-MCNC: 32 MG/DL (ref 8–23)
BUN/CREAT SERPL: 14.4 (ref 7–25)
CALCIUM SERPL-MCNC: 7.2 MG/DL (ref 8.6–10.5)
CHLORIDE SERPL-SCNC: 106 MMOL/L (ref 98–107)
CHOLEST SERPL-MCNC: 170 MG/DL (ref 0–200)
CO2 SERPL-SCNC: 22.5 MMOL/L (ref 22–29)
CREAT SERPL-MCNC: 2.22 MG/DL (ref 0.57–1)
EOSINOPHIL # BLD AUTO: 0.09 10*3/MM3 (ref 0–0.4)
EOSINOPHIL NFR BLD AUTO: 1.7 % (ref 0.3–6.2)
ERYTHROCYTE [DISTWIDTH] IN BLOOD BY AUTOMATED COUNT: 12.7 % (ref 12.3–15.4)
GLOBULIN SER CALC-MCNC: 2.1 GM/DL
GLUCOSE SERPL-MCNC: 102 MG/DL (ref 65–99)
HCT VFR BLD AUTO: 30.6 % (ref 34–46.6)
HDLC SERPL-MCNC: 66 MG/DL (ref 40–60)
HGB BLD-MCNC: 10 G/DL (ref 12–15.9)
IMM GRANULOCYTES # BLD AUTO: 0.02 10*3/MM3 (ref 0–0.05)
IMM GRANULOCYTES NFR BLD AUTO: 0.4 % (ref 0–0.5)
LDLC SERPL CALC-MCNC: 82 MG/DL (ref 0–100)
LYMPHOCYTES # BLD AUTO: 1.18 10*3/MM3 (ref 0.7–3.1)
LYMPHOCYTES NFR BLD AUTO: 22 % (ref 19.6–45.3)
MCH RBC QN AUTO: 30.4 PG (ref 26.6–33)
MCHC RBC AUTO-ENTMCNC: 32.7 G/DL (ref 31.5–35.7)
MCV RBC AUTO: 93 FL (ref 79–97)
MONOCYTES # BLD AUTO: 0.38 10*3/MM3 (ref 0.1–0.9)
MONOCYTES NFR BLD AUTO: 7.1 % (ref 5–12)
NEUTROPHILS # BLD AUTO: 3.67 10*3/MM3 (ref 1.7–7)
NEUTROPHILS NFR BLD AUTO: 68.4 % (ref 42.7–76)
NRBC BLD AUTO-RTO: 0 /100 WBC (ref 0–0.2)
PLATELET # BLD AUTO: 165 10*3/MM3 (ref 140–450)
POTASSIUM SERPL-SCNC: 5.4 MMOL/L (ref 3.5–5.2)
PROT SERPL-MCNC: 6.3 G/DL (ref 6–8.5)
RBC # BLD AUTO: 3.29 10*6/MM3 (ref 3.77–5.28)
SODIUM SERPL-SCNC: 140 MMOL/L (ref 136–145)
TRIGL SERPL-MCNC: 128 MG/DL (ref 0–150)
TSH SERPL DL<=0.005 MIU/L-ACNC: 2.58 UIU/ML (ref 0.27–4.2)
URATE SERPL-MCNC: 5.2 MG/DL (ref 2.4–5.7)
VLDLC SERPL CALC-MCNC: 22 MG/DL (ref 5–40)
WBC # BLD AUTO: 5.36 10*3/MM3 (ref 3.4–10.8)

## 2021-02-26 PROCEDURE — 99214 OFFICE O/P EST MOD 30 MIN: CPT | Performed by: NURSE PRACTITIONER

## 2021-02-26 PROCEDURE — 73521 X-RAY EXAM HIPS BI 2 VIEWS: CPT | Performed by: RADIOLOGY

## 2021-02-26 PROCEDURE — 96372 THER/PROPH/DIAG INJ SC/IM: CPT | Performed by: NURSE PRACTITIONER

## 2021-02-26 PROCEDURE — 73521 X-RAY EXAM HIPS BI 2 VIEWS: CPT | Performed by: NURSE PRACTITIONER

## 2021-02-26 RX ORDER — CALCITRIOL 0.25 UG/1
0.25 CAPSULE, LIQUID FILLED ORAL SEE ADMIN INSTRUCTIONS
Qty: 36 CAPSULE | Refills: 1 | Status: SHIPPED | OUTPATIENT
Start: 2021-02-26 | End: 2021-06-25

## 2021-02-26 RX ORDER — CYANOCOBALAMIN 1000 UG/ML
1000 INJECTION, SOLUTION INTRAMUSCULAR; SUBCUTANEOUS
Status: DISCONTINUED | OUTPATIENT
Start: 2021-02-26 | End: 2022-03-23

## 2021-02-26 RX ORDER — GABAPENTIN 800 MG/1
800 TABLET ORAL 3 TIMES DAILY
Qty: 30 TABLET | Refills: 5
Start: 2021-02-26 | End: 2022-08-17 | Stop reason: SDUPTHER

## 2021-02-26 RX ADMIN — CYANOCOBALAMIN 1000 MCG: 1000 INJECTION, SOLUTION INTRAMUSCULAR; SUBCUTANEOUS at 09:54

## 2021-02-26 NOTE — PROGRESS NOTES
"Chief Complaint  Back Pain, Insomnia, Chronic Kidney Disease, and Hypertension    Subjective          Brooklyn Johns presents to [facility] for f/u regarding chronic low back pain, insomnia, and CKD.    She underwent Mohs procedure December 2020 for lesion on her left cheek which she tolerated well.  Her recent labs with nephrology showed a low B12 level of 218.  She has been inconsistent with receiving injections in the office.  Her calcium was also decreased at 7.8 and her Calcitrol 0.25mg was increased to every Monday Wednesday and Friday.  She was started on gabapentin at night by pain management due to difficulty sleeping and worsening back pain.  She is sleeping better but does note worsening back pain over the past month.  She is scheduled for an injection with pain management later this month.      Review of Systems   Constitutional: Positive for fatigue.   Musculoskeletal: Positive for arthralgias (bilateral hip pain, worse with weightbearing), back pain, neck pain and neck stiffness.   Psychiatric/Behavioral: Positive for sleep disturbance.     Objective   Vital Signs:   /78 (BP Location: Left arm, Patient Position: Sitting, Cuff Size: Adult)   Pulse 69   Temp 97.8 °F (36.6 °C) (Oral)   Ht 170.2 cm (67\")   Wt (!) 137 kg (302 lb)   SpO2 98%   BMI 47.30 kg/m²     Physical Exam  Constitutional:       Appearance: She is well-developed. She is not ill-appearing.   HENT:      Head: Normocephalic.      Right Ear: Hearing, tympanic membrane and external ear normal.      Left Ear: Hearing, tympanic membrane and external ear normal.      Nose: Nose normal. No nasal deformity, mucosal edema or rhinorrhea.      Right Sinus: No maxillary sinus tenderness or frontal sinus tenderness.      Left Sinus: No maxillary sinus tenderness or frontal sinus tenderness.      Mouth/Throat:      Dentition: Normal dentition.   Eyes:      General: Lids are normal.         Right eye: No " discharge.         Left eye: No discharge.      Conjunctiva/sclera: Conjunctivae normal.      Right eye: No exudate.     Left eye: No exudate.  Neck:      Musculoskeletal: Normal range of motion. No edema.      Thyroid: No thyroid mass or thyromegaly.      Vascular: No carotid bruit.      Trachea: Trachea normal.   Cardiovascular:      Rate and Rhythm: Regular rhythm.      Pulses: Normal pulses.      Heart sounds: Normal heart sounds. No murmur.   Pulmonary:      Effort: No respiratory distress.      Breath sounds: Normal breath sounds. No decreased breath sounds, wheezing, rhonchi or rales.   Abdominal:      General: Bowel sounds are normal.      Palpations: Abdomen is soft.      Tenderness: There is no abdominal tenderness.   Lymphadenopathy:      Head:      Right side of head: No submental, submandibular, tonsillar, preauricular, posterior auricular or occipital adenopathy.      Left side of head: No submental, submandibular, tonsillar, preauricular, posterior auricular or occipital adenopathy.   Skin:     General: Skin is warm and dry.      Nails: There is no clubbing.     Neurological:      Mental Status: She is alert.   Psychiatric:         Behavior: Behavior is cooperative.        Result Review :   The following data was reviewed by: ULICES Amezquita on 02/26/2021:  Common labs    Common Labsle 6/29/20 6/29/20 6/29/20 6/29/20 9/3/20 9/3/20 2/26/21 2/26/21 2/26/21 2/26/21    0918 0918 0918 0918 1347 1347 1015 1015 1015 1015   Glucose      77       Glucose  108 (A)      102 (A)     BUN  23    24 (A)  32 (A)     Creatinine  1.50 (A)    1.91 (A)  2.22 (A)     eGFR Non  Am  34 (A)    26 (A)  22 (A)     eGFR  Am  42 (A)      26 (A)     Sodium  143    142  140     Potassium  4.7    4.4  5.4 (A)     Chloride  110 (A)    106  106     Calcium  8.4 (A)    7.7 (A)  7.2 (A)     Total Protein  6.3      6.3     Albumin  4.40    4.10  4.20     Total Bilirubin  0.4    0.4  0.4     Alkaline Phosphatase  155  (A)    153 (A)  187 (A)     AST (SGOT)  12    17  15     ALT (SGPT)  11    12  12     WBC 4.72    5.19  5.36      Hemoglobin 10.1 (A)    9.9 (A)  10.0 (A)      Hematocrit 30.9 (A)    31.5 (A)  30.6 (A)      Platelets 160    152  165      Total Cholesterol   145      170    Triglycerides   96      128    HDL Cholesterol   61 (A)      66 (A)    LDL Cholesterol    65      82    Uric Acid    5.6      5.2   (A) Abnormal value       Comments are available for some flowsheets but are not being displayed.                     Assessment and Plan    Diagnoses and all orders for this visit:    1. Lumbosacral spondylosis without myelopathy (Primary)  Assessment & Plan:  Followed by pain management    Orders:  -     gabapentin (Neurontin) 800 MG tablet; Take 1 tablet by mouth 3 (Three) Times a Day. GIVEN BY PAIN MGMT  Dispense: 30 tablet; Refill: 5    2. CKD (chronic kidney disease) stage 4, GFR 15-29 ml/min (CMS/[facility])  Assessment & Plan:  Followed by Dr. Pritchett    Orders:  -     calcitriol (ROCALTROL) 0.25 MCG capsule; Take 1 capsule by mouth See Admin Instructions. [id number]  Dispense: 36 capsule; Refill: 1    3. Bilateral hip pain  Assessment & Plan:  Arthritic changes noted on xrays today-will send to radiology for review. She also requests ortho referral    Orders:  -     XR Hips Bilateral With or Without Pelvis 2 View; Future  -     XR Hips Bilateral With or Without Pelvis 2 View  -     Ambulatory Referral to Orthopedic Surgery    4. Essential hypertension  Assessment & Plan:  Hypertension is unchanged.  Continue current treatment regimen.  Dietary sodium restriction.  Weight loss.  Blood pressure will be reassessed at the next regular appointment.    Orders:  -     CBC & Differential  -     Comprehensive Metabolic Panel  -     Lipid Panel  -     TSH    5. Hyperuricemia  Assessment & Plan:  Recheck uric acid today    Orders:  -     Uric Acid    6. B12 deficiency  Assessment & Plan:  She has received B12  "injections inconsistently in office and recent levels were low, will begin receiving injections at work    Orders:  -     cyanocobalamin injection 1,000 mcg  -     cyanocobalamin 1000 MCG/ML injection; Inject 1 mL into the appropriate muscle as directed by prescriber Every 28 (Twenty-Eight) Days.  Dispense: 30 mL; Refill: 0  -     Syringe 25G X 5/8\" 3 ML misc; 1 syringe Every 30 (Thirty) Days.  Dispense: 50 each; Refill: 0    7. Chronic deep vein thrombosis (DVT) of other vein of lower extremity, unspecified laterality (CMS/HCC)    8. Morbidly obese (CMS/HCC)  Assessment & Plan:  Discussed diet and exercise program with the goal of weight loss      Other orders  -     Cancel: XR Hip With or Without Pelvis 2 - 3 View Left  -     Cancel: XR Hip With or Without Pelvis 2 - 3 View Right      Follow Up   Return in about 4 months (around 6/26/2021).  Patient was given instructions and counseling regarding her condition or for health maintenance advice. Please see specific information pulled into the AVS if appropriate.       "

## 2021-02-28 PROBLEM — E66.01 MORBIDLY OBESE: Chronic | Status: ACTIVE | Noted: 2019-06-18

## 2021-02-28 PROBLEM — M25.551 BILATERAL HIP PAIN: Chronic | Status: ACTIVE | Noted: 2021-02-28

## 2021-02-28 PROBLEM — M25.551 BILATERAL HIP PAIN: Status: ACTIVE | Noted: 2021-02-28

## 2021-02-28 PROBLEM — E79.0 HYPERURICEMIA: Chronic | Status: ACTIVE | Noted: 2021-02-28

## 2021-02-28 PROBLEM — M47.817 LUMBOSACRAL SPONDYLOSIS WITHOUT MYELOPATHY: Chronic | Status: ACTIVE | Noted: 2019-06-18

## 2021-02-28 PROBLEM — E79.0 HYPERURICEMIA: Status: ACTIVE | Noted: 2021-02-28

## 2021-02-28 PROBLEM — I25.110 CORONARY ARTERY DISEASE INVOLVING NATIVE CORONARY ARTERY OF NATIVE HEART WITH UNSTABLE ANGINA PECTORIS (HCC): Chronic | Status: ACTIVE | Noted: 2020-09-09

## 2021-02-28 PROBLEM — M25.552 BILATERAL HIP PAIN: Chronic | Status: ACTIVE | Noted: 2021-02-28

## 2021-02-28 PROBLEM — E53.8 B12 DEFICIENCY: Status: ACTIVE | Noted: 2021-02-28

## 2021-02-28 PROBLEM — E53.8 B12 DEFICIENCY: Chronic | Status: ACTIVE | Noted: 2021-02-28

## 2021-02-28 PROBLEM — Z86.718 HISTORY OF RECURRENT DEEP VEIN THROMBOSIS (DVT): Chronic | Status: ACTIVE | Noted: 2019-06-18

## 2021-02-28 PROBLEM — N18.4 CKD (CHRONIC KIDNEY DISEASE) STAGE 4, GFR 15-29 ML/MIN: Chronic | Status: ACTIVE | Noted: 2019-10-20

## 2021-02-28 PROBLEM — M25.552 BILATERAL HIP PAIN: Status: ACTIVE | Noted: 2021-02-28

## 2021-02-28 RX ORDER — CYANOCOBALAMIN 1000 UG/ML
1000 INJECTION, SOLUTION INTRAMUSCULAR; SUBCUTANEOUS
Qty: 30 ML | Refills: 0 | Status: SHIPPED | OUTPATIENT
Start: 2021-02-28 | End: 2022-03-23

## 2021-02-28 NOTE — ASSESSMENT & PLAN NOTE
Arthritic changes noted on xrays today-will send to radiology for review. She also requests ortho referral

## 2021-02-28 NOTE — ASSESSMENT & PLAN NOTE
Hypertension is unchanged.  Continue current treatment regimen.  Dietary sodium restriction.  Weight loss.  Blood pressure will be reassessed at the next regular appointment.

## 2021-02-28 NOTE — ASSESSMENT & PLAN NOTE
She has received B12 injections inconsistently in office and recent levels were low, will begin receiving injections at work

## 2021-03-29 DIAGNOSIS — G25.81 RESTLESS LEGS: ICD-10-CM

## 2021-03-29 NOTE — TELEPHONE ENCOUNTER
----- Message from Brooklyn Johns sent at 3/29/2021 12:15 PM EDT -----  Regarding: Prescription Question  Contact: [phone]  I need you to change the refill for ropinrral I take 3 a day and Rx is for 1 a day.  Send to [facility] please  Thanks  Priya

## 2021-03-30 RX ORDER — ROPINIROLE 1 MG/1
1 TABLET, FILM COATED ORAL 3 TIMES DAILY
Qty: 270 TABLET | Refills: 1 | Status: SHIPPED | OUTPATIENT
Start: 2021-03-30 | End: 2021-10-11 | Stop reason: SDUPTHER

## 2021-04-07 ENCOUNTER — TELEPHONE (OUTPATIENT)
Dept: INTERNAL MEDICINE | Facility: CLINIC | Age: 71
End: 2021-04-07

## 2021-04-07 NOTE — TELEPHONE ENCOUNTER
I spoke with pharmacy tech at [facility] and advised pt is to take 3 tablets at bedtime, one hour before bedtime.  She is not using 1 po tid.

## 2021-04-07 NOTE — TELEPHONE ENCOUNTER
Pharmacy Name:    [facility]     Pharmacy representative name:  BRAD     Pharmacy representative phone number:  [phone]     What medication are you calling in regards to: REQUIP 1 MG  DIRECTIONS NEED CLARIFICATION     What question does the pharmacy have:     ROPIN HCL 1 MG. DIRECTIONS   IS THE PATIENT TAKING:   3 TAB 1 HR BEFORE BEDTIME?          Who is the provider that prescribed the medication: SURI DONIS notes:         REFERENCE NUMBER # [id number]

## 2021-05-02 DIAGNOSIS — I82.599 CHRONIC DEEP VEIN THROMBOSIS (DVT) OF OTHER VEIN OF LOWER EXTREMITY, UNSPECIFIED LATERALITY (HCC): ICD-10-CM

## 2021-05-03 RX ORDER — RIVAROXABAN 15 MG/1
TABLET, FILM COATED ORAL
Qty: 90 TABLET | Refills: 1 | Status: SHIPPED | OUTPATIENT
Start: 2021-05-03 | End: 2021-11-01

## 2021-06-24 DIAGNOSIS — M10.9 GOUT, UNSPECIFIED CAUSE, UNSPECIFIED CHRONICITY, UNSPECIFIED SITE: ICD-10-CM

## 2021-06-24 RX ORDER — ALLOPURINOL 100 MG/1
TABLET ORAL
Qty: 90 TABLET | Refills: 1 | Status: SHIPPED | OUTPATIENT
Start: 2021-06-24 | End: 2022-02-21

## 2021-06-24 RX ORDER — SIMVASTATIN 40 MG
TABLET ORAL
Qty: 90 TABLET | Refills: 1 | Status: SHIPPED | OUTPATIENT
Start: 2021-06-24 | End: 2022-02-21

## 2021-06-25 ENCOUNTER — OFFICE VISIT (OUTPATIENT)
Dept: INTERNAL MEDICINE | Facility: CLINIC | Age: 71
End: 2021-06-25

## 2021-06-25 VITALS
DIASTOLIC BLOOD PRESSURE: 78 MMHG | HEIGHT: 67 IN | SYSTOLIC BLOOD PRESSURE: 112 MMHG | BODY MASS INDEX: 45.67 KG/M2 | OXYGEN SATURATION: 98 % | HEART RATE: 74 BPM | WEIGHT: 291 LBS | TEMPERATURE: 97.8 F

## 2021-06-25 DIAGNOSIS — I10 ESSENTIAL HYPERTENSION: Chronic | ICD-10-CM

## 2021-06-25 DIAGNOSIS — Z12.31 ENCOUNTER FOR SCREENING MAMMOGRAM FOR MALIGNANT NEOPLASM OF BREAST: ICD-10-CM

## 2021-06-25 DIAGNOSIS — N18.4 CKD (CHRONIC KIDNEY DISEASE) STAGE 4, GFR 15-29 ML/MIN (HCC): ICD-10-CM

## 2021-06-25 DIAGNOSIS — E83.51 HYPOCALCEMIA: ICD-10-CM

## 2021-06-25 DIAGNOSIS — G25.81 RESTLESS LEGS: Primary | ICD-10-CM

## 2021-06-25 DIAGNOSIS — E53.8 B12 DEFICIENCY: ICD-10-CM

## 2021-06-25 DIAGNOSIS — E78.2 MIXED HYPERLIPIDEMIA: Chronic | ICD-10-CM

## 2021-06-25 DIAGNOSIS — M16.0 PRIMARY OSTEOARTHRITIS OF BOTH HIPS: ICD-10-CM

## 2021-06-25 LAB
ALBUMIN SERPL-MCNC: 4.7 G/DL (ref 3.5–5.2)
ALBUMIN/GLOB SERPL: 2.5 G/DL
ALP SERPL-CCNC: 200 U/L (ref 39–117)
ALT SERPL-CCNC: 10 U/L (ref 1–33)
AST SERPL-CCNC: 16 U/L (ref 1–32)
BILIRUB SERPL-MCNC: 0.6 MG/DL (ref 0–1.2)
BUN SERPL-MCNC: 23 MG/DL (ref 8–23)
BUN/CREAT SERPL: 11.7 (ref 7–25)
CALCIUM SERPL-MCNC: 7.3 MG/DL (ref 8.6–10.5)
CHLORIDE SERPL-SCNC: 106 MMOL/L (ref 98–107)
CO2 SERPL-SCNC: 24.5 MMOL/L (ref 22–29)
CREAT SERPL-MCNC: 1.96 MG/DL (ref 0.57–1)
GLOBULIN SER CALC-MCNC: 1.9 GM/DL
GLUCOSE SERPL-MCNC: 102 MG/DL (ref 65–99)
POTASSIUM SERPL-SCNC: 4.4 MMOL/L (ref 3.5–5.2)
PROT SERPL-MCNC: 6.6 G/DL (ref 6–8.5)
SODIUM SERPL-SCNC: 144 MMOL/L (ref 136–145)
VIT B12 SERPL-MCNC: 626 PG/ML (ref 211–946)

## 2021-06-25 PROCEDURE — 99214 OFFICE O/P EST MOD 30 MIN: CPT | Performed by: NURSE PRACTITIONER

## 2021-06-25 PROCEDURE — G0439 PPPS, SUBSEQ VISIT: HCPCS | Performed by: NURSE PRACTITIONER

## 2021-06-25 PROCEDURE — 1125F AMNT PAIN NOTED PAIN PRSNT: CPT | Performed by: NURSE PRACTITIONER

## 2021-06-25 PROCEDURE — 1159F MED LIST DOCD IN RCRD: CPT | Performed by: NURSE PRACTITIONER

## 2021-06-25 RX ORDER — CALCITRIOL 0.25 UG/1
0.25 CAPSULE, LIQUID FILLED ORAL SEE ADMIN INSTRUCTIONS
Qty: 36 CAPSULE | Refills: 1 | Status: SHIPPED | OUTPATIENT
Start: 2021-06-25 | End: 2022-02-22

## 2021-06-25 NOTE — PROGRESS NOTES
The ABCs of the Annual Wellness Visit  Subsequent Medicare Wellness Visit    Chief Complaint   Patient presents with   • Medicare Wellness-subsequent   • Hypertension   • Hyperlipidemia   • Hip Pain   • Chronic Kidney Disease       Subjective   History of Present Illness:  Brooklyn Johns is a 70 y.o. female who presents for a Subsequent Medicare Wellness Visit.    HEALTH RISK ASSESSMENT    Recent Hospitalizations:  No hospitalization(s) within the last year.    Current Medical Providers:  Patient Care Team:  Radha House APRN as PCP - General (Internal Medicine)    Smoking Status:  Social History     Tobacco Use   Smoking Status Never Smoker   Smokeless Tobacco Never Used       Alcohol Consumption:  Social History     Substance and Sexual Activity   Alcohol Use Yes   • Alcohol/week: 1.0 standard drinks   • Types: 1 Glasses of wine per week    Comment: social       Depression Screen:   PHQ-2/PHQ-9 Depression Screening 6/25/2021   Little interest or pleasure in doing things 0   Feeling down, depressed, or hopeless 1   Trouble falling or staying asleep, or sleeping too much 0   Feeling tired or having little energy 3   Poor appetite or overeating 0   Feeling bad about yourself - or that you are a failure or have let yourself or your family down 0   Trouble concentrating on things, such as reading the newspaper or watching television 0   Moving or speaking so slowly that other people could have noticed. Or the opposite - being so fidgety or restless that you have been moving around a lot more than usual 0   Thoughts that you would be better off dead, or of hurting yourself in some way 0   Total Score 4   If you checked off any problems, how difficult have these problems made it for you to do your work, take care of things at home, or get along with other people? Somewhat difficult       Fall Risk Screen:  STEADI Fall Risk Assessment was completed, and patient is at LOW risk for falls.Assessment completed  on:6/25/2021    Health Habits and Functional and Cognitive Screening:  Functional & Cognitive Status 6/25/2021   Do you have difficulty preparing food and eating? No   Do you have difficulty bathing yourself, getting dressed or grooming yourself? No   Do you have difficulty using the toilet? No   Do you have difficulty moving around from place to place? No   Do you have trouble with steps or getting out of a bed or a chair? No   Current Diet Well Balanced Diet   Dental Exam Up to date   Eye Exam Up to date   Exercise (times per week) 3 times per week   Current Exercises Include (No Data)        Exercise Comment Yoga   Current Exercise Activities Include -   Do you need help using the phone?  No   Are you deaf or do you have serious difficulty hearing?  No   Do you need help with transportation? No   Do you need help shopping? No   Do you need help preparing meals?  No   Do you need help with housework?  Yes   Do you need help with laundry? No   Do you need help taking your medications? No   Do you need help managing money? No   Do you ever drive or ride in a car without wearing a seat belt? No   Have you felt unusual stress, anger or loneliness in the last month? Yes   Who do you live with? Alone   If you need help, do you have trouble finding someone available to you? No   Have you been bothered in the last four weeks by sexual problems? No   Do you have difficulty concentrating, remembering or making decisions? No         Does the patient have evidence of cognitive impairment? No    Asprin use counseling:Does not need ASA (and currently is not on it)    Age-appropriate Screening Schedule:  Refer to the list below for future screening recommendations based on patient's age, sex and/or medical conditions. Orders for these recommended tests are listed in the plan section. The patient has been provided with a written plan.    Health Maintenance   Topic Date Due   • TDAP/TD VACCINES (1 - Tdap) Never done   • MAMMOGRAM   01/24/2021   • ZOSTER VACCINE (1 of 2) 02/28/2025 (Originally 12/5/2000)   • INFLUENZA VACCINE  08/01/2021   • LIPID PANEL  02/26/2022   • DXA SCAN  04/10/2024   • PAP SMEAR  Discontinued          The following portions of the patient's history were reviewed and updated as appropriate: allergies, current medications, past family history, past medical history, past social history, past surgical history and problem list.    Outpatient Medications Prior to Visit   Medication Sig Dispense Refill   • albuterol sulfate  (90 Base) MCG/ACT inhaler INHALE TWO PUFFS BY MOUTH EVERY 4 HOURS AS NEEDED FOR WHEEZING 8.5 g 5   • allopurinol (ZYLOPRIM) 100 MG tablet TAKE ONE TABLET BY MOUTH DAILY 90 tablet 1   • chlorthalidone (HYGROTON) 25 MG tablet Every other day 45 tablet 5   • cimetidine (TAGAMET) 400 MG tablet TAKE 1 TABLET TWICE A  tablet 3   • cyanocobalamin 1000 MCG/ML injection Inject 1 mL into the appropriate muscle as directed by prescriber Every 28 (Twenty-Eight) Days. 30 mL 0   • escitalopram (LEXAPRO) 10 MG tablet TAKE 1 TABLET DAILY 90 tablet 3   • gabapentin (Neurontin) 800 MG tablet Take 1 tablet by mouth 3 (Three) Times a Day. GIVEN BY PAIN MGMT (Patient taking differently: Take 800 mg by mouth Daily. GIVEN BY PAIN MGMT) 30 tablet 5   • HYDROcodone-acetaminophen (NORCO) 7.5-325 MG per tablet Take 1 tablet by mouth 2 (Two) Times a Day As Needed for Moderate Pain . (Patient taking differently: Take 1 tablet by mouth 3 (Three) Times a Day.) 60 tablet 0   • metoprolol succinate XL (TOPROL-XL) 25 MG 24 hr tablet TAKE 1 TABLET DAILY 90 tablet 3   • ramipril (ALTACE) 10 MG capsule TAKE 1 CAPSULE TWICE A  capsule 3   • rOPINIRole (REQUIP) 1 MG tablet Take 1 tablet by mouth 3 (Three) Times a Day. Take 1 hour before bedtime. 270 tablet 1   • simvastatin (ZOCOR) 40 MG tablet TAKE ONE TABLET BY MOUTH DAILY 90 tablet 1   • sodium bicarbonate 650 MG tablet Take 650 mg by mouth 2 (Two) Times a Day.     •  "Syringe 25G X 5/8\" 3 ML misc 1 syringe Every 30 (Thirty) Days. 50 each 0   • Xarelto 15 MG tablet TAKE 1 TABLET DAILY 90 tablet 1   • calcitriol (ROCALTROL) 0.25 MCG capsule Take 1 capsule by mouth See Admin Instructions. Izwraz-Jllbelwrw-Oqrbhg 36 capsule 1     Facility-Administered Medications Prior to Visit   Medication Dose Route Frequency Provider Last Rate Last Admin   • cyanocobalamin injection 1,000 mcg  1,000 mcg Intramuscular Q28 Days Radah House APRN   1,000 mcg at 10/26/20 0929   • cyanocobalamin injection 1,000 mcg  1,000 mcg Intramuscular Q28 Days Radha House APRN   1,000 mcg at 02/26/21 0954       Patient Active Problem List   Diagnosis   • Essential hypertension   • Sleep apnea   • Hyperlipidemia   • Crushing injury of left foot and ankle   • Lumbosacral spondylosis without myelopathy   • History of recurrent deep vein thrombosis (DVT)   • Morbidly obese (CMS/[facility])   • CKD (chronic kidney disease) stage 4, GFR 15-29 ml/min (CMS/[facility])   • Coronary artery disease involving native coronary artery of native heart with unstable angina pectoris (CMS/[facility])   • Primary insomnia   • Bilateral hip pain   • Hyperuricemia   • B12 deficiency   • Restless legs   • Hypocalcemia       Advanced Care Planning:  ACP discussion was held with the patient during this visit. Patient has an advance directive in EMR which is still valid.     Review of Systems    Compared to one year ago, the patient feels her physical health is the same. (although c/o worsening bilateral hip pain)  Compared to one year ago, the patient feels her mental health is the same.    Reviewed chart for potential of high risk medication in the elderly: yes  Reviewed chart for potential of harmful drug interactions in the elderly:yes    Objective         Vitals:    06/25/21 0856   BP: 112/78   BP Location: Left arm   Patient Position: Sitting   Cuff Size: Adult   Pulse: 74   Temp: 97.8 °F (36.6 °C)   TempSrc: Temporal   SpO2: 98%   Weight: 132 " "kg (291 lb)   Height: 170.2 cm (67\")   PainSc:   8   PainLoc: Back       Body mass index is 45.58 kg/m².  Discussed the patient's BMI with her. The BMI is above average; BMI management plan is completed.    Physical Exam    Lab Results   Component Value Date     (H) 06/25/2021        Assessment/Plan   Medicare Risks and Personalized Health Plan  CMS Preventative Services Quick Reference  Breast Cancer/Mammogram Screening  Cardiovascular risk  Chronic Pain   Immunizations Discussed/Encouraged (specific immunizations; Tdap )  Obesity/Overweight     The above risks/problems have been discussed with the patient.  Pertinent information has been shared with the patient in the After Visit Summary.  Follow up plans and orders are seen below in the Assessment/Plan Section.    Diagnoses and all orders for this visit:    1. Restless legs (Primary)  Assessment & Plan:  Sx improved with nightly Requip      2. CKD (chronic kidney disease) stage 4, GFR 15-29 ml/min (CMS/[facility])  Assessment & Plan:  Her Cr was elevated with recent labs (2.2), recheck today    Orders:  -     calcitriol (ROCALTROL) 0.25 MCG capsule; Take 1 capsule by mouth See Admin Instructions. Ocsewl-Mycmyxcdm-Tmpise  Dispense: 36 capsule; Refill: 1  -     Comprehensive Metabolic Panel    3. B12 deficiency  Assessment & Plan:  B12 281 with 2/2021 labs by nephrology, encouraged to administer injections monthly    Orders:  -     Vitamin B12    4. Primary osteoarthritis of both hips  -     Ambulatory Referral to Orthopedic Surgery    5. Essential hypertension  Assessment & Plan:  Hypertension is unchanged.  Continue current treatment regimen.  Dietary sodium restriction.  Blood pressure will be reassessed at the next regular appointment.      6. Mixed hyperlipidemia  Assessment & Plan:  Lipid abnormalities are unchanged.  Pharmacotherapy as ordered.  Lipids will be reassessed in 6 months.      7. Hypocalcemia  Assessment & Plan:  Calcitriol increased to 0.25mg " every M-W-F 2/2021, recheck level today      8. Encounter for screening mammogram for malignant neoplasm of breast  -     Mammo Screening Bilateral With CAD; Future    Follow Up:  Return in about 4 months (around 10/25/2021).     An After Visit Summary and PPPS were given to the patient.               Answers for HPI/ROS submitted by the patient on 6/18/2021  Please describe your symptoms.: Back pain. Hip pain  Have you had these symptoms before?: Yes  How long have you been having these symptoms?: Greater than 2 weeks  Please list any medications you are currently taking for this condition.: Norco  Please describe any probable cause for these symptoms. : Weight arthritis  What is the primary reason for your visit?: Other

## 2021-06-26 PROBLEM — E83.51 HYPOCALCEMIA: Chronic | Status: ACTIVE | Noted: 2021-06-26

## 2021-06-26 PROBLEM — G25.81 RESTLESS LEGS: Chronic | Status: ACTIVE | Noted: 2021-06-25

## 2021-06-26 PROBLEM — E83.51 HYPOCALCEMIA: Status: ACTIVE | Noted: 2021-06-26

## 2021-06-26 NOTE — PROGRESS NOTES
"Chief Complaint  Medicare Wellness-subsequent, Hypertension, Hyperlipidemia, Hip Pain, and Chronic Kidney Disease    Subjective          Brooklyn Johns presents to [facility] for f/u regarding HTN, hyperlipidemia and c/o bilateral hip pain.    She is followed by pain mgmt for chronic low back pain, c/o increased low back and bilateral hip pain. Xrays have showed marked degenerative changes in past.      Objective   Vital Signs:   /78 (BP Location: Left arm, Patient Position: Sitting, Cuff Size: Adult)   Pulse 74   Temp 97.8 °F (36.6 °C) (Temporal)   Ht 170.2 cm (67\")   Wt 132 kg (291 lb)   SpO2 98%   BMI 45.58 kg/m²     Physical Exam  Constitutional:       Appearance: She is well-developed. She is not ill-appearing.   HENT:      Head: Normocephalic.      Right Ear: Hearing, tympanic membrane and external ear normal.      Left Ear: Hearing, tympanic membrane and external ear normal.      Nose: Nose normal. No nasal deformity, mucosal edema or rhinorrhea.      Right Sinus: No maxillary sinus tenderness or frontal sinus tenderness.      Left Sinus: No maxillary sinus tenderness or frontal sinus tenderness.      Mouth/Throat:      Dentition: Normal dentition.   Eyes:      General: Lids are normal.         Right eye: No discharge.         Left eye: No discharge.      Conjunctiva/sclera: Conjunctivae normal.      Right eye: No exudate.     Left eye: No exudate.  Neck:      Thyroid: No thyroid mass or thyromegaly.      Vascular: No carotid bruit.      Trachea: Trachea normal.   Cardiovascular:      Rate and Rhythm: Regular rhythm.      Pulses: Normal pulses.      Heart sounds: Normal heart sounds. No murmur heard.     Pulmonary:      Effort: No respiratory distress.      Breath sounds: Normal breath sounds. No decreased breath sounds, wheezing, rhonchi or rales.   Abdominal:      General: Bowel sounds are normal.      Palpations: Abdomen is soft.      Tenderness: There is no " abdominal tenderness.   Musculoskeletal:      Cervical back: Normal range of motion. No edema.   Lymphadenopathy:      Head:      Right side of head: No submental, submandibular, tonsillar, preauricular, posterior auricular or occipital adenopathy.      Left side of head: No submental, submandibular, tonsillar, preauricular, posterior auricular or occipital adenopathy.   Skin:     General: Skin is warm and dry.      Nails: There is no clubbing.   Neurological:      Mental Status: She is alert.   Psychiatric:         Behavior: Behavior is cooperative.        Result Review :   The following data was reviewed by: ULICES Amezquita on 06/25/2021:  Common labs    Common Labsle 9/3/20 9/3/20 2/26/21 2/26/21 2/26/21 2/26/21 6/25/21    1347 1347 1015 1015 1015 1015    Glucose  77        Glucose    102 (A)   102 (A)   BUN  24 (A)  32 (A)   23   Creatinine  1.91 (A)  2.22 (A)   1.96 (A)   eGFR Non African Am  26 (A)  22 (A)   25 (A)   eGFR  Am    26 (A)   31 (A)   Sodium  142  140   144   Potassium  4.4  5.4 (A)   4.4   Chloride  106  106   106   Calcium  7.7 (A)  7.2 (A)   7.3 (A)   Total Protein    6.3   6.6   Albumin  4.10  4.20   4.70   Total Bilirubin  0.4  0.4   0.6   Alkaline Phosphatase  153 (A)  187 (A)   200 (A)   AST (SGOT)  17  15   16   ALT (SGPT)  12  12   10   WBC 5.19  5.36       Hemoglobin 9.9 (A)  10.0 (A)       Hematocrit 31.5 (A)  30.6 (A)       Platelets 152  165       Total Cholesterol     170     Triglycerides     128     HDL Cholesterol     66 (A)     LDL Cholesterol      82     Uric Acid      5.2    (A) Abnormal value       Comments are available for some flowsheets but are not being displayed.                     Assessment and Plan    Diagnoses and all orders for this visit:    1. Restless legs (Primary)  Assessment & Plan:  Sx improved with nightly Requip      2. CKD (chronic kidney disease) stage 4, GFR 15-29 ml/min (CMS/[facility])  Assessment & Plan:  Her Cr was elevated with recent labs  (2.2), recheck today    Orders:  -     calcitriol (ROCALTROL) 0.25 MCG capsule; Take 1 capsule by mouth See Admin Instructions. Zafnfh-Cjabkrgjf-Ozzdrs  Dispense: 36 capsule; Refill: 1  -     Comprehensive Metabolic Panel    3. B12 deficiency  Assessment & Plan:  B12 281 with 2/2021 labs by nephrology, encouraged to administer injections monthly    Orders:  -     Vitamin B12    4. Primary osteoarthritis of both hips  -     Ambulatory Referral to Orthopedic Surgery    5. Essential hypertension  Assessment & Plan:  Hypertension is unchanged.  Continue current treatment regimen.  Dietary sodium restriction.  Blood pressure will be reassessed at the next regular appointment.      6. Mixed hyperlipidemia  Assessment & Plan:  Lipid abnormalities are unchanged.  Pharmacotherapy as ordered.  Lipids will be reassessed in 6 months.      7. Hypocalcemia  Assessment & Plan:  Calcitriol increased to 0.25mg every M-W-F 2/2021, recheck level today      8. Encounter for screening mammogram for malignant neoplasm of breast  -     Mammo Screening Bilateral With CAD; Future      Follow Up   Return in about 4 months (around 10/25/2021).  Patient was given instructions and counseling regarding her condition or for health maintenance advice. Please see specific information pulled into the AVS if appropriate.       Answers for HPI/ROS submitted by the patient on 6/18/2021  Please describe your symptoms.: Back pain. Hip pain  Have you had these symptoms before?: Yes  How long have you been having these symptoms?: Greater than 2 weeks  Please list any medications you are currently taking for this condition.: Norco  Please describe any probable cause for these symptoms. : Weight arthritis  What is the primary reason for your visit?: Other

## 2021-06-26 NOTE — PATIENT INSTRUCTIONS
Medicare Wellness  Personal Prevention Plan of Service     Date of Office Visit:  2021  Encounter Provider:  ULICES Amezquita  Place of Service:  [facility]  Patient Name: Brooklyn Johns  :  1950    As part of the Medicare Wellness portion of your visit today, we are providing you with this personalized preventive plan of services (PPPS). This plan is based upon recommendations of the [place] Preventive Services Task Force (USPSTF) and the Advisory Committee on Immunization Practices (ACIP).    This lists the preventive care services that should be considered, and provides dates of when you are due. Items listed as completed are up-to-date and do not require any further intervention.    Health Maintenance   Topic Date Due   • TDAP/TD VACCINES (1 - Tdap) Never done   • MAMMOGRAM  2021   • ANNUAL WELLNESS VISIT  2021   • ZOSTER VACCINE (1 of 2) 2025 (Originally 2000)   • INFLUENZA VACCINE  2021   • LIPID PANEL  2022   • DXA SCAN  04/10/2024   • COLORECTAL CANCER SCREENING  2025   • HEPATITIS C SCREENING  Completed   • COVID-19 Vaccine  Completed   • Pneumococcal Vaccine 65+  Completed   • PAP SMEAR  Discontinued       Orders Placed This Encounter   Procedures   • Mammo Screening Bilateral With CAD     Standing Status:   Future     Standing Expiration Date:   2022     Order Specific Question:   Reason for Exam:     Answer:   Screening-last 2019   • Vitamin B12     Order Specific Question:   Release to patient     Answer:   Immediate     Order Specific Question:   [facility] Has the patient fasted?     Answer:   No   • Comprehensive Metabolic Panel     Order Specific Question:   Release to patient     Answer:   Immediate     Order Specific Question:   [facility] Has the patient fasted?     Answer:   No   • Ambulatory Referral to Orthopedic Surgery     Referral Priority:   Routine     Referral Type:   Consultation      Referral Reason:   Specialty Services Required     Referred to Provider:   Natacha Lam APRN     Requested Specialty:   Orthopedic Surgery     Number of Visits Requested:   1       Return in about 4 months (around 10/25/2021).

## 2021-08-16 ENCOUNTER — PATIENT MESSAGE (OUTPATIENT)
Dept: INTERNAL MEDICINE | Facility: CLINIC | Age: 71
End: 2021-08-16

## 2021-08-16 NOTE — TELEPHONE ENCOUNTER
From: Brooklyn Johns  To: ULICES Amezquita  Sent: 8/16/2021 12:08 PM EDT  Subject: Non-Urgent Medical Question    Check out my eye. I'm not sure how it happened but I wonder if I should stop Xeralto for a while.  Dies not itch or hurt, but it is irritating.   Priya Johns

## 2021-10-11 DIAGNOSIS — G25.81 RESTLESS LEGS: ICD-10-CM

## 2021-10-11 RX ORDER — ROPINIROLE 1 MG/1
1 TABLET, FILM COATED ORAL 3 TIMES DAILY
Qty: 270 TABLET | Refills: 1 | Status: SHIPPED | OUTPATIENT
Start: 2021-10-11 | End: 2022-04-04

## 2021-10-20 DIAGNOSIS — K21.9 GASTROESOPHAGEAL REFLUX DISEASE: ICD-10-CM

## 2021-10-20 RX ORDER — CIMETIDINE 400 MG/1
TABLET, FILM COATED ORAL
Qty: 180 TABLET | Refills: 3 | Status: SHIPPED | OUTPATIENT
Start: 2021-10-20 | End: 2022-10-17

## 2021-10-25 ENCOUNTER — OFFICE VISIT (OUTPATIENT)
Dept: INTERNAL MEDICINE | Facility: CLINIC | Age: 71
End: 2021-10-25

## 2021-10-25 VITALS
HEART RATE: 62 BPM | DIASTOLIC BLOOD PRESSURE: 72 MMHG | WEIGHT: 287 LBS | HEIGHT: 67 IN | OXYGEN SATURATION: 98 % | BODY MASS INDEX: 45.04 KG/M2 | TEMPERATURE: 98 F | SYSTOLIC BLOOD PRESSURE: 130 MMHG

## 2021-10-25 DIAGNOSIS — N18.4 CKD (CHRONIC KIDNEY DISEASE) STAGE 4, GFR 15-29 ML/MIN (HCC): ICD-10-CM

## 2021-10-25 DIAGNOSIS — E78.2 MIXED HYPERLIPIDEMIA: Chronic | ICD-10-CM

## 2021-10-25 DIAGNOSIS — N25.81 SECONDARY HYPERPARATHYROIDISM (HCC): Chronic | ICD-10-CM

## 2021-10-25 DIAGNOSIS — I10 ESSENTIAL HYPERTENSION: Primary | Chronic | ICD-10-CM

## 2021-10-25 DIAGNOSIS — E53.8 B12 DEFICIENCY: ICD-10-CM

## 2021-10-25 DIAGNOSIS — R73.9 HYPERGLYCEMIA: ICD-10-CM

## 2021-10-25 PROCEDURE — 99214 OFFICE O/P EST MOD 30 MIN: CPT | Performed by: NURSE PRACTITIONER

## 2021-10-25 PROCEDURE — 96372 THER/PROPH/DIAG INJ SC/IM: CPT | Performed by: NURSE PRACTITIONER

## 2021-10-25 RX ORDER — CYANOCOBALAMIN 1000 UG/ML
1000 INJECTION, SOLUTION INTRAMUSCULAR; SUBCUTANEOUS
Status: DISCONTINUED | OUTPATIENT
Start: 2021-10-25 | End: 2022-03-23

## 2021-10-25 RX ADMIN — CYANOCOBALAMIN 1000 MCG: 1000 INJECTION, SOLUTION INTRAMUSCULAR; SUBCUTANEOUS at 09:59

## 2021-10-25 NOTE — PROGRESS NOTES
"Chief Complaint  Hypertension, Anxiety, B12 deficiency, and Hip Pain    Subjective          Brooklyn Johns presents to [facility] for f/u regarding HTN, anxiety, B12 deficiency and bilateral hip pain.    She has restarted B12 injections for B12 deficiency, had previously stopped injections. She continues to c/o dyspnea, nuclear stress was negative.  No previous hx of lung disease.  She does c/o bilateral hip pain, has received inj through pain mgmt with little. +OA changes on xrays.  She has lost 13# over the past year by decreasing food intake.      Objective   Vital Signs:   /72 (BP Location: Left arm, Patient Position: Sitting, Cuff Size: Adult)   Pulse 62   Temp 98 °F (36.7 °C) (Temporal)   Ht 170.2 cm (67\")   Wt 130 kg (287 lb)   SpO2 98%   BMI 44.95 kg/m²     Physical Exam  Constitutional:       Appearance: She is well-developed. She is not ill-appearing.   HENT:      Head: Normocephalic.      Right Ear: Hearing, tympanic membrane and external ear normal.      Left Ear: Hearing, tympanic membrane and external ear normal.      Nose: Nose normal. No nasal deformity, mucosal edema or rhinorrhea.      Right Sinus: No maxillary sinus tenderness or frontal sinus tenderness.      Left Sinus: No maxillary sinus tenderness or frontal sinus tenderness.      Mouth/Throat:      Dentition: Normal dentition.   Eyes:      General: Lids are normal.         Right eye: No discharge.         Left eye: No discharge.      Conjunctiva/sclera: Conjunctivae normal.      Right eye: No exudate.     Left eye: No exudate.  Neck:      Thyroid: No thyroid mass or thyromegaly.      Vascular: No carotid bruit.      Trachea: Trachea normal.   Cardiovascular:      Rate and Rhythm: Regular rhythm.      Pulses: Normal pulses.      Heart sounds: Normal heart sounds. No murmur heard.      Pulmonary:      Effort: No respiratory distress.      Breath sounds: Normal breath sounds. No decreased breath " sounds, wheezing, rhonchi or rales.   Abdominal:      General: Bowel sounds are normal.      Palpations: Abdomen is soft.      Tenderness: There is no abdominal tenderness.   Musculoskeletal:      Cervical back: Normal range of motion. No edema.   Lymphadenopathy:      Head:      Right side of head: No submental, submandibular, tonsillar, preauricular, posterior auricular or occipital adenopathy.      Left side of head: No submental, submandibular, tonsillar, preauricular, posterior auricular or occipital adenopathy.   Skin:     General: Skin is warm and dry.      Nails: There is no clubbing.   Neurological:      Mental Status: She is alert.   Psychiatric:         Behavior: Behavior is cooperative.        Result Review :   The following data was reviewed by: ULICES Amezquita on 10/25/2021:  Common labs    Common Labsle 2/26/21 2/26/21 2/26/21 2/26/21 6/25/21    1015 1015 1015 1015    Glucose  102 (A)   102 (A)   BUN  32 (A)   23   Creatinine  2.22 (A)   1.96 (A)   eGFR Non African Am  22 (A)   25 (A)   eGFR  Am  26 (A)   31 (A)   Sodium  140   144   Potassium  5.4 (A)   4.4   Chloride  106   106   Calcium  7.2 (A)   7.3 (A)   Total Protein  6.3   6.6   Albumin  4.20   4.70   Total Bilirubin  0.4   0.6   Alkaline Phosphatase  187 (A)   200 (A)   AST (SGOT)  15   16   ALT (SGPT)  12   10   WBC 5.36       Hemoglobin 10.0 (A)       Hematocrit 30.6 (A)       Platelets 165       Total Cholesterol   170     Triglycerides   128     HDL Cholesterol   66 (A)     LDL Cholesterol    82     Uric Acid    5.2    (A) Abnormal value       Comments are available for some flowsheets but are not being displayed.                     Assessment and Plan    Diagnoses and all orders for this visit:    1. Essential hypertension (Primary)  Assessment & Plan:  Hypertension is unchanged.  Continue current treatment regimen.  Dietary sodium restriction.  Blood pressure will be reassessed at the next regular appointment.  She will  continue Chlorthalidone (every other day), Metoprolol and Ramipril which she is tolerating well. She c/o intermittent swelling, may take Chlorthalidone as needed.    Orders:  -     CBC & Differential  -     Comprehensive Metabolic Panel  -     TSH    2. B12 deficiency  Assessment & Plan:  She has restarted B12 injections at home, recheck levels.    Orders:  -     cyanocobalamin injection 1,000 mcg  -     Vitamin B12    3. CKD (chronic kidney disease) stage 4, GFR 15-29 ml/min (HCC)  Assessment & Plan:  Renal condition is unchanged.  Continue current treatment regimen.  Renal condition will be reassessed in 6 months.  She is followed by nephrology (overdue for f/u appt), recheck labs today.      4. Hyperglycemia  -     Hemoglobin A1c    5. Mixed hyperlipidemia  Assessment & Plan:  Lipid abnormalities are unchanged.  Pharmacotherapy as ordered.  Lipids will be reassessed in 6 months.  She denies myalgias with Simvastatin which she will continue along with a low-fat, low-cholesterol diet.    Orders:  -     Lipid Panel    6. Secondary hyperparathyroidism (HCC)  Assessment & Plan:  Started Calcitrol Mon-Wed-Fri 2/2021      She will schedule mammogram which is overdue.      Follow Up   Return in about 4 months (around 2/25/2022).  Patient was given instructions and counseling regarding her condition or for health maintenance advice. Please see specific information pulled into the AVS if appropriate.       Answers for HPI/ROS submitted by the patient on 10/18/2021  What is the primary reason for your visit?: Physical

## 2021-10-25 NOTE — ASSESSMENT & PLAN NOTE
Hypertension is unchanged.  Continue current treatment regimen.  Dietary sodium restriction.  Blood pressure will be reassessed at the next regular appointment.  She will continue Chlorthalidone (every other day), Metoprolol and Ramipril which she is tolerating well. She c/o intermittent swelling, may take Chlorthalidone as needed.

## 2021-10-25 NOTE — ASSESSMENT & PLAN NOTE
Lipid abnormalities are unchanged.  Pharmacotherapy as ordered.  Lipids will be reassessed in 6 months.  She denies myalgias with Simvastatin which she will continue along with a low-fat, low-cholesterol diet.

## 2021-10-25 NOTE — ASSESSMENT & PLAN NOTE
Renal condition is unchanged.  Continue current treatment regimen.  Renal condition will be reassessed in 6 months.  She is followed by nephrology (overdue for f/u appt), recheck labs today.

## 2021-10-26 DIAGNOSIS — E83.51 HYPOCALCEMIA: Primary | ICD-10-CM

## 2021-10-26 DIAGNOSIS — I10 ESSENTIAL HYPERTENSION: ICD-10-CM

## 2021-10-26 DIAGNOSIS — E87.5 HYPERKALEMIA: ICD-10-CM

## 2021-10-26 DIAGNOSIS — N18.4 CKD (CHRONIC KIDNEY DISEASE) STAGE 4, GFR 15-29 ML/MIN (HCC): ICD-10-CM

## 2021-10-26 LAB
ALBUMIN SERPL-MCNC: 4.1 G/DL (ref 3.8–4.8)
ALBUMIN/GLOB SERPL: 2 {RATIO} (ref 1.2–2.2)
ALP SERPL-CCNC: 181 IU/L (ref 44–121)
ALT SERPL-CCNC: 10 IU/L (ref 0–32)
AST SERPL-CCNC: 12 IU/L (ref 0–40)
BASOPHILS # BLD AUTO: 0 X10E3/UL (ref 0–0.2)
BASOPHILS NFR BLD AUTO: 1 %
BILIRUB SERPL-MCNC: 0.5 MG/DL (ref 0–1.2)
BUN SERPL-MCNC: 26 MG/DL (ref 8–27)
BUN/CREAT SERPL: 11 (ref 12–28)
CALCIUM SERPL-MCNC: 6.8 MG/DL (ref 8.7–10.3)
CHLORIDE SERPL-SCNC: 108 MMOL/L (ref 96–106)
CHOLEST SERPL-MCNC: 140 MG/DL (ref 100–199)
CO2 SERPL-SCNC: 21 MMOL/L (ref 20–29)
CREAT SERPL-MCNC: 2.43 MG/DL (ref 0.57–1)
EOSINOPHIL # BLD AUTO: 0.1 X10E3/UL (ref 0–0.4)
EOSINOPHIL NFR BLD AUTO: 2 %
ERYTHROCYTE [DISTWIDTH] IN BLOOD BY AUTOMATED COUNT: 13.4 % (ref 11.7–15.4)
GLOBULIN SER CALC-MCNC: 2.1 G/DL (ref 1.5–4.5)
GLUCOSE SERPL-MCNC: 92 MG/DL (ref 65–99)
HBA1C MFR BLD: 5.7 % (ref 4.8–5.6)
HCT VFR BLD AUTO: 27.9 % (ref 34–46.6)
HDLC SERPL-MCNC: 56 MG/DL
HGB BLD-MCNC: 8.9 G/DL (ref 11.1–15.9)
IMM GRANULOCYTES # BLD AUTO: 0 X10E3/UL (ref 0–0.1)
IMM GRANULOCYTES NFR BLD AUTO: 1 %
LDLC SERPL CALC-MCNC: 67 MG/DL (ref 0–99)
LYMPHOCYTES # BLD AUTO: 1.2 X10E3/UL (ref 0.7–3.1)
LYMPHOCYTES NFR BLD AUTO: 26 %
MCH RBC QN AUTO: 30.7 PG (ref 26.6–33)
MCHC RBC AUTO-ENTMCNC: 31.9 G/DL (ref 31.5–35.7)
MCV RBC AUTO: 96 FL (ref 79–97)
MONOCYTES # BLD AUTO: 0.4 X10E3/UL (ref 0.1–0.9)
MONOCYTES NFR BLD AUTO: 9 %
NEUTROPHILS # BLD AUTO: 2.7 X10E3/UL (ref 1.4–7)
NEUTROPHILS NFR BLD AUTO: 61 %
PLATELET # BLD AUTO: 160 X10E3/UL (ref 150–450)
POTASSIUM SERPL-SCNC: 5.9 MMOL/L (ref 3.5–5.2)
PROT SERPL-MCNC: 6.2 G/DL (ref 6–8.5)
RBC # BLD AUTO: 2.9 X10E6/UL (ref 3.77–5.28)
SODIUM SERPL-SCNC: 142 MMOL/L (ref 134–144)
TRIGL SERPL-MCNC: 92 MG/DL (ref 0–149)
TSH SERPL DL<=0.005 MIU/L-ACNC: 4.5 UIU/ML (ref 0.45–4.5)
VIT B12 SERPL-MCNC: >2000 PG/ML (ref 232–1245)
VLDLC SERPL CALC-MCNC: 17 MG/DL (ref 5–40)
WBC # BLD AUTO: 4.4 X10E3/UL (ref 3.4–10.8)

## 2021-10-26 RX ORDER — RAMIPRIL 10 MG/1
10 CAPSULE ORAL DAILY
Qty: 90 CAPSULE | Refills: 3
Start: 2021-10-26 | End: 2022-02-03

## 2021-10-26 RX ORDER — IBUPROFEN 200 MG
950 CAPSULE ORAL 2 TIMES DAILY
Qty: 60 TABLET | Refills: 1 | Status: SHIPPED | OUTPATIENT
Start: 2021-10-26 | End: 2022-02-21

## 2021-11-01 DIAGNOSIS — I82.599 CHRONIC DEEP VEIN THROMBOSIS (DVT) OF OTHER VEIN OF LOWER EXTREMITY, UNSPECIFIED LATERALITY (HCC): ICD-10-CM

## 2021-11-01 RX ORDER — RIVAROXABAN 15 MG/1
TABLET, FILM COATED ORAL
Qty: 90 TABLET | Refills: 3 | Status: SHIPPED | OUTPATIENT
Start: 2021-11-01 | End: 2022-09-19 | Stop reason: SINTOL

## 2021-11-17 ENCOUNTER — TRANSCRIBE ORDERS (OUTPATIENT)
Dept: ADMINISTRATIVE | Facility: HOSPITAL | Age: 71
End: 2021-11-17

## 2021-11-17 DIAGNOSIS — M54.12 BRACHIAL NEURITIS: Primary | ICD-10-CM

## 2021-12-16 ENCOUNTER — HOSPITAL ENCOUNTER (OUTPATIENT)
Dept: CT IMAGING | Facility: HOSPITAL | Age: 71
Discharge: HOME | End: 2021-12-16
Admitting: PHYSICIAN ASSISTANT

## 2021-12-16 DIAGNOSIS — M54.12 BRACHIAL NEURITIS: ICD-10-CM

## 2021-12-16 PROCEDURE — 72125 CT NECK SPINE W/O DYE: CPT

## 2022-01-13 ENCOUNTER — TELEPHONE (OUTPATIENT)
Dept: INTERNAL MEDICINE | Facility: CLINIC | Age: 72
End: 2022-01-13

## 2022-01-13 NOTE — TELEPHONE ENCOUNTER
Caller: Brooklyn Johns    Relationship: Self    Best call back number: [phone]    Who are you requesting to speak with (clinical staff, provider,  specific staff member): TAHIR DONIS     What was the call regarding: PATIENT TESTED POSITIVE FOR COVID AND HAS SOME QUESTION SHE WOULD LIKE TO DISCUSS WITH TAHIR     Do you require a callback: YES

## 2022-01-21 DIAGNOSIS — F32.89 OTHER DEPRESSION: ICD-10-CM

## 2022-01-21 RX ORDER — ESCITALOPRAM OXALATE 10 MG/1
TABLET ORAL
Qty: 90 TABLET | Refills: 3 | Status: SHIPPED | OUTPATIENT
Start: 2022-01-21 | End: 2022-12-30

## 2022-02-03 DIAGNOSIS — I10 ESSENTIAL HYPERTENSION: ICD-10-CM

## 2022-02-03 RX ORDER — METOPROLOL SUCCINATE 25 MG/1
TABLET, EXTENDED RELEASE ORAL
Qty: 90 TABLET | Refills: 3 | Status: SHIPPED | OUTPATIENT
Start: 2022-02-03 | End: 2023-01-30

## 2022-02-03 RX ORDER — RAMIPRIL 10 MG/1
CAPSULE ORAL
Qty: 180 CAPSULE | Refills: 3 | Status: SHIPPED | OUTPATIENT
Start: 2022-02-03 | End: 2022-11-09

## 2022-02-20 DIAGNOSIS — M10.9 GOUT, UNSPECIFIED CAUSE, UNSPECIFIED CHRONICITY, UNSPECIFIED SITE: ICD-10-CM

## 2022-02-20 DIAGNOSIS — E83.51 HYPOCALCEMIA: ICD-10-CM

## 2022-02-21 RX ORDER — IBUPROFEN 200 MG
CAPSULE ORAL
Qty: 60 TABLET | Refills: 1 | Status: SHIPPED | OUTPATIENT
Start: 2022-02-21 | End: 2022-06-20

## 2022-02-21 RX ORDER — ALLOPURINOL 100 MG/1
TABLET ORAL
Qty: 90 TABLET | Refills: 1 | Status: SHIPPED | OUTPATIENT
Start: 2022-02-21 | End: 2022-08-17 | Stop reason: SDUPTHER

## 2022-02-21 RX ORDER — SIMVASTATIN 40 MG
TABLET ORAL
Qty: 90 TABLET | Refills: 1 | Status: SHIPPED | OUTPATIENT
Start: 2022-02-21 | End: 2022-11-02

## 2022-02-22 DIAGNOSIS — N18.4 CKD (CHRONIC KIDNEY DISEASE) STAGE 4, GFR 15-29 ML/MIN: ICD-10-CM

## 2022-02-22 RX ORDER — CALCITRIOL 0.25 UG/1
CAPSULE, LIQUID FILLED ORAL
Qty: 36 CAPSULE | Refills: 3 | Status: SHIPPED | OUTPATIENT
Start: 2022-02-22 | End: 2023-02-01

## 2022-03-22 ENCOUNTER — OFFICE VISIT (OUTPATIENT)
Dept: ORTHOPEDIC SURGERY | Facility: CLINIC | Age: 72
End: 2022-03-22

## 2022-03-22 VITALS — WEIGHT: 287 LBS | TEMPERATURE: 96.6 F | HEIGHT: 67 IN | BODY MASS INDEX: 45.04 KG/M2

## 2022-03-22 DIAGNOSIS — M16.11 PRIMARY OSTEOARTHRITIS OF RIGHT HIP: ICD-10-CM

## 2022-03-22 DIAGNOSIS — R52 PAIN: Primary | ICD-10-CM

## 2022-03-22 PROCEDURE — 73522 X-RAY EXAM HIPS BI 3-4 VIEWS: CPT | Performed by: NURSE PRACTITIONER

## 2022-03-22 PROCEDURE — 99213 OFFICE O/P EST LOW 20 MIN: CPT | Performed by: NURSE PRACTITIONER

## 2022-03-22 RX ORDER — NEEDLES, DISPOSABLE 25GX5/8"
NEEDLE, DISPOSABLE MISCELLANEOUS
COMMUNITY

## 2022-03-22 RX ORDER — GABAPENTIN 400 MG/1
400 CAPSULE ORAL 2 TIMES DAILY
COMMUNITY
Start: 2022-03-20

## 2022-03-22 NOTE — PROGRESS NOTES
Patient: Brooklyn Johns  YOB: 1950 71 y.o. female  Medical Record Number: [id number]    Chief Complaints:   Chief Complaint   Patient presents with   • Right Hip - Pain       History of Present Illness:Brooklyn Johns is a 71 y.o. female who presents with right hip pain that is chronic in nature.  Patient states that she has been hurting for several years with it worsening over the last 6 months.  Patient states the pain is located to her groin and radiates to the posterior aspect of her hip.  Patient denies any known injury to her hip.  She states her pain is described as a constant severe ache and rates it as a 9 out of 10.  Patient states that this is greatly affecting her activities of daily living and she is not able to do the things that she wants to do.  Patient states the pain is worse with going up and down stairs, prolonged walking standing.  She also reports that her hip catches from time to time as well.  Patient states that she does take Aleve and gets mild relief.  She does not have any further complaints today.  She is a known patient of Dr. Trujillo he was done bilateral knee replacements on her.    Allergies: No Known Allergies    Medications:   Current Outpatient Medications   Medication Sig Dispense Refill   • allopurinol (ZYLOPRIM) 100 MG tablet TAKE ONE TABLET BY MOUTH DAILY 90 tablet 1   • calcitriol (ROCALTROL) 0.25 MCG capsule TAKE 1 CAPSULE MONDAY, WEDNESDAY, AND FRIDAY. SEE ADMIN INSTRUCTIONS 36 capsule 3   • calcium citrate (CALCITRATE) 950 (200 Ca) MG tablet TAKE ONE TABLET BY MOUTH TWICE A DAY 60 tablet 1   • chlorthalidone (HYGROTON) 25 MG tablet Every other day 45 tablet 5   • cimetidine (TAGAMET) 400 MG tablet TAKE 1 TABLET TWICE A  tablet 3   • cyanocobalamin 1000 MCG/ML injection Inject 1 mL into the appropriate muscle as directed by prescriber Every 28 (Twenty-Eight) Days. 30 mL 0   • escitalopram (LEXAPRO) 10 MG tablet TAKE 1 TABLET DAILY 90 tablet 3  "  • gabapentin (NEURONTIN) 400 MG capsule      • HYDROcodone-acetaminophen (NORCO) 7.5-325 MG per tablet Take 1 tablet by mouth 2 (Two) Times a Day As Needed for Moderate Pain . (Patient taking differently: Take 1 tablet by mouth 3 (Three) Times a Day.) 60 tablet 0   • metoprolol succinate XL (TOPROL-XL) 25 MG 24 hr tablet TAKE 1 TABLET DAILY 90 tablet 3   • Needle, Disp, (B-D DISP NEEDLE 30GX1\") 30G X 1\" misc BD Integra Syringe 3 mL 25 gauge x 5/8\"     • ramipril (ALTACE) 10 MG capsule TAKE 1 CAPSULE TWICE A  capsule 3   • rOPINIRole (REQUIP) 1 MG tablet Take 1 tablet by mouth 3 (Three) Times a Day. Take 1 hour before bedtime. 270 tablet 1   • simvastatin (ZOCOR) 40 MG tablet TAKE ONE TABLET BY MOUTH DAILY 90 tablet 1   • sodium bicarbonate 650 MG tablet Take 650 mg by mouth 2 (Two) Times a Day.     • Syringe 25G X 5/8\" 3 ML misc 1 syringe Every 30 (Thirty) Days. 50 each 0   • Xarelto 15 MG tablet TAKE 1 TABLET DAILY 90 tablet 3   • COVID-19 mRNA Vaccine, Pfizer, 30 MCG/0.3ML suspension Pfizer-BioNTech COVID-19 Vaccine (PF) 30 mcg/0.3 mL IM susp (purple)   PHARMACY ADMINISTERED     • gabapentin (Neurontin) 800 MG tablet Take 1 tablet by mouth 3 (Three) Times a Day. GIVEN BY PAIN MGMT (Patient taking differently: Take 800 mg by mouth Daily. GIVEN BY PAIN MGMT) 30 tablet 5     Current Facility-Administered Medications   Medication Dose Route Frequency Provider Last Rate Last Admin   • cyanocobalamin injection 1,000 mcg  1,000 mcg Intramuscular Q28 Days Radha House APRN   1,000 mcg at 10/26/20 0929   • cyanocobalamin injection 1,000 mcg  1,000 mcg Intramuscular Q28 Days Radha House APRN   1,000 mcg at 02/26/21 0954   • cyanocobalamin injection 1,000 mcg  1,000 mcg Intramuscular Q28 Days Radha House APRN   1,000 mcg at 10/25/21 0959         The following portions of the patient's history were reviewed and updated as appropriate: allergies, current medications, past family history, past medical " "history, past social history, past surgical history and problem list.    Review of Systems:   A 14 point review of systems was performed. All systems negative except pertinent positives/negative listed in HPI above    Physical Exam:   Vitals:    03/22/22 1513   Temp: 96.6 °F (35.9 °C)   TempSrc: Temporal   Weight: 130 kg (287 lb)   Height: 170.2 cm (67.01\")   PainSc:   8       General: A and O x 3, ASA, NAD    SCLERA:    Normal    DENTITION:   Normal     Hip:  right    LEG ALIGNMENT:     Neutral        LEG LENGTH DISCREPANCY   :    none    GAIT:     Antalgic    SKIN:     No abnormality    RANGE OF MOTION:     Limited by joint irritability    STRENGTH:     Limited by joint irratibility    DISTAL PULSES:    Paplable    DISTAL SENSATION :   Intact    LYMPHATICS:     No   lymphadenopathy    OTHER:          +   Stinchfeld test      -    log roll      -   Tenderness to palpation trochanteric bursa       Radiology:  Xrays 2views (AP bilateral hips, and lateral of the hip) ordered and reviewed for evaluation of hip pain  demonstrating osteoarthritic arthritic findings with anterior osteophytes present and cystic changes.  No other x-rays available for comparison purposes.    Assessment/Plan: Primary osteoarthritis right hip    Treatment options was imaging results were discussed in detail with the patient.  At this point in time he would like to treat this issue conservatively.  We are going to order a fluoroscopy guided intra-articular hip injection to the right hip with the patient.  Should she not be improved within the next 4 to 6 weeks after the shot and she should call back and schedule a follow-up visit for Dr. Trujillo to discuss further treatment options.      Basil Garcia, APRN  3/22/2022  "

## 2022-03-23 ENCOUNTER — TRANSCRIBE ORDERS (OUTPATIENT)
Dept: ADMINISTRATIVE | Facility: HOSPITAL | Age: 72
End: 2022-03-23

## 2022-03-23 DIAGNOSIS — Z12.31 VISIT FOR SCREENING MAMMOGRAM: Primary | ICD-10-CM

## 2022-03-23 DIAGNOSIS — E53.8 B12 DEFICIENCY: ICD-10-CM

## 2022-03-23 RX ORDER — CYANOCOBALAMIN 1000 UG/ML
INJECTION, SOLUTION INTRAMUSCULAR; SUBCUTANEOUS
Qty: 1 ML | Refills: 1 | Status: SHIPPED | OUTPATIENT
Start: 2022-03-23 | End: 2022-09-12

## 2022-03-28 ENCOUNTER — HOSPITAL ENCOUNTER (OUTPATIENT)
Dept: MAMMOGRAPHY | Facility: HOSPITAL | Age: 72
Discharge: HOME | End: 2022-03-28
Admitting: NURSE PRACTITIONER

## 2022-03-28 DIAGNOSIS — Z12.31 VISIT FOR SCREENING MAMMOGRAM: ICD-10-CM

## 2022-03-28 PROCEDURE — 77063 BREAST TOMOSYNTHESIS BI: CPT

## 2022-03-28 PROCEDURE — 77067 SCR MAMMO BI INCL CAD: CPT

## 2022-03-30 ENCOUNTER — HOSPITAL ENCOUNTER (OUTPATIENT)
Dept: GENERAL RADIOLOGY | Facility: HOSPITAL | Age: 72
Discharge: HOME | End: 2022-03-30
Admitting: NURSE PRACTITIONER

## 2022-03-30 DIAGNOSIS — M16.11 PRIMARY OSTEOARTHRITIS OF RIGHT HIP: ICD-10-CM

## 2022-03-30 PROCEDURE — 77002 NEEDLE LOCALIZATION BY XRAY: CPT

## 2022-03-30 PROCEDURE — 0 LIDOCAINE 1 % SOLUTION: Performed by: NURSE PRACTITIONER

## 2022-03-30 PROCEDURE — 25010000002 METHYLPREDNISOLONE PER 125 MG: Performed by: NURSE PRACTITIONER

## 2022-03-30 PROCEDURE — 25010000002 IOPAMIDOL 61 % SOLUTION: Performed by: NURSE PRACTITIONER

## 2022-03-30 RX ORDER — METHYLPREDNISOLONE SODIUM SUCCINATE 125 MG/2ML
80 INJECTION, POWDER, LYOPHILIZED, FOR SOLUTION INTRAMUSCULAR; INTRAVENOUS
Status: COMPLETED | OUTPATIENT
Start: 2022-03-30 | End: 2022-03-30

## 2022-03-30 RX ORDER — BUPIVACAINE HYDROCHLORIDE 2.5 MG/ML
10 INJECTION, SOLUTION EPIDURAL; INFILTRATION; INTRACAUDAL ONCE
Status: COMPLETED | OUTPATIENT
Start: 2022-03-30 | End: 2022-03-30

## 2022-03-30 RX ORDER — LIDOCAINE HYDROCHLORIDE 10 MG/ML
10 INJECTION, SOLUTION INFILTRATION; PERINEURAL ONCE
Status: COMPLETED | OUTPATIENT
Start: 2022-03-30 | End: 2022-03-30

## 2022-03-30 RX ADMIN — IOPAMIDOL 1 ML: 612 INJECTION, SOLUTION INTRAVENOUS at 08:16

## 2022-03-30 RX ADMIN — BUPIVACAINE HYDROCHLORIDE 5 ML: 2.5 INJECTION, SOLUTION EPIDURAL; INFILTRATION; INTRACAUDAL; PERINEURAL at 08:16

## 2022-03-30 RX ADMIN — METHYLPREDNISOLONE SODIUM SUCCINATE 80 MG: 125 INJECTION, POWDER, LYOPHILIZED, FOR SOLUTION INTRAMUSCULAR; INTRAVENOUS at 08:16

## 2022-03-30 RX ADMIN — LIDOCAINE HYDROCHLORIDE 3 ML: 10 INJECTION, SOLUTION INFILTRATION; PERINEURAL at 08:16

## 2022-04-03 DIAGNOSIS — G25.81 RESTLESS LEGS: ICD-10-CM

## 2022-04-04 ENCOUNTER — TELEPHONE (OUTPATIENT)
Dept: INTERNAL MEDICINE | Facility: CLINIC | Age: 72
End: 2022-04-04

## 2022-04-04 DIAGNOSIS — G25.81 RESTLESS LEGS: ICD-10-CM

## 2022-04-04 RX ORDER — ROPINIROLE 1 MG/1
TABLET, FILM COATED ORAL
Qty: 270 TABLET | Refills: 0 | Status: SHIPPED | OUTPATIENT
Start: 2022-04-04 | End: 2022-04-04 | Stop reason: SDUPTHER

## 2022-04-04 RX ORDER — ROPINIROLE 1 MG/1
1 TABLET, FILM COATED ORAL 3 TIMES DAILY
Qty: 270 TABLET | Refills: 2 | Status: SHIPPED | OUTPATIENT
Start: 2022-04-04 | End: 2022-04-07 | Stop reason: SDUPTHER

## 2022-04-04 NOTE — TELEPHONE ENCOUNTER
----- Message from ULICES Joshua sent at 4/4/2022  7:42 AM EDT -----  Regarding: FW: [facility]  Okay to send a prescription but she is due for her f/u appointment, will you schedule with her please? Thanks.  ----- Message -----  From: Brooklyn Johns  Sent: 4/3/2022  11:21 AM EDT  To: Tai Ordaz [facility]  Subject: [facility]     Please send out a refill order for my ropineral asap.  Thanks

## 2022-04-07 ENCOUNTER — TELEPHONE (OUTPATIENT)
Dept: INTERNAL MEDICINE | Facility: CLINIC | Age: 72
End: 2022-04-07

## 2022-04-07 DIAGNOSIS — G25.81 RESTLESS LEGS: ICD-10-CM

## 2022-04-07 RX ORDER — ROPINIROLE 1 MG/1
1 TABLET, FILM COATED ORAL 3 TIMES DAILY
Qty: 90 TABLET | Refills: 1 | Status: SHIPPED | OUTPATIENT
Start: 2022-04-07 | End: 2022-08-17 | Stop reason: SDUPTHER

## 2022-04-07 NOTE — TELEPHONE ENCOUNTER
Caller: Brooklyn Johns    Relationship: Self    Best call back number: [phone]    What is the best time to reach you: ANY     Who are you requesting to speak with (clinical staff, provider,  specific staff member): CLINICAL       What was the call regarding: PATIENT CALLED AND STATED SHE NEEDED HER ROPINIROLE 1MG TABLET REFILLED. PATIENT WAS TOLD BY THE [facility], THAT SHE NEEDED TO HAVE SOMEONE FROM THE OFFICE CALL THE [facility] DIRECTLY     [address] [place] [address] & (HAYDE POLLACK) - [phone]  - [phone] FX   [address] [place] [address]   Phone:  [phone]  Fax:  [phone]      Do you require a callback: PLEASE CALL ADVISE PATIENT ONCE [facility] HAS BE CONTACTED.     PATIENT ALSO STATED SHE NEEDED A UPDATE ON THE MEDICATION BEING SENT TO [facility]

## 2022-04-07 NOTE — TELEPHONE ENCOUNTER
----- Message from Brooklyn Johns sent at 4/7/2022 11:39 AM EDT -----  Regarding: Need [facility]  Yes. Basilio in [facility]  Please

## 2022-04-08 DIAGNOSIS — G25.81 RESTLESS LEGS: ICD-10-CM

## 2022-04-08 RX ORDER — ROPINIROLE 1 MG/1
TABLET, FILM COATED ORAL
Qty: 270 TABLET | Refills: 1 | OUTPATIENT
Start: 2022-04-08

## 2022-05-26 ENCOUNTER — OFFICE VISIT (OUTPATIENT)
Dept: ORTHOPEDIC SURGERY | Facility: CLINIC | Age: 72
End: 2022-05-26

## 2022-05-26 VITALS — WEIGHT: 274 LBS | BODY MASS INDEX: 44.03 KG/M2 | HEIGHT: 66 IN | TEMPERATURE: 95.7 F

## 2022-05-26 DIAGNOSIS — M16.0 PRIMARY OSTEOARTHRITIS OF BOTH HIPS: Primary | ICD-10-CM

## 2022-05-26 PROCEDURE — 99213 OFFICE O/P EST LOW 20 MIN: CPT | Performed by: ORTHOPAEDIC SURGERY

## 2022-05-26 NOTE — PROGRESS NOTES
Patient: Brooklyn Johns  YOB: 1950 71 y.o. female  Medical Record Number: [id number]    Chief Complaints: Right greater than left hip pain    History of Present Illness:Brooklyn Johns is a 71 y.o. female who presents with severe right groin pain.  She has had fluoroscopy guided injections which helped for maybe a week.  Unfortunately the pain has progressed and is limiting her in basic activities of daily living.    Allergies: No Known Allergies    Medications:   Current Outpatient Medications   Medication Sig Dispense Refill   • allopurinol (ZYLOPRIM) 100 MG tablet TAKE ONE TABLET BY MOUTH DAILY 90 tablet 1   • calcitriol (ROCALTROL) 0.25 MCG capsule TAKE 1 CAPSULE MONDAY, WEDNESDAY, AND FRIDAY. SEE ADMIN INSTRUCTIONS 36 capsule 3   • calcium citrate (CALCITRATE) 950 (200 Ca) MG tablet TAKE ONE TABLET BY MOUTH TWICE A DAY 60 tablet 1   • chlorthalidone (HYGROTON) 25 MG tablet Every other day 45 tablet 5   • cimetidine (TAGAMET) 400 MG tablet TAKE 1 TABLET TWICE A  tablet 3   • COVID-19 mRNA Vaccine, Pfizer, 30 MCG/0.3ML suspension Pfizer-BioNTech COVID-19 Vaccine (PF) 30 mcg/0.3 mL IM susp (purple)   PHARMACY ADMINISTERED     • cyanocobalamin 1000 MCG/ML injection INJECT ONE ML INTO THE APPROPRIATE MUSCLE AS DIRECTED EVERY 28 DAYS 1 mL 1   • escitalopram (LEXAPRO) 10 MG tablet TAKE 1 TABLET DAILY 90 tablet 3   • gabapentin (NEURONTIN) 400 MG capsule Take 400 mg by mouth 2 (Two) Times a Day.     • HYDROcodone-acetaminophen (NORCO) 7.5-325 MG per tablet Take 1 tablet by mouth 2 (Two) Times a Day As Needed for Moderate Pain . (Patient taking differently: Take 1 tablet by mouth 3 (Three) Times a Day.) 60 tablet 0   • metoprolol succinate XL (TOPROL-XL) 25 MG 24 hr tablet TAKE 1 TABLET DAILY 90 tablet 3   • ramipril (ALTACE) 10 MG capsule TAKE 1 CAPSULE TWICE A  capsule 3   • rOPINIRole (REQUIP) 1 MG tablet Take 1 tablet by mouth 3 (Three) Times a Day. Take 1 hour before bedtime.  "90 tablet 1   • simvastatin (ZOCOR) 40 MG tablet TAKE ONE TABLET BY MOUTH DAILY 90 tablet 1   • sodium bicarbonate 650 MG tablet Take 650 mg by mouth 2 (Two) Times a Day.     • Xarelto 15 MG tablet TAKE 1 TABLET DAILY 90 tablet 3   • gabapentin (Neurontin) 800 MG tablet Take 1 tablet by mouth 3 (Three) Times a Day. GIVEN BY PAIN MGMT (Patient taking differently: Take 800 mg by mouth Daily. GIVEN BY PAIN MGMT) 30 tablet 5   • Needle, Disp, (B-D DISP NEEDLE 30GX1\") 30G X 1\" misc BD Integra Syringe 3 mL 25 gauge x 5/8\"     • Syringe 25G X 5/8\" 3 ML misc 1 syringe Every 30 (Thirty) Days. 50 each 0     No current facility-administered medications for this visit.         The following portions of the patient's history were reviewed and updated as appropriate: allergies, current medications, past family history, past medical history, past social history, past surgical history and problem list.    Review of Systems:   A 14 point review of systems was performed. All systems negative except pertinent positives/negative listed in HPI above    Physical Exam:   Vitals:    05/26/22 1324   Temp: 95.7 °F (35.4 °C)   Weight: 124 kg (274 lb)   Height: 166.4 cm (65.5\")       General: A and O x 3, ASA, NAD    SCLERA:    Normal    DENTITION:   Normal  Hip:  bilateral    LEG ALIGNMENT:     Neutral        LEG LENGTH DISCREPANCY   :    none    GAIT:     Antalgic    SKIN:     No abnormality    RANGE OF MOTION:     Limited by joint irritability    STRENGTH:     Limited by joint irratibility    DISTAL PULSES:    Paplable    DISTAL SENSATION :   Intact    LYMPHATICS:     No   lymphadenopathy    OTHER:          +   Stinchfeld test      -    log roll      -   Tenderness to palpation trochanteric bursa        Radiology:  Xrays 2views dahiana hips  (AP bilateral hips, and lateral of the hip) taken on 3/22/2022 reviewed/demonstrating  advanced, end-satge osteoarthritis with bone on bone articluation, periarticular osteophytes, and subchondral cysts " with severe limitation of joint space superior medially right greater than left    Assessment/Plan: Right greater than left hip osteoarthritis.  Her BMI is currently 45.  We set a goal for 25 pounds weight loss which will put her at 250.  I would like to check her back in August with repeat x-rays.  She will go on to require hip replacement in the relatively near future.  Body mass index is 44.9 kg/m².      Hipoliot Trujillo MD  5/26/2022

## 2022-05-27 ENCOUNTER — TELEPHONE (OUTPATIENT)
Dept: INTERNAL MEDICINE | Facility: CLINIC | Age: 72
End: 2022-05-27

## 2022-05-27 NOTE — TELEPHONE ENCOUNTER
On license of [facility] ARE CALLING IN THEY HAVE FAXED OVER INFORMATION ABOUT GETTING A CARDIAC CLEARANCE FOR THIS PATIENT AND HAVE NOT HEARD BACK.     PLEASE ADVISE.     CALLBACK NUMBER: [phone]

## 2022-05-31 NOTE — TELEPHONE ENCOUNTER
Left message for [facility] that we have not received the fax.    Requested a second attempt.

## 2022-06-13 DIAGNOSIS — E53.8 B12 DEFICIENCY: ICD-10-CM

## 2022-06-13 RX ORDER — CYANOCOBALAMIN 1000 UG/ML
INJECTION, SOLUTION INTRAMUSCULAR; SUBCUTANEOUS
Qty: 1 ML | Refills: 1 | OUTPATIENT
Start: 2022-06-13

## 2022-06-19 DIAGNOSIS — E83.51 HYPOCALCEMIA: ICD-10-CM

## 2022-06-20 DIAGNOSIS — E53.8 B12 DEFICIENCY: ICD-10-CM

## 2022-06-20 RX ORDER — NEEDLES, FILTER 19GX1 1/2"
NEEDLE, DISPOSABLE MISCELLANEOUS
Qty: 3 EACH | Refills: 0 | Status: SHIPPED | OUTPATIENT
Start: 2022-06-20 | End: 2022-11-21

## 2022-06-20 RX ORDER — IBUPROFEN 200 MG
CAPSULE ORAL
Qty: 60 TABLET | Refills: 1 | Status: SHIPPED | OUTPATIENT
Start: 2022-06-20 | End: 2022-10-17

## 2022-08-06 DIAGNOSIS — I10 ESSENTIAL HYPERTENSION: ICD-10-CM

## 2022-08-06 DIAGNOSIS — E53.8 B12 DEFICIENCY: ICD-10-CM

## 2022-08-08 RX ORDER — CHLORTHALIDONE 25 MG/1
TABLET ORAL
Qty: 45 TABLET | Refills: 5
Start: 2022-08-08 | End: 2022-08-31 | Stop reason: SDUPTHER

## 2022-08-08 RX ORDER — CYANOCOBALAMIN 1000 UG/ML
INJECTION, SOLUTION INTRAMUSCULAR; SUBCUTANEOUS
Qty: 1 ML | Refills: 1 | OUTPATIENT
Start: 2022-08-08

## 2022-08-11 DIAGNOSIS — K21.9 GASTROESOPHAGEAL REFLUX DISEASE, UNSPECIFIED WHETHER ESOPHAGITIS PRESENT: Primary | ICD-10-CM

## 2022-08-11 RX ORDER — SODIUM BICARBONATE 650 MG/1
650 TABLET ORAL 2 TIMES DAILY
Qty: 90 TABLET | Refills: 1 | Status: SHIPPED | OUTPATIENT
Start: 2022-08-11 | End: 2022-12-02 | Stop reason: SDUPTHER

## 2022-08-17 ENCOUNTER — OFFICE VISIT (OUTPATIENT)
Dept: INTERNAL MEDICINE | Facility: CLINIC | Age: 72
End: 2022-08-17

## 2022-08-17 VITALS
DIASTOLIC BLOOD PRESSURE: 84 MMHG | HEART RATE: 53 BPM | SYSTOLIC BLOOD PRESSURE: 142 MMHG | TEMPERATURE: 97.3 F | BODY MASS INDEX: 45.48 KG/M2 | WEIGHT: 283 LBS | HEIGHT: 66 IN | OXYGEN SATURATION: 97 %

## 2022-08-17 DIAGNOSIS — D49.89 NEOPLASM, NOSE: Primary | ICD-10-CM

## 2022-08-17 DIAGNOSIS — E79.0 HYPERURICEMIA: ICD-10-CM

## 2022-08-17 DIAGNOSIS — E78.2 MIXED HYPERLIPIDEMIA: Chronic | ICD-10-CM

## 2022-08-17 DIAGNOSIS — E53.8 B12 DEFICIENCY: ICD-10-CM

## 2022-08-17 DIAGNOSIS — M16.0 PRIMARY OSTEOARTHRITIS OF BOTH HIPS: ICD-10-CM

## 2022-08-17 DIAGNOSIS — G25.81 RESTLESS LEGS: ICD-10-CM

## 2022-08-17 DIAGNOSIS — I10 ESSENTIAL HYPERTENSION: Chronic | ICD-10-CM

## 2022-08-17 PROBLEM — M54.16 LUMBAR RADICULOPATHY: Status: ACTIVE | Noted: 2022-06-21

## 2022-08-17 PROCEDURE — 99214 OFFICE O/P EST MOD 30 MIN: CPT | Performed by: NURSE PRACTITIONER

## 2022-08-17 PROCEDURE — 96372 THER/PROPH/DIAG INJ SC/IM: CPT | Performed by: NURSE PRACTITIONER

## 2022-08-17 RX ORDER — ROPINIROLE 1 MG/1
1 TABLET, FILM COATED ORAL 4 TIMES DAILY
Qty: 360 TABLET | Refills: 1 | Status: SHIPPED | OUTPATIENT
Start: 2022-08-17 | End: 2023-02-08 | Stop reason: SDUPTHER

## 2022-08-17 RX ORDER — ALLOPURINOL 100 MG/1
100 TABLET ORAL DAILY
Qty: 90 TABLET | Refills: 1 | Status: SHIPPED | OUTPATIENT
Start: 2022-08-17 | End: 2022-11-02

## 2022-08-17 RX ORDER — CYANOCOBALAMIN 1000 UG/ML
1000 INJECTION, SOLUTION INTRAMUSCULAR; SUBCUTANEOUS
Status: SHIPPED | OUTPATIENT
Start: 2022-08-17

## 2022-08-17 RX ADMIN — CYANOCOBALAMIN 1000 MCG: 1000 INJECTION, SOLUTION INTRAMUSCULAR; SUBCUTANEOUS at 10:50

## 2022-08-21 PROBLEM — M16.0 PRIMARY OSTEOARTHRITIS OF BOTH HIPS: Chronic | Status: ACTIVE | Noted: 2022-08-21

## 2022-08-21 PROBLEM — M16.0 PRIMARY OSTEOARTHRITIS OF BOTH HIPS: Status: ACTIVE | Noted: 2022-08-21

## 2022-08-21 PROBLEM — D49.89: Status: ACTIVE | Noted: 2022-08-21

## 2022-08-21 NOTE — ASSESSMENT & PLAN NOTE
Pain is worse in right hip, has f/u appt with ortho. She has been advised to lose weight prior to moving forward with hip replacement.

## 2022-08-21 NOTE — ASSESSMENT & PLAN NOTE
She c/o bilateral hip replacement, R>L. She has had injections through ortho but continues to c/o pain, working on weight loss for hip replacement considerations.

## 2022-08-21 NOTE — PROGRESS NOTES
"Chief Complaint  Hypertension (Follow up), Restless Legs Syndrome, Hyperlipidemia, and Neoplasm on nose    Subjective        Brooklyn Johns presents to [facility] for f/u regarding HTN hyperlipidemia and restless legs. She also c/o a neoplasm on the nose.    She c/o a lesion on the right side of her nose which has been present for several weeks and has increased in size, denies pain with lesion.  She also c/o bilateral hip pain, more severe in the right hip. She is followed by ortho and has discussed hip replacement due to severity of OA and pain. However, she has been asked to lose weight in order to move with surgery, weight has remained stable.  She does c/o increased restlessness of bilateral legs at night.      Objective   Vital Signs:  /84 (BP Location: Left arm, Patient Position: Sitting, Cuff Size: Large Adult)   Pulse 53   Temp 97.3 °F (36.3 °C) (Temporal)   Ht 166.4 cm (65.5\")   Wt 128 kg (283 lb)   SpO2 97%   BMI 46.38 kg/m²   Estimated body mass index is 46.38 kg/m² as calculated from the following:    Height as of this encounter: 166.4 cm (65.5\").    Weight as of this encounter: 128 kg (283 lb).    Class 3 Severe Obesity (BMI >=40). Obesity-related health conditions include the following: hypertension, dyslipidemias and osteoarthritis. Obesity is unchanged. BMI is is above average; BMI management plan is completed. We discussed portion control and increasing exercise.      Physical Exam  Constitutional:       Appearance: She is well-developed. She is not ill-appearing.   HENT:      Head: Normocephalic.      Right Ear: Hearing, tympanic membrane and external ear normal.      Left Ear: Hearing, tympanic membrane and external ear normal.      Nose: Nose normal. No nasal deformity, mucosal edema or rhinorrhea.      Right Sinus: No maxillary sinus tenderness or frontal sinus tenderness.      Left Sinus: No maxillary sinus tenderness or frontal sinus " tenderness.      Mouth/Throat:      Dentition: Normal dentition.   Eyes:      General: Lids are normal.         Right eye: No discharge.         Left eye: No discharge.      Conjunctiva/sclera: Conjunctivae normal.      Right eye: No exudate.     Left eye: No exudate.  Neck:      Thyroid: No thyroid mass or thyromegaly.      Vascular: No carotid bruit.      Trachea: Trachea normal.   Cardiovascular:      Rate and Rhythm: Regular rhythm.      Pulses: Normal pulses.      Heart sounds: Normal heart sounds. No murmur heard.  Pulmonary:      Effort: No respiratory distress.      Breath sounds: Normal breath sounds. No decreased breath sounds, wheezing, rhonchi or rales.   Abdominal:      General: Bowel sounds are normal.      Palpations: Abdomen is soft.      Tenderness: There is no abdominal tenderness.   Musculoskeletal:      Cervical back: Normal range of motion. No edema.   Lymphadenopathy:      Head:      Right side of head: No submental, submandibular, tonsillar, preauricular, posterior auricular or occipital adenopathy.      Left side of head: No submental, submandibular, tonsillar, preauricular, posterior auricular or occipital adenopathy.   Skin:     General: Skin is warm and dry.      Nails: There is no clubbing.      Comments: Papule with telangectasia and inflammation on right lateral nose   Neurological:      Mental Status: She is alert.   Psychiatric:         Behavior: Behavior is cooperative.        Result Review :  The following data was reviewed by: ULICES Amezquita on 08/17/2022:  Common labs    Common Labsle 10/25/21 10/25/21 10/25/21 10/25/21    0000 0000 0000 0000   Glucose  92     BUN  26     Creatinine  2.43 (A)     eGFR Non  Am  20 (A)     eGFR African Am  23 (A)     Sodium  142     Potassium  5.9 (A)     Chloride  108 (A)     Calcium  6.8 (A)     Total Protein  6.2     Albumin  4.1     Total Bilirubin  0.5     Alkaline Phosphatase  181 (A)     AST (SGOT)  12     ALT (SGPT)  10      WBC 4.4      Hemoglobin 8.9 (A)      Hematocrit 27.9 (A)      Platelets 160      Total Cholesterol   140    Triglycerides   92    HDL Cholesterol   56    LDL Cholesterol    67    Hemoglobin A1C    5.7 (A)   (A) Abnormal value       Comments are available for some flowsheets but are not being displayed.           Data reviewed: Consultant notes ortho 5/26/22          Assessment and Plan   Diagnoses and all orders for this visit:    1. Neoplasm, nose (Primary)  Assessment & Plan:  I am concerned about a possible malignancy, will refer to derm for probable excision and biopsy.    Orders:  -     Ambulatory Referral to Dermatology    2. Primary osteoarthritis of both hips  Assessment & Plan:  Pain is worse in right hip, has f/u appt with ortho. She has been advised to lose weight prior to moving forward with hip replacement.      3. Restless legs  Assessment & Plan:  We discussed restlessness of bilateral legs which are worse at night, currently managed on Requip-discussed increasing dose for better control of symptoms.    Orders:  -     rOPINIRole (REQUIP) 1 MG tablet; Take 1 tablet by mouth 4 (Four) Times a Day.  Dispense: 360 tablet; Refill: 1    4. Hyperuricemia  Assessment & Plan:  She is currently managed on allopurinol without recent gout flares, recheck uric acid.    Orders:  -     allopurinol (ZYLOPRIM) 100 MG tablet; Take 1 tablet by mouth Daily.  Dispense: 90 tablet; Refill: 1  -     Uric Acid    5. Essential hypertension  Assessment & Plan:  BP is mildly elevate today, she will continue metoprolol and chlorthalidone along with home BP monitoring. Call the office if BP readings are consistently >140/90.    Orders:  -     CBC (No Diff)  -     Comprehensive Metabolic Panel  -     TSH    6. Mixed hyperlipidemia  Assessment & Plan:  She denies myalgias with simvastatin which she will continue along with a low-fat, low-cholesterol diet.      Orders:  -     Lipid Panel    7. B12 deficiency  Assessment & Plan:  She  is currently managed on Vitamin B12 injections monthly, administered today.    Orders:  -     cyanocobalamin injection 1,000 mcg         Follow Up   Return in about 6 months (around 2/17/2023).  Patient was given instructions and counseling regarding her condition or for health maintenance advice. Please see specific information pulled into the AVS if appropriate.     Answers for HPI/ROS submitted by the patient on 8/15/2022  Have you had these symptoms before?: No  How long have you been having these symptoms?: Greater than 2 weeks  Please list any medications you are currently taking for this condition.: None.  Please describe any probable cause for these symptoms. : Nerves of face  What is the primary reason for your visit?: Other    Conflicting answers have been found for some questions. Please document the patient's answers manually.

## 2022-08-21 NOTE — ASSESSMENT & PLAN NOTE
BP is mildly elevate today, she will continue metoprolol and chlorthalidone along with home BP monitoring. Call the office if BP readings are consistently >140/90.

## 2022-08-21 NOTE — ASSESSMENT & PLAN NOTE
We discussed restlessness of bilateral legs which are worse at night, currently managed on Requip-discussed increasing dose for better control of symptoms.

## 2022-08-23 ENCOUNTER — OFFICE VISIT (OUTPATIENT)
Dept: ORTHOPEDIC SURGERY | Facility: CLINIC | Age: 72
End: 2022-08-23

## 2022-08-23 VITALS — HEIGHT: 65 IN | WEIGHT: 287.4 LBS | TEMPERATURE: 98.3 F | BODY MASS INDEX: 47.88 KG/M2

## 2022-08-23 DIAGNOSIS — M16.11 PRIMARY OSTEOARTHRITIS OF RIGHT HIP: Primary | ICD-10-CM

## 2022-08-23 PROCEDURE — 99213 OFFICE O/P EST LOW 20 MIN: CPT | Performed by: ORTHOPAEDIC SURGERY

## 2022-08-23 NOTE — PROGRESS NOTES
"Patient: Brooklyn Johns  YOB: 1950 71 y.o. female  Medical Record Number: [id number]    Chief Complaints:   Chief Complaint   Patient presents with   • Right Hip - Follow-up       History of Present Illness:Brooklyn Johns is a 71 y.o. female who presents with complaints of severe right hip and groin pain which has continued to progress.  Last saw her we set a goal of 25 pounds weight loss.  Unfortunately she has gained 13 pounds since then.  Still having activity limiting pain which at times is severe.    Allergies: No Known Allergies    Medications:   Current Outpatient Medications   Medication Sig Dispense Refill   • allopurinol (ZYLOPRIM) 100 MG tablet Take 1 tablet by mouth Daily. 90 tablet 1   • B-D INTEGRA SYRINGE 25G X 5/8\" 3 ML misc USE ONE SYRINGE EVERY 30 DAYS AS DIRECTED 3 each 0   • calcitriol (ROCALTROL) 0.25 MCG capsule TAKE 1 CAPSULE MONDAY, WEDNESDAY, AND FRIDAY. SEE ADMIN INSTRUCTIONS 36 capsule 3   • calcium citrate (CALCITRATE) 950 (200 Ca) MG tablet TAKE ONE TABLET BY MOUTH TWICE A DAY 60 tablet 1   • chlorthalidone (HYGROTON) 25 MG tablet Every other day 45 tablet 5   • cimetidine (TAGAMET) 400 MG tablet TAKE 1 TABLET TWICE A  tablet 3   • cyanocobalamin 1000 MCG/ML injection INJECT ONE ML INTO THE APPROPRIATE MUSCLE AS DIRECTED EVERY 28 DAYS 1 mL 1   • escitalopram (LEXAPRO) 10 MG tablet TAKE 1 TABLET DAILY 90 tablet 3   • gabapentin (NEURONTIN) 400 MG capsule Take 400 mg by mouth 2 (Two) Times a Day.     • HYDROcodone-acetaminophen (NORCO) 7.5-325 MG per tablet Take 1 tablet by mouth 2 (Two) Times a Day As Needed for Moderate Pain . (Patient taking differently: Take 1 tablet by mouth 3 (Three) Times a Day.) 60 tablet 0   • metoprolol succinate XL (TOPROL-XL) 25 MG 24 hr tablet TAKE 1 TABLET DAILY 90 tablet 3   • Needle, Disp, (B-D DISP NEEDLE 30GX1\") 30G X 1\" misc BD Integra Syringe 3 mL 25 gauge x 5/8\"     • ramipril (ALTACE) 10 MG capsule TAKE 1 CAPSULE " "TWICE A  capsule 3   • rOPINIRole (REQUIP) 1 MG tablet Take 1 tablet by mouth 4 (Four) Times a Day. 360 tablet 1   • simvastatin (ZOCOR) 40 MG tablet TAKE ONE TABLET BY MOUTH DAILY 90 tablet 1   • sodium bicarbonate 650 MG tablet Take 1 tablet by mouth 2 (Two) Times a Day. 90 tablet 1   • Xarelto 15 MG tablet TAKE 1 TABLET DAILY 90 tablet 3     Current Facility-Administered Medications   Medication Dose Route Frequency Provider Last Rate Last Admin   • cyanocobalamin injection 1,000 mcg  1,000 mcg Intramuscular Q28 Days Radha House APRN   1,000 mcg at 08/17/22 1050         The following portions of the patient's history were reviewed and updated as appropriate: allergies, current medications, past family history, past medical history, past social history, past surgical history and problem list.    Review of Systems:   A 14 point review of systems was performed. All systems negative except pertinent positives/negative listed in HPI above    Physical Exam:   Vitals:    08/23/22 1449   Temp: 98.3 °F (36.8 °C)   Weight: 130 kg (287 lb 6.4 oz)   Height: 165.1 cm (65\")   PainSc: 10-Worst pain ever       General: A and O x 3, ASA, NAD    SCLERA:    Normal    DENTITION:   Normal  Hip:  right    LEG ALIGNMENT:     Neutral        LEG LENGTH DISCREPANCY   :    none    GAIT:     Antalgic    SKIN:     No abnormality    RANGE OF MOTION:     Limited by joint irritability    STRENGTH:     Limited by joint irratibility    DISTAL PULSES:    Paplable    DISTAL SENSATION :   Intact    LYMPHATICS:     No   lymphadenopathy    OTHER:          +   Stinchfeld test      -    log roll      -   Tenderness to palpation trochanteric bursa        Radiology:  Xrays 2views right hip (AP bilateral hips, and lateral of the hip) taken previously reviewed demonstrating advanced arthritis with medial joint space narrowing bone-on-bone articulation.    Assessment/Plan: Right hip advanced osteoarthritis still above the BMI threshold currently " at 47.8.  She needs to lose 30 pounds I discussed options as far as weight loss.  We had discussions of the surgical procedure risk and complications she states understanding.  She will return in 3 months for weight check.  Repeat x-rays at that time.      Hipolito Trujillo MD  8/23/2022

## 2022-08-31 DIAGNOSIS — I10 ESSENTIAL HYPERTENSION: ICD-10-CM

## 2022-08-31 RX ORDER — CHLORTHALIDONE 25 MG/1
TABLET ORAL
Qty: 45 TABLET | Refills: 5
Start: 2022-08-31 | End: 2022-09-07 | Stop reason: SDUPTHER

## 2022-09-02 LAB
ALBUMIN SERPL-MCNC: 4.6 G/DL (ref 3.5–5.2)
ALBUMIN/GLOB SERPL: 3.8 G/DL
ALP SERPL-CCNC: 153 U/L (ref 39–117)
ALT SERPL-CCNC: 9 U/L (ref 1–33)
AST SERPL-CCNC: 16 U/L (ref 1–32)
BILIRUB SERPL-MCNC: 0.5 MG/DL (ref 0–1.2)
BUN SERPL-MCNC: 17 MG/DL (ref 8–23)
BUN/CREAT SERPL: 8.9 (ref 7–25)
CALCIUM SERPL-MCNC: 7.7 MG/DL (ref 8.6–10.5)
CHLORIDE SERPL-SCNC: 111 MMOL/L (ref 98–107)
CHOLEST SERPL-MCNC: 114 MG/DL (ref 0–200)
CO2 SERPL-SCNC: 22 MMOL/L (ref 22–29)
CREAT SERPL-MCNC: 1.92 MG/DL (ref 0.57–1)
EGFRCR-CYS SERPLBLD CKD-EPI 2021: 27.6 ML/MIN/1.73
ERYTHROCYTE [DISTWIDTH] IN BLOOD BY AUTOMATED COUNT: 13.6 % (ref 12.3–15.4)
GLOBULIN SER CALC-MCNC: 1.2 GM/DL
GLUCOSE SERPL-MCNC: 87 MG/DL (ref 65–99)
HCT VFR BLD AUTO: 27 % (ref 34–46.6)
HDLC SERPL-MCNC: 60 MG/DL (ref 40–60)
HGB BLD-MCNC: 8.2 G/DL (ref 12–15.9)
LDLC SERPL CALC-MCNC: 39 MG/DL (ref 0–100)
MCH RBC QN AUTO: 28.9 PG (ref 26.6–33)
MCHC RBC AUTO-ENTMCNC: 30.4 G/DL (ref 31.5–35.7)
MCV RBC AUTO: 95.1 FL (ref 79–97)
PLATELET # BLD AUTO: 173 10*3/MM3 (ref 140–450)
POTASSIUM SERPL-SCNC: 5.4 MMOL/L (ref 3.5–5.2)
PROT SERPL-MCNC: 5.8 G/DL (ref 6–8.5)
RBC # BLD AUTO: 2.84 10*6/MM3 (ref 3.77–5.28)
SODIUM SERPL-SCNC: 146 MMOL/L (ref 136–145)
TRIGL SERPL-MCNC: 70 MG/DL (ref 0–150)
TSH SERPL DL<=0.005 MIU/L-ACNC: 2.6 UIU/ML (ref 0.27–4.2)
URATE SERPL-MCNC: 5.3 MG/DL (ref 2.4–5.7)
VLDLC SERPL CALC-MCNC: 15 MG/DL (ref 5–40)
WBC # BLD AUTO: 5.81 10*3/MM3 (ref 3.4–10.8)

## 2022-09-07 DIAGNOSIS — I10 ESSENTIAL HYPERTENSION: ICD-10-CM

## 2022-09-08 RX ORDER — CHLORTHALIDONE 25 MG/1
TABLET ORAL
Qty: 45 TABLET | Refills: 5 | Status: SHIPPED | OUTPATIENT
Start: 2022-09-08

## 2022-09-08 NOTE — TELEPHONE ENCOUNTER
Caller: [facility] - [facility], [place] - [address] [phone] [facility] [phone] FX    Relationship: Pharmacy    Best call back number: [phone]    What was the call regarding: CALLING TO CHECK ON THE STATUS OF THIS MEDICATION REQUEST.     Do you require a callback: YES

## 2022-09-09 ENCOUNTER — TELEMEDICINE (OUTPATIENT)
Dept: INTERNAL MEDICINE | Facility: CLINIC | Age: 72
End: 2022-09-09

## 2022-09-12 ENCOUNTER — TELEMEDICINE (OUTPATIENT)
Dept: INTERNAL MEDICINE | Facility: CLINIC | Age: 72
End: 2022-09-12

## 2022-09-12 DIAGNOSIS — E87.5 HYPERKALEMIA: Primary | ICD-10-CM

## 2022-09-12 DIAGNOSIS — N18.4 CKD (CHRONIC KIDNEY DISEASE) STAGE 4, GFR 15-29 ML/MIN: Chronic | ICD-10-CM

## 2022-09-12 DIAGNOSIS — E53.8 B12 DEFICIENCY: ICD-10-CM

## 2022-09-12 DIAGNOSIS — D64.9 ANEMIA, UNSPECIFIED TYPE: ICD-10-CM

## 2022-09-12 PROCEDURE — 99213 OFFICE O/P EST LOW 20 MIN: CPT | Performed by: NURSE PRACTITIONER

## 2022-09-12 RX ORDER — CYANOCOBALAMIN 1000 UG/ML
INJECTION, SOLUTION INTRAMUSCULAR; SUBCUTANEOUS
Qty: 1 ML | Refills: 1 | Status: SHIPPED | OUTPATIENT
Start: 2022-09-12 | End: 2022-11-10

## 2022-09-14 LAB
BASOPHILS # BLD AUTO: 0 X10E3/UL (ref 0–0.2)
BASOPHILS NFR BLD AUTO: 1 %
BUN SERPL-MCNC: 21 MG/DL (ref 8–27)
BUN/CREAT SERPL: 12 (ref 12–28)
CALCIUM SERPL-MCNC: 7.6 MG/DL (ref 8.7–10.3)
CHLORIDE SERPL-SCNC: 110 MMOL/L (ref 96–106)
CO2 SERPL-SCNC: 16 MMOL/L (ref 20–29)
CREAT SERPL-MCNC: 1.71 MG/DL (ref 0.57–1)
EGFRCR-CYS SERPLBLD CKD-EPI 2021: 32 ML/MIN/1.73
EOSINOPHIL # BLD AUTO: 0.1 X10E3/UL (ref 0–0.4)
EOSINOPHIL NFR BLD AUTO: 1 %
ERYTHROCYTE [DISTWIDTH] IN BLOOD BY AUTOMATED COUNT: 13.1 % (ref 11.7–15.4)
FERRITIN SERPL-MCNC: 24 NG/ML (ref 15–150)
FOLATE SERPL-MCNC: 14.5 NG/ML
GLUCOSE SERPL-MCNC: 164 MG/DL (ref 65–99)
HCT VFR BLD AUTO: 27.1 % (ref 34–46.6)
HGB BLD-MCNC: 8.4 G/DL (ref 11.1–15.9)
IMM GRANULOCYTES # BLD AUTO: 0 X10E3/UL (ref 0–0.1)
IMM GRANULOCYTES NFR BLD AUTO: 0 %
IRON SATN MFR SERPL: 14 % (ref 15–55)
IRON SERPL-MCNC: 55 UG/DL (ref 27–139)
LYMPHOCYTES # BLD AUTO: 0.9 X10E3/UL (ref 0.7–3.1)
LYMPHOCYTES NFR BLD AUTO: 16 %
MCH RBC QN AUTO: 28.8 PG (ref 26.6–33)
MCHC RBC AUTO-ENTMCNC: 31 G/DL (ref 31.5–35.7)
MCV RBC AUTO: 93 FL (ref 79–97)
MONOCYTES # BLD AUTO: 0.5 X10E3/UL (ref 0.1–0.9)
MONOCYTES NFR BLD AUTO: 9 %
NEUTROPHILS # BLD AUTO: 3.9 X10E3/UL (ref 1.4–7)
NEUTROPHILS NFR BLD AUTO: 73 %
PLATELET # BLD AUTO: 165 X10E3/UL (ref 150–450)
POTASSIUM SERPL-SCNC: 5 MMOL/L (ref 3.5–5.2)
RBC # BLD AUTO: 2.92 X10E6/UL (ref 3.77–5.28)
SODIUM SERPL-SCNC: 145 MMOL/L (ref 134–144)
TIBC SERPL-MCNC: 400 UG/DL (ref 250–450)
UIBC SERPL-MCNC: 345 UG/DL (ref 118–369)
VIT B12 SERPL-MCNC: 533 PG/ML (ref 232–1245)
WBC # BLD AUTO: 5.4 X10E3/UL (ref 3.4–10.8)

## 2022-09-18 PROBLEM — E87.5 HYPERKALEMIA: Status: ACTIVE | Noted: 2022-09-18

## 2022-09-18 PROBLEM — D64.9 ANEMIA: Status: ACTIVE | Noted: 2022-09-18

## 2022-09-18 NOTE — PROGRESS NOTES
"Chief Complaint  Anemia and Chronic Kidney Disease    Subjective        Brooklyn Johns presents to [facility] for f/u regarding CKD and anemia.    Location of patient: Home  Location of provider: [facility]  Interaction between provider and patient performed through video visit due to COVID-19 pandemic.    You have chosen to receive care through a telehealth visit.  Do you consent to use a video/audio connection for your medical care today? Yes    History of Present Illness  Her recent labs show worsening kidney function, previously managed by Dr. Pritchett but has not been seen >1 year. She has seen derm regarding facial lesion, scheduled for Mohs' surgery (pathology +basal cell).   Her recent labs also show a mild anemia which she states has been chronic but has worsened since previous labs. Denies abdominal pain and/or rectal bleeding.      Objective   Vital Signs:  There were no vitals taken for this visit.  Estimated body mass index is 47.83 kg/m² as calculated from the following:    Height as of 8/23/22: 165.1 cm (65\").    Weight as of 8/23/22: 130 kg (287 lb 6.4 oz).    Class 3 Severe Obesity (BMI >=40). Obesity-related health conditions include the following: hypertension, coronary heart disease and osteoarthritis. Obesity is unchanged. BMI is is above average; BMI management plan is completed. We discussed portion control and increasing exercise.      Physical Exam  Constitutional:       Appearance: She is well-developed.   HENT:      Head: Normocephalic and atraumatic.   Eyes:      General:         Right eye: No discharge.         Left eye: No discharge.      Conjunctiva/sclera: Conjunctivae normal.   Pulmonary:      Effort: Pulmonary effort is normal.   Neurological:      Mental Status: She is alert and oriented to person, place, and time.   Psychiatric:         Behavior: Behavior normal.         Thought Content: Thought content normal.         Judgment: Judgment normal. "        Result Review :  The following data was reviewed by: ULICES Amezquita on 09/12/2022:  Common labs    Common Labsle 10/25/21 10/25/21 10/25/21 10/25/21 9/2/22 9/2/22 9/2/22 9/2/22 9/13/22 9/13/22    0000 0000 0000 0000 0827 0827 0827 0827 1300 1300   Glucose  92    87    164 (A)   BUN  26    17    21   Creatinine  2.43 (A)    1.92 (A)    1.71 (A)   eGFR Non African Am  20 (A)           eGFR  Am  23 (A)           Sodium  142    146 (A)    145 (A)   Potassium  5.9 (A)    5.4 (A)    5.0   Chloride  108 (A)    111 (A)    110 (A)   Calcium  6.8 (A)    7.7 (A)    7.6 (A)   Total Protein  6.2    5.8 (A)       Albumin  4.1    4.60       Total Bilirubin  0.5    0.5       Alkaline Phosphatase  181 (A)    153 (A)       AST (SGOT)  12    16       ALT (SGPT)  10    9       WBC 4.4    5.81    5.4    Hemoglobin 8.9 (A)    8.2 (A)    8.4 (A)    Hematocrit 27.9 (A)    27.0 (A)    27.1 (A)    Platelets 160    173    165    Total Cholesterol   140    114      Triglycerides   92    70      HDL Cholesterol   56    60      LDL Cholesterol    67    39      Hemoglobin A1C    5.7 (A)         Uric Acid        5.3     (A) Abnormal value       Comments are available for some flowsheets but are not being displayed.                     Assessment and Plan   Diagnoses and all orders for this visit:    1. Hyperkalemia (Primary)  Assessment & Plan:  K+ 5.4 with recent labs, recheck and consider titrating off Ramipril if remains elevated.    Orders:  -     CBC & Differential    2. CKD (chronic kidney disease) stage 4, GFR 15-29 ml/min ([facility])  Assessment & Plan:  Previously followed by Dr. Pritchett but has been lost to follow-up, will refer back for further mgmt    Orders:  -     Ambulatory Referral to Nephrology    3. Anemia, unspecified type  Assessment & Plan:  Worsening anemia due to worsening kidney function? Will return for further workup as well as hemoccult.    Orders:  -     Ferritin  -     Iron Profile  -     Vitamin B12 &  Folate  -     Basic Metabolic Panel  -     Cancel: Occult Blood, Fecal By Immunoassay - Stool, Per Rectum    istory and medical judgement obtained from video conversation with patient. No hands-on physical examination was performed. Approximately 14 minutes spent in video conversation with patient and in chart review.       Follow Up   No follow-ups on file.  Patient was given instructions and counseling regarding her condition or for health maintenance advice. Please see specific information pulled into the AVS if appropriate.

## 2022-09-18 NOTE — ASSESSMENT & PLAN NOTE
Worsening anemia due to worsening kidney function? Will return for further workup as well as hemoccult.

## 2022-09-18 NOTE — ASSESSMENT & PLAN NOTE
Previously followed by Dr. Pritchett but has been lost to follow-up, will refer back for further mgmt

## 2022-09-19 DIAGNOSIS — I82.409 RECURRENT ACUTE DEEP VEIN THROMBOSIS (DVT) OF LOWER EXTREMITY, UNSPECIFIED LATERALITY: Primary | ICD-10-CM

## 2022-09-19 NOTE — PROGRESS NOTES
Discussed recent labs with patient-she is currently on Xarelto which we will change to Eliquis due to worsening kidney function.  She has been referred to nephrology to reestablish care as well.

## 2022-09-27 DIAGNOSIS — D64.9 ANEMIA, UNSPECIFIED TYPE: ICD-10-CM

## 2022-09-27 LAB — HEMOCCULT STL QL IA: NEGATIVE

## 2022-09-27 PROCEDURE — 82274 ASSAY TEST FOR BLOOD FECAL: CPT | Performed by: NURSE PRACTITIONER

## 2022-09-28 ENCOUNTER — TREATMENT (OUTPATIENT)
Dept: PHYSICAL THERAPY | Facility: CLINIC | Age: 72
End: 2022-09-28

## 2022-09-28 DIAGNOSIS — R26.81 UNSTEADY GAIT: ICD-10-CM

## 2022-09-28 DIAGNOSIS — M25.551 RIGHT HIP PAIN: Primary | ICD-10-CM

## 2022-09-28 PROCEDURE — 97162 PT EVAL MOD COMPLEX 30 MIN: CPT | Performed by: PHYSICAL THERAPIST

## 2022-09-28 PROCEDURE — 97110 THERAPEUTIC EXERCISES: CPT | Performed by: PHYSICAL THERAPIST

## 2022-09-28 NOTE — PROGRESS NOTES
Physical Therapy Initial Evaluation and Plan of Care      Patient: Brooklyn Johns   : 1950  Diagnosis/ICD-10 Code:  Right hip pain [M25.551]  Referring practitioner: Hipolito Trujillo MD  Date of Initial Visit: 2022  Today's Date: 2022  Patient seen for 1 sessions           Subjective Evaluation    History of Present Illness  Date of onset: 8/15/2022  Mechanism of injury: Pt reports decreased tolerance with prolonged ambulation greater than 3-5 mins.  Difficulty is noted with ascending /descending stairs.  Pain sit-stand transfer     (+) groin pain  End stage OA (R) hip    Decreased independence with dressing activities.  Lives alone.  (+)     Pt taking hydrocodone for pain; hx of acute LBP as well    Subjective comment: pt presents to PT reporting progressively acute (R) hip pain. pt is a candidate for (R) fabby per ortho in the next 3-4 months.Quality of life: good    Pain  Current pain ratin  At best pain ratin  At worst pain ratin  Quality: dull ache, tight, discomfort, sharp and throbbing  Aggravating factors: ambulation, squatting, stairs, standing, movement, lifting and repetitive movement  Progression: worsening    Patient Goals  Patient goals for therapy: increased strength, independence with ADLs/IADLs, return to sport/leisure activities, improved balance, decreased pain and increased motion             Objective          Observations     Additional Hip Observation Details  (+) antalgia with gait/ encouraged use of spc    Tenderness     Right Hip   Tenderness in the greater trochanter.     Lumbar Screen  Lumbar range of motion within normal limits.    Neurological Testing     Sensation     Hip   Left Hip   Intact: light touch    Right Hip   Intact: light touch    Passive Range of Motion     Right Hip   Flexion: 95 degrees   Abduction: 25 degrees     Strength/Myotome Testing     Left Hip   Planes of Motion   Flexion: 4-  Extension: 4-  Abduction: 3+    Isolated Muscles    Gluteus jodi: 4-  Gluteus minimus: 4-  TFL: 4-    Right Hip   Planes of Motion   Flexion: 4-  Extension: 4-  Abduction: 3+    Isolated Muscles   Gluteus maximums: 4-  Gluteus minimus: 3+  TFL: 4-    Additional Strength Details  Core trunk lumbar stabilization 4-/5; quad 4-/5    Tests     Right Hip   Positive FERN.      General Comments     Hip Comments   Pain with all transfers; poor tolerance with ascending stairs.        See Exercise, Manual, and Modality Logs for complete treatment.       Functional Outcome Score: 47/50 LE fx score        Assessment & Plan     Assessment  Impairments: abnormal gait, abnormal or restricted ROM, activity intolerance, impaired balance, impaired physical strength, pain with function and safety issue  Functional Limitations: carrying objects, lifting, walking, uncomfortable because of pain, sitting, standing and unable to perform repetitive tasks  Assessment details: Brooklyn Johns is a 71 y.o. year-old female referred to physical therapy for (R) hip / unsteady gait. Pt reports 6/ 10 (R) hip pain. She presents with a evolving clinical presentation.  She has comorbidities OA (B) hip and no personal factors that may affect her progress in the plan of care.  Pt demonstrates unsteady/ antalgic gait . PT encouraged use of spc;  Decreased (R) LE MMT 4-/5; severe ttp (R) gtb; (+) fabers test;  5 min walking tolerance. Pain with all transfers;  37/50 LE fx score;  Poor tolerate with ascending/ descending stairs vandana x 1 Signs and symptoms are consistent with physical therapy diagnosis of (R) hip pain/ unsteady gait. Pt is scheduled for KIM in dec 2022. Patient is appropriate for skilled physical therapy in order to reduce pain and increase ease with daily mobility.   Prognosis: good    Goals  Plan Goals: Short Term Goals for completion in 30 days:   -Patient will report a reduction in pain for 12-24 hours or greater following aquatic therapy session  -Patient will demonstrate good  core stabilization strength with advanced  aquatic exercises such as bicycle kicks and tuck ups to help improve postural stability  -Patient will report increased standing tolerance from 5 min to 15min or greater to allow patient to complete ADLs such as cooking without pain/discomfort  -pt to be independent with all transfers  LTGs for completion within 90 days:  -Patient will demonstrate sit to stand without use of hands in order to increase functional strength  -Patient will increase walking tolerance from 5 min to 20 min or greater to allow for patient to walk for leisure/exercise  -Patient will demonstrate independence with water walks and stretches to promote independent managment of condition  -Patient will improve 5xSTS from 23 seconds to 19</=seconds in order to decrease risk of falls and increase functional strength  -Patient will increase LE strength to 4/5 or greater to increase LE stability during gait  -pt to demonstrate correct gait sequence with use of appropriate AD.     Plan  Therapy options: will be seen for skilled therapy services  Planned therapy interventions: postural training, stretching, therapeutic activities, abdominal trunk stabilization, ADL retraining, neuromuscular re-education, functional ROM exercises, gait training, home exercise program, strengthening and transfer training  Other planned therapy interventions: Aquatic therapy/land based PT  Frequency: 2x week (36 visits)  Plan details: Aquatic therapy for core stabilization, LE strength/stability, gait training, balance, and posture        Timed:  Manual Therapy:       mins  87721;  Therapeutic Exercise:     20    mins  77631;     Neuromuscular Danya:        mins  60893;    Therapeutic Activity:          mins  17018;     Gait Training:           mins  32536;     Ultrasound:          mins  50649;    Iontophoresis         mins 07770      Untimed:  Electrical Stimulation:         mins  94628 ( );  Traction:       mins  89361;    Low Eval          Mins  50738  Mod Eval      20   Mins  26363  High Eval                            Mins  88067  Dry Needling  (1-2 muscles)                 mins 07874 (Self-pay)  Dry Needling (3-4 muscles)      mins 20561 (Self-pay)  Dry Needling Trial         mins DRYNDLTRIAL  (No Charge)      Timed Treatment:   20   mins   Total Treatment:     40   mins    PT SIGNATURE: NIK Norton License Number: [id number]    Electronically signed by Donis Lobato PT, 09/28/22, 7:23 AM EDT    DATE TREATMENT INITIATED: 9/28/2022    Initial Certification  Certification Period: 12/27/2022  I certify that the therapy services are furnished while this patient is under my care.  The services outlined above are required by this patient, and will be reviewed every 90 days.     PHYSICIAN: Hipolito Trujillo MD   NPI: [id number]                                         DATE:     Please sign and return via fax to [phone] Thank you, [facility].

## 2022-10-04 ENCOUNTER — TREATMENT (OUTPATIENT)
Dept: PHYSICAL THERAPY | Facility: CLINIC | Age: 72
End: 2022-10-04

## 2022-10-04 DIAGNOSIS — R26.81 UNSTEADY GAIT: ICD-10-CM

## 2022-10-04 DIAGNOSIS — M25.551 RIGHT HIP PAIN: Primary | ICD-10-CM

## 2022-10-04 PROCEDURE — 97113 AQUATIC THERAPY/EXERCISES: CPT | Performed by: PHYSICAL THERAPIST

## 2022-10-04 NOTE — PROGRESS NOTES
Physical Therapy Daily Treatment Note    Patient: Brooklyn Johns   : 1950  Diagnosis/ICD-10 Code:  Right hip pain [M25.551]  Referring practitioner: Hipolito Trujillo MD  Date of Initial Visit: Type: THERAPY  Noted: 2022  Today's Date: 10/4/2022  Patient seen for 2 sessions           Subjective   Patient reports she is unable to have a KIM due to her weight.    Objective     AQUATIC EXERCISES:  Water Walk : Forward/backward, side step; 2 laps each              Stretch 1: Hamstring hip sweeps with small noodle x15                  Stretch 2: Modified Piriformis stretch with small noodle under knee, 20 sec x 4                                                     Hip Abd/Add : 15x              Hip Ext: 15x   SLR: 15x  Hip Circles : 10/10              March in Place (Alternating): 30x  Walking Marches: 2 laps, UE on LN  Tandem Walk: 2 laps, UE on LN  Leg Press: LN, 20x B          Mini Squat: 15x                  Seated at Bench:                 Bicycle: 2 min                              Flutter/Scissor: 20/20    Clams: 20       Assessment/Plan  Initial aquatic therapy session completed today. Provided verbal cues and demonstration for all exercises performed. Focused on trying to maintain a level pelvis during water walks. Also tried to keep patient at about chest depth in water. Patient stated the stretches felt like a good hurt on her R LE. Will assess response to initial PT session and progress as needed.          Timed:  Aquatic Therapy    40     mins [id number];  Total Treatment   40    mins    Fara Madrigal, PT  Physical Therapist    [place] License: [id number]

## 2022-10-06 ENCOUNTER — TREATMENT (OUTPATIENT)
Dept: PHYSICAL THERAPY | Facility: CLINIC | Age: 72
End: 2022-10-06

## 2022-10-06 DIAGNOSIS — R26.81 UNSTEADY GAIT: Primary | ICD-10-CM

## 2022-10-06 DIAGNOSIS — M25.551 RIGHT HIP PAIN: ICD-10-CM

## 2022-10-06 PROCEDURE — 97113 AQUATIC THERAPY/EXERCISES: CPT | Performed by: PHYSICAL THERAPIST

## 2022-10-06 NOTE — PROGRESS NOTES
"Physical Therapy Daily Treatment Note    Patient: Brooklyn Johns   : 1950  Diagnosis/ICD-10 Code:  Unsteady gait [R26.81]  Referring practitioner: Hipolito Trujillo MD  Date of Initial Visit: Type: THERAPY  Noted: 2022  Today's Date: 10/6/2022  Patient seen for 3 sessions           Subjective   Patient reports she felt \"very very very sore\" after her initial PT session but had no true pain.     Objective     AQUATIC EXERCISES:  Water Walk : Forward/backward, side step; 2 laps each    *independent           Stretch 1: Hamstring hip sweeps with large noodle x15 *independent                 Stretch 2: Modified Piriformis stretch with large noodle under knee, 20 sec x 4                                                   Hip Abd/Add : 15x              Hip Ext: 15x   SLR: 15x  Hip Circles : 10/10              March in Place (Alternating): 30x  Walking Marches: 2 laps, UE on LN  Tandem Walk: 2 laps, UE on LN  Leg Press: LN, 20x B          Mini Squat: 15x                  Seated at Bench:                 Bicycle: 2 min                              Flutter/Scissor: 20/20    Clams: 20       Assessment/Plan  Patient was able to arrive early and complete some of her water walking and stretches independently. She tolerated progression to larger noodle for both stretches. Placed emphasis on small kicks with all hip exercises in order to limit muscle soreness and to promote consistent upright posture. Also worked on slowing down speed of walking marches to focus more on single leg balance.         Timed:  Aquatic Therapy    38     mins [id number];  Total Treatment   38    mins    Fara Madrigal PT  Physical Therapist    [place] License: [id number]  "

## 2022-10-13 ENCOUNTER — TREATMENT (OUTPATIENT)
Dept: PHYSICAL THERAPY | Facility: CLINIC | Age: 72
End: 2022-10-13

## 2022-10-13 DIAGNOSIS — R26.81 UNSTEADY GAIT: Primary | ICD-10-CM

## 2022-10-13 DIAGNOSIS — M25.551 RIGHT HIP PAIN: ICD-10-CM

## 2022-10-13 PROCEDURE — 97113 AQUATIC THERAPY/EXERCISES: CPT | Performed by: PHYSICAL THERAPIST

## 2022-10-13 NOTE — PROGRESS NOTES
Physical Therapy Daily Treatment Note    [facility]  [address] [place] [address]  [phone] (phone)  [phone] (fax)    Patient: Brooklyn Johns   : 1950  Diagnosis/ICD-10 Code:  Unsteady gait [R26.81]  Referring practitioner: Hipolito Trujillo MD  Date of Initial Visit: Type: THERAPY  Noted: 2022  Today's Date: 10/13/2022  Patient seen for 4 sessions             Subjective   Patient reports her hip isn't doing too well. She is dealing with a lot of pulling type pain/discomfort    Objective        AQUATIC EXERCISES:  Water Walk : Forward/backward, side step; 2 laps each    *independent           Stretch 1: Hamstring hip sweeps with large noodle x15 *independent                 Stretch 2: Modified Piriformis stretch with large noodle under knee, 20 sec x 4                                                   Hip Abd/Add : 20x              Hip Ext: 20x   SLR: 20x  Hip Circles : 10/10              March in Place (Alternating): 30x  Walking Marches: 2 laps  Tandem Walk: 2 laps, UE on LN  Leg Press: LN, 20x B          Mini Squat: 15x     Suspended with LN/Rail, bicycle kicks, 2 min               Seated at Bench:                                     Flutter/Scissor: 20/20    Clams: 20       Assessment/Plan  Sharp pain noted during R LE stretches. Patient presents with a slight forward trunk lean during her hip extension kicks therefore still working on postural awareness. Able to progress to 20 reps with all LE hip exercises. Also had her perform walking marches without noodle support but she is still too unsteady to walk without noodle during tandem walks.          Timed:  Aquatic Therapy    38     mins [id number];  Total Treatment   45    mins    Fara Madrigal PT  Physical Therapist    [place] License: [id number]

## 2022-10-16 DIAGNOSIS — E83.51 HYPOCALCEMIA: ICD-10-CM

## 2022-10-17 DIAGNOSIS — K21.9 GASTROESOPHAGEAL REFLUX DISEASE: ICD-10-CM

## 2022-10-17 RX ORDER — IBUPROFEN 200 MG
CAPSULE ORAL
Qty: 60 TABLET | Refills: 1 | Status: SHIPPED | OUTPATIENT
Start: 2022-10-17 | End: 2022-11-16 | Stop reason: SDUPTHER

## 2022-10-17 RX ORDER — CIMETIDINE 400 MG/1
TABLET, FILM COATED ORAL
Qty: 180 TABLET | Refills: 1 | Status: SHIPPED | OUTPATIENT
Start: 2022-10-17 | End: 2022-11-17 | Stop reason: RX

## 2022-10-18 ENCOUNTER — TREATMENT (OUTPATIENT)
Dept: PHYSICAL THERAPY | Facility: CLINIC | Age: 72
End: 2022-10-18

## 2022-10-18 DIAGNOSIS — M25.551 RIGHT HIP PAIN: ICD-10-CM

## 2022-10-18 DIAGNOSIS — R26.81 UNSTEADY GAIT: Primary | ICD-10-CM

## 2022-10-18 PROCEDURE — 97113 AQUATIC THERAPY/EXERCISES: CPT

## 2022-10-18 NOTE — PROGRESS NOTES
Physical Therapy Daily Treatment Note    Patient: Brooklyn Johns   : 1950  Diagnosis/ICD-10 Code:  Unsteady gait [R26.81]  Referring practitioner: Hipolito Trujillo MD  Date of Initial Visit: Type: THERAPY  Noted: 2022  Today's Date: 10/18/2022  Patient seen for 5 sessions             Subjective Pt states that her pain in R hip spiked to 10/10 earlier today and she had to take a pain pill in order to function.  She reports it is 4-5/10 now.  She states she has also been feeling a catching sensation in that hip when she bends over.  She states she started back to work on  and that may be why her pain is worse.  She states that sitting is the hardest part of her job because it causes increased pain.    Objective   Water Walk : Forward/backward, side step; 2 laps each    *independent           Stretch 1: Hamstring hip sweeps with large noodle x15 *independent            Stretch 2: Modified Piriformis stretch with large noodle under knee, 20 sec x 4      Abdominals: with SN x 10                                               Hip Abd/Add : 20x              Hip Ext: 20x   SLR: 20x  Hip Circles : 10/10              March in Place (Alternating): 30x  Walking Marches: 2 laps independently  Tandem Walk: 2 laps, UE on LN-independent  Leg Press: LN, 20x B          Mini Squat: 15x     Suspended with LN/Rail, bicycle kicks, 2 min               Seated at Bench:                                     Flutter/Scissor: 20/20    Clams: 20         Assessment/Plan  Piriformis stretch and hip abduction modified to decrease ROM due to pain.  Abdominal exercises added in order to strengthen core.  Leg press caused pain on R and pt instructed to decrease hip flexion which decreased pain.  Plan to continue to strengthen B LE as well as core to decrease pain and improve balance.         Timed:  Aquatic Therapy    29     mins [id number];    Irma Escobar, PT  Physical Therapist    [place] License: [id number]

## 2022-10-25 ENCOUNTER — TREATMENT (OUTPATIENT)
Dept: PHYSICAL THERAPY | Facility: CLINIC | Age: 72
End: 2022-10-25

## 2022-10-25 DIAGNOSIS — M25.551 RIGHT HIP PAIN: ICD-10-CM

## 2022-10-25 DIAGNOSIS — R26.81 UNSTEADY GAIT: Primary | ICD-10-CM

## 2022-10-25 PROCEDURE — 97113 AQUATIC THERAPY/EXERCISES: CPT | Performed by: PHYSICAL THERAPIST

## 2022-10-25 NOTE — PROGRESS NOTES
Physical Therapy Daily Treatment Note    [facility]  [address] [place] [address]  [phone] (phone)  [phone] (fax)    Patient: Brooklyn Johns   : 1950  Diagnosis/ICD-10 Code:  Unsteady gait [R26.81]  Referring practitioner: Hipolito Trujillo MD  Date of Initial Visit: Type: THERAPY  Noted: 2022  Today's Date: 10/25/2022  Patient seen for 6 sessions             Subjective   Patient reports her R hip felt like a 25/10 pain this morning. She attributes it to some housecleaning she did.    Objective     AQUATIC EXERCISES:  Water Walk : Forward/backward, side step; 2 laps each            \  Stretch 1: Hamstring hip sweeps with large noodle x15               Stretch 2: Modified Piriformis stretch with large noodle under knee, 20 sec x 4                                                   Hip Abd/Add : 20x              Hip Ext: 20x   SLR: 20x  Hip Circles : 10/10              March in Place (Alternating): 30x  Walking Marches: 2 laps  Tandem Walk: 2 laps, UE on LN  Leg Press: LN, 20x B          Mini Squat: 15x     Suspended with LN/Rail, bicycle kicks, 2 min               Seated at Bench:                                     Flutter/Scissor: 20/20    Clams: 20       Assessment/Plan  Priya reports some relief of her R hip pain from being in the water. Discussed possibility of 3 month membership at pool to allow for continuation of aquatic routine. She seemed a little more unsteady with march walks and had some increased R hip pain after left press exercise on the R. Recommended she try to ease up ROM with some exercise and defer any that exacerbate her symptoms.          Timed:  Aquatic Therapy    38     mins [id number];  Total Treatment   40    mins    Fara Madrigal PT  Physical Therapist    [place] License: [id number]

## 2022-10-27 ENCOUNTER — TELEPHONE (OUTPATIENT)
Dept: PHYSICAL THERAPY | Facility: CLINIC | Age: 72
End: 2022-10-27

## 2022-11-01 ENCOUNTER — TREATMENT (OUTPATIENT)
Dept: PHYSICAL THERAPY | Facility: CLINIC | Age: 72
End: 2022-11-01

## 2022-11-01 DIAGNOSIS — M25.551 RIGHT HIP PAIN: ICD-10-CM

## 2022-11-01 DIAGNOSIS — R26.81 UNSTEADY GAIT: Primary | ICD-10-CM

## 2022-11-01 DIAGNOSIS — E79.0 HYPERURICEMIA: ICD-10-CM

## 2022-11-01 PROCEDURE — 97113 AQUATIC THERAPY/EXERCISES: CPT | Performed by: PHYSICAL THERAPIST

## 2022-11-01 NOTE — PROGRESS NOTES
"Physical Therapy 30-Day / 10-Visit Progress Note       [facility]  [address] [place] [address]  [phone] (phone)  [phone] (fax)    Patient: Brooklyn Johns   : 1950  Diagnosis/ICD-10 Code:  Unsteady gait [R26.81]  Referring practitioner: Hipolito Trujillo MD  Date of Initial Visit: Type: THERAPY  Noted: 2022  Today's Date: 2022  Patient seen for 7 sessions      Subjective:     Clinical Progress: improved  Home Program Compliance: Yes      Subjective   Patient reports she is feeling pretty good today but one of the stretches she did seemed to flare up her hip a little.    Objective     Active Range of Motion      Right Hip   Flexion: 85 degrees *in standing  Abduction: 30 degrees *in standing     Strength/Myotome Testing      Left Hip   Planes of Motion   Flexion: 4  Extension: 4  *testing in standing poolside  Abduction: 4  *testing in standing poolside     Right Hip   Planes of Motion   Flexion: 4-  Extension: 4-  *testing in standing poolside  Abduction: 3+ *testing in standing poolside        General Comments   Pain with Stairs, Able to perform transfers without UE support        Functional Outcome Score: LEFS=40/80    AQUATIC EXERCISES:  Water Walk : Forward/backward, side step; 2 laps each            \  Stretch 1: Hamstring hip sweeps with large noodle x15               Stretch 2: Modified Piriformis stretch with large noodle under knee, 20 sec x 4                                                   Hip Abd/Add : 20x              Hip Ext: 20x   SLR: 20x  Hip Circles : 10/10              March in Place (Alternating): 30x  Walking Marches: 2 laps  Tandem Walk: 2 laps, UE on LN  Leg Press: LN, 20x B     Step Ups 8\" 20x B       Mini Squat: 15x     Suspended with LN/Rail, bicycle kicks, 2 min               Seated at Bench:                                     Flutter/Scissor:     Clams: 20          Assessment/Plan  Brooklyn Johns " has been seen for 7 physical therapy sessions for (R) hip / unsteady gait. Pt reports 6/ 10 (R) hip pain. She presents with a evolving clinical presentation.  She has comorbidities OA (B) hip and no personal factors that may affect her progress in the plan of care.  Treatment has included therapeutic exercise and aquatic therapy. R hip is still significantly weaker compared to L and there are no significant changes in R hip AROM. She has made good progress towards her goals and reports improved standing/walking tolerance as well as reduced difficulty with transfers.  She will benefit from continued skilled physical therapy to address remaining impairments and functional limitations.      Goals  Plan Goals: Short Term Goals for completion in 30 days:   -Patient will report a reduction in pain for 12-24 hours or greater following aquatic therapy session (MET)  -Patient will demonstrate good core stabilization strength with advanced  aquatic exercises such as bicycle kicks and tuck ups to help improve postural stability (PARTIALLY MET, performing suspended bicycle kicks)  -Patient will report increased standing tolerance from 5 min to 15 min or greater to allow patient to complete ADLs such as cooking without pain/discomfort (MET; 30-40 min)  -pt to be independent with all transfers (MET)    LTGs for completion within 90 days:  -Patient will demonstrate sit to stand without use of hands in order to increase functional strength (MET)  -Patient will increase walking tolerance from 5 min to 20 min or greater to allow for patient to walk for leisure/exercise (MET)  -Patient will demonstrate independence with water walks and stretches to promote independent managment of condition (MET)  -Patient will improve 5xSTS from 23 seconds to 19</=seconds in order to decrease risk of falls and increase functional strength (ONGOING, NT today)  -Patient will increase LE strength to 4/5 or greater to increase LE stability during gait  (ONGOING, R LE still weaker)  -pt to demonstrate correct gait sequence with use of appropriate AD. (MET, uses cane)    Recommendations: Continue as planned  Timeframe: 1 month; 2x/week  Prognosis to achieve goals: good    PT Signature: NIK Leyva License: [id number]      Based upon review of the patient's progress and continued therapy plan, it is my medical opinion that Brookyln Johns should continue physical therapy treatment at [facility]  [address] [place] [address] [address] [place] [address] [phone].    Signature: __________________________________  Hipolito Trujillo MD  NPI: [id number]                                          Timed:  Aquatic Therapy    25     mins 45639;  Total Treatment   40     mins

## 2022-11-02 RX ORDER — SIMVASTATIN 40 MG
TABLET ORAL
Qty: 90 TABLET | Refills: 1 | Status: SHIPPED | OUTPATIENT
Start: 2022-11-02

## 2022-11-02 RX ORDER — ALLOPURINOL 100 MG/1
TABLET ORAL
Qty: 90 TABLET | Refills: 1 | Status: SHIPPED | OUTPATIENT
Start: 2022-11-02 | End: 2023-01-23

## 2022-11-03 ENCOUNTER — TREATMENT (OUTPATIENT)
Dept: PHYSICAL THERAPY | Facility: CLINIC | Age: 72
End: 2022-11-03

## 2022-11-03 DIAGNOSIS — R26.81 UNSTEADY GAIT: Primary | ICD-10-CM

## 2022-11-03 DIAGNOSIS — M25.551 RIGHT HIP PAIN: ICD-10-CM

## 2022-11-03 PROCEDURE — 97113 AQUATIC THERAPY/EXERCISES: CPT | Performed by: PHYSICAL THERAPIST

## 2022-11-03 NOTE — PROGRESS NOTES
"Physical Therapy Daily Treatment Note    [facility]  [address] [place] [address]  [phone] (phone)  [phone] (fax)    Patient: Brooklyn Johns   : 1950  Diagnosis/ICD-10 Code:  Unsteady gait [R26.81]  Referring practitioner: Hipolito Trujillo MD  Date of Initial Visit: Type: THERAPY  Noted: 2022  Today's Date: 11/3/2022  Patient seen for 8 sessions             Subjective   Priya reports she woke up tired but pain was okay, -5/10    Objective     AQUATIC EXERCISES:  Water Walk : Forward/backward, side step; 2 laps each            \  Stretch 1: Hamstring hip sweeps with large noodle x15               Stretch 2: Modified Piriformis stretch with large noodle under knee, 20 sec x 4                                                   Hip Abd/Add : 20x              Hip Ext: 20x   SLR: 20x  Hip Circles : 10/10              March in Place (Alternating): 30x  Walking Marches: 2 laps  Tandem Walk: 2 laps, UE on LN  Leg Press: LN, 20x B     Step Ups 8\" 20x B       Mini Squat: 15x     Suspended with LN/Rail, bicycle kicks, 2 min               Seated at Bench:                                     Flutter/Scissor: 20/20    Clams: 20       Assessment/Plan  Priya was able to come early to therapy and warm up/stretch independently. Reviewed stretches with her to ensure proper line of pull and optimal noodle positioning for overpressure. Still some instability with tandem walks but march walks appear more steady. Working on progression towards self management of condition         Timed:  Aquatic Therapy    25     mins 04419;  Total Treatment   45    mins    Fara Madrigal PT  Physical Therapist    [place] License: [id number]  "

## 2022-11-07 ENCOUNTER — TELEPHONE (OUTPATIENT)
Dept: INTERNAL MEDICINE | Facility: CLINIC | Age: 72
End: 2022-11-07

## 2022-11-09 ENCOUNTER — OFFICE VISIT (OUTPATIENT)
Dept: INTERNAL MEDICINE | Facility: CLINIC | Age: 72
End: 2022-11-09

## 2022-11-09 VITALS
WEIGHT: 282.8 LBS | SYSTOLIC BLOOD PRESSURE: 148 MMHG | OXYGEN SATURATION: 97 % | DIASTOLIC BLOOD PRESSURE: 78 MMHG | TEMPERATURE: 96.9 F | BODY MASS INDEX: 47.12 KG/M2 | HEART RATE: 75 BPM | HEIGHT: 65 IN

## 2022-11-09 DIAGNOSIS — D64.9 ANEMIA, UNSPECIFIED TYPE: Primary | ICD-10-CM

## 2022-11-09 DIAGNOSIS — R73.09 ELEVATED GLUCOSE: ICD-10-CM

## 2022-11-09 DIAGNOSIS — M25.551 BILATERAL HIP PAIN: Chronic | ICD-10-CM

## 2022-11-09 DIAGNOSIS — M25.552 BILATERAL HIP PAIN: Chronic | ICD-10-CM

## 2022-11-09 DIAGNOSIS — E83.51 HYPOCALCEMIA: Chronic | ICD-10-CM

## 2022-11-09 DIAGNOSIS — S80.12XA LEG HEMATOMA, LEFT, INITIAL ENCOUNTER: ICD-10-CM

## 2022-11-09 DIAGNOSIS — E66.01 MORBIDLY OBESE: Chronic | ICD-10-CM

## 2022-11-09 PROCEDURE — 99214 OFFICE O/P EST MOD 30 MIN: CPT | Performed by: NURSE PRACTITIONER

## 2022-11-09 NOTE — PROGRESS NOTES
"Chief Complaint  Leg Pain (L leg pain )  Subjective        Brooklyn Johns presents to [facility]  Lower Extremity Issue        The patient is a 71-year-old female who presents to the office today due to left leg and hip pain.     Left leg hematoma.    The patient reports that her supplemental insurance has a \"landmark\" who comes to her home to see her. She reports that she was there Monday 11/07/2022 and saw the bruise on her leg. The patient states that the provider asked if she had ever had deep vein thrombosis, she confirmed that she had 2. She denies pain in leg, currently maanged on Eliquis daily.    Right hip pain    The patient reports that she has been going to physical therapy and her range of motion has improved significantly. She is followed by Basil Garcia NP and Dr. Hipolito Trujillo. She has a follow up scheduled for 11/29/2022. She reports that she is taking hydrocodone 7.5 mg 3 time a day through pain mgmt. She states that she takes Tylenol occasionally. The patient reports that physical therapy would like her to use her cane when walking.      Obesity   She states that she has been working on losing weight. She reports that she is more active now, with going to physical therapy. The patient states that she lost 10 pounds and gained 5 pounds back shortly after. She states that she thinks she is retaining fluid, however; not as much as she used to. The patient reports that she does get short of breath when she is walking for a length of time.      Anemia   She reports that she has had anemia since she had a gastric bypass.        Objective   Vital Signs:  /78 (BP Location: Left arm, Patient Position: Sitting, Cuff Size: Large Adult)   Pulse 75   Temp 96.9 °F (36.1 °C) (Temporal)   Ht 165.1 cm (65\")   Wt 128 kg (282 lb 12.8 oz)   SpO2 97%   BMI 47.06 kg/m²   Estimated body mass index is 47.06 kg/m² as calculated from the following:    Height as of this " "encounter: 165.1 cm (65\").    Weight as of this encounter: 128 kg (282 lb 12.8 oz).          Physical Exam  Vitals reviewed.   Constitutional:       Appearance: She is well-developed.   HENT:      Head: Normocephalic and atraumatic.      Right Ear: Tympanic membrane and external ear normal.      Left Ear: Tympanic membrane and external ear normal.      Nose: Nose normal.   Eyes:      Conjunctiva/sclera: Conjunctivae normal.      Pupils: Pupils are equal, round, and reactive to light.   Neck:      Thyroid: No thyromegaly.      Vascular: No JVD.   Cardiovascular:      Rate and Rhythm: Normal rate and regular rhythm.      Heart sounds: Normal heart sounds.   Pulmonary:      Effort: Pulmonary effort is normal.      Breath sounds: Normal breath sounds.   Abdominal:      General: Bowel sounds are normal.      Palpations: Abdomen is soft.   Musculoskeletal:         General: Normal range of motion.      Cervical back: Normal range of motion and neck supple.      Comments: There is hematoma in left lower lateral leg without open wounds or edema, no erythema   Lymphadenopathy:      Cervical: No cervical adenopathy.   Skin:     General: Skin is warm and dry.      Findings: No rash.   Neurological:      Mental Status: She is alert and oriented to person, place, and time.      Cranial Nerves: No cranial nerve deficit.      Coordination: Coordination normal.   Psychiatric:         Behavior: Behavior normal.         Thought Content: Thought content normal.         Judgment: Judgment normal.        Result Review :                  Assessment and Plan   Diagnoses and all orders for this visit:    1. Anemia, unspecified type (Primary)  Comments:      Assessment & Plan:  Iron and B12 levels are normal, anemia due to CKD? Check labs to further evaluate.    Orders:  -     VERNA, PE & Free LT Chains, Ser  -     CBC & Differential    2. Hypocalcemia  Comments:       Assessment & Plan:  She will continue taking Calcium citrate twice a day " and calcitriol every other day, recheck level.    Orders:  -     Basic Metabolic Panel    3. Leg hematoma, left, initial encounter  Assessment & Plan:  No evidence of DVT, currently managed on Eliquis. She will monitor for worsening sx including edema and/or erythema. Call the office for further evaluation with worsening sx.      4. Elevated glucose  Assessment & Plan:  She has lost weight which will help with glucose control, recheck A1c.    Orders:  -     Hemoglobin A1c    5. Bilateral hip pain  Assessment & Plan:  Sx improving with PT, continue to monitor.      6. Morbidly obese (HCC)  Comments:      Assessment & Plan:  Patient's (Body mass index is 47.06 kg/m².) indicates that they are morbidly obese (BMI > 40 or > 35 with obesity - related health condition) with health conditions that include hypertension and osteoarthritis . Weight is improving with lifestyle modifications. BMI is is above average; BMI management plan is completed. We discussed portion control and increasing exercise.       Other orders  -     Hemoglobin A1c         Follow Up   Return if symptoms worsen or fail to improve, for Next scheduled follow up.  Patient was given instructions and counseling regarding her condition or for health maintenance advice. Please see specific information pulled into the AVS if appropriate.     Transcribed from ambient dictation for [employer] by Leslie Abraham.  11/09/22   18:09 EST    Patient or patient representative verbalized consent to the visit recording.    Answers for HPI/ROS submitted by the patient on 11/8/2022  What is the primary reason for your visit?: Lower Extremity Injury  Incident occurred: 2 days ago  Incident location: other  Injury mechanism: other  Pain location: left leg  Pain - numeric: 0/10  tingling: No  inability to bear weight: No  loss of motion: No  loss of sensation: No  muscle weakness: No  Foreign body present: no foreign bodies

## 2022-11-10 ENCOUNTER — TELEPHONE (OUTPATIENT)
Dept: PHYSICAL THERAPY | Facility: OTHER | Age: 72
End: 2022-11-10

## 2022-11-10 DIAGNOSIS — E53.8 B12 DEFICIENCY: ICD-10-CM

## 2022-11-10 LAB
ALBUMIN SERPL ELPH-MCNC: 3.9 G/DL (ref 2.9–4.4)
ALBUMIN/GLOB SERPL: 1.6 {RATIO} (ref 0.7–1.7)
ALPHA1 GLOB SERPL ELPH-MCNC: 0.3 G/DL (ref 0–0.4)
ALPHA2 GLOB SERPL ELPH-MCNC: 0.7 G/DL (ref 0.4–1)
B-GLOBULIN SERPL ELPH-MCNC: 0.9 G/DL (ref 0.7–1.3)
BASOPHILS # BLD AUTO: 0.03 10*3/MM3 (ref 0–0.2)
BASOPHILS NFR BLD AUTO: 0.6 % (ref 0–1.5)
BUN SERPL-MCNC: 28 MG/DL (ref 8–23)
BUN/CREAT SERPL: 13.7 (ref 7–25)
CALCIUM SERPL-MCNC: 9 MG/DL (ref 8.6–10.5)
CHLORIDE SERPL-SCNC: 106 MMOL/L (ref 98–107)
CO2 SERPL-SCNC: 28.6 MMOL/L (ref 22–29)
CREAT SERPL-MCNC: 2.04 MG/DL (ref 0.57–1)
EGFRCR SERPLBLD CKD-EPI 2021: 25.7 ML/MIN/1.73
EOSINOPHIL # BLD AUTO: 0.14 10*3/MM3 (ref 0–0.4)
EOSINOPHIL NFR BLD AUTO: 2.8 % (ref 0.3–6.2)
ERYTHROCYTE [DISTWIDTH] IN BLOOD BY AUTOMATED COUNT: 13.6 % (ref 12.3–15.4)
GAMMA GLOB SERPL ELPH-MCNC: 0.6 G/DL (ref 0.4–1.8)
GLOBULIN SER-MCNC: 2.6 G/DL (ref 2.2–3.9)
GLUCOSE SERPL-MCNC: 116 MG/DL (ref 65–99)
HBA1C MFR BLD: 5.9 % (ref 4.8–5.6)
HCT VFR BLD AUTO: 27.9 % (ref 34–46.6)
HGB BLD-MCNC: 8.9 G/DL (ref 12–15.9)
IGA SERPL-MCNC: 48 MG/DL (ref 64–422)
IGG SERPL-MCNC: 667 MG/DL (ref 586–1602)
IGM SERPL-MCNC: 89 MG/DL (ref 26–217)
IMM GRANULOCYTES # BLD AUTO: 0.01 10*3/MM3 (ref 0–0.05)
IMM GRANULOCYTES NFR BLD AUTO: 0.2 % (ref 0–0.5)
INTERPRETATION SERPL IEP-IMP: ABNORMAL
KAPPA LC FREE SER-MCNC: 35.5 MG/L (ref 3.3–19.4)
KAPPA LC FREE/LAMBDA FREE SER: 1.88 {RATIO} (ref 0.26–1.65)
LABORATORY COMMENT REPORT: ABNORMAL
LAMBDA LC FREE SERPL-MCNC: 18.9 MG/L (ref 5.7–26.3)
LYMPHOCYTES # BLD AUTO: 1.41 10*3/MM3 (ref 0.7–3.1)
LYMPHOCYTES NFR BLD AUTO: 28 % (ref 19.6–45.3)
M PROTEIN SERPL ELPH-MCNC: ABNORMAL G/DL
MCH RBC QN AUTO: 28.4 PG (ref 26.6–33)
MCHC RBC AUTO-ENTMCNC: 31.9 G/DL (ref 31.5–35.7)
MCV RBC AUTO: 89.1 FL (ref 79–97)
MONOCYTES # BLD AUTO: 0.42 10*3/MM3 (ref 0.1–0.9)
MONOCYTES NFR BLD AUTO: 8.3 % (ref 5–12)
NEUTROPHILS # BLD AUTO: 3.03 10*3/MM3 (ref 1.7–7)
NEUTROPHILS NFR BLD AUTO: 60.1 % (ref 42.7–76)
NRBC BLD AUTO-RTO: 0 /100 WBC (ref 0–0.2)
PLATELET # BLD AUTO: 173 10*3/MM3 (ref 140–450)
POTASSIUM SERPL-SCNC: 4.8 MMOL/L (ref 3.5–5.2)
PROT SERPL-MCNC: 6.5 G/DL (ref 6–8.5)
RBC # BLD AUTO: 3.13 10*6/MM3 (ref 3.77–5.28)
SODIUM SERPL-SCNC: 145 MMOL/L (ref 136–145)
WBC # BLD AUTO: 5.04 10*3/MM3 (ref 3.4–10.8)

## 2022-11-10 RX ORDER — CYANOCOBALAMIN 1000 UG/ML
INJECTION, SOLUTION INTRAMUSCULAR; SUBCUTANEOUS
Qty: 1 ML | Refills: 1 | Status: SHIPPED | OUTPATIENT
Start: 2022-11-10 | End: 2023-02-11

## 2022-11-13 PROBLEM — R73.09 ELEVATED GLUCOSE: Status: ACTIVE | Noted: 2022-11-13

## 2022-11-13 PROBLEM — S80.12XA LEG HEMATOMA, LEFT, INITIAL ENCOUNTER: Status: ACTIVE | Noted: 2022-11-13

## 2022-11-13 PROBLEM — D49.89: Status: RESOLVED | Noted: 2022-08-21 | Resolved: 2022-11-13

## 2022-11-13 PROBLEM — D64.9 ANEMIA: Chronic | Status: ACTIVE | Noted: 2022-09-18

## 2022-11-14 DIAGNOSIS — N18.4 CKD (CHRONIC KIDNEY DISEASE) STAGE 4, GFR 15-29 ML/MIN: Primary | ICD-10-CM

## 2022-11-14 NOTE — ASSESSMENT & PLAN NOTE
She will continue taking Calcium citrate twice a day and calcitriol every other day, recheck level.

## 2022-11-14 NOTE — ASSESSMENT & PLAN NOTE
Patient's (Body mass index is 47.06 kg/m².) indicates that they are morbidly obese (BMI > 40 or > 35 with obesity - related health condition) with health conditions that include hypertension and osteoarthritis . Weight is improving with lifestyle modifications. BMI is is above average; BMI management plan is completed. We discussed portion control and increasing exercise.

## 2022-11-14 NOTE — ASSESSMENT & PLAN NOTE
No evidence of DVT, currently managed on Eliquis. She will monitor for worsening sx including edema and/or erythema. Call the office for further evaluation with worsening sx.

## 2022-11-15 ENCOUNTER — TREATMENT (OUTPATIENT)
Dept: PHYSICAL THERAPY | Facility: CLINIC | Age: 72
End: 2022-11-15

## 2022-11-15 DIAGNOSIS — M25.551 RIGHT HIP PAIN: ICD-10-CM

## 2022-11-15 DIAGNOSIS — R26.81 UNSTEADY GAIT: Primary | ICD-10-CM

## 2022-11-15 PROCEDURE — 97113 AQUATIC THERAPY/EXERCISES: CPT | Performed by: PHYSICAL THERAPIST

## 2022-11-15 NOTE — PROGRESS NOTES
"Physical Therapy Daily Treatment Note    [facility]  [address] [place] [address]  [phone] (phone)  [phone] (fax)    Patient: Brooklyn Johns   : 1950  Diagnosis/ICD-10 Code:  Unsteady gait [R26.81]  Referring practitioner: Hipolito Trujillo MD  Date of Initial Visit: Type: THERAPY  Noted: 2022  Today's Date: 11/15/2022  Patient seen for 9 sessions             Subjective   Priya reports her hip feels terrible today. Being in the water seems to help the pain    Objective     AQUATIC EXERCISES:  Water Walk : Forward/backward, side step; 2 laps each              Stretch 1: Hamstring hip sweeps with large noodle x15               Stretch 2: Modified Piriformis stretch with large noodle under knee, 20 sec x 4                                                   Hip Abd/Add : 20x              Hip Ext: 20x   SLR: 20x  Hip Circles : 10/10              March in Place (Alternating): 30x  Walking Marches: 2 laps  Tandem Walk: 2 laps, UE on LN  Leg Press: LN, 20x B     Step Ups 8\" 20x B       Mini Squat: 15x     Suspended with LN/Rail, bicycle kicks, 2 min               Seated at Bench:                                     Flutter/Scissor: 20/20    Clams: 20     Sit to stand x 10    Assessment/Plan  Patient is nearing discharge and self management of condition. Still working on increasing postural awareness with standing exercises and ensuring proper angle for each kick. Encouraged continuation of aquatic exercise routine however Priya doesn't currently have pool access.          Timed:  Aquatic Therapy    38     mins [id number];  Total Treatment   60    mins    Fara Madrigal PT  Physical Therapist    [place] License: [id number]  "

## 2022-11-16 DIAGNOSIS — E83.51 HYPOCALCEMIA: ICD-10-CM

## 2022-11-16 NOTE — TELEPHONE ENCOUNTER
Rx Refill Note  Requested Prescriptions     Pending Prescriptions Disp Refills   • calcium citrate (CALCITRATE) 950 (200 Ca) MG tablet 60 tablet 1      Last office visit with prescribing clinician: 11/9/2022      Next office visit with prescribing clinician: 12/12/2022     Laine Lopez  11/16/22, 08:59 EST;l

## 2022-11-17 ENCOUNTER — TREATMENT (OUTPATIENT)
Dept: PHYSICAL THERAPY | Facility: CLINIC | Age: 72
End: 2022-11-17

## 2022-11-17 DIAGNOSIS — K21.9 GASTROESOPHAGEAL REFLUX DISEASE, UNSPECIFIED WHETHER ESOPHAGITIS PRESENT: Primary | ICD-10-CM

## 2022-11-17 DIAGNOSIS — M25.551 RIGHT HIP PAIN: ICD-10-CM

## 2022-11-17 DIAGNOSIS — R26.81 UNSTEADY GAIT: Primary | ICD-10-CM

## 2022-11-17 PROCEDURE — 97113 AQUATIC THERAPY/EXERCISES: CPT | Performed by: PHYSICAL THERAPIST

## 2022-11-17 RX ORDER — FAMOTIDINE 20 MG/1
20 TABLET, FILM COATED ORAL 2 TIMES DAILY
Qty: 180 TABLET | Refills: 1 | Status: SHIPPED | OUTPATIENT
Start: 2022-11-17

## 2022-11-17 RX ORDER — IBUPROFEN 200 MG
950 CAPSULE ORAL 2 TIMES DAILY
Qty: 180 TABLET | Refills: 1 | Status: SHIPPED | OUTPATIENT
Start: 2022-11-17

## 2022-11-17 NOTE — PROGRESS NOTES
"Physical Therapy Daily Treatment Note/DISCHARGE    [facility]  [address] [place] [address]  [phone] (phone)  [phone] (fax)    Patient: Brooklyn Johns   : 1950  Diagnosis/ICD-10 Code:  Unsteady gait [R26.81]  Referring practitioner: Hipolito Trujillo MD  Date of Initial Visit: Type: THERAPY  Noted: 2022  Today's Date: 2022  Patient seen for 10 sessions             Subjective   Patient reports she joined the [facility] near her home which has a pool    Objective        AQUATIC EXERCISES:  Water Walk : Forward/backward, side step; 2 laps each              Stretch 1: Hamstring hip sweeps with large noodle x15               Stretch 2: Modified Piriformis stretch with large noodle under knee, 20 sec x 4                                                   Hip Abd/Add : 20x              Hip Ext: 20x   SLR: 20x  Hip Circles : 10/10              March in Place (Alternating): 30x  Walking Marches: 2 laps  Tandem Walk: 2 laps, UE on LN  Leg Press: LN, 20x B     Step Ups 8\" 20x B       Mini Squat: 15x     Suspended with LN/Rail, bicycle kicks, 2 min               Seated at Bench:                                     Flutter/Scissor: 20/20    Clams: 20     Sit to stand x 10    Assessment/Plan  Priya has completed 10 session of aquatic physical therapy. She is now able to perform her routine independently and the majority of her PT goals have been MET. She has pool access through her local [facility]. At this time recommend discharge skilled PT.        Goals  Plan Goals: Short Term Goals for completion in 30 days:   -Patient will report a reduction in pain for 12-24 hours or greater following aquatic therapy session (MET)  -Patient will demonstrate good core stabilization strength with advanced  aquatic exercises such as bicycle kicks to help improve postural stability (MET)  -Patient will report increased standing tolerance from 5 min to 15 min or greater to allow " patient to complete ADLs such as cooking without pain/discomfort (MET; 30-40 min)  -pt to be independent with all transfers (MET)    LTGs for completion within 90 days:  -Patient will demonstrate sit to stand without use of hands in order to increase functional strength (MET)  -Patient will increase walking tolerance from 5 min to 20 min or greater to allow for patient to walk for leisure/exercise (MET)  -Patient will demonstrate independence with water walks and stretches to promote independent managment of condition (MET)  -Patient will improve 5xSTS from 23 seconds to 19</=seconds in order to decrease risk of falls and increase functional strength (NOT MET, NT today)  -Patient will increase LE strength to 4/5 or greater to increase LE stability during gait (NOT MET, R LE still weaker)  -pt to demonstrate correct gait sequence with use of appropriate AD. (MET, uses cane)    Timed:  Aquatic Therapy    38     mins [id number];  Total Treatment   60    mins    Fara Madrigal, PT  Physical Therapist    [place] License: [id number]

## 2022-11-20 DIAGNOSIS — E53.8 B12 DEFICIENCY: ICD-10-CM

## 2022-11-21 RX ORDER — NEEDLES, FILTER 19GX1 1/2"
NEEDLE, DISPOSABLE MISCELLANEOUS
Qty: 3 EACH | Refills: 0 | Status: SHIPPED | OUTPATIENT
Start: 2022-11-21

## 2022-11-29 ENCOUNTER — OFFICE VISIT (OUTPATIENT)
Dept: ORTHOPEDIC SURGERY | Facility: CLINIC | Age: 72
End: 2022-11-29

## 2022-11-29 VITALS — WEIGHT: 277 LBS | TEMPERATURE: 97.6 F | BODY MASS INDEX: 46.15 KG/M2 | HEIGHT: 65 IN | RESPIRATION RATE: 12 BRPM

## 2022-11-29 DIAGNOSIS — M16.11 PRIMARY OSTEOARTHRITIS OF RIGHT HIP: ICD-10-CM

## 2022-11-29 DIAGNOSIS — M25.551 RIGHT HIP PAIN: Primary | ICD-10-CM

## 2022-11-29 PROCEDURE — 73502 X-RAY EXAM HIP UNI 2-3 VIEWS: CPT | Performed by: NURSE PRACTITIONER

## 2022-11-29 PROCEDURE — 99213 OFFICE O/P EST LOW 20 MIN: CPT | Performed by: NURSE PRACTITIONER

## 2022-11-29 NOTE — PROGRESS NOTES
"Patient: Brooklyn Johns  YOB: 1950 71 y.o. female  Medical Record Number: [id number]    Chief Complaint:   Chief Complaint   Patient presents with   • Right Hip - Follow-up       History of Present Illness:Brooklyn Johns is a 71 y.o. female who presents for follow-up of right hip osteoarthritis that is chronic in nature.  Patient has known advanced hip arthritis that we have been conservatively treating.  Patient wants to proceed forward with surgery, but she has not been medically optimized.  She reports fluoroscopy guided hip injections have given her no significant relief.  Patient does report that she has lost some weight and she is down from a BMI of 47.8-46.1.  She denies any signs or symptoms of infection, and she is without any other significant complaints today    Allergies: No Known Allergies    Medications:   Current Outpatient Medications   Medication Sig Dispense Refill   • allopurinol (ZYLOPRIM) 100 MG tablet TAKE ONE TABLET BY MOUTH DAILY 90 tablet 1   • apixaban (ELIQUIS) 2.5 MG tablet tablet Take 1 tablet by mouth 2 (Two) Times a Day. 180 tablet 1   • B-D INTEGRA SYRINGE 25G X 5/8\" 3 ML misc USE ONE SYRINGE EVERY 30 DAYS AS DIRECTED 3 each 0   • calcitriol (ROCALTROL) 0.25 MCG capsule TAKE 1 CAPSULE MONDAY, WEDNESDAY, AND FRIDAY. SEE ADMIN INSTRUCTIONS 36 capsule 3   • calcium citrate (CALCITRATE) 950 (200 Ca) MG tablet Take 1 tablet by mouth 2 (Two) Times a Day. 180 tablet 1   • chlorthalidone (HYGROTON) 25 MG tablet Every other day 45 tablet 5   • cyanocobalamin 1000 MCG/ML injection INJECT 1 MILLILITER INTO THE APPROPRIATE MUSCLE AS DIRECTED EVERY 28 DAYS 1 mL 1   • escitalopram (LEXAPRO) 10 MG tablet TAKE 1 TABLET DAILY 90 tablet 3   • famotidine (PEPCID) 20 MG tablet Take 1 tablet by mouth 2 (Two) Times a Day. 180 tablet 1   • gabapentin (NEURONTIN) 400 MG capsule Take 400 mg by mouth 2 (Two) Times a Day.     • HYDROcodone-acetaminophen (NORCO) 7.5-325 MG per tablet " "Take 1 tablet by mouth 2 (Two) Times a Day As Needed for Moderate Pain . (Patient taking differently: Take 1 tablet by mouth 3 (Three) Times a Day.) 60 tablet 0   • metoprolol succinate XL (TOPROL-XL) 25 MG 24 hr tablet TAKE 1 TABLET DAILY 90 tablet 3   • Needle, Disp, (B-D DISP NEEDLE 30GX1\") 30G X 1\" misc BD Integra Syringe 3 mL 25 gauge x 5/8\"     • rOPINIRole (REQUIP) 1 MG tablet Take 1 tablet by mouth 4 (Four) Times a Day. 360 tablet 1   • simvastatin (ZOCOR) 40 MG tablet TAKE ONE TABLET BY MOUTH DAILY 90 tablet 1   • sodium bicarbonate 650 MG tablet Take 1 tablet by mouth 2 (Two) Times a Day. 90 tablet 1     Current Facility-Administered Medications   Medication Dose Route Frequency Provider Last Rate Last Admin   • cyanocobalamin injection 1,000 mcg  1,000 mcg Intramuscular Q28 Days Radha House APRN   1,000 mcg at 08/17/22 1050         The following portions of the patient's history were reviewed and updated as appropriate: allergies, current medications, past family history, past medical history, past social history, past surgical history and problem list.    Review of Systems:   A 14 point review of systems was performed. All systems negative except pertinent positives/negative listed in HPI above    Physical Exam:   Vitals:    11/29/22 0907   Resp: 12   Temp: 97.6 °F (36.4 °C)   Weight: 126 kg (277 lb)   Height: 165.1 cm (65\")       General: A and O x 3, ASA, NAD    SCLERA:    Normal    DENTITION:   Normal   Hip:  right    LEG ALIGNMENT:     Neutral        LEG LENGTH DISCREPANCY   :    none    GAIT:     Antalgic    SKIN:     No abnormality    RANGE OF MOTION:     Limited by joint irritability    STRENGTH:     Limited by joint irratibility    DISTAL PULSES:    Paplable    DISTAL SENSATION :   Intact    LYMPHATICS:     No   lymphadenopathy    OTHER:          +   Stinchfeld test      -    log roll      -   Tenderness to palpation trochanteric bursa     Radiology:    Xrays 2views (AP bilateral hips and " lateral hip) were ordered and reviewed for evaluation of hip pain demonstrating advanced, end-satge osteoarthritis with bone on bone articluation, periarticular osteophytes, and subchondral cysts  Comparison views: todays xrays were compared to previous xrays and demonstrate no change    Assessment/Plan: Primary osteoarthritis right hip  Treatment options as well as imaging results were discussed in detail with the patient.  Patient has lost some weight but has not had her optimized goal yet.  We have set a new weight loss goal of 10 to 12 pounds.  This will put the patient under a BMI of 45.  I did instruct patient that she is on Eliquis for DVTs and we will need medical clearance from her PCP giving us permission to be off Eliquis for at least 2 days prior to surgery.  We will have the patient tentatively follow back up with me in 3 months for reevaluation or if she has a weight loss goal before then she can follow back up with Dr. Trujillo and schedule her procedure.      Basil Garcia, APRN  11/29/2022

## 2022-12-02 DIAGNOSIS — K21.9 GASTROESOPHAGEAL REFLUX DISEASE, UNSPECIFIED WHETHER ESOPHAGITIS PRESENT: ICD-10-CM

## 2022-12-02 RX ORDER — SODIUM BICARBONATE 650 MG/1
650 TABLET ORAL 2 TIMES DAILY
Qty: 90 TABLET | Refills: 1 | Status: SHIPPED | OUTPATIENT
Start: 2022-12-02 | End: 2023-02-11

## 2022-12-12 ENCOUNTER — OFFICE VISIT (OUTPATIENT)
Dept: INTERNAL MEDICINE | Facility: CLINIC | Age: 72
End: 2022-12-12
Payer: MEDICARE

## 2022-12-12 VITALS
OXYGEN SATURATION: 91 % | DIASTOLIC BLOOD PRESSURE: 74 MMHG | BODY MASS INDEX: 46.15 KG/M2 | HEIGHT: 65 IN | WEIGHT: 277 LBS | TEMPERATURE: 97.8 F | HEART RATE: 57 BPM | SYSTOLIC BLOOD PRESSURE: 130 MMHG | RESPIRATION RATE: 19 BRPM

## 2022-12-12 DIAGNOSIS — E78.2 MIXED HYPERLIPIDEMIA: Chronic | ICD-10-CM

## 2022-12-12 DIAGNOSIS — I10 ESSENTIAL HYPERTENSION: Chronic | ICD-10-CM

## 2022-12-12 DIAGNOSIS — M25.551 RIGHT HIP PAIN: ICD-10-CM

## 2022-12-12 DIAGNOSIS — R20.2 PARESTHESIAS: ICD-10-CM

## 2022-12-12 DIAGNOSIS — N25.81 SECONDARY HYPERPARATHYROIDISM: ICD-10-CM

## 2022-12-12 DIAGNOSIS — Z00.00 MEDICARE ANNUAL WELLNESS VISIT, SUBSEQUENT: Primary | ICD-10-CM

## 2022-12-12 DIAGNOSIS — I25.110 CORONARY ARTERY DISEASE INVOLVING NATIVE CORONARY ARTERY OF NATIVE HEART WITH UNSTABLE ANGINA PECTORIS: Chronic | ICD-10-CM

## 2022-12-12 PROCEDURE — G0439 PPPS, SUBSEQ VISIT: HCPCS | Performed by: NURSE PRACTITIONER

## 2022-12-12 PROCEDURE — 1170F FXNL STATUS ASSESSED: CPT | Performed by: NURSE PRACTITIONER

## 2022-12-12 PROCEDURE — 99214 OFFICE O/P EST MOD 30 MIN: CPT | Performed by: NURSE PRACTITIONER

## 2022-12-12 PROCEDURE — 1159F MED LIST DOCD IN RCRD: CPT | Performed by: NURSE PRACTITIONER

## 2022-12-12 PROCEDURE — 1125F AMNT PAIN NOTED PAIN PRSNT: CPT | Performed by: NURSE PRACTITIONER

## 2022-12-12 NOTE — PROGRESS NOTES
The ABCs of the Annual Wellness Visit  Subsequent Medicare Wellness Visit    Michael Johns is a 72 y.o. female who presents for a Subsequent Medicare Wellness Visit.    The following portions of the patient's history were reviewed and   updated as appropriate: allergies, current medications, past family history, past medical history, past social history, past surgical history and problem list.    Compared to one year ago, the patient feels her physical   health is the same.    Compared to one year ago, the patient feels her mental   health is the same.    Recent Hospitalizations:  She was not admitted to the hospital during the last year.       Current Medical Providers:  Patient Care Team:  Radha House APRN as PCP - General (Internal Medicine)    Outpatient Medications Prior to Visit   Medication Sig Dispense Refill   • allopurinol (ZYLOPRIM) 100 MG tablet TAKE ONE TABLET BY MOUTH DAILY 90 tablet 1   • apixaban (ELIQUIS) 2.5 MG tablet tablet Take 1 tablet by mouth 2 (Two) Times a Day. 180 tablet 1   • B-D INTEGRA SYRINGE 25G X 5/8\" 3 ML misc USE ONE SYRINGE EVERY 30 DAYS AS DIRECTED 3 each 0   • calcitriol (ROCALTROL) 0.25 MCG capsule TAKE 1 CAPSULE MONDAY, WEDNESDAY, AND FRIDAY. SEE ADMIN INSTRUCTIONS 36 capsule 3   • calcium citrate (CALCITRATE) 950 (200 Ca) MG tablet Take 1 tablet by mouth 2 (Two) Times a Day. 180 tablet 1   • chlorthalidone (HYGROTON) 25 MG tablet Every other day 45 tablet 5   • cyanocobalamin 1000 MCG/ML injection INJECT 1 MILLILITER INTO THE APPROPRIATE MUSCLE AS DIRECTED EVERY 28 DAYS 1 mL 1   • famotidine (PEPCID) 20 MG tablet Take 1 tablet by mouth 2 (Two) Times a Day. 180 tablet 1   • gabapentin (NEURONTIN) 400 MG capsule Take 400 mg by mouth 2 (Two) Times a Day.     • HYDROcodone-acetaminophen (NORCO) 7.5-325 MG per tablet Take 1 tablet by mouth 2 (Two) Times a Day As Needed for Moderate Pain . (Patient taking differently: Take 1 tablet by mouth 3 (Three) Times  a Day.) 60 tablet 0   • metoprolol succinate XL (TOPROL-XL) 25 MG 24 hr tablet TAKE 1 TABLET DAILY 90 tablet 3   • Needle, Disp, (B-D DISP NEEDLE 30GX1\") 30G X 1\" misc BD Integra Syringe 3 mL 25 gauge x 5/8\"     • rOPINIRole (REQUIP) 1 MG tablet Take 1 tablet by mouth 4 (Four) Times a Day. 360 tablet 1   • simvastatin (ZOCOR) 40 MG tablet TAKE ONE TABLET BY MOUTH DAILY 90 tablet 1   • sodium bicarbonate 650 MG tablet Take 1 tablet by mouth 2 (Two) Times a Day. 90 tablet 1   • escitalopram (LEXAPRO) 10 MG tablet TAKE 1 TABLET DAILY 90 tablet 3     Facility-Administered Medications Prior to Visit   Medication Dose Route Frequency Provider Last Rate Last Admin   • cyanocobalamin injection 1,000 mcg  1,000 mcg Intramuscular Q28 Days Radha House APRN   1,000 mcg at 08/17/22 1050       Opioid medication/s are on active medication list.  and I have evaluated her active treatment plan and pain score trends (see table).  Vitals:    12/12/22 1406   PainSc: 10-Worst pain ever   PainLoc: Hip     I have reviewed the chart for potential of high risk medication and harmful drug interactions in the elderly.            Aspirin is not on active medication list.  Aspirin use is not indicated based on review of current medical condition/s. Risk of harm outweighs potential benefits.  .    Patient Active Problem List   Diagnosis   • Essential hypertension   • Sleep apnea   • Hyperlipidemia   • Crushing injury of left foot and ankle   • Lumbosacral spondylosis without myelopathy   • History of recurrent deep vein thrombosis (DVT)   • Morbidly obese ([facility])   • CKD (chronic kidney disease) stage 4, GFR 15-29 ml/min ([facility])   • Coronary artery disease involving native coronary artery of native heart with unstable angina pectoris ([facility])   • Primary insomnia   • Bilateral hip pain   • Hyperuricemia   • B12 deficiency   • Restless legs   • Hypocalcemia   • Secondary hyperparathyroidism ([facility])   • Lumbar radiculopathy   • Primary osteoarthritis  of both hips   • Hyperkalemia   • Anemia   • Leg hematoma, left, initial encounter   • Elevated glucose   • Basal cell carcinoma (BCC) of face   • Right hip pain     Advance Care Planning  Advance Directive is on file.  ACP discussion was held with the patient during this visit. Patient has an advance directive in EMR which is still valid.      Objective    Vitals:    12/12/22 1406   BP: 130/74   BP Location: Left arm   Patient Position: Sitting   Cuff Size: Large Adult   Pulse: 57   Resp: 19   Temp: 97.8 °F (36.6 °C)   SpO2: 91%   Weight: 126 kg (277 lb)   Height: 165.1 cm (65\")   PainSc: 10-Worst pain ever   PainLoc: Hip     Estimated body mass index is 46.1 kg/m² as calculated from the following:    Height as of this encounter: 165.1 cm (65\").    Weight as of this encounter: 126 kg (277 lb).    Class 3 Severe Obesity (BMI >=40). Obesity-related health conditions include the following: hypertension, dyslipidemias and osteoarthritis. Obesity is improving with lifestyle modifications. BMI is is above average; BMI management plan is completed. We discussed portion control and increasing exercise.      Does the patient have evidence of cognitive impairment? No    Lab Results   Component Value Date    HGBA1C 5.90 (H) 11/09/2022        HEALTH RISK ASSESSMENT    Smoking Status:  Social History     Tobacco Use   Smoking Status Never   Smokeless Tobacco Never     Alcohol Consumption:  Social History     Substance and Sexual Activity   Alcohol Use Not Currently   • Alcohol/week: 1.0 standard drink   • Types: 1 Glasses of wine per week    Comment: social     Fall Risk Screen:    STEADI Fall Risk Assessment has not been completed.    Depression Screening:  PHQ-2/PHQ-9 Depression Screening 8/17/2022   Retired PHQ-9 Total Score -   Retired Total Score -   Little Interest or Pleasure in Doing Things 0-->not at all   Feeling Down, Depressed or Hopeless 0-->not at all   PHQ-9: Brief Depression Severity Measure Score 0       Health  Habits and Functional and Cognitive Screening:  Functional & Cognitive Status 12/12/2022   Do you have difficulty preparing food and eating? No   Do you have difficulty bathing yourself, getting dressed or grooming yourself? Yes   Do you have difficulty using the toilet? No   Do you have difficulty moving around from place to place? No   Do you have trouble with steps or getting out of a bed or a chair? Yes   Current Diet Well Balanced Diet   Dental Exam Up to date   Eye Exam Not up to date   Exercise (times per week) 2 times per week   Current Exercises Include Home Exercise Program (TV, Computer, Etc.)        Exercise Comment -   Current Exercise Activities Include -   Do you need help using the phone?  No   Are you deaf or do you have serious difficulty hearing?  No   Do you need help with transportation? No   Do you need help shopping? No   Do you need help preparing meals?  No   Do you need help with housework?  No   Do you need help with laundry? Yes   Do you need help taking your medications? Yes   Do you need help managing money? No   Do you ever drive or ride in a car without wearing a seat belt? No   Have you felt unusual stress, anger or loneliness in the last month? -   Who do you live with? -   If you need help, do you have trouble finding someone available to you? -   Have you been bothered in the last four weeks by sexual problems? -   Do you have difficulty concentrating, remembering or making decisions? -       Age-appropriate Screening Schedule:  Refer to the list below for future screening recommendations based on patient's age, sex and/or medical conditions. Orders for these recommended tests are listed in the plan section. The patient has been provided with a written plan.    Health Maintenance   Topic Date Due   • TDAP/TD VACCINES (1 - Tdap) Never done   • INFLUENZA VACCINE  08/01/2022   • ZOSTER VACCINE (1 of 2) 02/28/2025 (Originally 12/5/2000)   • MAMMOGRAM  03/28/2023   • LIPID PANEL   09/02/2023   • DXA SCAN  04/10/2024   • PAP SMEAR  Discontinued                CMS Preventative Services Quick Reference  Risk Factors Identified During Encounter  Chronic Pain: follow-up with pain mgmt  Immunizations Discussed/Encouraged: Tdap, Influenza and COVID19  The above risks/problems have been discussed with the patient.  Pertinent information has been shared with the patient in the After Visit Summary.  An After Visit Summary and PPPS were made available to the patient.    Follow Up:   Next Medicare Wellness visit to be scheduled in 1 year.       Additional E&M Note during same encounter follows:  Patient has multiple medical problems which are significant and separately identifiable that require additional work above and beyond the Medicare Wellness Visit.      Chief Complaint  Medicare Wellness-subsequent, Hip Pain, and Fall    Subjective        HPI  Brooklyn Johns is also being seen today for right hip pain, HTN and     Hip pain  The patient fell on her right hip a couple of weeks ago. She has been experiencing hip pain prior to this for 1 year. On 11/29/2022 the patient was seen by the orthopedist ULICES Graf and did not receive any relief from the ultrasound guided injections. The x-rays obtained did not reveal any differences from before she fell. Pain management provided her 1.5 Hydrocodone tablets a day. The patient does not sleep well due to the pain and is not able to get into a comfortable position. She is trying to lose 10# in an effort to qualify for surgery.    Hand complication  Sometimes when the patient is holding something in her hand it falls \"for no reason\". Forefinger and middle finger of her fingers feel like they are not attached to her body sometimes. She experienced the sensation 3 to 4 times while sitting during today's visit when she was trying to read her book. It is not a loss of feeling. They do not wake her up numb while she is sleeping.    Knee pain  The patient  reports knee pain when she kneels on them.    Surgical history  The patient had Mohs surgery 10/8 on right ala for basal cell cancer which she tolerated well.    Health maintenance  The patient has not been scheduled to see a nephrologist yet  Review of Systems   Respiratory: Negative for cough, chest tightness and shortness of breath.    Cardiovascular: Negative for chest pain and leg swelling.   Gastrointestinal: Negative for abdominal pain.   Musculoskeletal: Positive for arthralgias, back pain, neck pain and neck stiffness.   Neurological: Positive for weakness and numbness.       Objective   Vital Signs:  /74 (BP Location: Left arm, Patient Position: Sitting, Cuff Size: Large Adult)   Pulse 57   Temp 97.8 °F (36.6 °C)   Resp 19   Ht 165.1 cm (65\")   Wt 126 kg (277 lb)   SpO2 91%   BMI 46.10 kg/m²     Physical Exam  Constitutional:       General: She is not in acute distress.     Appearance: Normal appearance. She is not diaphoretic.   HENT:      Head: Normocephalic and atraumatic.      Right Ear: Tympanic membrane, ear canal and external ear normal.      Left Ear: Tympanic membrane, ear canal and external ear normal.      Nose: Nose normal. No rhinorrhea.      Mouth/Throat:      Mouth: Mucous membranes are moist.      Pharynx: Oropharynx is clear.   Eyes:      General:         Right eye: No discharge.         Left eye: No discharge.      Conjunctiva/sclera: Conjunctivae normal.   Cardiovascular:      Rate and Rhythm: Normal rate and regular rhythm.      Pulses: Normal pulses.      Heart sounds: Normal heart sounds.   Pulmonary:      Effort: Pulmonary effort is normal.      Breath sounds: Normal breath sounds.   Abdominal:      General: Bowel sounds are normal.      Tenderness: There is no abdominal tenderness.   Musculoskeletal:         General: No swelling or tenderness.      Cervical back: Normal range of motion.   Skin:     General: Skin is warm and dry.   Neurological:      General: No focal  deficit present.      Mental Status: She is alert and oriented to person, place, and time.   Psychiatric:         Mood and Affect: Mood normal.         Behavior: Behavior normal.         Judgment: Judgment normal.          The following data was reviewed by: ULICES Amezquita on 12/12/2022:  Common labs    Common Labs 9/2/22 9/2/22 9/2/22 9/2/22 9/13/22 9/13/22 11/9/22 11/9/22 11/9/22 11/9/22    0827 0827 0827 0827 1300 1300 1608 1608 1608 1608   Glucose  87    164 (A)   116 (A)    BUN  17    21   28 (A)    Creatinine  1.92 (A)    1.71 (A)   2.04 (A)    Sodium  146 (A)    145 (A)   145    Potassium  5.4 (A)    5.0   4.8    Chloride  111 (A)    110 (A)   106    Calcium  7.7 (A)    7.6 (A)   9.0    Total Protein  5.8 (A)     6.5      Albumin  4.60     3.9      Total Bilirubin  0.5           Alkaline Phosphatase  153 (A)           AST (SGOT)  16           ALT (SGPT)  9           WBC 5.81    5.4   5.04     Hemoglobin 8.2 (A)    8.4 (A)   8.9 (A)     Hematocrit 27.0 (A)    27.1 (A)   27.9 (A)     Platelets 173    165   173     Total Cholesterol   114          Triglycerides   70          HDL Cholesterol   60          LDL Cholesterol    39          Hemoglobin A1C          5.90 (A)   Uric Acid    5.3         (A) Abnormal value       Comments are available for some flowsheets but are not being displayed.                      Assessment and Plan   Diagnoses and all orders for this visit:    1. Medicare annual wellness visit, subsequent (Primary)    2. Paresthesias  Comments:  A nerve conduction study was offered to the patient but she declined at this time, continue to monitor.    3. Essential hypertension  Assessment & Plan:  BP is well-controlled on metoprolol which she will continue along with a low sodium diet.      4. Mixed hyperlipidemia  Assessment & Plan:  She denies myalgias with simvastatin which she will continue along with a low-fat, low-cholesterol diet.        5. Coronary artery disease involving native  coronary artery of native heart with unstable angina pectoris (HCC)  Assessment & Plan:  Heart cath 9/16/2020: Mild nonobstructive coronary artery disease; currently managed on statin therapy, metoprolol and Eliquis (hx DVT)       6. Secondary hyperparathyroidism (HCC)  Assessment & Plan:  Started Calcitrol Mon-Wed-Fri 2/2021, recheck labs today      7. Right hip pain  Assessment & Plan:  She c/o pain in right hip since fall a couple of weeks ago. She has received an ultrasound guided injection through ortho with minimal relief. She is working on continued weight loss so she may be a candidate for hip replacement.      -     Referral to nephrology was sent on 09/18/2022 and 11/14/2022. No response has been received. Message sent to referral desk.       Follow Up   Return in about 4 months (around 4/12/2023).  Patient was given instructions and counseling regarding her condition or for health maintenance advice. Please see specific information pulled into the AVS if appropriate.       Transcribed from ambient dictation for ULICES Amezquita by Leeann Ackerman.  12/12/22   16:27 EST    Patient or patient representative verbalized consent to the visit recording.  I have personally performed the services described in this document as transcribed by the above individual, and it is both accurate and complete.

## 2022-12-30 DIAGNOSIS — F32.89 OTHER DEPRESSION: ICD-10-CM

## 2022-12-30 RX ORDER — ESCITALOPRAM OXALATE 10 MG/1
TABLET ORAL
Qty: 90 TABLET | Refills: 3 | Status: SHIPPED | OUTPATIENT
Start: 2022-12-30

## 2023-01-01 PROBLEM — C44.310 BASAL CELL CARCINOMA (BCC) OF FACE: Status: ACTIVE | Noted: 2023-01-01

## 2023-01-01 NOTE — ASSESSMENT & PLAN NOTE
Heart cath 9/16/2020: Mild nonobstructive coronary artery disease; currently managed on statin therapy, metoprolol and Eliquis (hx DVT)

## 2023-01-01 NOTE — ASSESSMENT & PLAN NOTE
She denies myalgias with simvastatin which she will continue along with a low-fat, low-cholesterol diet.

## 2023-01-01 NOTE — PATIENT INSTRUCTIONS
Medicare Wellness  Personal Prevention Plan of Service     Date of Office Visit:    Encounter Provider:  ULICES Amezquita  Place of Service:  [facility]  Patient Name: Brooklyn Johns  :  1950    As part of the Medicare Wellness portion of your visit today, we are providing you with this personalized preventive plan of services (PPPS). This plan is based upon recommendations of the [place] Preventive Services Task Force (USPSTF) and the Advisory Committee on Immunization Practices (ACIP).    This lists the preventive care services that should be considered, and provides dates of when you are due. Items listed as completed are up-to-date and do not require any further intervention.    Health Maintenance   Topic Date Due    TDAP/TD VACCINES (1 - Tdap) Never done    COVID-19 Vaccine (4 - Booster for Pfizer series) 2021    ANNUAL WELLNESS VISIT  2022    INFLUENZA VACCINE  2022    ZOSTER VACCINE (1 of 2) 2025 (Originally 2000)    MAMMOGRAM  2023    LIPID PANEL  2023    DXA SCAN  04/10/2024    COLORECTAL CANCER SCREENING  2025    HEPATITIS C SCREENING  Completed    Pneumococcal Vaccine 65+  Completed    PAP SMEAR  Discontinued       No orders of the defined types were placed in this encounter.      Return in about 4 months (around 2023).

## 2023-01-15 PROBLEM — M25.551 RIGHT HIP PAIN: Status: ACTIVE | Noted: 2023-01-15

## 2023-01-15 NOTE — ASSESSMENT & PLAN NOTE
She c/o pain in right hip since fall a couple of weeks ago. She has received an ultrasound guided injection through ortho with minimal relief. She is working on continued weight loss so she may be a candidate for hip replacement.

## 2023-01-23 DIAGNOSIS — E79.0 HYPERURICEMIA: ICD-10-CM

## 2023-01-23 RX ORDER — ALLOPURINOL 100 MG/1
TABLET ORAL
Qty: 90 TABLET | Refills: 3 | Status: SHIPPED | OUTPATIENT
Start: 2023-01-23

## 2023-01-23 NOTE — TELEPHONE ENCOUNTER
Rx Refill Note  Requested Prescriptions     Pending Prescriptions Disp Refills   • allopurinol (ZYLOPRIM) 100 MG tablet [Pharmacy Med Name: ALLOPURINOL TABS 100MG] 90 tablet 3     Sig: TAKE 1 TABLET DAILY      Last office visit with prescribing clinician: 12/12/2022   Last telemedicine visit with prescribing clinician: 4/17/2023   Next office visit with prescribing clinician: 4/17/2023                         Would you like a call back once the refill request has been completed: [] Yes [] No    If the office needs to give you a call back, can they leave a voicemail: [] Yes [] No    Caren Astorga  01/23/23, 08:12 EST

## 2023-01-30 DIAGNOSIS — I10 ESSENTIAL HYPERTENSION: ICD-10-CM

## 2023-01-30 RX ORDER — METOPROLOL SUCCINATE 25 MG/1
TABLET, EXTENDED RELEASE ORAL
Qty: 90 TABLET | Refills: 3 | Status: SHIPPED | OUTPATIENT
Start: 2023-01-30

## 2023-01-30 RX ORDER — RAMIPRIL 10 MG/1
CAPSULE ORAL
Qty: 180 CAPSULE | Refills: 3 | OUTPATIENT
Start: 2023-01-30

## 2023-02-01 DIAGNOSIS — N18.4 CKD (CHRONIC KIDNEY DISEASE) STAGE 4, GFR 15-29 ML/MIN: ICD-10-CM

## 2023-02-01 RX ORDER — CALCITRIOL 0.25 UG/1
CAPSULE, LIQUID FILLED ORAL
Qty: 36 CAPSULE | Refills: 3 | Status: SHIPPED | OUTPATIENT
Start: 2023-02-01

## 2023-02-01 NOTE — TELEPHONE ENCOUNTER
Rx Refill Note  Requested Prescriptions     Pending Prescriptions Disp Refills   • calcitriol (ROCALTROL) 0.25 MCG capsule [Pharmacy Med Name: CALCITRIOL CAPS 0.25MCG] 36 capsule 3     Sig: TAKE 1 CAPSULE MONDAY, WEDNESDAY, AND FRIDAY. SEE ADMIN INSTRUCTIONS      Last office visit with prescribing clinician: 12/12/2022   Last telemedicine visit with prescribing clinician: 4/17/2023   Next office visit with prescribing clinician: 4/17/2023                         Would you like a call back once the refill request has been completed: [] Yes [] No    If the office needs to give you a call back, can they leave a voicemail: [] Yes [] No    Caren Astorga  02/01/23, 08:27 EST

## 2023-02-08 DIAGNOSIS — G25.81 RESTLESS LEGS: ICD-10-CM

## 2023-02-08 DIAGNOSIS — E53.8 B12 DEFICIENCY: ICD-10-CM

## 2023-02-08 DIAGNOSIS — K21.9 GASTROESOPHAGEAL REFLUX DISEASE, UNSPECIFIED WHETHER ESOPHAGITIS PRESENT: ICD-10-CM

## 2023-02-08 RX ORDER — ROPINIROLE 1 MG/1
1 TABLET, FILM COATED ORAL 4 TIMES DAILY
Qty: 360 TABLET | Refills: 1 | Status: SHIPPED | OUTPATIENT
Start: 2023-02-08

## 2023-02-08 NOTE — TELEPHONE ENCOUNTER
Rx Refill Note  Requested Prescriptions     Pending Prescriptions Disp Refills   • sodium bicarbonate 650 MG tablet [Pharmacy Med Name: SODIUM BICARB 650 MG TABLET] 90 tablet 1     Sig: TAKE 1 TABLET BY MOUTH TWICE A DAY   • cyanocobalamin 1000 MCG/ML injection [Pharmacy Med Name: CYANOCOBALAMIN 1,000 MCG/ML VL] 1 mL 1     Sig: INJECT 1ML INTO THE APPROPRIATE MUSCLE EVERY 28 DAYS AS DIRECTED      Last office visit with prescribing clinician: 12/12/2022   Last telemedicine visit with prescribing clinician: 4/17/2023   Next office visit with prescribing clinician: 4/17/2023                         Would you like a call back once the refill request has been completed: [] Yes [] No    If the office needs to give you a call back, can they leave a voicemail: [] Yes [] No    Caren Astorga  02/08/23, 08:10 EST

## 2023-02-11 RX ORDER — CYANOCOBALAMIN 1000 UG/ML
INJECTION, SOLUTION INTRAMUSCULAR; SUBCUTANEOUS
Qty: 1 ML | Refills: 1 | Status: SHIPPED | OUTPATIENT
Start: 2023-02-11

## 2023-02-11 RX ORDER — SODIUM BICARBONATE 650 MG/1
TABLET ORAL
Qty: 90 TABLET | Refills: 1 | Status: SHIPPED | OUTPATIENT
Start: 2023-02-11

## 2023-02-23 DIAGNOSIS — I82.409 RECURRENT ACUTE DEEP VEIN THROMBOSIS (DVT) OF LOWER EXTREMITY, UNSPECIFIED LATERALITY: ICD-10-CM

## 2023-02-23 RX ORDER — APIXABAN 2.5 MG/1
TABLET, FILM COATED ORAL
Qty: 180 TABLET | Refills: 3 | Status: SHIPPED | OUTPATIENT
Start: 2023-02-23

## 2023-02-28 ENCOUNTER — OFFICE VISIT (OUTPATIENT)
Dept: ORTHOPEDIC SURGERY | Facility: CLINIC | Age: 73
End: 2023-02-28
Payer: MEDICARE

## 2023-02-28 VITALS — BODY MASS INDEX: 46.15 KG/M2 | TEMPERATURE: 96.5 F | HEIGHT: 65 IN | RESPIRATION RATE: 16 BRPM | WEIGHT: 277 LBS

## 2023-02-28 DIAGNOSIS — M16.11 PRIMARY OSTEOARTHRITIS OF RIGHT HIP: Primary | ICD-10-CM

## 2023-02-28 PROCEDURE — 99213 OFFICE O/P EST LOW 20 MIN: CPT | Performed by: NURSE PRACTITIONER

## 2023-02-28 NOTE — PROGRESS NOTES
"Patient: Brooklyn Johns  YOB: 1950 72 y.o. female  Medical Record Number: [id number]    Chief Complaint:   Chief Complaint   Patient presents with   • Right Hip - Follow-up       History of Present Illness:Brooklyn Johns is a 72 y.o. female who presents for follow-up of right hip pain that is chronic in nature.  Patient has known advanced right hip osteoarthritis we have been conservatively treating.  Patient states that the physical therapy as well as the fluoroscopy guided injections have not given her any significant relief.  Patient states that she is having trouble meeting her BMI goal and does not know if she can lose any more weight.  Patient states that she had a gastric bypass in her 30s and cannot do anymore bariatric procedures.  Patient states that it is becoming very difficult to walk and wants to know what her next treatment options are.  Patient denies any recent injury or fall or trauma to the hip.  She denies any signs or symptoms of infection, and she is without any other significant complaints today.    Allergies: No Known Allergies    Medications:   Current Outpatient Medications   Medication Sig Dispense Refill   • allopurinol (ZYLOPRIM) 100 MG tablet TAKE 1 TABLET DAILY 90 tablet 3   • B-D INTEGRA SYRINGE 25G X 5/8\" 3 ML misc USE ONE SYRINGE EVERY 30 DAYS AS DIRECTED 3 each 0   • calcitriol (ROCALTROL) 0.25 MCG capsule TAKE 1 CAPSULE MONDAY, WEDNESDAY, AND FRIDAY. SEE ADMIN INSTRUCTIONS 36 capsule 3   • calcium citrate (CALCITRATE) 950 (200 Ca) MG tablet Take 1 tablet by mouth 2 (Two) Times a Day. 180 tablet 1   • chlorthalidone (HYGROTON) 25 MG tablet Every other day 45 tablet 5   • cyanocobalamin 1000 MCG/ML injection INJECT 1ML INTO THE APPROPRIATE MUSCLE EVERY 28 DAYS AS DIRECTED 1 mL 1   • Eliquis 2.5 MG tablet tablet TAKE 1 TABLET TWICE A  tablet 3   • escitalopram (LEXAPRO) 10 MG tablet TAKE 1 TABLET DAILY 90 tablet 3   • famotidine (PEPCID) 20 MG tablet " "Take 1 tablet by mouth 2 (Two) Times a Day. 180 tablet 1   • gabapentin (NEURONTIN) 400 MG capsule Take 1 capsule by mouth 2 (Two) Times a Day.     • HYDROcodone-acetaminophen (NORCO) 7.5-325 MG per tablet Take 1 tablet by mouth 2 (Two) Times a Day As Needed for Moderate Pain . (Patient taking differently: Take 1 tablet by mouth 3 (Three) Times a Day.) 60 tablet 0   • metoprolol succinate XL (TOPROL-XL) 25 MG 24 hr tablet TAKE 1 TABLET DAILY 90 tablet 3   • Needle, Disp, (B-D DISP NEEDLE 30GX1\") 30G X 1\" misc BD Integra Syringe 3 mL 25 gauge x 5/8\"     • rOPINIRole (REQUIP) 1 MG tablet Take 1 tablet by mouth 4 (Four) Times a Day. 360 tablet 1   • simvastatin (ZOCOR) 40 MG tablet TAKE ONE TABLET BY MOUTH DAILY 90 tablet 1   • sodium bicarbonate 650 MG tablet TAKE 1 TABLET BY MOUTH TWICE A DAY 90 tablet 1     Current Facility-Administered Medications   Medication Dose Route Frequency Provider Last Rate Last Admin   • cyanocobalamin injection 1,000 mcg  1,000 mcg Intramuscular Q28 Days Radha House APRN   1,000 mcg at 08/17/22 1050         The following portions of the patient's history were reviewed and updated as appropriate: allergies, current medications, past family history, past medical history, past social history, past surgical history and problem list.    Review of Systems:   Pertinent positives/negative listed in HPI above    Physical Exam:   Vitals:    02/28/23 1003   Resp: 16   Temp: 96.5 °F (35.8 °C)   TempSrc: Temporal   Weight: 126 kg (277 lb)   Height: 165.1 cm (65\")   PainSc: 0-No pain       General: A and O x 3, ASA, NAD      Hip:  left    LEG ALIGNMENT:     Neutral        LEG LENGTH DISCREPANCY   :    none    GAIT:     Antalgic    SKIN:     No abnormality    RANGE OF MOTION:     Limited by joint irritability    STRENGTH:     Limited by joint irratibility    DISTAL PULSES:    Paplable    DISTAL SENSATION :   Intact    LYMPHATICS:     No   lymphadenopathy    OTHER:          +   Stinchfeld " test      -    log roll      -   Tenderness to palpation trochanteric bursa      Radiology:    Xrays 2views right hip (AP bilateral hips and lateral hip) taken previously demonstrating advanced, end-satge osteoarthritis with bone on bone articluation, periarticular osteophytes, and subchondral cysts  Comparison views: No new x-rays were taken today for comparison purposes.    Assessment/Plan:    Treatment options as well as imaging results were discussed in detail with the patient.  I also did consult with Dr. Trujillo on how to proceed forward with this patient.  Per [facility] BMI policy, patient is not a good candidate to proceed forward with a total hip replacement from our standpoint.  We did recommend the patient could proceed forward with getting a second opinion from another surgeon.  We are referring the patient to Dr. Emil Allan for further treatment option and evaluation.  I did instruct her that we would be happy to see her back again at any time but in order for Dr. Trujillo to proceed forward with surgery her BMI has to be under 45 kg/m².    There are no diagnoses linked to this encounter.    Basil Garcia, APRN  2/28/2023

## 2023-04-03 DIAGNOSIS — Z12.31 ENCOUNTER FOR SCREENING MAMMOGRAM FOR MALIGNANT NEOPLASM OF BREAST: Primary | ICD-10-CM

## 2023-04-13 ENCOUNTER — HOSPITAL ENCOUNTER (OUTPATIENT)
Dept: MAMMOGRAPHY | Facility: HOSPITAL | Age: 73
Discharge: HOME | End: 2023-04-13
Admitting: NURSE PRACTITIONER
Payer: MEDICARE

## 2023-04-13 DIAGNOSIS — Z12.31 ENCOUNTER FOR SCREENING MAMMOGRAM FOR MALIGNANT NEOPLASM OF BREAST: ICD-10-CM

## 2023-04-13 PROCEDURE — 77067 SCR MAMMO BI INCL CAD: CPT

## 2023-04-13 PROCEDURE — 77063 BREAST TOMOSYNTHESIS BI: CPT

## 2023-04-17 ENCOUNTER — OFFICE VISIT (OUTPATIENT)
Dept: INTERNAL MEDICINE | Facility: CLINIC | Age: 73
End: 2023-04-17
Payer: MEDICARE

## 2023-04-17 VITALS
DIASTOLIC BLOOD PRESSURE: 78 MMHG | HEART RATE: 70 BPM | WEIGHT: 287 LBS | OXYGEN SATURATION: 94 % | BODY MASS INDEX: 47.82 KG/M2 | RESPIRATION RATE: 18 BRPM | SYSTOLIC BLOOD PRESSURE: 140 MMHG | HEIGHT: 65 IN

## 2023-04-17 DIAGNOSIS — I10 ESSENTIAL HYPERTENSION: Chronic | ICD-10-CM

## 2023-04-17 DIAGNOSIS — N18.4 CKD (CHRONIC KIDNEY DISEASE) STAGE 4, GFR 15-29 ML/MIN: Primary | Chronic | ICD-10-CM

## 2023-04-17 DIAGNOSIS — E53.8 B12 DEFICIENCY: ICD-10-CM

## 2023-04-17 DIAGNOSIS — R73.09 ELEVATED GLUCOSE: ICD-10-CM

## 2023-04-17 DIAGNOSIS — E66.01 MORBIDLY OBESE: ICD-10-CM

## 2023-04-17 DIAGNOSIS — I25.110 CORONARY ARTERY DISEASE INVOLVING NATIVE CORONARY ARTERY OF NATIVE HEART WITH UNSTABLE ANGINA PECTORIS: Chronic | ICD-10-CM

## 2023-04-17 DIAGNOSIS — E78.2 MIXED HYPERLIPIDEMIA: Chronic | ICD-10-CM

## 2023-04-17 LAB
ALBUMIN SERPL-MCNC: 4.3 G/DL (ref 3.5–5.2)
ALBUMIN/GLOB SERPL: 2 G/DL
ALP SERPL-CCNC: 124 U/L (ref 39–117)
ALT SERPL-CCNC: 10 U/L (ref 1–33)
AST SERPL-CCNC: 15 U/L (ref 1–32)
BASOPHILS # BLD AUTO: 0.02 10*3/MM3 (ref 0–0.2)
BASOPHILS NFR BLD AUTO: 0.4 % (ref 0–1.5)
BILIRUB SERPL-MCNC: 0.4 MG/DL (ref 0–1.2)
BUN SERPL-MCNC: 26 MG/DL (ref 8–23)
BUN/CREAT SERPL: 14.1 (ref 7–25)
CALCIUM SERPL-MCNC: 9 MG/DL (ref 8.6–10.5)
CHLORIDE SERPL-SCNC: 107 MMOL/L (ref 98–107)
CHOLEST SERPL-MCNC: 169 MG/DL (ref 0–200)
CO2 SERPL-SCNC: 28.2 MMOL/L (ref 22–29)
CREAT SERPL-MCNC: 1.84 MG/DL (ref 0.57–1)
EGFRCR SERPLBLD CKD-EPI 2021: 28.9 ML/MIN/1.73
EOSINOPHIL # BLD AUTO: 0.14 10*3/MM3 (ref 0–0.4)
EOSINOPHIL NFR BLD AUTO: 2.5 % (ref 0.3–6.2)
ERYTHROCYTE [DISTWIDTH] IN BLOOD BY AUTOMATED COUNT: 14.5 % (ref 12.3–15.4)
GLOBULIN SER CALC-MCNC: 2.1 GM/DL
GLUCOSE SERPL-MCNC: 109 MG/DL (ref 65–99)
HBA1C MFR BLD: 5.9 % (ref 4.8–5.6)
HCT VFR BLD AUTO: 28.9 % (ref 34–46.6)
HDLC SERPL-MCNC: 67 MG/DL (ref 40–60)
HGB BLD-MCNC: 9.1 G/DL (ref 12–15.9)
IMM GRANULOCYTES # BLD AUTO: 0.01 10*3/MM3 (ref 0–0.05)
IMM GRANULOCYTES NFR BLD AUTO: 0.2 % (ref 0–0.5)
LDLC SERPL CALC-MCNC: 86 MG/DL (ref 0–100)
LYMPHOCYTES # BLD AUTO: 1.26 10*3/MM3 (ref 0.7–3.1)
LYMPHOCYTES NFR BLD AUTO: 22.7 % (ref 19.6–45.3)
MCH RBC QN AUTO: 28.3 PG (ref 26.6–33)
MCHC RBC AUTO-ENTMCNC: 31.5 G/DL (ref 31.5–35.7)
MCV RBC AUTO: 90 FL (ref 79–97)
MONOCYTES # BLD AUTO: 0.38 10*3/MM3 (ref 0.1–0.9)
MONOCYTES NFR BLD AUTO: 6.9 % (ref 5–12)
NEUTROPHILS # BLD AUTO: 3.73 10*3/MM3 (ref 1.7–7)
NEUTROPHILS NFR BLD AUTO: 67.3 % (ref 42.7–76)
NRBC BLD AUTO-RTO: 0 /100 WBC (ref 0–0.2)
PLATELET # BLD AUTO: 162 10*3/MM3 (ref 140–450)
POTASSIUM SERPL-SCNC: 5.3 MMOL/L (ref 3.5–5.2)
PROT SERPL-MCNC: 6.4 G/DL (ref 6–8.5)
RBC # BLD AUTO: 3.21 10*6/MM3 (ref 3.77–5.28)
SODIUM SERPL-SCNC: 144 MMOL/L (ref 136–145)
TRIGL SERPL-MCNC: 84 MG/DL (ref 0–150)
TSH SERPL DL<=0.005 MIU/L-ACNC: 2.27 UIU/ML (ref 0.27–4.2)
VLDLC SERPL CALC-MCNC: 16 MG/DL (ref 5–40)
WBC # BLD AUTO: 5.54 10*3/MM3 (ref 3.4–10.8)

## 2023-04-17 RX ORDER — CYANOCOBALAMIN 1000 UG/ML
INJECTION, SOLUTION INTRAMUSCULAR; SUBCUTANEOUS
Qty: 1 ML | Refills: 1 | Status: SHIPPED | OUTPATIENT
Start: 2023-04-17

## 2023-04-17 RX ORDER — DULOXETIN HYDROCHLORIDE 30 MG/1
CAPSULE, DELAYED RELEASE ORAL
COMMUNITY

## 2023-05-07 NOTE — ASSESSMENT & PLAN NOTE
She is given information to contact [facility] to make an appointment, discussed importance of f/u with nephrology due to worsening kidney function.

## 2023-05-07 NOTE — ASSESSMENT & PLAN NOTE
She denies myalgias with simvastatin which she will continue along with a low-fat, low-cholesterol diet.   Lab Results   Component Value Date    LDL 86 04/17/2023

## 2023-05-07 NOTE — ASSESSMENT & PLAN NOTE
Patient's (Body mass index is 47.76 kg/m².) indicates that they are morbidly/severely obese (BMI > 40 or > 35 with obesity - related health condition) with health conditions that include obstructive sleep apnea, hypertension, coronary heart disease, dyslipidemias and osteoarthritis . Weight is unchanged. BMI  is above average; BMI management plan is completed. We discussed portion control and increasing exercise.

## 2023-05-07 NOTE — PROGRESS NOTES
Chief Complaint  Follow-up, Hip Pain, Fall (A few times last month ), Chronic Kidney Disease, Hypertension, and Glucose intolerance    Subjective        Brooklyn Johns presents to [facility] for f/u regarding CKD, HTN and glucose intolerance.    History of Present Illness  She has been lost to follow-up with nephrology, referral previously placed due to worsening kidney function. According to nephrology notes they have been unable to reach her to schedule an appointment.  She has had difficulty getting her Requip through the pharmacy, has been working with [facility] (through [employer]) to get medication.  She has had several falls since her last visit; she stool up from her chair and lost her balance, landing on her left side. She also fell behind her recliner and had difficulty getting up, called provider through [facility] for assistance.  She sees ortho (Dr. Glover) for back and joint pain, recently started on a steroid and a muscle relaxer due to exacerbation of pain.    Back Pain  This is a chronic problem. The current episode started more than 1 year ago. The problem occurs constantly. The problem has been rapidly worsening since onset. The pain is present in the sacro-iliac. The quality of the pain is described as shooting and stabbing. The pain radiates to the right thigh. The pain is at a severity of 9/10. The pain is the same all the time. The symptoms are aggravated by sitting and standing. Stiffness is present in the morning, at night and all day. Associated symptoms include leg pain and weakness. Pertinent negatives include no abdominal pain, bladder incontinence, bowel incontinence, chest pain, dysuria, fever, headaches, numbness, paresis, paresthesias, perianal numbness, tingling or weight loss. Risk factors include lack of exercise, obesity and poor posture.       Objective   Vital Signs:  /78 (BP Location: Left arm, Patient Position: Sitting, Cuff  "Size: Small Adult)   Pulse 70   Resp 18   Ht 165.1 cm (65\")   Wt 130 kg (287 lb)   SpO2 94%   BMI 47.76 kg/m²   Estimated body mass index is 47.76 kg/m² as calculated from the following:    Height as of this encounter: 165.1 cm (65\").    Weight as of this encounter: 130 kg (287 lb).             Physical Exam  Constitutional:       Appearance: She is well-developed. She is not ill-appearing.   HENT:      Head: Normocephalic.      Right Ear: Hearing, tympanic membrane and external ear normal.      Left Ear: Hearing, tympanic membrane and external ear normal.      Nose: Nose normal. No nasal deformity, mucosal edema or rhinorrhea.      Right Sinus: No maxillary sinus tenderness or frontal sinus tenderness.      Left Sinus: No maxillary sinus tenderness or frontal sinus tenderness.      Mouth/Throat:      Dentition: Normal dentition.   Eyes:      General: Lids are normal.         Right eye: No discharge.         Left eye: No discharge.      Conjunctiva/sclera: Conjunctivae normal.      Right eye: No exudate.     Left eye: No exudate.  Neck:      Thyroid: No thyroid mass or thyromegaly.      Vascular: No carotid bruit.      Trachea: Trachea normal.   Cardiovascular:      Rate and Rhythm: Regular rhythm.      Pulses: Normal pulses.      Heart sounds: Normal heart sounds. No murmur heard.  Pulmonary:      Effort: No respiratory distress.      Breath sounds: Normal breath sounds. No decreased breath sounds, wheezing, rhonchi or rales.   Abdominal:      General: Bowel sounds are normal.      Palpations: Abdomen is soft.      Tenderness: There is no abdominal tenderness.   Musculoskeletal:      Cervical back: Normal range of motion. No edema.   Lymphadenopathy:      Head:      Right side of head: No submental, submandibular, tonsillar, preauricular, posterior auricular or occipital adenopathy.      Left side of head: No submental, submandibular, tonsillar, preauricular, posterior auricular or occipital adenopathy. "   Skin:     General: Skin is warm and dry.      Nails: There is no clubbing.   Neurological:      Mental Status: She is alert.   Psychiatric:         Behavior: Behavior is cooperative.        Result Review :                   Assessment and Plan   Diagnoses and all orders for this visit:    1. CKD (chronic kidney disease) stage 4, GFR 15-29 ml/min (Primary)  Assessment & Plan:  She is given information to contact [facility] to make an appointment, discussed importance of f/u with nephrology due to worsening kidney function.      2. Essential hypertension  Assessment & Plan:  BP is slightly elevated in the office today; continue metoprolol and chlorthalidone along with a low sodium diet.    Orders:  -     CBC & Differential  -     Comprehensive Metabolic Panel  -     TSH    3. Mixed hyperlipidemia  Assessment & Plan:  She denies myalgias with simvastatin which she will continue along with a low-fat, low-cholesterol diet.   Lab Results   Component Value Date    LDL 86 04/17/2023       Orders:  -     Lipid Panel    4. Coronary artery disease involving native coronary artery of native heart with unstable angina pectoris (HCC)  Assessment & Plan:  Heart cath 9/16/2020: Mild nonobstructive coronary artery disease; she is managed on statin therapy, beta blocker, no aspiring (managed on Eliquis).      5. Elevated glucose  Assessment & Plan:  Her weight has increased cachorro 10# since her last visit, recheck labs today.    Orders:  -     Hemoglobin A1c    6. Morbidly obese  Assessment & Plan:  Patient's (Body mass index is 47.76 kg/m².) indicates that they are morbidly/severely obese (BMI > 40 or > 35 with obesity - related health condition) with health conditions that include obstructive sleep apnea, hypertension, coronary heart disease, dyslipidemias and osteoarthritis . Weight is unchanged. BMI  is above average; BMI management plan is completed. We discussed portion control and increasing exercise.               Follow Up   Return in about 4 months (around 8/17/2023).  Patient was given instructions and counseling regarding her condition or for health maintenance advice. Please see specific information pulled into the AVS if appropriate.       Answers for HPI/ROS submitted by the patient on 4/16/2023  What is the primary reason for your visit?: Back Pain

## 2023-05-07 NOTE — ASSESSMENT & PLAN NOTE
Heart cath 9/16/2020: Mild nonobstructive coronary artery disease; she is managed on statin therapy, beta blocker, no aspiring (managed on Eliquis).

## 2023-05-07 NOTE — ASSESSMENT & PLAN NOTE
BP is slightly elevated in the office today; continue metoprolol and chlorthalidone along with a low sodium diet.

## 2023-05-21 DIAGNOSIS — E53.8 B12 DEFICIENCY: ICD-10-CM

## 2023-05-21 DIAGNOSIS — K21.9 GASTROESOPHAGEAL REFLUX DISEASE, UNSPECIFIED WHETHER ESOPHAGITIS PRESENT: ICD-10-CM

## 2023-05-22 NOTE — TELEPHONE ENCOUNTER
"Rx Refill Note  Requested Prescriptions     Pending Prescriptions Disp Refills   • Syringe/Needle, Disp, (B-D INTEGRA SYRINGE) 25G X 5/8\" 3 ML misc 3 each 0   • sodium bicarbonate 650 MG tablet 90 tablet 1     Sig: Take 1 tablet by mouth 2 (Two) Times a Day.   • DULoxetine (CYMBALTA) 30 MG capsule     • cyanocobalamin 1000 MCG/ML injection 1 mL 1     Sig: Inject 1mL into the appropriate muscle every 28 days      Last office visit with prescribing clinician: 4/17/2023   Last telemedicine visit with prescribing clinician: 4/17/2023   Next office visit with prescribing clinician: 8/23/2023                         Would you like a call back once the refill request has been completed: [] Yes [] No    If the office needs to give you a call back, can they leave a voicemail: [] Yes [] No    Caren Astorga  05/22/23, 08:41 EDT  "

## 2023-05-24 DIAGNOSIS — E53.8 B12 DEFICIENCY: ICD-10-CM

## 2023-05-24 RX ORDER — DULOXETIN HYDROCHLORIDE 30 MG/1
30 CAPSULE, DELAYED RELEASE ORAL DAILY
Qty: 90 CAPSULE | Refills: 1 | Status: SHIPPED | OUTPATIENT
Start: 2023-05-24

## 2023-05-24 RX ORDER — CYANOCOBALAMIN 1000 UG/ML
INJECTION, SOLUTION INTRAMUSCULAR; SUBCUTANEOUS
Qty: 10 ML | Refills: 1 | Status: SHIPPED | OUTPATIENT
Start: 2023-05-24

## 2023-05-24 RX ORDER — SODIUM BICARBONATE 650 MG/1
650 TABLET ORAL 2 TIMES DAILY
Qty: 180 TABLET | Refills: 1 | Status: SHIPPED | OUTPATIENT
Start: 2023-05-24

## 2023-05-24 RX ORDER — NEEDLES, FILTER 19GX1 1/2"
1 NEEDLE, DISPOSABLE MISCELLANEOUS
Qty: 50 EACH | Refills: 0 | Status: SHIPPED | OUTPATIENT
Start: 2023-05-24 | End: 2023-05-24 | Stop reason: SDUPTHER

## 2023-05-24 RX ORDER — NEEDLES, FILTER 19GX1 1/2"
1 NEEDLE, DISPOSABLE MISCELLANEOUS
Qty: 50 EACH | Refills: 0 | Status: SHIPPED | OUTPATIENT
Start: 2023-05-24

## 2023-06-06 DIAGNOSIS — K21.9 GASTROESOPHAGEAL REFLUX DISEASE, UNSPECIFIED WHETHER ESOPHAGITIS PRESENT: ICD-10-CM

## 2023-06-07 RX ORDER — FAMOTIDINE 20 MG/1
TABLET, FILM COATED ORAL
Qty: 180 TABLET | Refills: 3 | Status: SHIPPED | OUTPATIENT
Start: 2023-06-07

## 2023-06-07 NOTE — TELEPHONE ENCOUNTER
Rx Refill Note  Requested Prescriptions     Pending Prescriptions Disp Refills    famotidine (PEPCID) 20 MG tablet [Pharmacy Med Name: FAMOTIDINE TABS 20MG] 180 tablet 3     Sig: TAKE 1 TABLET TWICE A DAY      Last office visit with prescribing clinician: 4/17/2023   Last telemedicine visit with prescribing clinician: Visit date not found   Next office visit with prescribing clinician: 8/23/2023     Jyoti Rios MA  06/07/23, 08:03 EDT

## 2023-08-23 ENCOUNTER — OFFICE VISIT (OUTPATIENT)
Dept: INTERNAL MEDICINE | Facility: CLINIC | Age: 73
End: 2023-08-23
Payer: MEDICARE

## 2023-08-23 ENCOUNTER — TRANSCRIBE ORDERS (OUTPATIENT)
Dept: ADMINISTRATIVE | Facility: HOSPITAL | Age: 73
End: 2023-08-23
Payer: MEDICARE

## 2023-08-23 VITALS
HEART RATE: 78 BPM | WEIGHT: 284.2 LBS | DIASTOLIC BLOOD PRESSURE: 70 MMHG | SYSTOLIC BLOOD PRESSURE: 126 MMHG | HEIGHT: 65 IN | OXYGEN SATURATION: 93 % | BODY MASS INDEX: 47.35 KG/M2 | TEMPERATURE: 97.5 F

## 2023-08-23 DIAGNOSIS — I10 ESSENTIAL HYPERTENSION: Primary | Chronic | ICD-10-CM

## 2023-08-23 DIAGNOSIS — D63.1 ANEMIA IN END-STAGE RENAL DISEASE: Primary | ICD-10-CM

## 2023-08-23 DIAGNOSIS — E78.2 MIXED HYPERLIPIDEMIA: Chronic | ICD-10-CM

## 2023-08-23 DIAGNOSIS — N18.6 ANEMIA IN END-STAGE RENAL DISEASE: Primary | ICD-10-CM

## 2023-08-23 DIAGNOSIS — I25.110 CORONARY ARTERY DISEASE INVOLVING NATIVE CORONARY ARTERY OF NATIVE HEART WITH UNSTABLE ANGINA PECTORIS: Chronic | ICD-10-CM

## 2023-08-23 DIAGNOSIS — N25.81 SECONDARY HYPERPARATHYROIDISM: ICD-10-CM

## 2023-08-23 DIAGNOSIS — N18.4 CKD (CHRONIC KIDNEY DISEASE) STAGE 4, GFR 15-29 ML/MIN: Chronic | ICD-10-CM

## 2023-08-23 RX ORDER — ALBUTEROL SULFATE 90 UG/1
AEROSOL, METERED RESPIRATORY (INHALATION)
COMMUNITY
Start: 2023-06-26

## 2023-08-23 RX ORDER — RAMIPRIL 10 MG/1
CAPSULE ORAL
COMMUNITY

## 2023-08-24 LAB
ALBUMIN SERPL-MCNC: 4.3 G/DL (ref 3.5–5.2)
ALBUMIN/GLOB SERPL: 2.7 G/DL
ALP SERPL-CCNC: 138 U/L (ref 39–117)
ALT SERPL-CCNC: 11 U/L (ref 1–33)
AST SERPL-CCNC: 12 U/L (ref 1–32)
BILIRUB SERPL-MCNC: 0.5 MG/DL (ref 0–1.2)
BUN SERPL-MCNC: 25 MG/DL (ref 8–23)
BUN/CREAT SERPL: 14.1 (ref 7–25)
CALCIUM SERPL-MCNC: 8.9 MG/DL (ref 8.6–10.5)
CHLORIDE SERPL-SCNC: 99 MMOL/L (ref 98–107)
CHOLEST SERPL-MCNC: 155 MG/DL (ref 0–200)
CO2 SERPL-SCNC: 25.8 MMOL/L (ref 22–29)
CREAT SERPL-MCNC: 1.77 MG/DL (ref 0.57–1)
EGFRCR SERPLBLD CKD-EPI 2021: 30.2 ML/MIN/1.73
GLOBULIN SER CALC-MCNC: 1.6 GM/DL
GLUCOSE SERPL-MCNC: 96 MG/DL (ref 65–99)
HDLC SERPL-MCNC: 61 MG/DL (ref 40–60)
LDLC SERPL CALC-MCNC: 76 MG/DL (ref 0–100)
POTASSIUM SERPL-SCNC: 4.9 MMOL/L (ref 3.5–5.2)
PROT SERPL-MCNC: 5.9 G/DL (ref 6–8.5)
SODIUM SERPL-SCNC: 134 MMOL/L (ref 136–145)
TRIGL SERPL-MCNC: 98 MG/DL (ref 0–150)
VLDLC SERPL CALC-MCNC: 18 MG/DL (ref 5–40)

## 2023-08-30 ENCOUNTER — HOSPITAL ENCOUNTER (OUTPATIENT)
Dept: INFUSION THERAPY | Facility: HOSPITAL | Age: 73
Discharge: HOME | End: 2023-08-30
Payer: MEDICARE

## 2023-08-30 VITALS
RESPIRATION RATE: 16 BRPM | HEART RATE: 75 BPM | SYSTOLIC BLOOD PRESSURE: 145 MMHG | OXYGEN SATURATION: 98 % | DIASTOLIC BLOOD PRESSURE: 60 MMHG | TEMPERATURE: 98 F

## 2023-08-30 DIAGNOSIS — N18.4 CHRONIC KIDNEY DISEASE, STAGE IV (SEVERE): Primary | ICD-10-CM

## 2023-08-30 PROCEDURE — 96374 THER/PROPH/DIAG INJ IV PUSH: CPT

## 2023-08-30 PROCEDURE — 96365 THER/PROPH/DIAG IV INF INIT: CPT

## 2023-08-30 PROCEDURE — 25010000002 FERUMOXYTOL 510 MG/17ML SOLUTION 17 ML VIAL: Performed by: NURSE PRACTITIONER

## 2023-08-30 RX ADMIN — FERUMOXYTOL 510 MG: 510 INJECTION INTRAVENOUS at 12:48

## 2023-09-06 ENCOUNTER — HOSPITAL ENCOUNTER (OUTPATIENT)
Dept: INFUSION THERAPY | Facility: HOSPITAL | Age: 73
Discharge: HOME | End: 2023-09-06
Admitting: NURSE PRACTITIONER
Payer: MEDICARE

## 2023-09-06 VITALS
TEMPERATURE: 98.4 F | HEART RATE: 67 BPM | RESPIRATION RATE: 18 BRPM | SYSTOLIC BLOOD PRESSURE: 142 MMHG | OXYGEN SATURATION: 98 % | DIASTOLIC BLOOD PRESSURE: 65 MMHG

## 2023-09-06 DIAGNOSIS — N18.4 CHRONIC KIDNEY DISEASE, STAGE IV (SEVERE): Primary | ICD-10-CM

## 2023-09-06 PROCEDURE — 96374 THER/PROPH/DIAG INJ IV PUSH: CPT

## 2023-09-06 PROCEDURE — 25010000002 FERUMOXYTOL 510 MG/17ML SOLUTION 17 ML VIAL: Performed by: NURSE PRACTITIONER

## 2023-09-06 PROCEDURE — 96365 THER/PROPH/DIAG IV INF INIT: CPT

## 2023-09-06 RX ADMIN — FERUMOXYTOL 510 MG: 510 INJECTION INTRAVENOUS at 10:39

## 2023-09-10 PROBLEM — M50.30 DDD (DEGENERATIVE DISC DISEASE), CERVICAL: Status: ACTIVE | Noted: 2023-07-26

## 2023-09-10 PROBLEM — R73.03 PREDIABETES: Status: ACTIVE | Noted: 2023-05-08

## 2023-09-10 PROBLEM — E87.22 CHRONIC METABOLIC ACIDOSIS: Status: ACTIVE | Noted: 2023-05-08

## 2023-09-10 PROBLEM — N18.9 ANEMIA IN CHRONIC KIDNEY DISEASE: Chronic | Status: ACTIVE | Noted: 2023-05-08

## 2023-09-10 PROBLEM — D63.1 ANEMIA IN CHRONIC KIDNEY DISEASE: Chronic | Status: ACTIVE | Noted: 2023-05-08

## 2023-09-10 NOTE — ASSESSMENT & PLAN NOTE
She denies myalgias with simvastatin which she will continue along with a low-fat, low-cholesterol diet.   Lab Results   Component Value Date    LDL 76 08/23/2023   .

## 2023-09-10 NOTE — ASSESSMENT & PLAN NOTE
Heart cath 9/16/2020: Mild nonobstructive coronary artery disease; managed on statin therapy and metoprolol daily.

## 2023-09-10 NOTE — PROGRESS NOTES
"Chief Complaint  Hypertension (4 month follow up), Hyperlipidemia, Coronary Artery Disease, and Chronic Kidney Disease    Subjective        Brooklyn Johns presents to [facility] for f/u regarding HTN, hyperlipidemia, CKD and CAD.    History of Present Illness  Her stool has been dark red over the past several days without bowel movement changes, does note eating large amount of watermelon recently.  She is scheduled for an iron fusion next week through nephrology, +hx CKD (Stage 4).  She continues with pain mgmt, c/o bilateral hip pain and stiffness which is worse with walking and/or standing.      Objective   Vital Signs:  /70 (BP Location: Left arm, Patient Position: Sitting, Cuff Size: Large Adult)   Pulse 78   Temp 97.5 °F (36.4 °C) (Temporal)   Ht 165.1 cm (65\")   Wt 129 kg (284 lb 3.2 oz)   SpO2 93%   BMI 47.29 kg/m²   Estimated body mass index is 47.29 kg/m² as calculated from the following:    Height as of this encounter: 165.1 cm (65\").    Weight as of this encounter: 129 kg (284 lb 3.2 oz).               Physical Exam  Constitutional:       Appearance: She is well-developed. She is not ill-appearing.   HENT:      Head: Normocephalic.      Right Ear: Hearing, tympanic membrane and external ear normal.      Left Ear: Hearing, tympanic membrane and external ear normal.      Nose: Nose normal. No nasal deformity, mucosal edema or rhinorrhea.      Right Sinus: No maxillary sinus tenderness or frontal sinus tenderness.      Left Sinus: No maxillary sinus tenderness or frontal sinus tenderness.      Mouth/Throat:      Dentition: Normal dentition.   Eyes:      General: Lids are normal.         Right eye: No discharge.         Left eye: No discharge.      Conjunctiva/sclera: Conjunctivae normal.      Right eye: No exudate.     Left eye: No exudate.  Neck:      Thyroid: No thyroid mass or thyromegaly.      Vascular: No carotid bruit.      Trachea: Trachea normal. "   Cardiovascular:      Rate and Rhythm: Regular rhythm.      Pulses: Normal pulses.      Heart sounds: Normal heart sounds. No murmur heard.  Pulmonary:      Effort: No respiratory distress.      Breath sounds: Normal breath sounds. No decreased breath sounds, wheezing, rhonchi or rales.   Abdominal:      General: Bowel sounds are normal.      Palpations: Abdomen is soft.      Tenderness: There is no abdominal tenderness.   Musculoskeletal:      Cervical back: Normal range of motion. No edema.   Lymphadenopathy:      Head:      Right side of head: No submental, submandibular, tonsillar, preauricular, posterior auricular or occipital adenopathy.      Left side of head: No submental, submandibular, tonsillar, preauricular, posterior auricular or occipital adenopathy.   Skin:     General: Skin is warm and dry.      Nails: There is no clubbing.   Neurological:      Mental Status: She is alert.   Psychiatric:         Behavior: Behavior is cooperative.      Result Review :  The following data was reviewed by: ULICES Amezquita on 08/23/2023:  Common labs          11/9/2022    16:08 4/17/2023    10:21 8/23/2023    11:44   Common Labs   Glucose 116  109  96    BUN 28  26  25    Creatinine 2.04  1.84  1.77    Sodium 145  144  134    Potassium 4.8  5.3  4.9    Chloride 106  107  99    Calcium 9.0  9.0  8.9    Total Protein 6.5  6.4  5.9    Albumin 3.9  4.3  4.3    Total Bilirubin  0.4  0.5    Alkaline Phosphatase  124  138    AST (SGOT)  15  12    ALT (SGPT)  10  11    WBC 5.04  5.54     Hemoglobin 8.9  9.1     Hematocrit 27.9  28.9     Platelets 173  162     Total Cholesterol  169  155    Triglycerides  84  98    HDL Cholesterol  67  61    LDL Cholesterol   86  76    Hemoglobin A1C 5.90  5.90       Data reviewed : Consultant notes pain mgmt 5/30/23             Assessment and Plan   Diagnoses and all orders for this visit:    1. Essential hypertension (Primary)  Assessment & Plan:  BP is well-controlled on metoprolol  which she will continue along with a low sodium diet.    Orders:  -     Comprehensive Metabolic Panel    2. Coronary artery disease involving native coronary artery of native heart with unstable angina pectoris  Assessment & Plan:  Heart cath 9/16/2020: Mild nonobstructive coronary artery disease; managed on statin therapy and metoprolol daily.    Orders:  -     Lipid Panel    3. Mixed hyperlipidemia  Assessment & Plan:  She denies myalgias with simvastatin which she will continue along with a low-fat, low-cholesterol diet.   Lab Results   Component Value Date    LDL 76 08/23/2023   .      4. CKD (chronic kidney disease) stage 4, GFR 15-29 ml/min  Assessment & Plan:  She has re-established with nephrology for further monitoring, kidney function is stable.      5. Secondary hyperparathyroidism  Assessment & Plan:  She is managed on Calcitrol, recheck sodium              Follow Up   Return in about 6 months (around 2/23/2024).  Patient was given instructions and counseling regarding her condition or for health maintenance advice. Please see specific information pulled into the AVS if appropriate.       Answers submitted by the patient for this visit:  Other (Submitted on 8/21/2023)  Please describe your symptoms.: Back and hip pain causing inability to sleep or stand more then 15-20min., Also, problems with sudden jerking of arms  and upper body while sitting.  Legs jerk while trying to relax and sleep., Not able to sit or lay down without pain and  jerking of arms and legs., Arms are loosing strength.  Lifting is getting harder.  Hands shake while holding cup or pens.  Have you had these symptoms before?: No  How long have you been having these symptoms?: Greater than 2 weeks  Please list any medications you are currently taking for this condition.: Ropinerol  Please describe any probable cause for these symptoms. : Have been unable to refill Norco because of manufacturers delivery problems.  Unable to sleep and have  considerable pain in hip and back.  , Pharmacy has screwed up delivery of meds causing me to be off certain meds for long periods.  Ie: ropinerol, gabapentine,norco,allapurinol,calcium citrate,sodium Bicarbonate, b12,  still have not gotten D2 or iron because they are not on my insurance plan?????  Primary Reason for Visit (Submitted on 8/21/2023)  What is the primary reason for your visit?: Other

## 2023-10-24 DIAGNOSIS — I10 ESSENTIAL HYPERTENSION: ICD-10-CM

## 2023-10-24 RX ORDER — CHLORTHALIDONE 25 MG/1
TABLET ORAL
Qty: 45 TABLET | Refills: 3 | Status: SHIPPED | OUTPATIENT
Start: 2023-10-24

## 2024-01-01 DIAGNOSIS — N18.4 CKD (CHRONIC KIDNEY DISEASE) STAGE 4, GFR 15-29 ML/MIN: ICD-10-CM

## 2024-01-02 RX ORDER — CALCITRIOL 0.25 UG/1
CAPSULE, LIQUID FILLED ORAL
Qty: 36 CAPSULE | Refills: 3 | Status: SHIPPED | OUTPATIENT
Start: 2024-01-02

## 2024-01-02 NOTE — TELEPHONE ENCOUNTER
Protocol failed   See phone message.  I called pharmacy & recalled refill done 11-5-18 for Valium #40.  Called pt. To let him know he can .  RTN appt. Made.   Call back prn. He agreed.   S.O./Cathleen Talamantes RN.

## 2024-01-24 RX ORDER — METOPROLOL SUCCINATE 25 MG/1
TABLET, EXTENDED RELEASE ORAL
Qty: 90 TABLET | Refills: 3 | Status: SHIPPED | OUTPATIENT
Start: 2024-01-24

## 2024-02-02 ENCOUNTER — TELEPHONE (OUTPATIENT)
Dept: INTERNAL MEDICINE | Facility: CLINIC | Age: 74
End: 2024-02-02
Payer: MEDICARE

## 2024-02-03 ENCOUNTER — HOSPITAL ENCOUNTER (EMERGENCY)
Facility: HOSPITAL | Age: 74
Discharge: HOME | End: 2024-02-03
Attending: EMERGENCY MEDICINE
Payer: MEDICARE

## 2024-02-03 ENCOUNTER — APPOINTMENT (OUTPATIENT)
Dept: GENERAL RADIOLOGY | Facility: HOSPITAL | Age: 74
End: 2024-02-03
Payer: MEDICARE

## 2024-02-03 VITALS
BODY MASS INDEX: 45.32 KG/M2 | HEIGHT: 65 IN | WEIGHT: 272 LBS | RESPIRATION RATE: 18 BRPM | OXYGEN SATURATION: 97 % | DIASTOLIC BLOOD PRESSURE: 91 MMHG | TEMPERATURE: 97.5 F | SYSTOLIC BLOOD PRESSURE: 177 MMHG | HEART RATE: 68 BPM

## 2024-02-03 DIAGNOSIS — M16.11 PRIMARY OSTEOARTHRITIS OF RIGHT HIP: Primary | ICD-10-CM

## 2024-02-03 PROCEDURE — 99282 EMERGENCY DEPT VISIT SF MDM: CPT

## 2024-02-03 PROCEDURE — 25010000002 HYDROMORPHONE 1 MG/ML SOLUTION: Performed by: EMERGENCY MEDICINE

## 2024-02-03 PROCEDURE — 96372 THER/PROPH/DIAG INJ SC/IM: CPT

## 2024-02-03 PROCEDURE — 73502 X-RAY EXAM HIP UNI 2-3 VIEWS: CPT

## 2024-02-03 RX ORDER — SODIUM CHLORIDE 0.9 % (FLUSH) 0.9 %
10 SYRINGE (ML) INJECTION AS NEEDED
Status: DISCONTINUED | OUTPATIENT
Start: 2024-02-03 | End: 2024-02-03

## 2024-02-03 RX ADMIN — HYDROMORPHONE HYDROCHLORIDE 1 MG: 1 INJECTION, SOLUTION INTRAMUSCULAR; INTRAVENOUS; SUBCUTANEOUS at 09:51

## 2024-02-03 NOTE — ED NOTES
Patient to ER via car from home able to walk to triage for R hip pain  Patient reports she has been on hold for replacement x 2 years  Patient reports yesterday she has been lifting and carrying boxes to move

## 2024-02-03 NOTE — ED PROVIDER NOTES
EMERGENCY DEPARTMENT ENCOUNTER    Room Number:  39/39  PCP: Radha House APRN    HPI:  Chief Complaint: Right hip pain  A complete HPI/ROS/PMH/PSH/SH/FH are unobtainable due to: None  Context: Brooklyn Johns is a 73 y.o. female who presents to the ED c/o acute right hip pain.  She endorses having chronic right hip pain but has acutely worsened over the past 1 week.  No recent trauma.  No fever.         PAST MEDICAL HISTORY  Active Ambulatory Problems     Diagnosis Date Noted    Essential hypertension 07/11/2016    Sleep apnea 07/11/2016    Hyperlipidemia 07/11/2016    Crushing injury of left foot and ankle 01/01/1990    Lumbosacral spondylosis without myelopathy 06/18/2019    History of recurrent deep vein thrombosis (DVT) 06/18/2019    Morbidly obese 06/18/2019    CKD (chronic kidney disease) stage 4, GFR 15-29 ml/min 10/20/2019    Coronary artery disease involving native coronary artery of native heart with unstable angina pectoris 09/09/2020    Primary insomnia 01/22/2021    Bilateral hip pain 02/28/2021    Hyperuricemia 02/28/2021    B12 deficiency 02/28/2021    Restless legs 06/25/2021    Hypocalcemia 06/26/2021    Secondary hyperparathyroidism 10/25/2021    Lumbar radiculopathy 06/21/2022    Primary osteoarthritis of both hips 08/21/2022    Hyperkalemia 09/18/2022    Anemia 09/18/2022    Leg hematoma, left, initial encounter 11/13/2022    Elevated glucose 11/13/2022    Basal cell carcinoma (BCC) of face 01/01/2023    Right hip pain 01/15/2023    Chronic kidney disease, stage IV (severe) 08/30/2023    Chronic metabolic acidosis 05/08/2023    DDD (degenerative disc disease), cervical 07/26/2023    Prediabetes 05/08/2023    Anemia in chronic kidney disease 05/08/2023     Resolved Ambulatory Problems     Diagnosis Date Noted    Screening for colon cancer 11/06/2019    Neoplasm, nose 08/21/2022     Past Medical History:   Diagnosis Date    Adductor tendinitis     ADHD (attention deficit hyperactivity  disorder)     Allergic     Ankle sprain 1977    Anxiety     Arthritis of back 1970    Arthritis of neck 1980    Asthma     Bronchitis     CAD (coronary artery disease)     Cataract     Cervical disc disorder     CTS (carpal tunnel syndrome) 2000    Deep vein thrombosis 2015. Again in 2018    Degeneration of lumbar or lumbosacral intervertebral disc     Depression     GERD (gastroesophageal reflux disease) 1975    Gout     Headache 1986    Heart murmur 1990    Hip arthrosis 2010    History of DVT (deep vein thrombosis)     Hypertension     Insomnia     Insomnia secondary to anxiety     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     Kidney disease     Knee swelling 1970    Low back pain 1980    Low back strain 1970    Lumbosacral disc disease 1970    Neck strain 1970    Obesity 1950    Osteoarthritis     Osteopenia     Periarthritis of shoulder     Renal insufficiency Dr. Pritchett. 2016    Rhinitis, allergic     RLS (restless legs syndrome)     Rotator cuff syndrome     Sinus disorder     Tear of meniscus of knee 1988         PAST SURGICAL HISTORY  Past Surgical History:   Procedure Laterality Date    ANKLE FUSION Left     ANKLE OPEN REDUCTION INTERNAL FIXATION  1990??1995?    Triple orthodesis. Then ankle fusion. Dr. Wadsworth    APPENDECTOMY  1984    BARIATRIC SURGERY  1984    CARDIAC CATHETERIZATION  1999    CARDIAC CATHETERIZATION N/A 09/16/2020    Procedure: Coronary angiography;  Surgeon: Liliana Mae MD;  Location: [facility] CATH INVASIVE LOCATION;  Service: Cardiovascular;  Laterality: N/A;    CARDIAC CATHETERIZATION N/A 09/16/2020    Procedure: Left Heart Cath;  Surgeon: Liliana Mae MD;  Location: [facility] CATH INVASIVE LOCATION;  Service: Cardiovascular;  Laterality: N/A;    CHOLECYSTECTOMY      COLONOSCOPY  1990    COLONOSCOPY N/A 12/27/2019    Procedure: COLONOSCOPY INTO CECUM WITH COLD BX POLYPECTOMY;  Surgeon: Jessee Birch Jr., MD;  Location: [facility] ENDOSCOPY;  Service: General     CORONARY ANGIOPLASTY WITH STENT PLACEMENT      CORONARY STENT PLACEMENT      EYE SURGERY      GASTRIC BYPASS      HYSTERECTOMY      Total    JOINT REPLACEMENT  Lft knee 2014  rht knee 2015    KNEE ACL RECONSTRUCTION      MOHS SURGERY Left 12/07/2020    left cheek    OOPHORECTOMY      SINUS SURGERY      x 2    TOTAL KNEE ARTHROPLASTY Bilateral     TRIGGER POINT INJECTION           FAMILY HISTORY  Family History   Problem Relation Age of Onset    Breast cancer Mother     Cancer Mother         bladder    Arthritis Mother     Hyperlipidemia Mother     Stroke Mother     Lung cancer Father     Arthritis Father     Cancer Father     Hyperlipidemia Father     Breast cancer Sister     Hypertension Sister     Heart disease Sister     Diabetes Sister     Cancer Sister     Breast cancer Sister     Hypertension Sister     Heart disease Sister     Arthritis Sister     Cancer Sister     Cancer Sister     Diabetes Sister     Cancer Maternal Aunt     Diabetes Paternal Uncle          SOCIAL HISTORY  Social History     Socioeconomic History    Marital status:    Tobacco Use    Smoking status: Never    Smokeless tobacco: Never   Vaping Use    Vaping Use: Never used   Substance and Sexual Activity    Alcohol use: Not Currently     Alcohol/week: 1.0 standard drink of alcohol     Types: 1 Glasses of wine per week     Comment: social    Drug use: Not Currently     Types: Hydrocodone     Comment: I take this for the pain in my hip and back.    Sexual activity: Not Currently     Partners: Male     Birth control/protection: None, Hysterectomy         ALLERGIES  Patient has no known allergies.        REVIEW OF SYSTEMS  Review of Systems     Included in HPI  All systems reviewed and negative except for those discussed in HPI.       PHYSICAL EXAM  ED Triage Vitals [02/03/24 0823]   Temp Heart Rate Resp BP SpO2   97.5 °F (36.4 °C) 83 18 -- 100 %      Temp src Heart Rate Source Patient Position BP Location FiO2 (%)   -- -- -- -- --        Physical Exam      GENERAL: no acute distress  HENT: nares patent  EYES: no scleral icterus  CV: regular rhythm, normal rate  RESPIRATORY: normal effort  ABDOMEN: soft, obese abdomen  MUSCULOSKELETAL: no deformity, mild pain with passive range of motion of the right hip, no pain with passive range of motion to the right knee or ankle  NEURO: alert, moves all extremities, follows commands  PSYCH:  calm, cooperative  SKIN: warm, dry    Vital signs and nursing notes reviewed.          LAB RESULTS  No results found for this or any previous visit (from the past 24 hour(s)).    Ordered the above labs and reviewed the results.        RADIOLOGY  XR Hip With or Without Pelvis 2 - 3 View Right    Result Date: 2/3/2024  AP PELVIS AND AP/FROG LATERAL RIGHT HIP  HISTORY: Chronic right hip pain.  COMPARISON: None  FINDINGS: There is advanced osteoarthritis of the right hip with axial/medial migration of the right femoral head and joint space narrowing. Marginal osteophyte formation is present. There is no evidence for fracture or acute abnormality. There are changes of degenerative disc disease in the lower lumbar spine where there appears to be a levoscoliotic curvature. Several phleboliths overlie the lower pelvis.      1. Osteoarthritis of the right hip with axial/medial migration of right femoral head and joint space narrowing and marginal spur formation. 2. Degenerative disc disease lower lumbar spine.       Ordered the above noted radiological studies. Reviewed by me in PACS.        MEDICATIONS GIVEN IN ER  Medications   HYDROmorphone (DILAUDID) injection 1 mg (1 mg Intramuscular Given 2/3/24 0951)         ORDERS PLACED DURING THIS VISIT:  Orders Placed This Encounter   Procedures    XR Hip With or Without Pelvis 2 - 3 View Right         OUTPATIENT MEDICATION MANAGEMENT:  Current Facility-Administered Medications Ordered in Epic   Medication Dose Route Frequency Provider Last Rate Last Admin    cyanocobalamin  "injection 1,000 mcg  1,000 mcg Intramuscular Q28 Days Radha House APRN   1,000 mcg at 08/17/22 1050     Current Outpatient Medications Ordered in Epic   Medication Sig Dispense Refill    albuterol sulfate  (90 Base) MCG/ACT inhaler Inhale. (Patient not taking: Reported on 8/23/2023)      allopurinol (ZYLOPRIM) 100 MG tablet TAKE 1 TABLET DAILY 90 tablet 3    calcitriol (ROCALTROL) 0.25 MCG capsule TAKE 1 CAPSULE MONDAY, WEDNESDAY, AND FRIDAY. SEE ADMIN INSTRUCTIONS 36 capsule 3    calcium citrate (CALCITRATE) 950 (200 Ca) MG tablet TAKE 1 TABLET BY MOUTH TWICE A  tablet 1    chlorthalidone (HYGROTON) 25 MG tablet TAKE 1 TABLET EVERY OTHER DAY 45 tablet 3    cyanocobalamin 1000 MCG/ML injection Inject 1mL into the appropriate muscle every 28 days 10 mL 1    DULoxetine (CYMBALTA) 30 MG capsule Take 1 capsule by mouth Daily. 90 capsule 1    Eliquis 2.5 MG tablet tablet TAKE 1 TABLET TWICE A  tablet 3    famotidine (PEPCID) 20 MG tablet TAKE 1 TABLET TWICE A  tablet 3    gabapentin (NEURONTIN) 400 MG capsule Take 1 capsule by mouth 2 (Two) Times a Day.      HYDROcodone-acetaminophen (NORCO) 7.5-325 MG per tablet Take 1 tablet by mouth 2 (Two) Times a Day As Needed for Moderate Pain . (Patient taking differently: Take 1 tablet by mouth 3 (Three) Times a Day.) 60 tablet 0    metoprolol succinate XL (TOPROL-XL) 25 MG 24 hr tablet TAKE 1 TABLET DAILY 90 tablet 3    Needle, Disp, (B-D DISP NEEDLE 30GX1\") 30G X 1\" misc BD Integra Syringe 3 mL 25 gauge x 5/8\"      ramipril (ALTACE) 10 MG capsule       rOPINIRole (REQUIP) 1 MG tablet TAKE 1 TABLET FOUR TIMES A  tablet 3    simvastatin (ZOCOR) 40 MG tablet TAKE ONE TABLET BY MOUTH DAILY 90 tablet 1    sodium bicarbonate 650 MG tablet Take 1 tablet by mouth 2 (Two) Times a Day. 180 tablet 1    Syringe/Needle, Disp, (B-D INTEGRA SYRINGE) 25G X 5/8\" 3 ML misc Inject 1 Units under the skin into the appropriate area as directed Every 30 " (Thirty) Days. 50 each 0       PROCEDURES  Procedures        MEDICAL DECISION MAKING, PROGRESS, and CONSULTS    Discussion below represents my analysis of pertinent findings related to patient's condition, differential diagnosis, treatment plan and final disposition.        Differential diagnosis:    Hip fracture, hip dislocation, arthritis, septic joint       After my initial evaluation, I considered the need for admission due to pain control issues        Independent interpretation of labs, radiology studies, and discussions with consultants:  ED Course as of 02/03/24 1036   Sat Feb 03, 2024   0925 X-ray of the right hip independently interpreted by myself.  Osteoarthritis.  No fracture. [TD]      ED Course User Index  [TD] Evan Piña II, MD         Her exam is not consistent with septic joint.  No evidence of fracture or dislocation.  She does have rather significant osteoarthritis.  Has been told by her orthopedic physician Dr. Trujillo that she is not a candidate for hip replacement due to her BMI.    Patient was able to ambulate to the emergency department.  I do not think that she needs to be admitted to the hospital at this time.    DIAGNOSIS  Final diagnoses:   Primary osteoarthritis of right hip         DISPOSITION  DISCHARGE    FOLLOW-UP  Radha House, APRN  [address] [facility] [address]  [place] [address]  [phone]    Schedule an appointment as soon as possible for a visit   As needed    Hipolito Trujillo MD  [address] [facility] [address]  [place] [address]  [phone]          Khang Gomez II, MD  [address] [facility] [address]  [place] [address]  [phone]               Medication List        Changed      HYDROcodone-acetaminophen 7.5-325 MG per tablet  Commonly known as: NORCO  Take 1 tablet by mouth 2 (Two) Times a Day As Needed for Moderate Pain .  What changed: when to take this                  Latest Documented Vital Signs:  As of 10:36 EST  BP- 165/84 HR- 77 Temp- 97.5 °F  (36.4 °C) O2 sat- 99%      --    Please note that portions of this were completed with a voice recognition program.       Note Disclaimer: At [facility], we believe that sharing information builds trust and better relationships. You are receiving this note because you are receiving care at [facility] or recently visited. It is possible you will see health information before a provider has talked with you about it. This kind of information can be easy to misunderstand. To help you fully understand what it means for your health, we urge you to discuss this note with your provider.         Evan Piña II, MD  02/03/24 9783

## 2024-02-04 DIAGNOSIS — E83.51 HYPOCALCEMIA: ICD-10-CM

## 2024-02-04 DIAGNOSIS — E53.8 B12 DEFICIENCY: ICD-10-CM

## 2024-02-04 DIAGNOSIS — I82.409 RECURRENT ACUTE DEEP VEIN THROMBOSIS (DVT) OF LOWER EXTREMITY, UNSPECIFIED LATERALITY: ICD-10-CM

## 2024-02-04 DIAGNOSIS — K21.9 GASTROESOPHAGEAL REFLUX DISEASE, UNSPECIFIED WHETHER ESOPHAGITIS PRESENT: ICD-10-CM

## 2024-02-05 ENCOUNTER — OFFICE VISIT (OUTPATIENT)
Dept: INTERNAL MEDICINE | Facility: CLINIC | Age: 74
End: 2024-02-05
Payer: MEDICARE

## 2024-02-05 VITALS
HEART RATE: 93 BPM | OXYGEN SATURATION: 98 % | BODY MASS INDEX: 43.89 KG/M2 | SYSTOLIC BLOOD PRESSURE: 144 MMHG | DIASTOLIC BLOOD PRESSURE: 84 MMHG | HEIGHT: 65 IN | WEIGHT: 263.4 LBS

## 2024-02-05 DIAGNOSIS — Z23 NEED FOR INFLUENZA VACCINATION: ICD-10-CM

## 2024-02-05 DIAGNOSIS — R73.09 ELEVATED GLUCOSE: ICD-10-CM

## 2024-02-05 DIAGNOSIS — M47.817 LUMBOSACRAL SPONDYLOSIS WITHOUT MYELOPATHY: Chronic | ICD-10-CM

## 2024-02-05 DIAGNOSIS — Z00.00 MEDICARE ANNUAL WELLNESS VISIT, SUBSEQUENT: Primary | ICD-10-CM

## 2024-02-05 DIAGNOSIS — I10 ESSENTIAL HYPERTENSION: Chronic | ICD-10-CM

## 2024-02-05 DIAGNOSIS — M16.11 PRIMARY OSTEOARTHRITIS OF RIGHT HIP: ICD-10-CM

## 2024-02-05 DIAGNOSIS — N18.4 CKD (CHRONIC KIDNEY DISEASE) STAGE 4, GFR 15-29 ML/MIN: Chronic | ICD-10-CM

## 2024-02-05 DIAGNOSIS — E53.8 B12 DEFICIENCY: Chronic | ICD-10-CM

## 2024-02-05 PROBLEM — R73.03 PREDIABETES: Status: RESOLVED | Noted: 2023-05-08 | Resolved: 2024-02-05

## 2024-02-05 PROBLEM — E55.9 VITAMIN D DEFICIENCY: Status: ACTIVE | Noted: 2024-01-08

## 2024-02-05 RX ORDER — ERGOCALCIFEROL 1.25 MG/1
CAPSULE ORAL
COMMUNITY
Start: 2023-10-25

## 2024-02-05 RX ORDER — FUROSEMIDE 40 MG/1
TABLET ORAL
COMMUNITY

## 2024-02-05 RX ORDER — DULOXETIN HYDROCHLORIDE 60 MG/1
1 CAPSULE, DELAYED RELEASE ORAL
COMMUNITY

## 2024-02-05 RX ORDER — METOPROLOL SUCCINATE 50 MG/1
TABLET, EXTENDED RELEASE ORAL
COMMUNITY

## 2024-02-06 LAB
ALBUMIN SERPL-MCNC: 4.7 G/DL (ref 3.8–4.8)
ALBUMIN/GLOB SERPL: 2.4 {RATIO} (ref 1.2–2.2)
ALP SERPL-CCNC: 129 IU/L (ref 44–121)
ALT SERPL-CCNC: 13 IU/L (ref 0–32)
AST SERPL-CCNC: 14 IU/L (ref 0–40)
BILIRUB SERPL-MCNC: 0.4 MG/DL (ref 0–1.2)
BUN SERPL-MCNC: 27 MG/DL (ref 8–27)
BUN/CREAT SERPL: 13 (ref 12–28)
CALCIUM SERPL-MCNC: 9.1 MG/DL (ref 8.7–10.3)
CHLORIDE SERPL-SCNC: 110 MMOL/L (ref 96–106)
CHOLEST SERPL-MCNC: 178 MG/DL (ref 100–199)
CO2 SERPL-SCNC: 20 MMOL/L (ref 20–29)
CREAT SERPL-MCNC: 2.06 MG/DL (ref 0.57–1)
EGFRCR SERPLBLD CKD-EPI 2021: 25 ML/MIN/1.73
ERYTHROCYTE [DISTWIDTH] IN BLOOD BY AUTOMATED COUNT: 14 % (ref 11.7–15.4)
GLOBULIN SER CALC-MCNC: 2 G/DL (ref 1.5–4.5)
GLUCOSE SERPL-MCNC: 115 MG/DL (ref 70–99)
HBA1C MFR BLD: 5.5 % (ref 4.8–5.6)
HCT VFR BLD AUTO: 35.7 % (ref 34–46.6)
HDLC SERPL-MCNC: 58 MG/DL
HGB BLD-MCNC: 11.7 G/DL (ref 11.1–15.9)
LDLC SERPL CALC-MCNC: 94 MG/DL (ref 0–99)
MCH RBC QN AUTO: 32 PG (ref 26.6–33)
MCHC RBC AUTO-ENTMCNC: 32.8 G/DL (ref 31.5–35.7)
MCV RBC AUTO: 98 FL (ref 79–97)
PLATELET # BLD AUTO: 212 X10E3/UL (ref 150–450)
POTASSIUM SERPL-SCNC: 4.8 MMOL/L (ref 3.5–5.2)
PROT SERPL-MCNC: 6.7 G/DL (ref 6–8.5)
RBC # BLD AUTO: 3.66 X10E6/UL (ref 3.77–5.28)
SODIUM SERPL-SCNC: 143 MMOL/L (ref 134–144)
TRIGL SERPL-MCNC: 149 MG/DL (ref 0–149)
TSH SERPL DL<=0.005 MIU/L-ACNC: 2.27 UIU/ML (ref 0.45–4.5)
VLDLC SERPL CALC-MCNC: 26 MG/DL (ref 5–40)
WBC # BLD AUTO: 6 X10E3/UL (ref 3.4–10.8)

## 2024-02-06 RX ORDER — METOPROLOL SUCCINATE 25 MG/1
25 TABLET, EXTENDED RELEASE ORAL DAILY
Qty: 90 TABLET | Refills: 1 | Status: SHIPPED | OUTPATIENT
Start: 2024-02-06

## 2024-02-06 RX ORDER — FAMOTIDINE 20 MG/1
20 TABLET, FILM COATED ORAL 2 TIMES DAILY
Qty: 180 TABLET | Refills: 1 | Status: SHIPPED | OUTPATIENT
Start: 2024-02-06

## 2024-02-06 RX ORDER — SIMVASTATIN 40 MG
40 TABLET ORAL DAILY
Qty: 90 TABLET | Refills: 1 | Status: SHIPPED | OUTPATIENT
Start: 2024-02-06

## 2024-02-06 RX ORDER — SODIUM BICARBONATE 650 MG/1
650 TABLET ORAL 2 TIMES DAILY
Qty: 180 TABLET | Refills: 1 | Status: SHIPPED | OUTPATIENT
Start: 2024-02-06

## 2024-02-06 RX ORDER — CYANOCOBALAMIN 1000 UG/ML
INJECTION, SOLUTION INTRAMUSCULAR; SUBCUTANEOUS
Qty: 10 ML | Refills: 1 | Status: SHIPPED | OUTPATIENT
Start: 2024-02-06

## 2024-02-06 RX ORDER — IBUPROFEN 200 MG
950 CAPSULE ORAL 2 TIMES DAILY
Qty: 180 TABLET | Refills: 1 | Status: SHIPPED | OUTPATIENT
Start: 2024-02-06

## 2024-02-12 DIAGNOSIS — G25.81 RESTLESS LEGS: ICD-10-CM

## 2024-02-12 RX ORDER — ROPINIROLE 1 MG/1
1 TABLET, FILM COATED ORAL 4 TIMES DAILY
Qty: 360 TABLET | Refills: 3 | Status: SHIPPED | OUTPATIENT
Start: 2024-02-12

## 2024-02-18 PROBLEM — M16.11 PRIMARY OSTEOARTHRITIS OF RIGHT HIP: Chronic | Status: ACTIVE | Noted: 2022-08-21

## 2024-03-11 DIAGNOSIS — E79.0 HYPERURICEMIA: ICD-10-CM

## 2024-03-11 RX ORDER — ALLOPURINOL 100 MG/1
100 TABLET ORAL DAILY
Qty: 90 TABLET | Refills: 3 | Status: SHIPPED | OUTPATIENT
Start: 2024-03-11

## 2024-03-11 NOTE — TELEPHONE ENCOUNTER
Rx Refill Note  Requested Prescriptions     Pending Prescriptions Disp Refills    allopurinol (ZYLOPRIM) 100 MG tablet [Pharmacy Med Name: ALLOPURINOL 100MG TABLETS] 90 tablet 3     Sig: TAKE 1 TABLET BY MOUTH EVERY DAY      Last office visit with prescribing clinician: 2/5/2024   Last telemedicine visit with prescribing clinician: Visit date not found   Next office visit with prescribing clinician: 8/5/2024

## 2024-03-26 ENCOUNTER — PRE-ADMISSION TESTING (OUTPATIENT)
Dept: PREADMISSION TESTING | Facility: HOSPITAL | Age: 74
End: 2024-03-26
Payer: MEDICARE

## 2024-03-26 ENCOUNTER — HOSPITAL ENCOUNTER (OUTPATIENT)
Dept: GENERAL RADIOLOGY | Facility: HOSPITAL | Age: 74
Discharge: HOME | End: 2024-03-26
Payer: MEDICARE

## 2024-03-26 VITALS
HEART RATE: 69 BPM | OXYGEN SATURATION: 94 % | SYSTOLIC BLOOD PRESSURE: 184 MMHG | HEIGHT: 62 IN | TEMPERATURE: 98.4 F | WEIGHT: 271.4 LBS | DIASTOLIC BLOOD PRESSURE: 81 MMHG | RESPIRATION RATE: 20 BRPM | BODY MASS INDEX: 49.94 KG/M2

## 2024-03-26 LAB
ALBUMIN SERPL-MCNC: 4.1 G/DL (ref 3.5–5.2)
ALBUMIN/GLOB SERPL: 1.6 G/DL
ALP SERPL-CCNC: 131 U/L (ref 39–117)
ALT SERPL W P-5'-P-CCNC: 6 U/L (ref 1–33)
ANION GAP SERPL CALCULATED.3IONS-SCNC: 14.4 MMOL/L (ref 5–15)
AST SERPL-CCNC: 13 U/L (ref 1–32)
BACTERIA UR QL AUTO: ABNORMAL /HPF
BILIRUB SERPL-MCNC: 0.3 MG/DL (ref 0–1.2)
BILIRUB UR QL STRIP: NEGATIVE
BUN SERPL-MCNC: 22 MG/DL (ref 8–23)
BUN/CREAT SERPL: 14.1 (ref 7–25)
CALCIUM SPEC-SCNC: 8.4 MG/DL (ref 8.6–10.5)
CHLORIDE SERPL-SCNC: 105 MMOL/L (ref 98–107)
CLARITY UR: ABNORMAL
CO2 SERPL-SCNC: 24.6 MMOL/L (ref 22–29)
COLOR UR: YELLOW
CREAT SERPL-MCNC: 1.56 MG/DL (ref 0.57–1)
DEPRECATED RDW RBC AUTO: 40.2 FL (ref 37–54)
EGFRCR SERPLBLD CKD-EPI 2021: 35 ML/MIN/1.73
ERYTHROCYTE [DISTWIDTH] IN BLOOD BY AUTOMATED COUNT: 11.7 % (ref 12.3–15.4)
GLOBULIN UR ELPH-MCNC: 2.6 GM/DL
GLUCOSE SERPL-MCNC: 105 MG/DL (ref 65–99)
GLUCOSE UR STRIP-MCNC: NEGATIVE MG/DL
HBA1C MFR BLD: 5.2 % (ref 4.8–5.6)
HCT VFR BLD AUTO: 32.4 % (ref 34–46.6)
HGB BLD-MCNC: 10.4 G/DL (ref 12–15.9)
HGB UR QL STRIP.AUTO: NEGATIVE
HYALINE CASTS UR QL AUTO: ABNORMAL /LPF
INR PPP: 1.03 (ref 0.9–1.1)
KETONES UR QL STRIP: NEGATIVE
LEUKOCYTE ESTERASE UR QL STRIP.AUTO: ABNORMAL
MCH RBC QN AUTO: 30.8 PG (ref 26.6–33)
MCHC RBC AUTO-ENTMCNC: 32.1 G/DL (ref 31.5–35.7)
MCV RBC AUTO: 95.9 FL (ref 79–97)
NITRITE UR QL STRIP: NEGATIVE
PH UR STRIP.AUTO: 7 [PH] (ref 5–8)
PLATELET # BLD AUTO: 198 10*3/MM3 (ref 140–450)
PMV BLD AUTO: 10.3 FL (ref 6–12)
POTASSIUM SERPL-SCNC: 4.2 MMOL/L (ref 3.5–5.2)
PROT SERPL-MCNC: 6.7 G/DL (ref 6–8.5)
PROT UR QL STRIP: NEGATIVE
PROTHROMBIN TIME: 13.7 SECONDS (ref 11.7–14.2)
QT INTERVAL: 411 MS
QTC INTERVAL: 428 MS
RBC # BLD AUTO: 3.38 10*6/MM3 (ref 3.77–5.28)
RBC # UR STRIP: ABNORMAL /HPF
REF LAB TEST METHOD: ABNORMAL
SODIUM SERPL-SCNC: 144 MMOL/L (ref 136–145)
SP GR UR STRIP: 1.01 (ref 1–1.03)
SQUAMOUS #/AREA URNS HPF: ABNORMAL /HPF
UROBILINOGEN UR QL STRIP: ABNORMAL
WBC # UR STRIP: ABNORMAL /HPF
WBC NRBC COR # BLD AUTO: 6.4 10*3/MM3 (ref 3.4–10.8)

## 2024-03-26 PROCEDURE — 93005 ELECTROCARDIOGRAM TRACING: CPT

## 2024-03-26 PROCEDURE — 81001 URINALYSIS AUTO W/SCOPE: CPT

## 2024-03-26 PROCEDURE — 36415 COLL VENOUS BLD VENIPUNCTURE: CPT

## 2024-03-26 PROCEDURE — 73502 X-RAY EXAM HIP UNI 2-3 VIEWS: CPT

## 2024-03-26 PROCEDURE — 80053 COMPREHEN METABOLIC PANEL: CPT | Performed by: ORTHOPAEDIC SURGERY

## 2024-03-26 PROCEDURE — 93010 ELECTROCARDIOGRAM REPORT: CPT | Performed by: INTERNAL MEDICINE

## 2024-03-26 PROCEDURE — 85027 COMPLETE CBC AUTOMATED: CPT | Performed by: ORTHOPAEDIC SURGERY

## 2024-03-26 PROCEDURE — 85610 PROTHROMBIN TIME: CPT

## 2024-03-26 PROCEDURE — 83036 HEMOGLOBIN GLYCOSYLATED A1C: CPT

## 2024-03-26 PROCEDURE — 71046 X-RAY EXAM CHEST 2 VIEWS: CPT

## 2024-03-26 NOTE — DISCHARGE INSTRUCTIONS
Take the following medications the morning of surgery:    METOPROLOL  FAMOTIDINE  REQUIP    If you are on prescription narcotic pain medication to control your pain you may also take that medication the morning of surgery.    General Instructions:  Do not eat solid food after midnight the night before surgery.  You may drink clear liquids day of surgery but must stop at least one hour before your hospital arrival time.  It is beneficial for you to have a clear drink that contains carbohydrates the day of surgery.  We suggest a 12 to 20 ounce bottle of Gatorade or Powerade for non-diabetic patients or a 12 to 20 ounce bottle of G2 or Powerade Zero for diabetic patients. (Pediatric patients, are not advised to drink a 12 to 20 ounce carbohydrate drink)    Clear liquids are liquids you can see through.  Nothing red in color.     Plain water                               Sports drinks  Sodas                                   Gelatin (Jell-O)  Fruit juices without pulp such as white grape juice and apple juice  Popsicles that contain no fruit or yogurt  Tea or coffee (no cream or milk added)  Gatorade / Powerade  G2 / Powerade Zero    Infants may have breast milk up to four hours before surgery.  Infants drinking formula may drink formula up to six hours before surgery.   Patients who avoid smoking, chewing tobacco and alcohol for 4 weeks prior to surgery have a reduced risk of post-operative complications.  Quit smoking as many days before surgery as you can.  Do not smoke, use chewing tobacco or drink alcohol the day of surgery.   If applicable bring your C-PAP/ BI-PAP machine in with you to preop day of surgery.  Bring any papers given to you in the doctor’s office.  Wear clean comfortable clothes.  Do not wear contact lenses, false eyelashes or make-up.  Bring a case for your glasses.   Bring crutches or walker if applicable.  Remove all piercings.  Leave jewelry and any other valuables at home.  Hair extensions with  metal clips must be removed prior to surgery.  The Pre-Admission Testing nurse will instruct you to bring medications if unable to obtain an accurate list in Pre-Admission Testing.        If you were given a blood bank ID arm band remember to bring it with you the day of surgery.    Preventing a Surgical Site Infection:  For 2 to 3 days before surgery, avoid shaving with a razor because the razor can irritate skin and make it easier to develop an infection.    Any areas of open skin can increase the risk of a post-operative wound infection by allowing bacteria to enter and travel throughout the body.  Notify your surgeon if you have any skin wounds / rashes even if it is not near the expected surgical site.  The area will need assessed to determine if surgery should be delayed until it is healed.  The night prior to surgery shower using a fresh bar of anti-bacterial soap (such as Dial) and clean washcloth.  Sleep in a clean bed with clean clothing.  Do not allow pets to sleep with you.  Shower on the morning of surgery using a fresh bar of anti-bacterial soap (such as Dial) and clean washcloth.  Dry with a clean towel and dress in clean clothing.  Ask your surgeon if you will be receiving antibiotics prior to surgery.  Make sure you, your family, and all healthcare providers clean their hands with soap and water or an alcohol based hand  before caring for you or your wound.    Day of surgery:  Your arrival time is approximately two hours before your scheduled surgery time.  Upon arrival, a Pre-op nurse and Anesthesiologist will review your health history, obtain vital signs, and answer questions you may have.  The only belongings needed at this time will be a list of your home medications and if applicable your C-PAP/BI-PAP machine.  A Pre-op nurse will start an IV and you may receive medication in preparation for surgery, including something to help you relax.     Please be aware that surgery does come  with discomfort.  We want to make every effort to control your discomfort so please discuss any uncontrolled symptoms with your nurse.   Your doctor will most likely have prescribed pain medications.      If you are going home after surgery you will receive individualized written care instructions before being discharged.  A responsible adult must drive you to and from the hospital on the day of your surgery and ideally stay with you through the night.   .  Discharge prescriptions can be filled by the hospital pharmacy during regular pharmacy hours.  If you are having surgery late in the day/evening your prescription may be e-prescribed to your pharmacy.  Please verify your pharmacy hours or chose a 24 hour pharmacy to avoid not having access to your prescription because your pharmacy has closed for the day.    If you are staying overnight following surgery, you will be transported to your hospital room following the recovery period.  [facility] has all private rooms.    If you have any questions please call Pre-Admission Testing at [phone].  Deductibles and co-payments are collected on the day of service. Please be prepared to pay the required co-pay, deductible or deposit on the day of service as defined by your plan.    Call your surgeon immediately if you experience any of the following symptoms:  Sore Throat  Shortness of Breath or difficulty breathing  Cough  Chills  Body soreness or muscle pain  Headache  Fever  New loss of taste or smell  Do not arrive for your surgery ill.  Your procedure will need to be rescheduled to another time.  You will need to call your physician before the day of surgery to avoid any unnecessary exposure to hospital staff as well as other patients.      CHLORHEXIDINE CLOTH INSTRUCTIONS  The morning of surgery follow these instructions using the Chlorhexidine cloths you've been given.  These steps reduce bacteria on the body.  Do not use the cloths near your  eyes, ears mouth, genitalia or on open wounds.  Throw the cloths away after use but do not try to flush them down a toilet.      Open and remove one cloth at a time from the package.    Leave the cloth unfolded and begin the bathing.  Massage the skin with the cloths using gentle pressure to remove bacteria.  Do not scrub harshly.   Follow the steps below with one 2% CHG cloth per area (6 total cloths).  One cloth for neck, shoulders and chest.  One cloth for both arms, hands, fingers and underarms (do underarms last).  One cloth for the abdomen followed by groin.  One cloth for right leg and foot including between the toes.  One cloth for left leg and foot including between the toes.  The last cloth is to be used for the back of the neck, back and buttocks.    Allow the CHG to air dry 3 minutes on the skin which will give it time to work and decrease the chance of irritation.  The skin may feel sticky until it is dry.  Do not rinse with water or any other liquid or you will lose the beneficial effects of the CHG.  If mild skin irritation occurs, do rinse the skin to remove the CHG.  Report this to the nurse at time of admission.  Do not apply lotions, creams, ointments, deodorants or perfumes after using the clothes. Dress in clean clothes before coming to the hospital.

## 2024-04-01 ENCOUNTER — APPOINTMENT (OUTPATIENT)
Dept: GENERAL RADIOLOGY | Facility: HOSPITAL | Age: 74
End: 2024-04-01
Payer: MEDICARE

## 2024-04-01 ENCOUNTER — HOSPITAL ENCOUNTER (OUTPATIENT)
Facility: HOSPITAL | Age: 74
Discharge: HOME | End: 2024-04-02
Attending: ORTHOPAEDIC SURGERY | Admitting: ORTHOPAEDIC SURGERY
Payer: MEDICARE

## 2024-04-01 ENCOUNTER — ANESTHESIA (OUTPATIENT)
Dept: PERIOP | Facility: HOSPITAL | Age: 74
End: 2024-04-01
Payer: MEDICARE

## 2024-04-01 ENCOUNTER — ANESTHESIA EVENT (OUTPATIENT)
Dept: PERIOP | Facility: HOSPITAL | Age: 74
End: 2024-04-01
Payer: MEDICARE

## 2024-04-01 ENCOUNTER — TRANSCRIBE ORDERS (OUTPATIENT)
Dept: HOME HEALTH SERVICES | Facility: HOME HEALTHCARE | Age: 74
End: 2024-04-01
Payer: MEDICARE

## 2024-04-01 DIAGNOSIS — Z96.641 AFTERCARE FOLLOWING RIGHT HIP JOINT REPLACEMENT SURGERY: Primary | ICD-10-CM

## 2024-04-01 DIAGNOSIS — Z47.1 AFTERCARE FOLLOWING RIGHT HIP JOINT REPLACEMENT SURGERY: Primary | ICD-10-CM

## 2024-04-01 PROBLEM — Z96.649 HIP JOINT REPLACEMENT STATUS: Status: ACTIVE | Noted: 2024-04-01

## 2024-04-01 LAB
ANION GAP SERPL CALCULATED.3IONS-SCNC: 10.8 MMOL/L (ref 5–15)
BUN SERPL-MCNC: 34 MG/DL (ref 8–23)
BUN/CREAT SERPL: 21.9 (ref 7–25)
CALCIUM SPEC-SCNC: 6.6 MG/DL (ref 8.6–10.5)
CHLORIDE SERPL-SCNC: 107 MMOL/L (ref 98–107)
CO2 SERPL-SCNC: 24.2 MMOL/L (ref 22–29)
CREAT SERPL-MCNC: 1.55 MG/DL (ref 0.57–1)
EGFRCR SERPLBLD CKD-EPI 2021: 35.2 ML/MIN/1.73
GLUCOSE SERPL-MCNC: 155 MG/DL (ref 65–99)
HCT VFR BLD AUTO: 30.8 % (ref 34–46.6)
HGB BLD-MCNC: 9.8 G/DL (ref 12–15.9)
POTASSIUM SERPL-SCNC: 5.1 MMOL/L (ref 3.5–5.2)
SODIUM SERPL-SCNC: 142 MMOL/L (ref 136–145)

## 2024-04-01 PROCEDURE — 25010000002 ROPIVACAINE PER 1 MG: Performed by: ORTHOPAEDIC SURGERY

## 2024-04-01 PROCEDURE — 25010000002 FENTANYL CITRATE (PF) 50 MCG/ML SOLUTION: Performed by: NURSE ANESTHETIST, CERTIFIED REGISTERED

## 2024-04-01 PROCEDURE — A9270 NON-COVERED ITEM OR SERVICE: HCPCS | Performed by: ORTHOPAEDIC SURGERY

## 2024-04-01 PROCEDURE — 25010000002 KETOROLAC TROMETHAMINE PER 15 MG: Performed by: ORTHOPAEDIC SURGERY

## 2024-04-01 PROCEDURE — 63710000001 ROPINIROLE 1 MG TABLET: Performed by: ORTHOPAEDIC SURGERY

## 2024-04-01 PROCEDURE — 25010000002 ONDANSETRON PER 1 MG: Performed by: NURSE ANESTHETIST, CERTIFIED REGISTERED

## 2024-04-01 PROCEDURE — 25010000002 DEXAMETHASONE SODIUM PHOSPHATE 20 MG/5ML SOLUTION: Performed by: NURSE ANESTHETIST, CERTIFIED REGISTERED

## 2024-04-01 PROCEDURE — 63710000001 HYDROCODONE-ACETAMINOPHEN 7.5-325 MG TABLET: Performed by: NURSE ANESTHETIST, CERTIFIED REGISTERED

## 2024-04-01 PROCEDURE — A9270 NON-COVERED ITEM OR SERVICE: HCPCS | Performed by: NURSE ANESTHETIST, CERTIFIED REGISTERED

## 2024-04-01 PROCEDURE — 97110 THERAPEUTIC EXERCISES: CPT

## 2024-04-01 PROCEDURE — 25010000002 VANCOMYCIN 1 G RECONSTITUTED SOLUTION: Performed by: ORTHOPAEDIC SURGERY

## 2024-04-01 PROCEDURE — 63710000001 FUROSEMIDE 40 MG TABLET: Performed by: ORTHOPAEDIC SURGERY

## 2024-04-01 PROCEDURE — G0378 HOSPITAL OBSERVATION PER HR: HCPCS

## 2024-04-01 PROCEDURE — 25010000002 CLONIDINE PER 1 MG: Performed by: ORTHOPAEDIC SURGERY

## 2024-04-01 PROCEDURE — 25010000002 SUGAMMADEX 200 MG/2ML SOLUTION: Performed by: NURSE ANESTHETIST, CERTIFIED REGISTERED

## 2024-04-01 PROCEDURE — 25010000002 PROPOFOL 10 MG/ML EMULSION: Performed by: NURSE ANESTHETIST, CERTIFIED REGISTERED

## 2024-04-01 PROCEDURE — 80048 BASIC METABOLIC PNL TOTAL CA: CPT | Performed by: ORTHOPAEDIC SURGERY

## 2024-04-01 PROCEDURE — 25010000002 HYDROMORPHONE PER 4 MG: Performed by: NURSE ANESTHETIST, CERTIFIED REGISTERED

## 2024-04-01 PROCEDURE — 25010000002 EPINEPHRINE 1 MG/ML SOLUTION 30 ML VIAL: Performed by: ORTHOPAEDIC SURGERY

## 2024-04-01 PROCEDURE — 72170 X-RAY EXAM OF PELVIS: CPT

## 2024-04-01 PROCEDURE — 63710000001 MELOXICAM 15 MG TABLET: Performed by: ORTHOPAEDIC SURGERY

## 2024-04-01 PROCEDURE — 63710000001 APIXABAN 2.5 MG TABLET: Performed by: ORTHOPAEDIC SURGERY

## 2024-04-01 PROCEDURE — 63710000001 FAMOTIDINE 20 MG TABLET: Performed by: ORTHOPAEDIC SURGERY

## 2024-04-01 PROCEDURE — 63710000001 GABAPENTIN 400 MG CAPSULE: Performed by: ORTHOPAEDIC SURGERY

## 2024-04-01 PROCEDURE — 63710000001 OXYCODONE-ACETAMINOPHEN 5-325 MG TABLET: Performed by: ORTHOPAEDIC SURGERY

## 2024-04-01 PROCEDURE — 25010000002 GLYCOPYRROLATE 0.2 MG/ML SOLUTION: Performed by: NURSE ANESTHETIST, CERTIFIED REGISTERED

## 2024-04-01 PROCEDURE — 25010000002 CEFAZOLIN 3 G RECONSTITUTED SOLUTION 1 EACH VIAL: Performed by: ORTHOPAEDIC SURGERY

## 2024-04-01 PROCEDURE — C1776 JOINT DEVICE (IMPLANTABLE): HCPCS | Performed by: ORTHOPAEDIC SURGERY

## 2024-04-01 PROCEDURE — 85014 HEMATOCRIT: CPT | Performed by: ORTHOPAEDIC SURGERY

## 2024-04-01 PROCEDURE — 25010000002 FENTANYL CITRATE (PF) 100 MCG/2ML SOLUTION: Performed by: NURSE ANESTHETIST, CERTIFIED REGISTERED

## 2024-04-01 PROCEDURE — 25010000002 PROPOFOL 200 MG/20ML EMULSION: Performed by: NURSE ANESTHETIST, CERTIFIED REGISTERED

## 2024-04-01 PROCEDURE — 85018 HEMOGLOBIN: CPT | Performed by: ORTHOPAEDIC SURGERY

## 2024-04-01 PROCEDURE — 25010000002 MIDAZOLAM PER 1 MG: Performed by: ANESTHESIOLOGY

## 2024-04-01 PROCEDURE — 97161 PT EVAL LOW COMPLEX 20 MIN: CPT

## 2024-04-01 PROCEDURE — 63710000001 DULOXETINE 60 MG CAPSULE DELAYED-RELEASE PARTICLES: Performed by: ORTHOPAEDIC SURGERY

## 2024-04-01 PROCEDURE — 76000 FLUOROSCOPY <1 HR PHYS/QHP: CPT

## 2024-04-01 PROCEDURE — 25810000003 LACTATED RINGERS PER 1000 ML: Performed by: ANESTHESIOLOGY

## 2024-04-01 PROCEDURE — 25810000003 SODIUM CHLORIDE 0.9 % SOLUTION: Performed by: ORTHOPAEDIC SURGERY

## 2024-04-01 DEVICE — OR3O DUAL MOBILITY XLPE INSERT 28/40
Type: IMPLANTABLE DEVICE | Site: HIP | Status: FUNCTIONAL
Brand: OR3O DUAL MOBILITY

## 2024-04-01 DEVICE — POLARSTEM STANDARD NON-CEMENTED                                    WITH TI/HA 2
Type: IMPLANTABLE DEVICE | Site: HIP | Status: FUNCTIONAL
Brand: POLARSTEM

## 2024-04-01 DEVICE — IMPLANTABLE DEVICE: Type: IMPLANTABLE DEVICE | Status: FUNCTIONAL

## 2024-04-01 DEVICE — DEV CONTRL TISS STRATAFIX SPIRAL MNCRYL UD 3/0 PLS 30CM: Type: IMPLANTABLE DEVICE | Site: HIP | Status: FUNCTIONAL

## 2024-04-01 DEVICE — OXINIUM FEMORAL HEAD 12/14 TAPER                                    28 MM +0
Type: IMPLANTABLE DEVICE | Site: HIP | Status: FUNCTIONAL
Brand: OXINIUM

## 2024-04-01 DEVICE — OR3O DUAL MOBILITY LINER 40/52
Type: IMPLANTABLE DEVICE | Site: HIP | Status: FUNCTIONAL
Brand: OR3O DUAL MOBILITY

## 2024-04-01 DEVICE — R3 3 HOLE ACETABULAR SHELL 52MM
Type: IMPLANTABLE DEVICE | Site: HIP | Status: FUNCTIONAL
Brand: R3 ACETABULAR

## 2024-04-01 RX ORDER — IPRATROPIUM BROMIDE AND ALBUTEROL SULFATE 2.5; .5 MG/3ML; MG/3ML
3 SOLUTION RESPIRATORY (INHALATION) ONCE AS NEEDED
Status: DISCONTINUED | OUTPATIENT
Start: 2024-04-01 | End: 2024-04-01 | Stop reason: HOSPADM

## 2024-04-01 RX ORDER — DIPHENHYDRAMINE HYDROCHLORIDE 50 MG/ML
12.5 INJECTION INTRAMUSCULAR; INTRAVENOUS
Status: DISCONTINUED | OUTPATIENT
Start: 2024-04-01 | End: 2024-04-01 | Stop reason: HOSPADM

## 2024-04-01 RX ORDER — FAMOTIDINE 10 MG/ML
20 INJECTION, SOLUTION INTRAVENOUS ONCE
Status: COMPLETED | OUTPATIENT
Start: 2024-04-01 | End: 2024-04-01

## 2024-04-01 RX ORDER — CELECOXIB 200 MG/1
200 CAPSULE ORAL ONCE
Status: DISCONTINUED | OUTPATIENT
Start: 2024-04-01 | End: 2024-04-01 | Stop reason: HOSPADM

## 2024-04-01 RX ORDER — LIDOCAINE HYDROCHLORIDE 10 MG/ML
0.5 INJECTION, SOLUTION INFILTRATION; PERINEURAL ONCE AS NEEDED
Status: DISCONTINUED | OUTPATIENT
Start: 2024-04-01 | End: 2024-04-01 | Stop reason: HOSPADM

## 2024-04-01 RX ORDER — LABETALOL HYDROCHLORIDE 5 MG/ML
5 INJECTION, SOLUTION INTRAVENOUS
Status: DISCONTINUED | OUTPATIENT
Start: 2024-04-01 | End: 2024-04-01 | Stop reason: HOSPADM

## 2024-04-01 RX ORDER — FAMOTIDINE 20 MG/1
40 TABLET, FILM COATED ORAL DAILY
Status: DISCONTINUED | OUTPATIENT
Start: 2024-04-01 | End: 2024-04-02 | Stop reason: HOSPADM

## 2024-04-01 RX ORDER — ACETAMINOPHEN 500 MG
1000 TABLET ORAL ONCE
Status: COMPLETED | OUTPATIENT
Start: 2024-04-01 | End: 2024-04-01

## 2024-04-01 RX ORDER — VANCOMYCIN HYDROCHLORIDE 1 G/20ML
INJECTION, POWDER, LYOPHILIZED, FOR SOLUTION INTRAVENOUS AS NEEDED
Status: DISCONTINUED | OUTPATIENT
Start: 2024-04-01 | End: 2024-04-01 | Stop reason: HOSPADM

## 2024-04-01 RX ORDER — EPHEDRINE SULFATE 50 MG/ML
5 INJECTION, SOLUTION INTRAVENOUS ONCE AS NEEDED
Status: DISCONTINUED | OUTPATIENT
Start: 2024-04-01 | End: 2024-04-01 | Stop reason: HOSPADM

## 2024-04-01 RX ORDER — MELOXICAM 15 MG/1
15 TABLET ORAL DAILY
Status: DISCONTINUED | OUTPATIENT
Start: 2024-04-01 | End: 2024-04-02 | Stop reason: HOSPADM

## 2024-04-01 RX ORDER — ONDANSETRON 4 MG/1
4 TABLET, FILM COATED ORAL EVERY 6 HOURS PRN
Qty: 30 TABLET | Refills: 0 | Status: SHIPPED | OUTPATIENT
Start: 2024-04-01

## 2024-04-01 RX ORDER — SODIUM CHLORIDE 0.9 % (FLUSH) 0.9 %
3 SYRINGE (ML) INJECTION EVERY 12 HOURS SCHEDULED
Status: DISCONTINUED | OUTPATIENT
Start: 2024-04-01 | End: 2024-04-01 | Stop reason: HOSPADM

## 2024-04-01 RX ORDER — PROMETHAZINE HYDROCHLORIDE 25 MG/1
25 TABLET ORAL ONCE AS NEEDED
Status: DISCONTINUED | OUTPATIENT
Start: 2024-04-01 | End: 2024-04-01 | Stop reason: HOSPADM

## 2024-04-01 RX ORDER — FUROSEMIDE 40 MG/1
40 TABLET ORAL DAILY
Status: DISCONTINUED | OUTPATIENT
Start: 2024-04-01 | End: 2024-04-02 | Stop reason: HOSPADM

## 2024-04-01 RX ORDER — DROPERIDOL 2.5 MG/ML
0.62 INJECTION, SOLUTION INTRAMUSCULAR; INTRAVENOUS
Status: DISCONTINUED | OUTPATIENT
Start: 2024-04-01 | End: 2024-04-01 | Stop reason: HOSPADM

## 2024-04-01 RX ORDER — FENTANYL CITRATE 50 UG/ML
INJECTION, SOLUTION INTRAMUSCULAR; INTRAVENOUS AS NEEDED
Status: DISCONTINUED | OUTPATIENT
Start: 2024-04-01 | End: 2024-04-01 | Stop reason: SURG

## 2024-04-01 RX ORDER — GLYCOPYRROLATE 0.2 MG/ML
INJECTION INTRAMUSCULAR; INTRAVENOUS AS NEEDED
Status: DISCONTINUED | OUTPATIENT
Start: 2024-04-01 | End: 2024-04-01 | Stop reason: SURG

## 2024-04-01 RX ORDER — OXYCODONE HYDROCHLORIDE AND ACETAMINOPHEN 5; 325 MG/1; MG/1
1 TABLET ORAL EVERY 4 HOURS PRN
Qty: 50 TABLET | Refills: 0 | Status: SHIPPED | OUTPATIENT
Start: 2024-04-01

## 2024-04-01 RX ORDER — SODIUM CHLORIDE, SODIUM LACTATE, POTASSIUM CHLORIDE, CALCIUM CHLORIDE 600; 310; 30; 20 MG/100ML; MG/100ML; MG/100ML; MG/100ML
9 INJECTION, SOLUTION INTRAVENOUS CONTINUOUS
Status: DISCONTINUED | OUTPATIENT
Start: 2024-04-01 | End: 2024-04-02 | Stop reason: HOSPADM

## 2024-04-01 RX ORDER — METOPROLOL SUCCINATE 50 MG/1
50 TABLET, EXTENDED RELEASE ORAL 2 TIMES DAILY
Status: DISCONTINUED | OUTPATIENT
Start: 2024-04-01 | End: 2024-04-01

## 2024-04-01 RX ORDER — DOCUSATE SODIUM 100 MG/1
100 CAPSULE, LIQUID FILLED ORAL 2 TIMES DAILY PRN
Status: DISCONTINUED | OUTPATIENT
Start: 2024-04-01 | End: 2024-04-02 | Stop reason: HOSPADM

## 2024-04-01 RX ORDER — MIDAZOLAM HYDROCHLORIDE 1 MG/ML
0.5 INJECTION INTRAMUSCULAR; INTRAVENOUS
Status: DISCONTINUED | OUTPATIENT
Start: 2024-04-01 | End: 2024-04-01 | Stop reason: HOSPADM

## 2024-04-01 RX ORDER — DULOXETIN HYDROCHLORIDE 60 MG/1
60 CAPSULE, DELAYED RELEASE ORAL DAILY
Status: DISCONTINUED | OUTPATIENT
Start: 2024-04-01 | End: 2024-04-02 | Stop reason: HOSPADM

## 2024-04-01 RX ORDER — HYDROCODONE BITARTRATE AND ACETAMINOPHEN 5; 325 MG/1; MG/1
1 TABLET ORAL ONCE AS NEEDED
Status: DISCONTINUED | OUTPATIENT
Start: 2024-04-01 | End: 2024-04-01 | Stop reason: HOSPADM

## 2024-04-01 RX ORDER — FENTANYL CITRATE 50 UG/ML
25 INJECTION, SOLUTION INTRAMUSCULAR; INTRAVENOUS
Status: DISCONTINUED | OUTPATIENT
Start: 2024-04-01 | End: 2024-04-01 | Stop reason: HOSPADM

## 2024-04-01 RX ORDER — NALOXONE HCL 0.4 MG/ML
0.1 VIAL (ML) INJECTION
Status: DISCONTINUED | OUTPATIENT
Start: 2024-04-01 | End: 2024-04-02 | Stop reason: HOSPADM

## 2024-04-01 RX ORDER — HYDROMORPHONE HYDROCHLORIDE 1 MG/ML
0.25 INJECTION, SOLUTION INTRAMUSCULAR; INTRAVENOUS; SUBCUTANEOUS
Status: DISCONTINUED | OUTPATIENT
Start: 2024-04-01 | End: 2024-04-01 | Stop reason: HOSPADM

## 2024-04-01 RX ORDER — ONDANSETRON 2 MG/ML
4 INJECTION INTRAMUSCULAR; INTRAVENOUS ONCE AS NEEDED
Status: DISCONTINUED | OUTPATIENT
Start: 2024-04-01 | End: 2024-04-01 | Stop reason: HOSPADM

## 2024-04-01 RX ORDER — NALOXONE HCL 0.4 MG/ML
0.2 VIAL (ML) INJECTION AS NEEDED
Status: DISCONTINUED | OUTPATIENT
Start: 2024-04-01 | End: 2024-04-01 | Stop reason: HOSPADM

## 2024-04-01 RX ORDER — ROCURONIUM BROMIDE 10 MG/ML
INJECTION, SOLUTION INTRAVENOUS AS NEEDED
Status: DISCONTINUED | OUTPATIENT
Start: 2024-04-01 | End: 2024-04-01 | Stop reason: SURG

## 2024-04-01 RX ORDER — ONDANSETRON 2 MG/ML
INJECTION INTRAMUSCULAR; INTRAVENOUS AS NEEDED
Status: DISCONTINUED | OUTPATIENT
Start: 2024-04-01 | End: 2024-04-01 | Stop reason: SURG

## 2024-04-01 RX ORDER — SODIUM CHLORIDE 0.9 % (FLUSH) 0.9 %
3-10 SYRINGE (ML) INJECTION AS NEEDED
Status: DISCONTINUED | OUTPATIENT
Start: 2024-04-01 | End: 2024-04-01 | Stop reason: HOSPADM

## 2024-04-01 RX ORDER — OXYCODONE HYDROCHLORIDE AND ACETAMINOPHEN 5; 325 MG/1; MG/1
1 TABLET ORAL EVERY 4 HOURS PRN
Status: DISCONTINUED | OUTPATIENT
Start: 2024-04-01 | End: 2024-04-02 | Stop reason: HOSPADM

## 2024-04-01 RX ORDER — ONDANSETRON 2 MG/ML
4 INJECTION INTRAMUSCULAR; INTRAVENOUS EVERY 6 HOURS PRN
Status: DISCONTINUED | OUTPATIENT
Start: 2024-04-01 | End: 2024-04-02 | Stop reason: HOSPADM

## 2024-04-01 RX ORDER — FLUMAZENIL 0.1 MG/ML
0.2 INJECTION INTRAVENOUS AS NEEDED
Status: DISCONTINUED | OUTPATIENT
Start: 2024-04-01 | End: 2024-04-01 | Stop reason: HOSPADM

## 2024-04-01 RX ORDER — TRANEXAMIC ACID 100 MG/ML
INJECTION, SOLUTION INTRAVENOUS AS NEEDED
Status: DISCONTINUED | OUTPATIENT
Start: 2024-04-01 | End: 2024-04-01 | Stop reason: SURG

## 2024-04-01 RX ORDER — GABAPENTIN 400 MG/1
800 CAPSULE ORAL NIGHTLY
Status: DISCONTINUED | OUTPATIENT
Start: 2024-04-01 | End: 2024-04-02 | Stop reason: HOSPADM

## 2024-04-01 RX ORDER — PROMETHAZINE HYDROCHLORIDE 25 MG/1
25 SUPPOSITORY RECTAL ONCE AS NEEDED
Status: DISCONTINUED | OUTPATIENT
Start: 2024-04-01 | End: 2024-04-01 | Stop reason: HOSPADM

## 2024-04-01 RX ORDER — METOPROLOL SUCCINATE 25 MG/1
25 TABLET, EXTENDED RELEASE ORAL DAILY
Status: DISCONTINUED | OUTPATIENT
Start: 2024-04-01 | End: 2024-04-01

## 2024-04-01 RX ORDER — OXYCODONE HYDROCHLORIDE AND ACETAMINOPHEN 5; 325 MG/1; MG/1
2 TABLET ORAL EVERY 4 HOURS PRN
Status: DISCONTINUED | OUTPATIENT
Start: 2024-04-01 | End: 2024-04-02 | Stop reason: HOSPADM

## 2024-04-01 RX ORDER — METOPROLOL SUCCINATE 50 MG/1
50 TABLET, EXTENDED RELEASE ORAL DAILY
Status: DISCONTINUED | OUTPATIENT
Start: 2024-04-02 | End: 2024-04-02 | Stop reason: HOSPADM

## 2024-04-01 RX ORDER — PROPOFOL 10 MG/ML
INJECTION, EMULSION INTRAVENOUS AS NEEDED
Status: DISCONTINUED | OUTPATIENT
Start: 2024-04-01 | End: 2024-04-01 | Stop reason: SURG

## 2024-04-01 RX ORDER — HYDROCODONE BITARTRATE AND ACETAMINOPHEN 7.5; 325 MG/1; MG/1
1 TABLET ORAL EVERY 4 HOURS PRN
Status: DISCONTINUED | OUTPATIENT
Start: 2024-04-01 | End: 2024-04-01 | Stop reason: HOSPADM

## 2024-04-01 RX ORDER — HYDRALAZINE HYDROCHLORIDE 20 MG/ML
5 INJECTION INTRAMUSCULAR; INTRAVENOUS
Status: DISCONTINUED | OUTPATIENT
Start: 2024-04-01 | End: 2024-04-01 | Stop reason: HOSPADM

## 2024-04-01 RX ORDER — DEXAMETHASONE SODIUM PHOSPHATE 4 MG/ML
INJECTION, SOLUTION INTRA-ARTICULAR; INTRALESIONAL; INTRAMUSCULAR; INTRAVENOUS; SOFT TISSUE AS NEEDED
Status: DISCONTINUED | OUTPATIENT
Start: 2024-04-01 | End: 2024-04-01 | Stop reason: SURG

## 2024-04-01 RX ORDER — ROPINIROLE 1 MG/1
1 TABLET, FILM COATED ORAL 4 TIMES DAILY
Status: DISCONTINUED | OUTPATIENT
Start: 2024-04-01 | End: 2024-04-02 | Stop reason: HOSPADM

## 2024-04-01 RX ORDER — SODIUM CHLORIDE 9 MG/ML
100 INJECTION, SOLUTION INTRAVENOUS CONTINUOUS
Status: DISCONTINUED | OUTPATIENT
Start: 2024-04-01 | End: 2024-04-02 | Stop reason: HOSPADM

## 2024-04-01 RX ORDER — FENTANYL CITRATE 50 UG/ML
50 INJECTION, SOLUTION INTRAMUSCULAR; INTRAVENOUS ONCE AS NEEDED
Status: DISCONTINUED | OUTPATIENT
Start: 2024-04-01 | End: 2024-04-01 | Stop reason: HOSPADM

## 2024-04-01 RX ORDER — EPHEDRINE SULFATE 50 MG/ML
INJECTION INTRAVENOUS AS NEEDED
Status: DISCONTINUED | OUTPATIENT
Start: 2024-04-01 | End: 2024-04-01 | Stop reason: SURG

## 2024-04-01 RX ORDER — PHENYLEPHRINE HCL IN 0.9% NACL 1 MG/10 ML
SYRINGE (ML) INTRAVENOUS AS NEEDED
Status: DISCONTINUED | OUTPATIENT
Start: 2024-04-01 | End: 2024-04-01 | Stop reason: SURG

## 2024-04-01 RX ORDER — LIDOCAINE HYDROCHLORIDE 20 MG/ML
INJECTION, SOLUTION INFILTRATION; PERINEURAL AS NEEDED
Status: DISCONTINUED | OUTPATIENT
Start: 2024-04-01 | End: 2024-04-01 | Stop reason: SURG

## 2024-04-01 RX ORDER — ONDANSETRON 4 MG/1
4 TABLET, ORALLY DISINTEGRATING ORAL EVERY 6 HOURS PRN
Status: DISCONTINUED | OUTPATIENT
Start: 2024-04-01 | End: 2024-04-02 | Stop reason: HOSPADM

## 2024-04-01 RX ADMIN — ONDANSETRON 4 MG: 2 INJECTION INTRAMUSCULAR; INTRAVENOUS at 07:12

## 2024-04-01 RX ADMIN — MIDAZOLAM 0.5 MG: 1 INJECTION INTRAMUSCULAR; INTRAVENOUS at 06:20

## 2024-04-01 RX ADMIN — OXYCODONE AND ACETAMINOPHEN 2 TABLET: 5; 325 TABLET ORAL at 20:35

## 2024-04-01 RX ADMIN — SUGAMMADEX 250 MG: 100 INJECTION, SOLUTION INTRAVENOUS at 08:03

## 2024-04-01 RX ADMIN — GLYCOPYRROLATE 0.2 MG: 0.2 INJECTION INTRAMUSCULAR; INTRAVENOUS at 07:19

## 2024-04-01 RX ADMIN — ROPINIROLE 1 MG: 1 TABLET, FILM COATED ORAL at 20:35

## 2024-04-01 RX ADMIN — DEXAMETHASONE SODIUM PHOSPHATE 8 MG: 4 INJECTION, SOLUTION INTRAMUSCULAR; INTRAVENOUS at 07:12

## 2024-04-01 RX ADMIN — PROPOFOL 150 MG: 10 INJECTION, EMULSION INTRAVENOUS at 07:04

## 2024-04-01 RX ADMIN — LIDOCAINE HYDROCHLORIDE 80 MG: 20 INJECTION, SOLUTION INFILTRATION; PERINEURAL at 07:04

## 2024-04-01 RX ADMIN — ROPINIROLE 1 MG: 1 TABLET, FILM COATED ORAL at 17:44

## 2024-04-01 RX ADMIN — EPHEDRINE SULFATE 5 MG: 50 INJECTION INTRAVENOUS at 07:48

## 2024-04-01 RX ADMIN — MELOXICAM 15 MG: 15 TABLET ORAL at 10:20

## 2024-04-01 RX ADMIN — APIXABAN 2.5 MG: 2.5 TABLET, FILM COATED ORAL at 10:20

## 2024-04-01 RX ADMIN — SODIUM CHLORIDE, POTASSIUM CHLORIDE, SODIUM LACTATE AND CALCIUM CHLORIDE 9 ML/HR: 600; 310; 30; 20 INJECTION, SOLUTION INTRAVENOUS at 06:15

## 2024-04-01 RX ADMIN — DULOXETINE HYDROCHLORIDE 60 MG: 60 CAPSULE, DELAYED RELEASE ORAL at 13:07

## 2024-04-01 RX ADMIN — Medication 100 MCG: at 07:48

## 2024-04-01 RX ADMIN — FENTANYL CITRATE 25 MCG: 50 INJECTION, SOLUTION INTRAMUSCULAR; INTRAVENOUS at 08:03

## 2024-04-01 RX ADMIN — FUROSEMIDE 40 MG: 40 TABLET ORAL at 13:07

## 2024-04-01 RX ADMIN — TRANEXAMIC ACID 1000 MG: 100 INJECTION INTRAVENOUS at 08:00

## 2024-04-01 RX ADMIN — FAMOTIDINE 40 MG: 20 TABLET, FILM COATED ORAL at 10:20

## 2024-04-01 RX ADMIN — FENTANYL CITRATE 25 MCG: 50 INJECTION, SOLUTION INTRAMUSCULAR; INTRAVENOUS at 08:45

## 2024-04-01 RX ADMIN — FENTANYL CITRATE 50 MCG: 50 INJECTION, SOLUTION INTRAMUSCULAR; INTRAVENOUS at 07:18

## 2024-04-01 RX ADMIN — HYDROMORPHONE HYDROCHLORIDE 0.25 MG: 1 INJECTION, SOLUTION INTRAMUSCULAR; INTRAVENOUS; SUBCUTANEOUS at 09:05

## 2024-04-01 RX ADMIN — SODIUM CHLORIDE 3000 MG: 900 INJECTION INTRAVENOUS at 15:23

## 2024-04-01 RX ADMIN — APIXABAN 2.5 MG: 2.5 TABLET, FILM COATED ORAL at 20:55

## 2024-04-01 RX ADMIN — FENTANYL CITRATE 25 MCG: 50 INJECTION, SOLUTION INTRAMUSCULAR; INTRAVENOUS at 08:35

## 2024-04-01 RX ADMIN — ROPINIROLE 1 MG: 1 TABLET, FILM COATED ORAL at 13:07

## 2024-04-01 RX ADMIN — SODIUM CHLORIDE 100 ML/HR: 9 INJECTION, SOLUTION INTRAVENOUS at 10:13

## 2024-04-01 RX ADMIN — ACETAMINOPHEN 1000 MG: 500 TABLET ORAL at 06:01

## 2024-04-01 RX ADMIN — PROPOFOL 25 MCG/KG/MIN: 10 INJECTION, EMULSION INTRAVENOUS at 07:12

## 2024-04-01 RX ADMIN — Medication 100 MCG: at 07:45

## 2024-04-01 RX ADMIN — FENTANYL CITRATE 25 MCG: 50 INJECTION, SOLUTION INTRAMUSCULAR; INTRAVENOUS at 08:30

## 2024-04-01 RX ADMIN — TRANEXAMIC ACID 1000 MG: 100 INJECTION INTRAVENOUS at 07:08

## 2024-04-01 RX ADMIN — HYDROCODONE BITARTRATE AND ACETAMINOPHEN 1 TABLET: 7.5; 325 TABLET ORAL at 08:44

## 2024-04-01 RX ADMIN — ROCURONIUM BROMIDE 50 MG: 10 INJECTION, SOLUTION INTRAVENOUS at 07:04

## 2024-04-01 RX ADMIN — SODIUM CHLORIDE 100 ML/HR: 9 INJECTION, SOLUTION INTRAVENOUS at 20:41

## 2024-04-01 RX ADMIN — FENTANYL CITRATE 25 MCG: 50 INJECTION, SOLUTION INTRAMUSCULAR; INTRAVENOUS at 08:17

## 2024-04-01 RX ADMIN — GABAPENTIN 800 MG: 400 CAPSULE ORAL at 20:36

## 2024-04-01 RX ADMIN — SODIUM CHLORIDE 3000 MG: 900 INJECTION INTRAVENOUS at 23:52

## 2024-04-01 RX ADMIN — SODIUM CHLORIDE 3000 MG: 900 INJECTION INTRAVENOUS at 06:50

## 2024-04-01 RX ADMIN — HYDROMORPHONE HYDROCHLORIDE 0.25 MG: 1 INJECTION, SOLUTION INTRAMUSCULAR; INTRAVENOUS; SUBCUTANEOUS at 08:59

## 2024-04-01 RX ADMIN — SODIUM CHLORIDE, POTASSIUM CHLORIDE, SODIUM LACTATE AND CALCIUM CHLORIDE 9 ML/HR: 600; 310; 30; 20 INJECTION, SOLUTION INTRAVENOUS at 08:49

## 2024-04-01 RX ADMIN — FENTANYL CITRATE 25 MCG: 50 INJECTION, SOLUTION INTRAMUSCULAR; INTRAVENOUS at 08:50

## 2024-04-01 RX ADMIN — FAMOTIDINE 20 MG: 10 INJECTION INTRAVENOUS at 06:20

## 2024-04-01 RX ADMIN — OXYCODONE AND ACETAMINOPHEN 1 TABLET: 5; 325 TABLET ORAL at 10:20

## 2024-04-01 NOTE — DISCHARGE INSTRUCTIONS
Total Hip  Discharge Instructions  Dr. BRAULIO Ryan” Jason II  [phone]    INCISION CARE  Wash your hands prior to dressing changes  JESE Wound VAC: Postoperatively you had a JESE Wound Vac placed on the incision. This was placed under sterile conditions in the operating room. It remains in place for 7 days postoperatively. After 7 days, the entire dressing must be removed, including all of the sticky adhesive. The dressing and battery pack provide gentle suction to the incision and provide several benefits over a traditional dressing:  It maintains the sterile environment of the OR and reduces the risk of infection  The suction removes unwanted buildup of blood/hematoma under the skin to reduce swelling  The suction also promotes fresh blood supply to the skin and soft tissue to speed up healing  The postoperative scar is reduced in size  Showering is permitted immediately after surgery, but the battery pack must be protected or removed during the shower.   After 7 days the JESE Wound Vac is removed. If there is no drainage, no dressing is required. If there is some scant drainage a dry bandage can be applied and changed daily until seen in the office or until the drainage stops.   No creams or ointments to the incisions until 4 weeks post op.  Do not touch or pick at the incision  Check incision every day and notify surgeon immediately if any of the following signs or symptoms are seen:  Increase in redness  Increase in swelling around the incision and of the entire extremity  Increase in pain  NEW drainage or oozing from the incision  Pulling apart of the edges of the incision  Increase in overall body temperature (greater than 100.4 degrees)  Zip-Line: your incision was closed with a state of the art device.   Is a non-invasive and easy to use wound closure device that replaces sutures, staples and glue for surgical incisions  It minimizes scarring and eliminating “railroad” marks that come with staples or  sutures  It makes removal as atraumatic as peeling off a bandage  Can be removed at home or by a physical therapist or nurse at 14 days postoperatively    ACTIVITIES  Exercises:  Physical therapy will begin immediately while in the hospital. Patients going to a nursing home will get therapy as part of their care at the SNU/SNF facility. Patients going home may also have a therapist come to the house to help them mobilize until they can safely get to an outpatient therapy facility.  Elevate the affected leg most of the day during the first week post operatively. Caution must be taken to avoid pillow placement directly under the heel of the leg, as this can cause pressure ulcers even with a soft pillow. All pillows and blankets should be placed underneath of the thigh and calf so that the heel is free-floating.  Use cold packs for 20-30 minutes approximately 5 times per day.  You should perform the daily stretching and strengthening exercises as taught by the therapist as often as possible. This can be done many times a day.  Full weight bearing is allowed after surgery. It will be sore/painful to put weight on the leg, but this will help the bone to heal and prevent complications such as pneumonia, bed sores and blood clots. Mobilization is vital to the recovery process.  Activities of Daily Living:  No tub baths, hot tubs, or swimming pools for 4 weeks.  May shower and let water run over the incision immediately after surgery. The battery pack of the JESE Wound Vac must be protected or removed while in the shower. After the JESE is removed 7 days after surgery showering is permitted as long as there is no drainage from the wound.     Restrictions  Weight: It is ok to allow full weight bearing after surgery. Weight on the leg actually quickens the recovery process. While it will be sore/painful to put weight on the leg, it is safe to do so. Hip replacement after hip fracture has a much slower recover process. It can  take months to heal fully from a hip fracture and patients even make some slow benefits up to a year afterwards.   Driving: Many patients have questions about when it is safe to return to driving. The answer is that this is extremely variable. It depends on the extent of the surgery, as well as how quickly you heal. Certainly left leg surgeries make returning to driving easier while right leg surgeries require more extensive rehabilitation before driving can be safe. Until you can press down on the brake hard, and are off of all narcotics, driving is not permitted. Your surgeon cannot “clear” you to return to driving, only you can make the decision when you feel it is safe.    Medications  Anticoagulants: After upper extremity surgery most patients do not require an anticoagulant unless you have another injury that will be keeping you from mobilizing. Lower extremity surgery typically does require use of an anticoagulation medicine.   IF YOU HAD LOWER EXTREMITY SURGERY AND ARE NOT DISCHARGED HOME WITH ANY ANTICOAGULANT MEDICINE YOU SHOULD TAKE ASPIRIN 325mg DAILY FOR 30 DAYS POSTOPERATIVELY.  If you are discharged home with an anticoagulant such as Aspirin, Xarelto, Eliquis, Coumadin, or Lovenox, follow these simple instructions:   Notify surgeon immediately if any salvatore bleeding is noted in the urine, stool, emesis, or from the nose or the incision. Blood in the stool will often appear as black rather than red. Blood in urine may appear as pink. Blood in emesis may appear as brown/black like coffee grounds.  You will need to apply pressure for longer periods of time to any cuts or abrasions to stop bleeding  Avoid alcohol while taking anticoagulants  Most anticoagulants are to be taken for 30 days postoperatively. After this time, you may stop using them unless instructed otherwise.   If you were already taking an anticoagulant (commonly Aspirin, Coumadin, or Plavix) you will likely be resuming your normal dose  postoperatively and will be continuing that medication at the discretion of the prescribing physician.  Stool Softeners: You will be at greater risk of constipation after surgery due to being less mobile and the pain medications.  Take stool softeners as needed. Over the counter Colace 100 mg 1-2 capsules twice daily can be taken.  If stools become too loose or too frequent, please decreases the dosage or stop the stool softener.  If constipation occurs despite use of stool softeners, you are to continue the stool softeners and add a laxative (Milk of Magnesia 1 ounce daily as needed)  Drink plenty of fluids, and eat fruits and vegetables during your recovery time. Getting up and mobilizing will help the bowels to recover their regular function, as will weaning off of all narcotics when the pain becomes tolerable.  Pain Medications: Utilized after surgery are narcotics. This is some general information about these medications.  CLASSIFICATION: Pain medications are called Opioids and are narcotics  LEGALITIES: It is illegal to share narcotics with others  DRIVING: it is illegal to drive while under the influence of narcotics. Doing so is a DUI.  POTENTIAL SIDE EFFECTS: nausea, vomiting, itching, dizziness, drowsiness, dry mouth, constipation, and difficulty urinating.  POTENTIAL ADVERSE EFFECTS:  Opioid tolerance can develop with use of pain medications and this simply means that it requires more and more of the medication to control pain. However, this is seen more in patients that use opioids for longer periods of time.  Opioid dependence can develop with use of Opioids. People with opioid dependence will experience withdrawal symptoms upon cessation of the medication.  Opioid addiction can develop with use of Opioids. The incidence of this is very unlikely in patients who take the medications as ordered and stop the medications as instructed.  Opioid overdose can be dangerous, but is unlikely when the medication  is taken as ordered and stopped when ordered. It is important not to mix opioids with alcohol as this can lead to over sedation and respiratory difficulty.  DOSAGE:  After the initial surgical pain begins to resolve, you may begin to decrease the pain medication. By the end of a few weeks, you should be off of pain medications.  Refills will not be given by the office during evening hours, on weekends, or after 6 weeks post-op. You are responsible for weaning off of pain medication. You can increase the time between narcotic pills, taking one every 4 then 6 then 8 hours and so on.  To seek refills on pain medications during the initial 6-week post-operative period, you must call the office to request the refill. The office will then notify you when to  the prescription. DO NOT wait until you are out of the medication to request a refill. Prescriptions will not be filled over the weekend and depending on the schedule, it may take a couple days for the prescription to be available. Someone will have to pick the prescription in person at the office.    FOLLOW-UP VISITS  You will need to follow up in the office with your surgeon in 3 weeks, or as instructed elsewhere in your discharge paperwork. Please call this number [phone] to schedule this appointment. If you are going to an SNF/SNU facility, they will arrange for you to follow up in the office.  If you have any concerns or suspected complications prior to your follow up visit, please call the office. Do not wait until your appointment time if you suspect complications. These will need to be addressed in the office promptly.      Khang Gomez II, MD  Orthopaedic Surgery  [facility]

## 2024-04-01 NOTE — ANESTHESIA PREPROCEDURE EVALUATION
Anesthesia Evaluation     Patient summary reviewed and Nursing notes reviewed   NPO Solid Status: > 8 hours  NPO Liquid Status: > 2 hours           Airway   Mallampati: II  TM distance: <3 FB  Neck ROM: full  Possible difficult intubation  Comment: Good ROM neck  Dental - normal exam     Pulmonary - normal exam    breath sounds clear to auscultation  (+) asthma,sleep apnea    ROS comment: No CPAP  Cardiovascular - normal exam    ECG reviewed  Rhythm: regular  Rate: normal    (+) hypertension 2 medications or greater, valvular problems/murmurs murmur, DVT resolved, hyperlipidemia  (-) CAD, angina    ROS comment: EF 60% by ECHO 8/16/no significant CAD by cath 8/20/no cardiac stents ever placed, chart hx is wrong    Neuro/Psych  (+) headaches, numbness, psychiatric history Anxiety    ROS Comment: RLS  GI/Hepatic/Renal/Endo    (+) obesity, morbid obesity, GERD, renal disease- CRI    ROS Comment: Hx gastric bypass    Musculoskeletal     (+) back pain      ROS comment: Cervical and lumbar DDD  Abdominal   (+) obese   Substance History      OB/GYN          Other   arthritis, blood dyscrasia anemia,   history of cancer      Other Comment: 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    CODE STATUS:

## 2024-04-01 NOTE — ANESTHESIA PROCEDURE NOTES
Airway  Urgency: elective    Date/Time: 4/1/2024 7:07 AM  Airway not difficult    General Information and Staff    Patient location during procedure: OR  Anesthesiologist: Tip Casillas MD  CRNA/CAA: Elen Cash CRNA    Indications and Patient Condition  Indications for airway management: airway protection    Preoxygenated: yes  MILS not maintained throughout  Mask difficulty assessment: 2 - vent by mask + OA or adjuvant +/- NMBA    Final Airway Details  Final airway type: endotracheal airway      Successful airway: ETT  Cuffed: yes   Successful intubation technique: direct laryngoscopy  Facilitating devices/methods: intubating stylet  Endotracheal tube insertion site: oral  Blade: Singh  Blade size: 2  ETT size (mm): 7.0  Cormack-Lehane Classification: grade I - full view of glottis  Placement verified by: chest auscultation and capnometry   Cuff volume (mL): 7  Measured from: teeth  ETT/EBT  to teeth (cm): 20  Number of attempts at approach: 1  Assessment: lips, teeth, and gum same as pre-op and atraumatic intubation    Additional Comments  Airway exam prior to DL, teeth/lips inspected. Preoxygenated with 100% O2; sniffing position, IV induction, eyes taped. Easy mask ventilation w/ OA. Atraumatic intubation. ETT connected to vent. Confirmed EBBS, +EtCO2. Lips and teeth inspected, intact, no damage.

## 2024-04-01 NOTE — OP NOTE
Anterior Total Hip Operative Note  Dr. BRAULIO Ryan” Jason II  [phone]    PATIENT NAME: Brooklyn Johns  MRN: [id number]  : 1950 AGE: 73 y.o. GENDER: female  DATE OF OPERATION: 2024  PREOPERATIVE DIAGNOSIS: End stage Osteoarthritis  POSTOPERATIVE DIAGNOSIS: Same  OPERATION PERFORMED: Right Anterior Total Hip Arthroplasty  SURGEON: Khang Gomez MD  Circulator: Tierra Rea RN; Shawn Golden RN  Scrub Person: Shahrzad Ordoñez PCT  Vendor Representative: Dick Nichole  Assistant: Jose Sanders CSA  Other: Nadir Oliva  ANESTHESIA: General  ASSISTANT: Robert Sanders. This case would not have been possible without another set of skilled surgical hands for retraction, use of instrumentation, and general assistance.  This assistance was vital to the success of the case.   ESTIMATED BLOOD LOSS: 250cc  SPONGE AND NEEDLE COUNT: Correct  INDICATIONS:  Arthritis: This patient was noted to have severe arthritis affecting the operative hip. They failed conservative management and elected to undergo total hip replacement. A discussion of operative versus nonoperative treatment was had. They elected to undergo anterior total hip arthroplasty. The risks of surgery were discussed and included the risk of anesthesia, infection, damage to neurovascular structures, implant loosening/failure, fracture, hardware prominence, dislocation, the need for further procedures, medical complications, and others. No guarantees were made. The patient wished to proceed with surgery and a surgical consent was signed.  COMPONENTS:   Acetabular Cup: Smith [employer] Nephew R3 acetabular cup: 52 Outer Diameter  Cup Screws: Smith & Nephew 6.5 mm screws: No Screws Were Used  Smith [employer] Nephew Neutral Acetabular Liner: Neutral  Smith [employer] Nephew Polar Femoral Stem: Size 2  Smith & Nephew Oxinium Head: Dual Mobilitymm +0    PERTINENT FINDINGS: Arthritis: Endstage degenerative arthritis of the femoral head and acetabulum.    DETAILS OF  PROCEDURE:   The patient was met in the preoperative area. The site was marked. The consent and H&P were reviewed. The patient was then wheeled back to the operative suite underwent anesthesia. The [place] table boots were secured to the patients’ feet. The patient was moved onto the [place] table and secured in the supine position. The perineal post was inserted and the boots were secured into the leg holders. Surgical alcohol was used to thoroughly clean the operative area.     The hip and leg was then prepped in the normal sterile fashion, multiple layers of sterile drapes, and surgical space suits for the entire operative team. New outer gloves were used by all sterile surgical team members after final draping. The surgical incision was marked. A surgical timeout was performed.    A Modified Mauricoi-Marin anterior approach was used. Dissection was carried down to the fascia. The fascia was incised and the tensor fascia erum muscle was retracted laterally and the sartorius medially. The lateral femoral circumflex vessels were identified and cauterized using bovie. The rectus femoris was retracted medially. A capsulotomy was then performed. The capsule was tagged with Ethibond for later repair. Retractors were placed on either side of the femoral neck and dissection was further carried down so that the lesser trochanter could be palpated and the superior rim of the acetabulum could be visualized.    The femoral neck cut was then made from  just proximal to the lesser trochanter medially headed towards the saddle laterally. Care was taken not to extend the cut into the lesser or greater trochanters. The head and neck segment were removed with a corkscrew. The acetabulum was then exposed. The labrum was removed using a kocher and scalpel and excess osteophytes were removed using an osteotome.    The acetabulum was progressively reamed, beginning with medialization and then finalizing the position of the reamer to  approximate the final cup position. Fluoroscopy was used to ensure proper placement of the reamer, including adequate medialization as well as appropriate abduction and anteversion. The real cup was then opened and inserted using fluoroscopy, ensuring good position in terms of abduction and anteversion.     No Screws: Initial press-fit fixation of the cup was very robust and no screws were required for supplemental fixation.    After thorough irrigation and ensuring that no soft tissue was entrapped within the cup, the real liner was snapped into place.    The hook was placed just distal to the greater trochanter. The femur was then externally rotated, extended and adducted under the well leg. Soft tissue releases were performed to gain exposure to the proximal femur. Capsule was released from the saddle and the lateral femur. Care was taken to preserve the short external rotator tendons. The capsule was also released along the medial femur. The hydraulic femoral lift was then used to better expose the femur. Further soft tissue releases were then performed, again ensuring preservation of the short external rotators.    The femur was machined with a cookie cutter osteotome and then a rasp was used to further lateralize the starting point. The sclerotic bone on the lateral shoulder of the femur was removed with a rongeour and curette as needed to protect the greater trochanter. Progressive broaches were inserted until adequate fill had been achieved. Using fluoroscopy, the femoral stem was visualized after trial reduction of the hip. The length and offset were compared to the non-operative hip. Trials of stem size and neck length were trialed until equal leg length and offset were obtained. Additionally hip stability was tested with internal and external rotation of the leg. The leg was stable with at least 90° of external rotation and there was no impingement at 60° of internal rotation. After implantation of the  "final stem and ball, the leg was once again brought into normal anatomic position and relocated. Final x-rays were taken with final implants noting good position of the stem and cup, and no visualized fracture.. The hip was stable upon reduction.    An analgesic cocktail was then injected about the hip as well as the surgical dissection area. The capsule was closed.  The fascia was then closed with a running stitch and the skin was closed in layers.  A sterile dressing was applied.    The patient was moved from the Ryan table to the [facility] where the boots were removed. The patient was taken to the recovery room in stable condition. There were no complications and the patient tolerated the procedure well.    R \"Andrea\" Jason RUIZ MD  Orthopaedic Surgery  [facility]  [phone]            "

## 2024-04-01 NOTE — H&P
Orthopaedic Surgery  History & Physical For Elective Total Hip  Dr. BRAULIO Gomez II  [phone]    HPI:  Patient is a 73 y.o. Not  or  female who presents with End-stage arthritis of the right hip.  They failed conservative treatment of their hip pain and a thorough discussion of the risks and benefits of surgery was had.  The patient wishes to continue with elective total hip replacement, they were scheduled and are here for surgery. They did get medical clearance as well as a thorough preoperative workup.     MEDICAL HISTORY  Past Medical History:   Diagnosis Date    Adductor tendinitis     ADHD (attention deficit hyperactivity disorder)     Allergic     Anemia     Ankle sprain 1977    Anxiety     Arthritis of back 1970    Arthritis of neck 1980    Asthma     Bronchitis     CAD (coronary artery disease)     Cataract     Removed by Dr Plasencia    Cervical disc disorder     CTS (carpal tunnel syndrome) 2000    Deep vein thrombosis 2015. Again in 2018    Degeneration of lumbar or lumbosacral intervertebral disc     Depression     GERD (gastroesophageal reflux disease) 1975    Gout     Headache 1986    Heart murmur 1990    Hip arthrosis 2010    History of DVT (deep vein thrombosis)     Hyperlipidemia     Hypertension     Insomnia     Insomnia secondary to anxiety     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     Kidney disease     pt states stage 4 kidney disease    Knee swelling 1970    Low back pain 1980    Low back strain 1970    Lumbosacral disc disease 1970    Neck strain 1970    Obesity 1950    Osteoarthritis     Osteopenia     Periarthritis of shoulder     Renal insufficiency Dr. Pritchett. 2016    Rhinitis, allergic     RLS (restless legs syndrome)     Rotator cuff syndrome     Sinus disorder     Sleep apnea     NO CPAP    Tear of meniscus of knee 1988     Past Surgical History:   Procedure Laterality Date    ANKLE FUSION Left     ANKLE OPEN REDUCTION INTERNAL FIXATION   1990??1995?    Triple orthodesis. Then ankle fusion. Dr. Wadsworth    APPENDECTOMY  1984    BARIATRIC SURGERY  1984    CARDIAC CATHETERIZATION  1999    CARDIAC CATHETERIZATION N/A 09/16/2020    Procedure: Coronary angiography;  Surgeon: Liliana Mae MD;  Location:  [facility];  Service: Cardiovascular;  Laterality: N/A;    CARDIAC CATHETERIZATION N/A 09/16/2020    Procedure: Left Heart Cath;  Surgeon: Liliana Mae MD;  Location:  [facility];  Service: Cardiovascular;  Laterality: N/A;    CHOLECYSTECTOMY      COLONOSCOPY  1990    COLONOSCOPY N/A 12/27/2019    Procedure: COLONOSCOPY INTO CECUM WITH COLD BX POLYPECTOMY;  Surgeon: Jessee Birch Jr., MD;  Location:  [facility];  Service: General    CORONARY ANGIOPLASTY WITH STENT PLACEMENT      CORONARY STENT PLACEMENT      EYE SURGERY      GASTRIC BYPASS      HYSTERECTOMY      Total    JOINT REPLACEMENT  Lft knee 2014  rht knee 2015    KNEE ACL RECONSTRUCTION      MOHS SURGERY Left 12/07/2020    left cheek    OOPHORECTOMY      SINUS SURGERY      x 2    TOTAL KNEE ARTHROPLASTY Bilateral     TRIGGER POINT INJECTION       Prior to Admission medications    Medication Sig Start Date End Date Taking? Authorizing Provider   allopurinol (ZYLOPRIM) 100 MG tablet TAKE 1 TABLET BY MOUTH EVERY DAY 3/11/24  Yes Radha House APRN   calcium citrate (CALCITRATE) 950 (200 Ca) MG tablet Take 1 tablet by mouth 2 (Two) Times a Day. 2/6/24  Yes Radha House APRN   famotidine (PEPCID) 20 MG tablet Take 1 tablet by mouth 2 (Two) Times a Day. 2/6/24  Yes Radha House APRN   furosemide (LASIX) 40 MG tablet Take 1 tablet by mouth Daily.   Yes Venu Castañeda MD   gabapentin (NEURONTIN) 400 MG capsule Take 2 capsules by mouth Every Night. 3/20/22  Yes Venu Castañeda MD   HYDROcodone-acetaminophen (NORCO) 7.5-325 MG per tablet Take 1 tablet by mouth 2 (Two) Times a Day As Needed for Moderate Pain .  Patient taking  differently: Take 1 tablet by mouth 3 (Three) Times a Day. 10/26/18  Yes Deja Granger MD   metoprolol succinate XL (TOPROL-XL) 50 MG 24 hr tablet 1 tablet 2 (Two) Times a Day.   Yes Venu Castañeda MD   ramipril (ALTACE) 10 MG capsule Take 1 capsule by mouth Every Night. HOLD PER MD INSTR-HOLD THE NIGHT BEFORE SURGERY.   Yes Venu Castañeda MD   rOPINIRole (REQUIP) 1 MG tablet Take 1 tablet by mouth 4 (Four) Times a Day. Take 1 hour before bedtime. 2/12/24  Yes Radha House APRN   simvastatin (ZOCOR) 40 MG tablet Take 1 tablet by mouth Daily. 2/6/24  Yes Radha House APRN   sodium bicarbonate 650 MG tablet Take 1 tablet by mouth 2 (Two) Times a Day. 2/6/24  Yes Radha House APRN   albuterol sulfate  (90 Base) MCG/ACT inhaler Inhale.  Patient not taking: Reported on 2/5/2024 6/26/23   Venu Castañeda MD   apixaban (Eliquis) 2.5 MG tablet tablet Take 1 tablet by mouth 2 (Two) Times a Day.  Patient taking differently: Take 1 tablet by mouth 2 (Two) Times a Day. HOLD PER MD ESQUEDA 2/6/24   Radha House APRN   calcitriol (ROCALTROL) 0.25 MCG capsule TAKE 1 CAPSULE MONDAY, WEDNESDAY, AND FRIDAY. SEE ADMIN INSTRUCTIONS 1/2/24   Radha House APRN   chlorthalidone (HYGROTON) 25 MG tablet TAKE 1 TABLET EVERY OTHER DAY  Patient taking differently: Take 1 tablet by mouth Every Other Day. TAKE 1 TABLET EVERY OTHER DAY/ TUESDAY,THURSDAY,SATURDAY 10/24/23   Radha House APRN   cyanocobalamin 1000 MCG/ML injection Inject 1mL into the appropriate muscle every 28 days  Patient taking differently: Inject 1 mL under the skin into the appropriate area as directed Every 28 (Twenty-Eight) Days. Inject 1mL into the appropriate muscle every 28 days  HOLD PER MD ESQUEDA 2/6/24   Radha House APRN   DULoxetine (CYMBALTA) 60 MG capsule Take 1 capsule by mouth Daily.    Provider, Historical, MD   metoprolol succinate XL (TOPROL-XL) 25 MG 24 hr tablet Take 1 tablet by mouth Daily.  "2/6/24   Radha House APRN   Needle, Disp, (B-D DISP NEEDLE 30GX1\") 30G X 1\" misc BD Integra Syringe 3 mL 25 gauge x 5/8\"    ProviderVenu MD   Syringe/Needle, Disp, (B-D INTEGRA SYRINGE) 25G X 5/8\" 3 ML misc Inject 1 Units under the skin into the appropriate area as directed Every 30 (Thirty) Days. 5/24/23   Radha House APRN   vitamin D (ERGOCALCIFEROL) 1.25 MG (27262 UT) capsule capsule TAKE 1 CAPSULE (50,000 UNITS TOTAL) BY MOUTH ONCE WEEKLY 10/25/23   ProviderVenu MD     No Known Allergies  Most Recent Immunizations   Administered Date(s) Administered    COVID-19 (PFIZER) BIVALENT 12+YRS 01/27/2023    COVID-19 (PFIZER) Purple Cap Monovalent 10/29/2021    Fluzone High Dose =>65 Years ([employer] ONLY) 08/21/2020    Fluzone High-Dose 65+yrs 02/05/2024    Influenza, Unspecified 10/22/2018    PPD Test 09/01/2016    Pneumococcal Conjugate 13-Valent (PCV13) 11/25/2015    Pneumococcal Polysaccharide (PPSV23) 12/05/2018     Social History     Tobacco Use    Smoking status: Never    Smokeless tobacco: Never   Substance Use Topics    Alcohol use: Not Currently     Alcohol/week: 1.0 standard drink of alcohol     Types: 1 Glasses of wine per week     Comment: social      Social History     Substance and Sexual Activity   Drug Use Not Currently    Types: Hydrocodone    Comment: I take this for the pain in my hip and back.       REVIEW OF SYSTEMS:  Head: negative for headache  Respiratory: negative for shortness of breath.   Cardiovascular: negative for chest pain.   Gastrointestinal: negative abdominal pain.   Neurological: negative for LOC  Psychiatric/Behavioral: negative for memory loss.   All other systems reviewed and are negative    VITALS: There were no vitals taken for this visit. There is no height or weight on file to calculate BMI.    PHYSICAL EXAM:   CONSTITUTIONAL: A&Ox3, No acute distress  LUNGS: Equal chest rise, no shortness of air  CARDIOVASCULAR: palpable peripheral pulses  SKIN: no " skin lesions in the area examined  LYMPH: no lymphadenopathy in the area examined  EXTREMITY: Hip  Pulses:  Brisk Capillary Refill  Sensation: Intact to Saphenous, Sural, Deep Peroneal, Superficial Peroneal, and Tibial Nerves and grossly throughout extremity  Motor: 5/5 EHL/FHL/TA/GS motor complexes    RADIOLOGY REVIEW:   XR Chest PA & Lateral    Result Date: 3/27/2024  1. No evidence for active disease in the chest. 2. Cardiomegaly. 3. Left basilar linear subsegmental atelectasis or scarring. 4. Osteoarthritis of both hips, greater on the right with axial/medial migration. 5. Degenerative disc disease lower lumbar spine.   This report was finalized on 3/27/2024 3:24 PM by Dr. Sundeep Law M.D on Workstation: [id number]      XR Hip With or Without Pelvis 2 - 3 View Right    Result Date: 3/27/2024  1. No evidence for active disease in the chest. 2. Cardiomegaly. 3. Left basilar linear subsegmental atelectasis or scarring. 4. Osteoarthritis of both hips, greater on the right with axial/medial migration. 5. Degenerative disc disease lower lumbar spine.   This report was finalized on 3/27/2024 3:24 PM by Dr. Sundeep Law M.D on Workstation: [id number]       LABS:   Results for the past 24 hours: No results found for this or any previous visit (from the past 24 hour(s)).    IMPRESSION:  Patient is a 73 y.o. Not  or  female with end-stage osteoarthritis of the right hip    PLAN:   Surgery: Elective total hip arthroplasty  Consent: The risks and benefits of operative versus nonoperative treatment were discussed. The patient elected to undergo operative treatment of their hip. The risks discussed included but were not limited to blood clots, MI, stroke, other medical complications, infection, dislocation, fracture, damage to neurovascular structures, continued pain, hardware prominence, loss of range of motion, stiffness, need for further procedures, and and risk of anesthesia. No guarantees were made    Disposition: Elective right Total Hip Arthroplasty today.    Khang Gomez II, MD  Orthopaedic Surgery  [facility]

## 2024-04-01 NOTE — CASE MANAGEMENT/SOCIAL WORK
Discharge Planning Assessment  [facility]     Patient Name: Brooklyn Johns  MRN: [id number]  Today's Date: 4/1/2024    Admit Date: 4/1/2024    Plan: Home with family support & [facility] .   Discharge Needs Assessment       Row Name 04/01/24 1633       Living Environment    People in Home facility resident;alone    Current Living Arrangements independent living facility    Primary Care Provided by self    Provides Primary Care For no one    Family Caregiver if Needed sibling(s)    Quality of Family Relationships helpful;involved;supportive    Able to Return to Prior Arrangements yes       Resource/Environmental Concerns    Transportation Concerns none       Transition Planning    Patient/Family Anticipates Transition to home;home with help/services    Patient/Family Anticipated Services at Transition     Transportation Anticipated family or friend will provide       Discharge Needs Assessment    Readmission Within the Last 30 Days no previous admission in last 30 days    Equipment Currently Used at Home cane, straight    Discharge Facility/Level of Care Needs home with home health    Provided Post Acute Provider List? N/A    N/A Provider List Comment Declined; requests [facility].    Patient's Choice of Community Agency(s) [facility].                   Discharge Plan       Row Name 04/01/24 1636       Plan    Plan Home with family support & [employer] .    Patient/Family in Agreement with Plan yes    Plan Comments Spoke with the patient, verified current information and explained the role of the CCP. Patient said she lives alone at [facility] in Independent Living. She's IADL and uses a cane. [facility]  in the past. Patient plans to discharge home with  [facility] . Referral sent in Epic. She also plans for her family to transport her home at d/c. No other needs identified. CCP will follow.                  Continued Care and Services -  Admitted Since 4/1/2024       Home Medical Care       Service Provider Request Status Selected Services Address Phone Fax Patient Preferred     [facility] Pending - Request Sent N/A [address] [address] [phone] --                     Demographic Summary       Row Name 04/01/24 1632       General Information    Admission Type observation    Reason for Consult discharge planning    Preferred Language English       Contact Information    Permission Granted to Share Info With ;family/designee                   Functional Status       Row Name 04/01/24 1632       Functional Status    Usual Activity Tolerance good       Functional Status, IADL    Medications independent    Meal Preparation assistive equipment    Housekeeping assistive equipment    Laundry assistive equipment    Shopping assistive equipment       Mental Status    General Appearance WDL WDL       Mental Status Summary    Recent Changes in Mental Status/Cognitive Functioning no changes                   Psychosocial       Row Name 04/01/24 1632       Intellectual Performance WDL    Level of Consciousness Alert       Coping/Stress    Patient Personal Strengths able to adapt    Sources of Support sibling(s)    Reaction to Health Status accepting    Understanding of Condition and Treatment adequate understanding of medical condition;adequate understanding of treatment       Developmental Stage (Eriksson's)    Developmental Stage Stage 8 (65 years-death/Late Adulthood) Integrity vs. Despair                    Hannah CINTRON, RN

## 2024-04-01 NOTE — ANESTHESIA POSTPROCEDURE EVALUATION
Patient: Brooklyn Johns    Procedure Summary       Date: 04/01/24 Room / Location:  [facility] [address] [place] [address] [facility]    Anesthesia Start: 0656 Anesthesia Stop: 0822    Procedure: RIGHT TOTAL HIP ARTHROPLASTY - ANTERIOR (Right: Hip) Diagnosis:     Surgeons: Khang Gomez II, MD Provider: Tip Casillas MD    Anesthesia Type: general ASA Status: 3            Anesthesia Type: general    Vitals  Vitals Value Taken Time   /81 04/01/24 0915   Temp 36.4 °C (97.6 °F) 04/01/24 0820   Pulse 70 04/01/24 0924   Resp 16 04/01/24 0845   SpO2 99 % 04/01/24 0924   Vitals shown include unfiled device data.        Post Anesthesia Care and Evaluation    Patient location during evaluation: PACU  Patient participation: complete - patient participated  Level of consciousness: awake and alert  Pain management: adequate    Airway patency: patent  Anesthetic complications: No anesthetic complications  PONV Status: none  Cardiovascular status: acceptable and hemodynamically stable  Respiratory status: acceptable, nonlabored ventilation and spontaneous ventilation  Hydration status: acceptable

## 2024-04-01 NOTE — THERAPY EVALUATION
Patient Name: Brooklyn Johns  : 1950    MRN: [id number]                              Today's Date: 2024       Admit Date: 2024    Visit Dx: No diagnosis found.  Patient Active Problem List   Diagnosis    Essential hypertension    Sleep apnea    Hyperlipidemia    Crushing injury of left foot and ankle    Lumbosacral spondylosis without myelopathy    History of recurrent deep vein thrombosis (DVT)    Morbidly obese    CKD (chronic kidney disease) stage 4, GFR 15-29 ml/min    Coronary artery disease involving native coronary artery of native heart with unstable angina pectoris    Primary insomnia    Bilateral hip pain    Hyperuricemia    B12 deficiency    Restless legs    Hypocalcemia    Secondary hyperparathyroidism    Lumbar radiculopathy    Primary osteoarthritis of right hip    Hyperkalemia    Anemia    Leg hematoma, left, initial encounter    Elevated glucose    Basal cell carcinoma (BCC) of face    Right hip pain    Chronic kidney disease, stage IV (severe)    Chronic metabolic acidosis    DDD (degenerative disc disease), cervical    Anemia in chronic kidney disease    Vitamin D deficiency    Hip joint replacement status     Past Medical History:   Diagnosis Date    Adductor tendinitis     ADHD (attention deficit hyperactivity disorder)     Allergic     Anemia     Ankle sprain     Anxiety     Arthritis of back 1970    Arthritis of neck     Asthma     Bronchitis     CAD (coronary artery disease)     Cataract     Removed by Dr Plasencia    Cervical disc disorder     CTS (carpal tunnel syndrome)     Deep vein thrombosis 2015. Again in 2018    Degeneration of lumbar or lumbosacral intervertebral disc     Depression     GERD (gastroesophageal reflux disease)     Gout     Headache     Heart murmur     Hip arthrosis 2010    History of DVT (deep vein thrombosis)     Hyperlipidemia     Hypertension     Insomnia     Insomnia secondary to anxiety     Intervertebral disc  disorder of lumbar region with myelopathy     Kidney disease     pt states stage 4 kidney disease    Knee swelling 1970    Low back pain 1980    Low back strain 1970    Lumbosacral disc disease 1970    Neck strain 1970    Obesity 1950    Osteoarthritis     Osteopenia     Periarthritis of shoulder     Renal insufficiency Dr. Pricthett. 2016    Rhinitis, allergic     RLS (restless legs syndrome)     Rotator cuff syndrome     Sinus disorder     Sleep apnea     NO CPAP    Tear of meniscus of knee 1988     Past Surgical History:   Procedure Laterality Date    ANKLE FUSION Left     ANKLE OPEN REDUCTION INTERNAL FIXATION  1990??1995?    Triple orthodesis. Then ankle fusion. Dr. Wadsworth    APPENDECTOMY  1984    BARIATRIC SURGERY  1984    CARDIAC CATHETERIZATION  1999    CARDIAC CATHETERIZATION N/A 09/16/2020    Procedure: Coronary angiography;  Surgeon: Liliana Mae MD;  Location:  [facility];  Service: Cardiovascular;  Laterality: N/A;    CARDIAC CATHETERIZATION N/A 09/16/2020    Procedure: Left Heart Cath;  Surgeon: Liliana Mae MD;  Location:  [facility];  Service: Cardiovascular;  Laterality: N/A;    CHOLECYSTECTOMY      COLONOSCOPY  1990    COLONOSCOPY N/A 12/27/2019    Procedure: COLONOSCOPY INTO CECUM WITH COLD BX POLYPECTOMY;  Surgeon: Jessee Birch Jr., MD;  [facility];  Service: General    CORONARY ANGIOPLASTY WITH STENT PLACEMENT      EYE SURGERY      GASTRIC BYPASS      HYSTERECTOMY      Total    JOINT REPLACEMENT  Lft knee 2014  rht knee 2015    KNEE ACL RECONSTRUCTION      MOHS SURGERY Left 12/07/2020    left cheek    OOPHORECTOMY      SINUS SURGERY      x 2    TOTAL KNEE ARTHROPLASTY Bilateral     TRIGGER POINT INJECTION        General Information       Row Name 04/01/24 1525          Physical Therapy Time and Intention    Document Type evaluation  -SM     Mode of Treatment individual therapy;physical therapy  -       Row Name 04/01/24 1525           General Information    Patient Profile Reviewed yes  -SM     Prior Level of Function independent:  -     Existing Precautions/Restrictions fall  -       Row Name 04/01/24 1525          Living Environment    People in Home facility resident  -       Row Name 04/01/24 1525          Home Main Entrance    Number of Stairs, Main Entrance none  -       Row Name 04/01/24 1525          Cognition    Orientation Status (Cognition) oriented x 4  -       Row Name 04/01/24 1525          Safety Issues, Functional Mobility    Impairments Affecting Function (Mobility) endurance/activity tolerance;strength;pain;range of motion (ROM)  -               User Key  (r) = Recorded By, (t) = Taken By, (c) = Cosigned By      Initials Name Provider Type     Carine Marcus PT Physical Therapist                   Mobility       Row Name 04/01/24 1525          Bed Mobility    Comment, (Bed Mobility) Patient [facility]IC upon arrival  -[facility] Name 04/01/24 1525          Sit-Stand Transfer    Sit-Stand [place] (Transfers) contact guard  -     Assistive Device (Sit-Stand Transfers) walker, front-wheeled  -[facility] Name 04/01/24 1525          Gait/Stairs (Locomotion)    [place] Level (Gait) contact guard  -     Assistive Device (Gait) walker, front-wheeled  -     Distance in Feet (Gait) 40  -SM     Deviations/Abnormal Patterns (Gait) gait speed decreased;antalgic  -     Comment, (Gait/Stairs) Gait slow and antalgic with patient using step to gait pattern. No overt LOB noted.  -               User Key  (r) = Recorded By, (t) = Taken By, (c) = Cosigned By      Initials Name Provider Type    Carine Dias PT Physical Therapist                   Obj/Interventions       Row Name 04/01/24 1531          Range of Motion Comprehensive    Comment, General Range of Motion Generalized post op deficits  -       Row Name 04/01/24 1531          Strength Comprehensive (MMT)    General Manual Muscle Testing (MMT)  Assessment lower extremity strength deficits identified  -     Comment, General Manual Muscle Testing (MMT) Assessment Generalized post op weakness  -       Row Name 04/01/24 1531          Motor Skills    Therapeutic Exercise other (see comments)  KIM post op protocol  -       Row Name 04/01/24 1531          Balance    Balance Assessment sitting static balance;sitting dynamic balance;sit to stand dynamic balance;standing static balance;standing dynamic balance  -     Static Sitting Balance modified independence  -     Dynamic Sitting Balance supervision  -     Position, Sitting Balance sitting in chair  -     Sit to Stand Dynamic Balance contact guard  -     Static Standing Balance standby assist  -     Dynamic Standing Balance contact guard  -SM     Position/Device Used, Standing Balance supported;walker, front-wheeled  -     Balance Interventions sitting;standing;sit to stand;supported;static;dynamic  -               User Key  (r) = Recorded By, (t) = Taken By, (c) = Cosigned By      Initials Name Provider Type     Carine Marcus, PT Physical Therapist                   Goals/Plan       Row Name 04/01/24 1538          Bed Mobility Goal 1 (PT)    Activity/Assistive Device (Bed Mobility Goal 1, PT) bed mobility activities, all  -     [place] Level/Cues Needed (Bed Mobility Goal 1, PT) modified independence  -     Time Frame (Bed Mobility Goal 1, PT) 1 week  -       Row Name 04/01/24 1538          Transfer Goal 1 (PT)    Activity/Assistive Device (Transfer Goal 1, PT) sit-to-stand/stand-to-sit;bed-to-chair/chair-to-bed  -     [place] Level/Cues Needed (Transfer Goal 1, PT) modified independence  -     Time Frame (Transfer Goal 1, PT) 1 week  -[facility] Name 04/01/24 1538          Gait Training Goal 1 (PT)    Activity/Assistive Device (Gait Training Goal 1, PT) gait (walking locomotion)  -     [place] Level (Gait Training Goal 1, PT) standby assist  -      Distance (Gait Training Goal 1, PT) 100ft  -     Time Frame (Gait Training Goal 1, PT) 1 week  -               User Key  (r) = Recorded By, (t) = Taken By, (c) = Cosigned By      Initials Name Provider Type     Carine Marcus, PT Physical Therapist                   Clinical Impression       Row Name 04/01/24 1533          Pain    Pretreatment Pain Rating 2/10  -SM     Posttreatment Pain Rating 3/10  -SM     Pain Location - Side/Orientation Right  -     Pain Location - hip  -     Pain Intervention(s) Rest;Repositioned;Ambulation/increased activity  -       Row Name 04/01/24 9353          Plan of Care Review    Plan of Care Reviewed With patient  -     Outcome Evaluation Patient is a 73 y.o female who presents POD0 R KIM anterior approach. Patient reports she lives at a facility where she has 24/7 assist if needed though patient reports she is independent at baseline. Patient reports she needs a rwx at d/c. Patient educated in and performed KIM post op protocol exercises. Patient performed STS from chair with CGA. Patient ambulated 40ft with rwx and CGA. Gait slow and antalgic with patient using step to gait pattern. No overt LOB noted. Patient planning to d/c home with HHPT at d/c. PT will continue to monitor.  -       Row Name 04/01/24 9163          Therapy Assessment/Plan (PT)    Rehab Potential (PT) good, to achieve stated therapy goals  -     Criteria for Skilled Interventions Met (PT) yes  -     Therapy Frequency (PT) daily  -       Row Name 04/01/24 1533          Vital Signs    Pre Patient Position Sitting  -     Intra Patient Position Standing  -     Post Patient Position Sitting  -[facility] Name 04/01/24 1533          Positioning and Restraints    Pre-Treatment Position in bed  -     Post Treatment Position bathroom  -     Bathroom notified nsg;sitting;call light within reach;encouraged to call for assist  -               User Key  (r) = Recorded By, (t) = Taken By, (c)  = Cosigned By      Initials Name Provider Type    Carine Dias PT Physical Therapist                   Outcome Measures       Row Name 04/01/24 1539 04/01/24 1020       How much help from another person do you currently need...    Turning from your back to your side while in flat bed without using bedrails? 4  -SM 3  -DD    Moving from lying on back to sitting on the side of a flat bed without bedrails? 4  -SM 3  -DD    Moving to and from a bed to a chair (including a wheelchair)? 3  -SM 3  -DD    Standing up from a chair using your arms (e.g., wheelchair, bedside chair)? 3  -SM 3  -DD    Climbing 3-5 steps with a railing? 2  -SM 2  -DD    To walk in hospital room? 3  -SM 3  -DD    AM-PAC 6 Clicks Score (PT) 19  -SM 17  -DD    Highest Level of Mobility Goal 6 --> Walk 10 steps or more  -SM 5 --> Static standing  -DD      Row Name 04/01/24 1001          How much help from another person do you currently need...    Turning from your back to your side while in flat bed without using bedrails? 3  -DD     Moving from lying on back to sitting on the side of a flat bed without bedrails? 3  -DD     Moving to and from a bed to a chair (including a wheelchair)? 3  -DD     Standing up from a chair using your arms (e.g., wheelchair, bedside chair)? 3  -DD     Climbing 3-5 steps with a railing? 2  -DD     To walk in hospital room? 3  -DD     AM-PAC 6 Clicks Score (PT) 17  -DD     Highest Level of Mobility Goal 5 --> Static standing  -DD       Row Name 04/01/24 1539          Functional Assessment    Outcome Measure Options AM-PAC 6 Clicks Basic Mobility (PT)  -               User Key  (r) = Recorded By, (t) = Taken By, (c) = Cosigned By      Initials Name Provider Type    Carine Dias PT Physical Therapist    Kathy Carcamo, RN Registered Nurse                                 Physical Therapy Education       Title: PT OT SLP Therapies (Done)       Topic: Physical Therapy (Done)       Point: Mobility  training (Done)       Learning Progress Summary             Patient Acceptance, E, VU by  at 4/1/2024 1539                         Point: Home exercise program (Done)       Learning Progress Summary             Patient Acceptance, E, VU by  at 4/1/2024 1539                         Point: Body mechanics (Done)       Learning Progress Summary             Patient Acceptance, E, VU by  at 4/1/2024 1539                         Point: Precautions (Done)       Learning Progress Summary             Patient Acceptance, E, VU by  at 4/1/2024 1539                                         User Key       Initials Effective Dates Name Provider Type Discipline     05/02/22 -  Carine Marcus, PT Physical Therapist PT                  PT Recommendation and Plan     Plan of Care Reviewed With: patient  Outcome Evaluation: Patient is a 73 y.o female who presents POD0 R KIM anterior approach. Patient reports she lives at a facility where she has 24/7 assist if needed though patient reports she is independent at baseline. Patient reports she needs a rwx at d/c. Patient educated in and performed KIM post op protocol exercises. Patient performed STS from chair with CGA. Patient ambulated 40ft with rwx and CGA. Gait slow and antalgic with patient using step to gait pattern. No overt LOB noted. Patient planning to d/c home with HHPT at d/c. PT will continue to monitor.     Time Calculation:         PT Charges       Row Name 04/01/24 1540             Time Calculation    Start Time 1405  -      Stop Time 1418  -      Time Calculation (min) 13 min  -      PT Received On 04/01/24  -      PT - Next Appointment 04/02/24  -      PT Goal Re-Cert Due Date 04/08/24  -         Time Calculation- PT    Total Timed Code Minutes- PT 8 minute(s)  -         Timed Charges    37918 - PT Therapeutic Exercise Minutes 8  -         Total Minutes    Timed Charges Total Minutes 8  -       Total Minutes 8  -SM                User Key   (r) = Recorded By, (t) = Taken By, (c) = Cosigned By      Initials Name Provider Type     Carine Marcus, PT Physical Therapist                  Therapy Charges for Today       Code Description Service Date Service Provider Modifiers Qty    18445177472 HC PT THER PROC EA 15 MIN 4/1/2024 Carine Marcus, PT GP 1    78850660773 HC PT EVAL LOW COMPLEXITY 2 4/1/2024 Carine Marcus, PT GP 1            PT G-Codes  Outcome Measure Options: AM-PAC 6 Clicks Basic Mobility (PT)  AM-PAC 6 Clicks Score (PT): 19  PT Discharge Summary  Anticipated Discharge Disposition (PT): home with assist, home with home health    Carine Marcus PT  4/1/2024

## 2024-04-01 NOTE — PLAN OF CARE
Goal Outcome Evaluation:      Patient is POD #0 for a right total hip arthroplasty.  Patient is alert x4.  Benjamin dressing is dry, and intact.  Benjamin drain is flashing green.  Ice applied to affected extremity.  Patient was able to ambulate from the stretcher to the bed. Voiding function is intact. NS is infusing at 100 cc/hr.  Teaching provided on how to use the incentive spirometer. Percocet, and dilaudid were ordered for pain management.  Up to chair.  No signs of distress noted. Will continue to monitor, and update accordingly.

## 2024-04-01 NOTE — DISCHARGE PLACEMENT REQUEST
"Brooklyn Gilliam \"Priya\" (73 y.o. Female)       Date of Birth   1950    Social Security Number       Address   [address] [place] [address]    Home Phone   [phone]    MRN   [id number]       Latter-day   Rastafarian    Marital Status                               Admission Date   4/1/24    Admission Type   Elective    Admitting Provider   Khang Gomez II, MD    Attending Provider   Khang Gomez II, MD    Department, Room/Bed   [address], P789/1       Discharge Date       Discharge Disposition       Discharge Destination                                 Attending Provider: Khang Gomez II, MD    Allergies: No Known Allergies    Isolation: None   Infection: None   Code Status: Not on file    Ht: 157.5 cm (62.01\")   Wt: 123 kg (271 lb 6.4 oz)    Admission Cmt: None   Principal Problem: Hip joint replacement status [Z96.649]                   Active Insurance as of 4/1/2024       Primary Coverage       Payor Plan Insurance Group Employer/Plan Group    [facility] MEDICARE REPLACEMENT [facility] [employer] [id number]       Payor Plan Address Payor Plan Phone Number Payor Plan Fax Number Effective Dates    [address] [address]   1/1/2023 - None Entered    [facility] [id number]         Subscriber Name Subscriber Birth Date Member ID       BROOKLYN GILLIAM 1950 [id number]                     Emergency Contacts        (Rel.) Home Phone Work Phone Mobile Phone    ADDY BROWN (Brother) -- -- [phone]    SAY BROWN (Sister) [phone] -- --    Chuck Infante (Relative) -- -- [phone]              "

## 2024-04-01 NOTE — PLAN OF CARE
Goal Outcome Evaluation:  Plan of Care Reviewed With: patient           Outcome Evaluation: Patient is a 73 y.o female who presents POD0 R KIM anterior approach. Patient reports she lives at a facility where she has 24/7 assist if needed though patient reports she is independent at baseline. Patient reports she needs a rwx at d/c. Patient educated in and performed KIM post op protocol exercises. Patient performed STS from chair with CGA. Patient ambulated 40ft with rwx and CGA. Gait slow and antalgic with patient using step to gait pattern. No overt LOB noted. Patient planning to d/c home with HHPT at d/c. PT will continue to monitor.      Anticipated Discharge Disposition (PT): home with assist, home with home health

## 2024-04-02 ENCOUNTER — HOME HEALTH ADMISSION (OUTPATIENT)
Dept: HOME HEALTH SERVICES | Facility: HOME HEALTHCARE | Age: 74
End: 2024-04-02
Payer: MEDICARE

## 2024-04-02 ENCOUNTER — READMISSION MANAGEMENT (OUTPATIENT)
Dept: CALL CENTER | Facility: HOSPITAL | Age: 74
End: 2024-04-02
Payer: MEDICARE

## 2024-04-02 ENCOUNTER — DOCUMENTATION (OUTPATIENT)
Dept: HOME HEALTH SERVICES | Facility: HOME HEALTHCARE | Age: 74
End: 2024-04-02
Payer: MEDICARE

## 2024-04-02 ENCOUNTER — NURSE TRIAGE (OUTPATIENT)
Dept: CALL CENTER | Facility: HOSPITAL | Age: 74
End: 2024-04-02
Payer: MEDICARE

## 2024-04-02 VITALS
HEART RATE: 61 BPM | SYSTOLIC BLOOD PRESSURE: 157 MMHG | WEIGHT: 271.4 LBS | RESPIRATION RATE: 16 BRPM | BODY MASS INDEX: 49.94 KG/M2 | OXYGEN SATURATION: 96 % | HEIGHT: 62 IN | DIASTOLIC BLOOD PRESSURE: 74 MMHG | TEMPERATURE: 97.9 F

## 2024-04-02 LAB
HCT VFR BLD AUTO: 26.3 % (ref 34–46.6)
HGB BLD-MCNC: 8.6 G/DL (ref 12–15.9)

## 2024-04-02 PROCEDURE — 63710000001 APIXABAN 2.5 MG TABLET: Performed by: ORTHOPAEDIC SURGERY

## 2024-04-02 PROCEDURE — 63710000001 OXYCODONE-ACETAMINOPHEN 5-325 MG TABLET: Performed by: ORTHOPAEDIC SURGERY

## 2024-04-02 PROCEDURE — 97530 THERAPEUTIC ACTIVITIES: CPT

## 2024-04-02 PROCEDURE — 63710000001 MELOXICAM 15 MG TABLET: Performed by: ORTHOPAEDIC SURGERY

## 2024-04-02 PROCEDURE — A9270 NON-COVERED ITEM OR SERVICE: HCPCS | Performed by: ORTHOPAEDIC SURGERY

## 2024-04-02 PROCEDURE — 85018 HEMOGLOBIN: CPT | Performed by: ORTHOPAEDIC SURGERY

## 2024-04-02 PROCEDURE — 85014 HEMATOCRIT: CPT | Performed by: ORTHOPAEDIC SURGERY

## 2024-04-02 PROCEDURE — 63710000001 ROPINIROLE 1 MG TABLET: Performed by: ORTHOPAEDIC SURGERY

## 2024-04-02 PROCEDURE — 63710000001 DULOXETINE 60 MG CAPSULE DELAYED-RELEASE PARTICLES: Performed by: ORTHOPAEDIC SURGERY

## 2024-04-02 PROCEDURE — 63710000001 FUROSEMIDE 40 MG TABLET: Performed by: ORTHOPAEDIC SURGERY

## 2024-04-02 PROCEDURE — 63710000001 FAMOTIDINE 20 MG TABLET: Performed by: ORTHOPAEDIC SURGERY

## 2024-04-02 PROCEDURE — G0378 HOSPITAL OBSERVATION PER HR: HCPCS

## 2024-04-02 PROCEDURE — 63710000001 METOPROLOL SUCCINATE XL 50 MG TABLET SUSTAINED-RELEASE 24 HOUR: Performed by: ORTHOPAEDIC SURGERY

## 2024-04-02 RX ADMIN — METOPROLOL SUCCINATE 50 MG: 50 TABLET, FILM COATED, EXTENDED RELEASE ORAL at 08:11

## 2024-04-02 RX ADMIN — DULOXETINE HYDROCHLORIDE 60 MG: 60 CAPSULE, DELAYED RELEASE ORAL at 08:13

## 2024-04-02 RX ADMIN — FAMOTIDINE 40 MG: 20 TABLET, FILM COATED ORAL at 08:13

## 2024-04-02 RX ADMIN — ROPINIROLE 1 MG: 1 TABLET, FILM COATED ORAL at 08:13

## 2024-04-02 RX ADMIN — OXYCODONE AND ACETAMINOPHEN 2 TABLET: 5; 325 TABLET ORAL at 10:33

## 2024-04-02 RX ADMIN — APIXABAN 2.5 MG: 2.5 TABLET, FILM COATED ORAL at 08:13

## 2024-04-02 RX ADMIN — MELOXICAM 15 MG: 15 TABLET ORAL at 08:12

## 2024-04-02 RX ADMIN — FUROSEMIDE 40 MG: 40 TABLET ORAL at 08:12

## 2024-04-02 NOTE — DISCHARGE SUMMARY
"  Orthopaedic Discharge Summary  Dr. RAMSEY “Andrea” Jason II  [phone]    NAME: Brooklyn ESTEVEZ Juan Manuelmohini PCP: Radha House APRN   :  MRN: [id number]  [id number] LOS:  ADMIT: 0 days  2024   AGE/SEX: 73 y.o. female DC:  today             Admitting Diagnosis: Hip joint replacement status [Z96.649]    Surgery Performed: [place] ARTHRP ACETBLR/PROX FEM PROSTC AGRFT/ALGRFT [62047] (RIGHT TOTAL HIP ARTHROPLASTY - ANTERIOR)    Discharge Medications:         Discharge Medications        New Medications        Instructions Start Date   ondansetron 4 MG tablet  Commonly known as: Zofran   4 mg, Oral, Every 6 Hours PRN      oxyCODONE-acetaminophen 5-325 MG per tablet  Commonly known as: PERCOCET   1 tablet, Oral, Every 4 Hours PRN             Changes to Medications        Instructions Start Date   chlorthalidone 25 MG tablet  Commonly known as: HYGROTON  What changed:   how much to take  how to take this  when to take this  additional instructions   TAKE 1 TABLET EVERY OTHER DAY      cyanocobalamin 1000 MCG/ML injection  What changed:   how much to take  how to take this  when to take this  additional instructions   Inject 1mL into the appropriate muscle every 28 days             Continue These Medications        Instructions Start Date   allopurinol 100 MG tablet  Commonly known as: ZYLOPRIM   100 mg, Oral, Daily      apixaban 2.5 MG tablet tablet  Commonly known as: Eliquis   2.5 mg, Oral, 2 Times Daily      B-D DISP NEEDLE 30GX1\" 30G X 1\" misc  Generic drug: Needle (Disp)   BD Integra Syringe 3 mL 25 gauge x 5/8\"      B-D INTEGRA SYRINGE 25G X 5/8\" 3 ML misc  Generic drug: Syringe/Needle (Disp)   1 Units, Subcutaneous, Every 30 Days      calcitriol 0.25 MCG capsule  Commonly known as: ROCALTROL   TAKE 1 CAPSULE MONDAY, WEDNESDAY, AND FRIDAY. SEE ADMIN INSTRUCTIONS      calcium citrate 950 (200 Ca) MG tablet  Commonly known as: CALCITRATE   950 mg, Oral, 2 Times Daily      DULoxetine 60 MG capsule  Commonly known as: " CYMBALTA   1 capsule, Oral, Daily      famotidine 20 MG tablet  Commonly known as: PEPCID   20 mg, Oral, 2 Times Daily      furosemide 40 MG tablet  Commonly known as: LASIX   Take 1 tablet by mouth Daily.      gabapentin 400 MG capsule  Commonly known as: NEURONTIN   800 mg, Oral, Nightly      metoprolol succinate XL 25 MG 24 hr tablet  Commonly known as: TOPROL-XL   25 mg, Oral, Daily      metoprolol succinate XL 50 MG 24 hr tablet  Commonly known as: TOPROL-XL   1 tablet 2 (Two) Times a Day.      ramipril 10 MG capsule  Commonly known as: ALTACE   Take 1 capsule by mouth Every Night. HOLD PER MD INSTR-HOLD THE NIGHT BEFORE SURGERY.      rOPINIRole 1 MG tablet  Commonly known as: REQUIP   1 mg, Oral, 4 Times Daily, Take 1 hour before bedtime.      simvastatin 40 MG tablet  Commonly known as: ZOCOR   40 mg, Oral, Daily      sodium bicarbonate 650 MG tablet   650 mg, Oral, 2 Times Daily      vitamin D 1.25 MG (71239 UT) capsule capsule  Commonly known as: ERGOCALCIFEROL   TAKE 1 CAPSULE (50,000 UNITS TOTAL) BY MOUTH ONCE WEEKLY             Stop These Medications      HYDROcodone-acetaminophen 7.5-325 MG per tablet  Commonly known as: NORCO            ASK your doctor about these medications        Instructions Start Date   albuterol sulfate  (90 Base) MCG/ACT inhaler  Commonly known as: PROVENTIL HFA;VENTOLIN HFA;PROAIR HFA   Inhale.               Vitals:     Vitals:    04/1950 04/01/24 2228 04/02/24 0222 04/02/24 0629   BP: 174/84 163/72 151/74 137/65   BP Location: Right arm Right arm Right arm Right arm   Patient Position: Sitting Lying Lying Lying   Pulse: 65 60 61 61   Resp: 16 16 16 16   Temp: 98.2 °F (36.8 °C) 97.3 °F (36.3 °C) 97.4 °F (36.3 °C) 97.5 °F (36.4 °C)   TempSrc: Oral Oral Oral Oral   SpO2: 98% 96% 97% 98%   Weight:       Height:           Labs:      Admission on 04/01/2024   Component Date Value Ref Range Status    WBC 03/26/2024 6.40  3.40 - 10.80 10*3/mm3 Final    RBC 03/26/2024 3.38  (L)  3.77 - 5.28 10*6/mm3 Final    Hemoglobin 03/26/2024 10.4 (L)  12.0 - 15.9 g/dL Final    Hematocrit 03/26/2024 32.4 (L)  34.0 - 46.6 % Final    MCV 03/26/2024 95.9  79.0 - 97.0 fL Final    MCH 03/26/2024 30.8  26.6 - 33.0 pg Final    MCHC 03/26/2024 32.1  31.5 - 35.7 g/dL Final    RDW 03/26/2024 11.7 (L)  12.3 - 15.4 % Final    RDW-SD 03/26/2024 40.2  37.0 - 54.0 fl Final    MPV 03/26/2024 10.3  6.0 - 12.0 fL Final    Platelets 03/26/2024 198  140 - 450 10*3/mm3 Final    Glucose 03/26/2024 105 (H)  65 - 99 mg/dL Final    BUN 03/26/2024 22  8 - 23 mg/dL Final    Creatinine 03/26/2024 1.56 (H)  0.57 - 1.00 mg/dL Final    Sodium 03/26/2024 144  136 - 145 mmol/L Final    Potassium 03/26/2024 4.2  3.5 - 5.2 mmol/L Final    Chloride 03/26/2024 105  98 - 107 mmol/L Final    CO2 03/26/2024 24.6  22.0 - 29.0 mmol/L Final    Calcium 03/26/2024 8.4 (L)  8.6 - 10.5 mg/dL Final    Total Protein 03/26/2024 6.7  6.0 - 8.5 g/dL Final    Albumin 03/26/2024 4.1  3.5 - 5.2 g/dL Final    ALT (SGPT) 03/26/2024 6  1 - 33 U/L Final    AST (SGOT) 03/26/2024 13  1 - 32 U/L Final    Alkaline Phosphatase 03/26/2024 131 (H)  39 - 117 U/L Final    Total Bilirubin 03/26/2024 0.3  0.0 - 1.2 mg/dL Final    Globulin 03/26/2024 2.6  gm/dL Final    A/G Ratio 03/26/2024 1.6  g/dL Final    BUN/Creatinine Ratio 03/26/2024 14.1  7.0 - 25.0 Final    Anion Gap 03/26/2024 14.4  5.0 - 15.0 mmol/L Final    eGFR 03/26/2024 35.0 (L)  >60.0 mL/min/1.73 Final    Glucose 04/01/2024 155 (H)  65 - 99 mg/dL Final    BUN 04/01/2024 34 (H)  8 - 23 mg/dL Final    Creatinine 04/01/2024 1.55 (H)  0.57 - 1.00 mg/dL Final    Sodium 04/01/2024 142  136 - 145 mmol/L Final    Potassium 04/01/2024 5.1  3.5 - 5.2 mmol/L Final    Chloride 04/01/2024 107  98 - 107 mmol/L Final    CO2 04/01/2024 24.2  22.0 - 29.0 mmol/L Final    Calcium 04/01/2024 6.6 (L)  8.6 - 10.5 mg/dL Final    BUN/Creatinine Ratio 04/01/2024 21.9  7.0 - 25.0 Final    Anion Gap 04/01/2024 10.8  5.0 - 15.0  "mmol/L Final    eGFR 04/01/2024 35.2 (L)  >60.0 mL/min/1.73 Final    Hemoglobin 04/01/2024 9.8 (L)  12.0 - 15.9 g/dL Final    Hematocrit 04/01/2024 30.8 (L)  34.0 - 46.6 % Final    Hemoglobin 04/02/2024 8.6 (L)  12.0 - 15.9 g/dL Final    Hematocrit 04/02/2024 26.3 (L)  34.0 - 46.6 % Final        No results found.    Hospital Course:   73 y.o. female was admitted to [facility] to services of Khang Gomez II, MD with Hip joint replacement status [Z96.649] on 4/1/2024 and underwent [place] ARTHRP ACETBLR/PROX FEM PROSTC AGRFT/ALGRFT [73489] (RIGHT TOTAL HIP ARTHROPLASTY - ANTERIOR). Post-operatively the patient transferred to the floor where the patient underwent mobilization therapy. Opioids were titrated to achieve appropriate pain management to allow for participation in mobilization exercises. Vital signs and laboratory values are now within safe parameters for discharge. The dressings and/or incision is intact without signs or symptoms of active infection. Operative extremity neurovascular status remains intact as compared to the preoperative exam. Appropriate education re: incision care, activity levels, medications, and follow up visits was completed and all questions were answered. The patient is now deemed stable for discharge.    HOME: The patient progressed well with physical therapy. There were cleared for discharge to home. The patietn was sent home in good condition}.       R \"Andrea\" Jason RUIZ MD  Orthopaedic Surgery  [facility]  [phone]                                               "

## 2024-04-02 NOTE — THERAPY TREATMENT NOTE
Patient Name: Brooklyn Johns  : 1950    MRN: [id number]                              Today's Date: 2024       Admit Date: 2024    Visit Dx: No diagnosis found.  Patient Active Problem List   Diagnosis    Essential hypertension    Sleep apnea    Hyperlipidemia    Crushing injury of left foot and ankle    Lumbosacral spondylosis without myelopathy    History of recurrent deep vein thrombosis (DVT)    Morbidly obese    CKD (chronic kidney disease) stage 4, GFR 15-29 ml/min    Coronary artery disease involving native coronary artery of native heart with unstable angina pectoris    Primary insomnia    Bilateral hip pain    Hyperuricemia    B12 deficiency    Restless legs    Hypocalcemia    Secondary hyperparathyroidism    Lumbar radiculopathy    Primary osteoarthritis of right hip    Hyperkalemia    Anemia    Leg hematoma, left, initial encounter    Elevated glucose    Basal cell carcinoma (BCC) of face    Right hip pain    Chronic kidney disease, stage IV (severe)    Chronic metabolic acidosis    DDD (degenerative disc disease), cervical    Anemia in chronic kidney disease    Vitamin D deficiency    Hip joint replacement status     Past Medical History:   Diagnosis Date    Adductor tendinitis     ADHD (attention deficit hyperactivity disorder)     Allergic     Anemia     Ankle sprain     Anxiety     Arthritis of back 1970    Arthritis of neck     Asthma     Bronchitis     CAD (coronary artery disease)     Cataract     Removed by Dr Plasencia    Cervical disc disorder     CTS (carpal tunnel syndrome)     Deep vein thrombosis 2015. Again in 2018    Degeneration of lumbar or lumbosacral intervertebral disc     Depression     GERD (gastroesophageal reflux disease)     Gout     Headache     Heart murmur     Hip arthrosis 2010    History of DVT (deep vein thrombosis)     Hyperlipidemia     Hypertension     Insomnia     Insomnia secondary to anxiety     Intervertebral disc  disorder of lumbar region with myelopathy     Kidney disease     pt states stage 4 kidney disease    Knee swelling 1970    Low back pain 1980    Low back strain 1970    Lumbosacral disc disease 1970    Neck strain 1970    Obesity 1950    Osteoarthritis     Osteopenia     Periarthritis of shoulder     Renal insufficiency Dr. Pirtchett. 2016    Rhinitis, allergic     RLS (restless legs syndrome)     Rotator cuff syndrome     Sinus disorder     Sleep apnea     NO CPAP    Tear of meniscus of knee 1988     Past Surgical History:   Procedure Laterality Date    ANKLE FUSION Left     ANKLE OPEN REDUCTION INTERNAL FIXATION  1990??1995?    Triple orthodesis. Then ankle fusion. Dr. Wadsworth    APPENDECTOMY  1984    BARIATRIC SURGERY  1984    CARDIAC CATHETERIZATION  1999    CARDIAC CATHETERIZATION N/A 09/16/2020    Procedure: Coronary angiography;  Surgeon: Liliana Mae MD;  Location:  [facility];  Service: Cardiovascular;  Laterality: N/A;    CARDIAC CATHETERIZATION N/A 09/16/2020    Procedure: Left Heart Cath;  Surgeon: Liliana Mae MD;  Location:  [facility];  Service: Cardiovascular;  Laterality: N/A;    CHOLECYSTECTOMY      COLONOSCOPY  1990    COLONOSCOPY N/A 12/27/2019    Procedure: COLONOSCOPY INTO CECUM WITH COLD BX POLYPECTOMY;  Surgeon: Jessee Birch Jr., MD;  [facility];  Service: General    CORONARY ANGIOPLASTY WITH STENT PLACEMENT      EYE SURGERY      GASTRIC BYPASS      HYSTERECTOMY      Total    JOINT REPLACEMENT  Lft knee 2014  rht knee 2015    KNEE ACL RECONSTRUCTION      MOHS SURGERY Left 12/07/2020    left cheek    OOPHORECTOMY      SINUS SURGERY      x 2    TOTAL KNEE ARTHROPLASTY Bilateral     TRIGGER POINT INJECTION        General Information       Row Name 04/02/24 1008          Physical Therapy Time and Intention    Document Type therapy note (daily note)  -CH     Mode of Treatment physical therapy;individual therapy  -       Row Name 04/02/24  1008          General Information    Patient Profile Reviewed yes  -     Existing Precautions/Restrictions fall  -       Row Name 04/02/24 1008          Cognition    Orientation Status (Cognition) oriented x 4  -       Row Name 04/02/24 1008          Safety Issues, Functional Mobility    Impairments Affecting Function (Mobility) pain;range of motion (ROM);strength  -               User Key  (r) = Recorded By, (t) = Taken By, (c) = Cosigned By      Initials Name Provider Type     Marie Moya, PT Physical Therapist                   Mobility       Row Name 04/02/24 1009          Bed Mobility    Bed Mobility supine-sit;sit-supine  -     Supine-Sit [place] (Bed Mobility) not tested  -     Sit-Supine [place] (Bed Mobility) not tested  -     Comment, (Bed Mobility) sitting in chair  -       Row Name 04/02/24 1009          Sit-Stand Transfer    Sit-Stand [place] (Transfers) standby assist  -     Assistive Device (Sit-Stand Transfers) walker, front-wheeled  -       Row Name 04/02/24 1009          Gait/Stairs (Locomotion)    [place] Level (Gait) standby assist  -     Assistive Device (Gait) walker, front-wheeled  -     Distance in Feet (Gait) 150  -     Deviations/Abnormal Patterns (Gait) gait speed decreased;antalgic  -     Bilateral Gait Deviations forward flexed posture  -     Comment, (Gait/Stairs) gait was slow but steady with no LOB noted  -               User Key  (r) = Recorded By, (t) = Taken By, (c) = Cosigned By      Initials Name Provider Type     Marie Moya, PT Physical Therapist                   Obj/Interventions       Row Name 04/02/24 1010          Motor Skills    Therapeutic Exercise --  10 reps R KIM protocol  -               User Key  (r) = Recorded By, (t) = Taken By, (c) = Cosigned By      Initials Name Provider Type     Marie Moya, PT Physical Therapist                   Goals/Plan    No documentation.                   Clinical Impression       Row Name 04/02/24 1010          Pain    Pretreatment Pain Rating 4/10  -     Posttreatment Pain Rating 6/10  -     Pain Location - Side/Orientation Right  -     Pain Location - hip  -     Pain Intervention(s) Repositioned;Cold applied  -       Row Name 04/02/24 1010          Plan of Care Review    Plan of Care Reviewed With patient  -     Outcome Evaluation Pt demonstrates increased activity tolerance and functional strength as she was able to increase her gait distance and required less assistance. Pt was able to ambulate in the howe with SBA and walker and demonstrates understanding of exercises. Pt plans to return home this date with HHPT to follow up.  -       Row Name 04/02/24 1010          Positioning and Restraints    Pre-Treatment Position sitting in chair/recliner  -     Post Treatment Position chair  -CH     In Chair reclined;call light within reach;encouraged to call for assist;exit alarm on;notified ns  -               User Key  (r) = Recorded By, (t) = Taken By, (c) = Cosigned By      Initials Name Provider Type     Marie Moya, PT Physical Therapist                   Outcome Measures       Row Name 04/02/24 1012 04/02/24 0811       How much help from another person do you currently need...    Turning from your back to your side while in flat bed without using bedrails? 4  -CH 4  -SHASHA    Moving from lying on back to sitting on the side of a flat bed without bedrails? 4  -CH 4  -SHASHA    Moving to and from a bed to a chair (including a wheelchair)? 3  -CH 3  -SHASHA    Standing up from a chair using your arms (e.g., wheelchair, bedside chair)? 3  -CH 3  -SHASHA    Climbing 3-5 steps with a railing? 3  -CH 3  -SHASHA    To walk in hospital room? 3  -CH 3  -SHASHA    AM-PAC 6 Clicks Score (PT) 20  -CH 20  -SHASHA    Highest Level of Mobility Goal 6 --> Walk 10 steps or more  - 6 --> Walk 10 steps or more  -      Row Name 04/02/24 1012          Functional Assessment     Outcome Measure Options AM-PAC 6 Clicks Basic Mobility (PT)  -               User Key  (r) = Recorded By, (t) = Taken By, (c) = Cosigned By      Initials Name Provider Type     Marie Moya, PT Physical Therapist    France Anderson RN Registered Nurse                                 Physical Therapy Education       Title: PT OT SLP Therapies (Done)       Topic: Physical Therapy (Done)       Point: Mobility training (Done)       Learning Progress Summary             Patient Acceptance, E,TB,D, VU,NR by  at 4/2/2024 1012    Acceptance, E, VU by  at 4/1/2024 1539                         Point: Home exercise program (Done)       Learning Progress Summary             Patient Acceptance, E,TB,D, VU,NR by  at 4/2/2024 1012    Acceptance, E, VU by  at 4/1/2024 1539                         Point: Body mechanics (Done)       Learning Progress Summary             Patient Acceptance, E,TB,D, VU,NR by  at 4/2/2024 1012    Acceptance, E, VU by  at 4/1/2024 1539                         Point: Precautions (Done)       Learning Progress Summary             Patient Acceptance, E,TB,D, VU,NR by  at 4/2/2024 1012    Acceptance, E, VU by  at 4/1/2024 1539                                         User Key       Initials Effective Dates Name Provider Type Discipline     06/16/21 -  Marie Moya, PT Physical Therapist PT     05/02/22 -  Carine Marcus PT Physical Therapist PT                  PT Recommendation and Plan     Plan of Care Reviewed With: patient  Outcome Evaluation: Pt demonstrates increased activity tolerance and functional strength as she was able to increase her gait distance and required less assistance. Pt was able to ambulate in the howe with SBA and walker and demonstrates understanding of exercises. Pt plans to return home this date with HHPT to follow up.     Time Calculation:         PT Charges       Row Name 04/02/24 1013             Time Calculation    Start Time 0945  -       Stop Time 0957  -      Time Calculation (min) 12 min  -CH      PT Received On 04/02/24  -      PT - Next Appointment 04/03/24  -         Time Calculation- PT    Total Timed Code Minutes- PT 12 minute(s)  -CH         Timed Charges    15547 - PT Therapeutic Activity Minutes 12  -CH         Total Minutes    Timed Charges Total Minutes 12  -CH       Total Minutes 12  -CH                User Key  (r) = Recorded By, (t) = Taken By, (c) = Cosigned By      Initials Name Provider Type     Marie Moya, PT Physical Therapist                  Therapy Charges for Today       Code Description Service Date Service Provider Modifiers Qty    [id number]  PT THERAPEUTIC ACT EA 15 MIN 4/2/2024 Marie Moya, PT GP 1            PT G-Codes  Outcome Measure Options: AM-PAC 6 Clicks Basic Mobility (PT)  AM-PAC 6 Clicks Score (PT): 20  PT Discharge Summary  Anticipated Discharge Disposition (PT): home with assist, home with home health    Marie Moya, PT  4/2/2024

## 2024-04-02 NOTE — NURSING NOTE
Patient is POD #1 for a right total hip arthroplasty.  Patient is alert & oriented x 4.  Benjamin dressing is dry, and intact.  Benjamin drain is flashing green. Voids spontaneously. IV fluids infusing at 100 mL/hr. Pain minimal overnight, medicated with percocets x2 at 5mg. On 2L nasal cannula.     Discharge plan: home with home health     Care ongoing.

## 2024-04-02 NOTE — OUTREACH NOTE
Prep Survey      Flowsheet Row Responses   [facility] [place] patient discharged from? [place]   Is LACE score < 7 ? Yes   Eligibility [facility]   Date of Admission 04/01/24   Date of Discharge 04/02/24   Discharge Disposition Home-Health Care Svc   Discharge diagnosis R ARTHRP ACETBLR/PROX FEM PROSTC AGRFT/ALGRFT (84220) (RIGHT TOTAL HIP ARTHROPLASTY - ANTERIO   Does the patient have one of the following disease processes/diagnoses(primary or secondary)? Total Joint Replacement   Does the patient have Home health ordered? Yes   What is the Home health agency?  [facility]   Is there a DME ordered? No   Prep survey completed? Yes            YUSUF HUFF - Registered Nurse

## 2024-04-02 NOTE — TELEPHONE ENCOUNTER
"Reason for Disposition   Health Information question, no triage required and triager able to answer question    Additional Information   Negative: [1] Caller is not with the adult (patient) AND [2] reporting urgent symptoms   Negative: Lab result questions   Negative: Medication questions   Negative: Caller can't be reached by phone   Negative: Caller has already spoken to PCP or another triager   Negative: RN needs further essential information from caller in order to complete triage   Negative: Requesting regular office appointment   Negative: [1] Caller requesting NON-URGENT health information AND [2] PCP's office is the best resource    Answer Assessment - Initial Assessment Questions  1. REASON FOR CALL or QUESTION: \"What is your reason for calling today?\" or \"How can I best help you?\" or \"What question do you have that I can help answer?\"      Caller states when she was discharged from [facility] and \"the nurses forgot to bring my walker down\".    Protocols used: Information Only Call-ADULT-    "

## 2024-04-02 NOTE — PLAN OF CARE
Goal Outcome Evaluation:  Plan of Care Reviewed With: patient           Outcome Evaluation: Pt demonstrates increased activity tolerance and functional strength as she was able to increase her gait distance and required less assistance. Pt was able to ambulate in the howe with SBA and walker and demonstrates understanding of exercises. Pt plans to return home this date with HHPT to follow up.      Anticipated Discharge Disposition (PT): home with assist, home with home health

## 2024-04-02 NOTE — TELEPHONE ENCOUNTER
Walker left at hospital at discharge 04/02/2024 Caller states she was just discharged and the nursing staff forgot to bring down her walker.        Called [facility] and spoke with Susana and she states she will check the patient room and call me back.     11:25 called floor and spoke with Zaida who states they have found the patient's walker and will call her to set up /delivery .    11:35 Called patient back states the floor called and said they would leave the walker at the desk.   1139 Spoke with CM manager Laine and she states she will have the walker sent to patient's home per Leora dorsey.

## 2024-04-02 NOTE — PLAN OF CARE
Goal Outcome Evaluation:            Patient to discharge home via private transportation. Dressing clean dry and intact. Pain well controlled.

## 2024-04-02 NOTE — PROGRESS NOTES
Patient is discharging today . Spoke to patient who is agreeable to home health and has no current home health . Face sheet information is correct and orders for home health PT in [facility]. Thank you !

## 2024-04-03 ENCOUNTER — HOME CARE VISIT (OUTPATIENT)
Dept: HOME HEALTH SERVICES | Facility: HOME HEALTHCARE | Age: 74
End: 2024-04-03
Payer: MEDICARE

## 2024-04-03 ENCOUNTER — TRANSITIONAL CARE MANAGEMENT TELEPHONE ENCOUNTER (OUTPATIENT)
Dept: CALL CENTER | Facility: HOSPITAL | Age: 74
End: 2024-04-03
Payer: MEDICARE

## 2024-04-03 VITALS
DIASTOLIC BLOOD PRESSURE: 68 MMHG | RESPIRATION RATE: 16 BRPM | HEART RATE: 69 BPM | OXYGEN SATURATION: 98 % | SYSTOLIC BLOOD PRESSURE: 130 MMHG | TEMPERATURE: 96.8 F

## 2024-04-03 PROCEDURE — G0151 HHCP-SERV OF PT,EA 15 MIN: HCPCS

## 2024-04-03 NOTE — CASE COMMUNICATION
Patient will be 2w3 for Home Health PT.  She had an anterior right KIM by Dr. Gomez 4/1/24.  She lives in an apartment at the [facility].  Has a hospital bed and an accessible bathroom.  JESE intact and functioning properly with no drainage.  Has all medication and puts them in a machine that distributes then at correct times.  Patient was able to perform appropriate supine exercises and walk in hallway with vanessa goetz.  Patient should do well.  Plans to continue PT with Divine who comes to the [facility] when HH is finished.   Spoke with MD this am. Pt will have a chest tube placed this day. And [facility] Spoke with Jessica at facility she requests updates after today's procedure and ok pt to go Friday. Following.   1500  Pt did have chest tube placed. Spoke with John pt is able to dc tomorrow to them. Sent op updates and PPD, CXR, S/S form now. Packet started.

## 2024-04-03 NOTE — CASE MANAGEMENT/SOCIAL WORK
Continued Stay Note  [facility]     Patient Name: Brooklyn Johns  MRN: [id number]  Today's Date: 4/3/2024    Admit Date: 4/1/2024    Plan: Home with family support & Jainism .   Discharge Plan       Row Name 04/03/24 1247       Plan    Plan Comments [facility] called and left a message for the patient to ensure she received the walker delivered by [employer] yesterday. Await her call back.                   Discharge Codes    No documentation.                 Expected Discharge Date and Time       Expected Discharge Date Expected Discharge Time    Apr 2, 2024               Hannah CINTRON, RN

## 2024-04-03 NOTE — HOME HEALTH
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 facility with elevator  Goal for Physical Therapy:  \"to walk without pain, to get back to previous acitities\"  Skilled Physical Therapy indicated for instruction in gait, exercise, education and home safety.    Plan for next visit: review exercises and gait"

## 2024-04-03 NOTE — CASE MANAGEMENT/SOCIAL WORK
Case Management Discharge Note      Final Note: [facility].    Provided Post Acute Provider List?: N/A  N/A Provider List Comment: Declined; requests [facility].    Selected Continued Care - Discharged on 4/2/2024 Admission date: 4/1/2024 - Discharge disposition: Home or Self Care      Destination    No services have been selected for the patient.                Durable Medical Equipment    No services have been selected for the patient.                Dialysis/Infusion    No services have been selected for the patient.                Home Medical Care Coordination complete.      Service Provider Selected Services Address Phone Fax Patient Preferred    [facility] [address] [address] [phone] --              Therapy    No services have been selected for the patient.                Community Resources    No services have been selected for the patient.                Community & DME    No services have been selected for the patient.                         Final Discharge Disposition Code: 06 - home with home health care

## 2024-04-03 NOTE — OUTREACH NOTE
Call Center TCM Note      Flowsheet Row Responses   [facility] patient discharged from? [place]   Does the patient have one of the following disease processes/diagnoses(primary or secondary)? Total Joint Replacement   Joint surgery performed? Hip   TCM attempt successful? Yes   Call start time 1004   Call end time 1014   Has the patient been back in either the hospital or Emergency Department since discharge? No   Does the patient have all medications related to this admission filled (includes all antibiotics, pain medications, etc.) Yes   Is the patient taking all medications as directed (includes completed medication regime)? Yes   Is the patient able to teach back alternate methods of pain control? Ice, Reposition, Correct alignment, Short, frequent activity   Comments States will call surgeon's office for hospital f/u appt. Prefers to f/u with surgeon only at this time.   Does the patient have an appointment with their PCP within 7-14 days of discharge? No   Nursing Interventions Routed TCM call to PCP office, Patient desires to follow up with specialty only   Has home health visited the patient within 72 hours of discharge? Call prior to 72 hours   Home health comments States HH PT will visit today.   Psychosocial issues? No   Has the patient began therapy sessions (either in the home or as an out patient)? Yes   If the patient has started attending therapy, what post op day did they begin to attend (either in home or as an out patient)?   Post op day #2.   Does the patient have a wound vac in place? Yes  [JESE dressing.]   Date wound vac should be removed 04/08/24   Has the patient fallen since discharge? No   Did the patient receive a copy of their discharge instructions? Yes   Nursing interventions Reviewed instructions with patient   What is the patient's perception of their functional status since discharge? Same   Is the patient able to teach back signs and symptoms of infection? Temp >100.4 for  "24h or longer, Incisional drainage, Blisters around incision, Increased swelling or redness around incision (not associated with surgical edema), Severe discomfort or pain, Changes in mobility, Shortness of breath or chest pain   Is the patient able to teach back how to prevent infection? Check incision daily, Wash hands before and after touching incision, Keep incision covered if drainage, Keep incision covered if pets in house, Shower only as directed by surgeon, Eat well-balanced diet, No tub baths, hot tub or swimming, No lotion or creams   Is the patient able to teach back signs and symptoms of DVT? Redness in calf, Swelling in calf, Area hot to touch, Severe pain in calf, Shortness of breath or chest pain   Is the patient able to teach back home safety measures? Ability to shower   Did the patient implement home safety suggestions from pre-surgery classes if attended? N/A   If the patient is a current smoker, are they able to teach back resources for cessation? Not a smoker   Is the patient/caregiver able to teach back the hierarchy of who to call/visit for symptoms/problems? PCP, Specialist, Home health nurse, Urgent Care, ED, 911 Yes   TCM call completed? Yes   Wrap up additional comments Patient states is about the same. States \"doing pretty good\". Denies any s/s of infection at incision-JESE dressing in place. States her walker was delivered. Denies any needs or concerns today.  PT will visit today. TCM complete.   Call end time 1014   Would this patient benefit from a Referral to [facility] Social Work? No   Is the patient interested in additional calls from an ambulatory ? No            Nevin Morales RN    4/3/2024, 10:15 EDT        "

## 2024-04-05 ENCOUNTER — HOME CARE VISIT (OUTPATIENT)
Dept: HOME HEALTH SERVICES | Facility: HOME HEALTHCARE | Age: 74
End: 2024-04-05
Payer: MEDICARE

## 2024-04-05 VITALS
DIASTOLIC BLOOD PRESSURE: 82 MMHG | RESPIRATION RATE: 16 BRPM | HEART RATE: 77 BPM | TEMPERATURE: 96.8 F | SYSTOLIC BLOOD PRESSURE: 138 MMHG | OXYGEN SATURATION: 96 %

## 2024-04-05 PROCEDURE — G0151 HHCP-SERV OF PT,EA 15 MIN: HCPCS

## 2024-04-05 NOTE — HOME HEALTH
Plan for next visit:add standing exercise, remove JESE and take picture    Subjective:  Got some leg compression sleeves that Dr. Gomez ordered.  I also got theses dressings.  What am I to do with them?  Falls since last visit: none   Home/Social Environment:  lives in the [facility]

## 2024-04-08 ENCOUNTER — HOME CARE VISIT (OUTPATIENT)
Dept: HOME HEALTH SERVICES | Facility: HOME HEALTHCARE | Age: 74
End: 2024-04-08
Payer: MEDICARE

## 2024-04-08 VITALS
HEART RATE: 90 BPM | OXYGEN SATURATION: 96 % | SYSTOLIC BLOOD PRESSURE: 160 MMHG | RESPIRATION RATE: 18 BRPM | DIASTOLIC BLOOD PRESSURE: 80 MMHG | TEMPERATURE: 97.3 F

## 2024-04-08 PROCEDURE — G0151 HHCP-SERV OF PT,EA 15 MIN: HCPCS

## 2024-04-09 ENCOUNTER — TELEPHONE (OUTPATIENT)
Dept: ORTHOPEDIC SURGERY | Facility: HOSPITAL | Age: 74
End: 2024-04-09
Payer: MEDICARE

## 2024-04-09 NOTE — TELEPHONE ENCOUNTER
Attempted to reach Ms. Johns to see how she is doing as she is 1 week SP RTH. Message left at this time.

## 2024-04-10 ENCOUNTER — HOME CARE VISIT (OUTPATIENT)
Dept: HOME HEALTH SERVICES | Facility: HOME HEALTHCARE | Age: 74
End: 2024-04-10
Payer: MEDICARE

## 2024-04-10 VITALS
HEART RATE: 82 BPM | DIASTOLIC BLOOD PRESSURE: 80 MMHG | SYSTOLIC BLOOD PRESSURE: 152 MMHG | OXYGEN SATURATION: 96 % | TEMPERATURE: 96.8 F | RESPIRATION RATE: 16 BRPM

## 2024-04-10 PROCEDURE — G0151 HHCP-SERV OF PT,EA 15 MIN: HCPCS

## 2024-04-10 NOTE — HOME HEALTH
"Subjective: \"I'm tired and sore but overall doing very well.\"    Falls reported: none    Medication changes: none    PT removed JESE dressing which had stopped pumping with dc instructions from hospital to remove JESE after 7 days; dc papers also indicated zipline dressing used but when removed JESE, incision was entirely covered with well-adhered steri-strips instead; no drainage noted so left open to air; left message for Dr Gomez's office vm system to discuss if patient is allowed to shower or not given that incision covered with streristrips not zipline-will await callback    Plan for next visit: Continue gait training progressing to cane, HEP upgrade instruction with further standing exercises, and patient education as needed"

## 2024-04-16 ENCOUNTER — HOME CARE VISIT (OUTPATIENT)
Dept: HOME HEALTH SERVICES | Facility: HOME HEALTHCARE | Age: 74
End: 2024-04-16
Payer: MEDICARE

## 2024-04-16 VITALS
HEART RATE: 67 BPM | RESPIRATION RATE: 18 BRPM | DIASTOLIC BLOOD PRESSURE: 84 MMHG | OXYGEN SATURATION: 96 % | SYSTOLIC BLOOD PRESSURE: 150 MMHG | TEMPERATURE: 97.2 F

## 2024-04-16 PROCEDURE — G0151 HHCP-SERV OF PT,EA 15 MIN: HCPCS

## 2024-04-16 NOTE — HOME HEALTH
"Subjective: \"I'm better today. I don't know what upset my stomach yesterday so sorry I had to reschedule yesterday's appt.\"    Falls reported: none    Medication changes: none    Plan for next visit: discharge assessment and instructions, final HEP review, gait pattern training with cane including steps, and patient education as needed"

## 2024-04-18 ENCOUNTER — HOME CARE VISIT (OUTPATIENT)
Dept: HOME HEALTH SERVICES | Facility: HOME HEALTHCARE | Age: 74
End: 2024-04-18
Payer: MEDICARE

## 2024-04-18 VITALS
HEART RATE: 66 BPM | SYSTOLIC BLOOD PRESSURE: 140 MMHG | TEMPERATURE: 96.5 F | DIASTOLIC BLOOD PRESSURE: 88 MMHG | OXYGEN SATURATION: 94 % | RESPIRATION RATE: 18 BRPM

## 2024-04-18 PROCEDURE — G0151 HHCP-SERV OF PT,EA 15 MIN: HCPCS

## 2024-04-18 NOTE — CASE COMMUNICATION
This is to notify your office of home PT/agency discharge effective [id number] with goals met. She has appt for follow-up in your office on [phone] and will discuss need for any outpatient PT with you at that time. Thanks for the referral!

## 2024-04-30 DIAGNOSIS — I82.409 RECURRENT ACUTE DEEP VEIN THROMBOSIS (DVT) OF LOWER EXTREMITY, UNSPECIFIED LATERALITY: ICD-10-CM

## 2024-04-30 RX ORDER — APIXABAN 2.5 MG/1
2.5 TABLET, FILM COATED ORAL 2 TIMES DAILY
Qty: 180 TABLET | Refills: 3 | Status: SHIPPED | OUTPATIENT
Start: 2024-04-30

## 2024-05-07 RX ORDER — METOPROLOL SUCCINATE 25 MG/1
25 TABLET, EXTENDED RELEASE ORAL DAILY
Qty: 90 TABLET | Refills: 1 | Status: SHIPPED | OUTPATIENT
Start: 2024-05-07

## 2024-06-27 DIAGNOSIS — E79.0 HYPERURICEMIA: ICD-10-CM

## 2024-06-27 DIAGNOSIS — N18.4 CKD (CHRONIC KIDNEY DISEASE) STAGE 4, GFR 15-29 ML/MIN: ICD-10-CM

## 2024-06-27 DIAGNOSIS — E83.51 HYPOCALCEMIA: ICD-10-CM

## 2024-06-27 DIAGNOSIS — K21.9 GASTROESOPHAGEAL REFLUX DISEASE, UNSPECIFIED WHETHER ESOPHAGITIS PRESENT: ICD-10-CM

## 2024-07-01 RX ORDER — METOPROLOL SUCCINATE 25 MG/1
50 TABLET, EXTENDED RELEASE ORAL DAILY
Qty: 90 TABLET | Refills: 1 | Status: SHIPPED | OUTPATIENT
Start: 2024-07-01

## 2024-07-01 RX ORDER — ALBUTEROL SULFATE 90 UG/1
2 AEROSOL, METERED RESPIRATORY (INHALATION)
Qty: 6.7 G | Refills: 1 | Status: SHIPPED | OUTPATIENT
Start: 2024-07-01

## 2024-07-01 RX ORDER — SIMVASTATIN 40 MG
40 TABLET ORAL DAILY
Qty: 90 TABLET | Refills: 1 | Status: SHIPPED | OUTPATIENT
Start: 2024-07-01

## 2024-07-01 RX ORDER — CALCITRIOL 0.25 UG/1
CAPSULE, LIQUID FILLED ORAL
Qty: 36 CAPSULE | Refills: 0 | Status: SHIPPED | OUTPATIENT
Start: 2024-07-01

## 2024-07-01 RX ORDER — IBUPROFEN 200 MG
950 CAPSULE ORAL 2 TIMES DAILY
Qty: 180 TABLET | Refills: 1 | Status: SHIPPED | OUTPATIENT
Start: 2024-07-01

## 2024-07-01 RX ORDER — RAMIPRIL 10 MG/1
10 CAPSULE ORAL NIGHTLY
Qty: 90 CAPSULE | Refills: 1 | Status: SHIPPED | OUTPATIENT
Start: 2024-07-01

## 2024-07-01 RX ORDER — ERGOCALCIFEROL 1.25 MG/1
50000 CAPSULE ORAL WEEKLY
Qty: 12 CAPSULE | Refills: 1 | Status: SHIPPED | OUTPATIENT
Start: 2024-07-01

## 2024-07-01 RX ORDER — DULOXETIN HYDROCHLORIDE 60 MG/1
60 CAPSULE, DELAYED RELEASE ORAL DAILY
Qty: 90 CAPSULE | Refills: 1 | Status: SHIPPED | OUTPATIENT
Start: 2024-07-01

## 2024-07-01 RX ORDER — ALLOPURINOL 100 MG/1
100 TABLET ORAL DAILY
Qty: 90 TABLET | Refills: 1 | Status: SHIPPED | OUTPATIENT
Start: 2024-07-01

## 2024-07-01 RX ORDER — SODIUM BICARBONATE 650 MG/1
650 TABLET ORAL 2 TIMES DAILY
Qty: 180 TABLET | Refills: 1 | Status: SHIPPED | OUTPATIENT
Start: 2024-07-01

## 2024-07-20 DIAGNOSIS — N18.4 CKD (CHRONIC KIDNEY DISEASE) STAGE 4, GFR 15-29 ML/MIN: ICD-10-CM

## 2024-07-20 DIAGNOSIS — K21.9 GASTROESOPHAGEAL REFLUX DISEASE, UNSPECIFIED WHETHER ESOPHAGITIS PRESENT: ICD-10-CM

## 2024-07-22 RX ORDER — SIMVASTATIN 40 MG
40 TABLET ORAL DAILY
Qty: 90 TABLET | Refills: 1 | Status: SHIPPED | OUTPATIENT
Start: 2024-07-22

## 2024-07-22 RX ORDER — FAMOTIDINE 20 MG/1
20 TABLET, FILM COATED ORAL 2 TIMES DAILY
Qty: 180 TABLET | Refills: 1 | Status: SHIPPED | OUTPATIENT
Start: 2024-07-22

## 2024-07-22 RX ORDER — CALCITRIOL 0.25 UG/1
CAPSULE, LIQUID FILLED ORAL
Qty: 36 CAPSULE | Refills: 1 | Status: SHIPPED | OUTPATIENT
Start: 2024-07-22

## 2024-08-05 ENCOUNTER — OFFICE VISIT (OUTPATIENT)
Dept: INTERNAL MEDICINE | Facility: CLINIC | Age: 74
End: 2024-08-05
Payer: MEDICARE

## 2024-08-05 VITALS
SYSTOLIC BLOOD PRESSURE: 118 MMHG | DIASTOLIC BLOOD PRESSURE: 80 MMHG | BODY MASS INDEX: 50.16 KG/M2 | HEART RATE: 56 BPM | WEIGHT: 272.6 LBS | OXYGEN SATURATION: 97 % | HEIGHT: 62 IN

## 2024-08-05 DIAGNOSIS — N18.4 CKD (CHRONIC KIDNEY DISEASE) STAGE 4, GFR 15-29 ML/MIN: Chronic | ICD-10-CM

## 2024-08-05 DIAGNOSIS — R73.09 ELEVATED GLUCOSE: ICD-10-CM

## 2024-08-05 DIAGNOSIS — Z12.31 ENCOUNTER FOR SCREENING MAMMOGRAM FOR MALIGNANT NEOPLASM OF BREAST: ICD-10-CM

## 2024-08-05 DIAGNOSIS — I25.110 CORONARY ARTERY DISEASE INVOLVING NATIVE CORONARY ARTERY OF NATIVE HEART WITH UNSTABLE ANGINA PECTORIS: Chronic | ICD-10-CM

## 2024-08-05 DIAGNOSIS — I10 ESSENTIAL HYPERTENSION: Primary | Chronic | ICD-10-CM

## 2024-08-05 DIAGNOSIS — M16.11 PRIMARY OSTEOARTHRITIS OF RIGHT HIP: Chronic | ICD-10-CM

## 2024-08-05 DIAGNOSIS — Z78.0 POSTMENOPAUSAL: ICD-10-CM

## 2024-08-05 PROBLEM — M54.16 LUMBAR RADICULOPATHY: Status: RESOLVED | Noted: 2022-06-21 | Resolved: 2024-08-05

## 2024-08-05 PROBLEM — D64.9 ANEMIA: Chronic | Status: RESOLVED | Noted: 2022-09-18 | Resolved: 2024-08-05

## 2024-08-05 PROBLEM — M25.551 RIGHT HIP PAIN: Status: RESOLVED | Noted: 2023-01-15 | Resolved: 2024-08-05

## 2024-08-05 PROBLEM — S80.12XA LEG HEMATOMA, LEFT, INITIAL ENCOUNTER: Status: RESOLVED | Noted: 2022-11-13 | Resolved: 2024-08-05

## 2024-08-05 LAB
ALBUMIN SERPL-MCNC: 4.3 G/DL (ref 3.5–5.2)
ALBUMIN/GLOB SERPL: 2.7 G/DL
ALP SERPL-CCNC: 162 U/L (ref 39–117)
ALT SERPL-CCNC: 9 U/L (ref 1–33)
AST SERPL-CCNC: 15 U/L (ref 1–32)
BILIRUB SERPL-MCNC: 0.4 MG/DL (ref 0–1.2)
BUN SERPL-MCNC: 25 MG/DL (ref 8–23)
BUN/CREAT SERPL: 15.8 (ref 7–25)
CALCIUM SERPL-MCNC: 8.4 MG/DL (ref 8.6–10.5)
CHLORIDE SERPL-SCNC: 105 MMOL/L (ref 98–107)
CHOLEST SERPL-MCNC: 151 MG/DL (ref 0–200)
CO2 SERPL-SCNC: 28.4 MMOL/L (ref 22–29)
CREAT SERPL-MCNC: 1.58 MG/DL (ref 0.57–1)
EGFRCR SERPLBLD CKD-EPI 2021: 34.4 ML/MIN/1.73
ERYTHROCYTE [DISTWIDTH] IN BLOOD BY AUTOMATED COUNT: 12.9 % (ref 12.3–15.4)
GLOBULIN SER CALC-MCNC: 1.6 GM/DL
GLUCOSE SERPL-MCNC: 110 MG/DL (ref 65–99)
HBA1C MFR BLD: 5.9 % (ref 4.8–5.6)
HCT VFR BLD AUTO: 35.9 % (ref 34–46.6)
HDLC SERPL-MCNC: 60 MG/DL (ref 40–60)
HGB BLD-MCNC: 11.5 G/DL (ref 12–15.9)
LDLC SERPL CALC-MCNC: 74 MG/DL (ref 0–100)
MCH RBC QN AUTO: 29.9 PG (ref 26.6–33)
MCHC RBC AUTO-ENTMCNC: 32 G/DL (ref 31.5–35.7)
MCV RBC AUTO: 93.5 FL (ref 79–97)
PLATELET # BLD AUTO: 152 10*3/MM3 (ref 140–450)
POTASSIUM SERPL-SCNC: 4.7 MMOL/L (ref 3.5–5.2)
PROT SERPL-MCNC: 5.9 G/DL (ref 6–8.5)
RBC # BLD AUTO: 3.84 10*6/MM3 (ref 3.77–5.28)
SODIUM SERPL-SCNC: 142 MMOL/L (ref 136–145)
TRIGL SERPL-MCNC: 89 MG/DL (ref 0–150)
TSH SERPL DL<=0.005 MIU/L-ACNC: 2.85 UIU/ML (ref 0.27–4.2)
VLDLC SERPL CALC-MCNC: 17 MG/DL (ref 5–40)
WBC # BLD AUTO: 5.31 10*3/MM3 (ref 3.4–10.8)

## 2024-08-05 PROCEDURE — 1160F RVW MEDS BY RX/DR IN RCRD: CPT | Performed by: NURSE PRACTITIONER

## 2024-08-05 PROCEDURE — 3074F SYST BP LT 130 MM HG: CPT | Performed by: NURSE PRACTITIONER

## 2024-08-05 PROCEDURE — 1159F MED LIST DOCD IN RCRD: CPT | Performed by: NURSE PRACTITIONER

## 2024-08-05 PROCEDURE — 1125F AMNT PAIN NOTED PAIN PRSNT: CPT | Performed by: NURSE PRACTITIONER

## 2024-08-05 PROCEDURE — 99214 OFFICE O/P EST MOD 30 MIN: CPT | Performed by: NURSE PRACTITIONER

## 2024-08-05 PROCEDURE — G2211 COMPLEX E/M VISIT ADD ON: HCPCS | Performed by: NURSE PRACTITIONER

## 2024-08-05 PROCEDURE — 3079F DIAST BP 80-89 MM HG: CPT | Performed by: NURSE PRACTITIONER

## 2024-08-05 RX ORDER — METOPROLOL SUCCINATE 50 MG/1
50 TABLET, EXTENDED RELEASE ORAL DAILY
Qty: 90 TABLET | Refills: 1 | Status: SHIPPED | OUTPATIENT
Start: 2024-08-05

## 2024-08-05 NOTE — ASSESSMENT & PLAN NOTE
Heart cath 9/16/2020: Mild nonobstructive coronary artery disease  She is currently managed on statin therapy and a beta-blocker.  She is not on aspirin due to use of Eliquis.  Denies chest pain or palpitations.

## 2024-08-05 NOTE — PROGRESS NOTES
"Chief Complaint  Hypertension (6 month follow up)  Subjective        Brooklyn Johns presents to [facility]  History of Present Illness  History of Present Illness  The patient presents for evaluation of multiple medical concerns.    She underwent right hip replacement on 04/01/2024 and has been discharged from Dr. Gomez's office on 07/23/2024 due to improvement in symptoms.  She has completed home physical therapy and has been advised to increase her walking which she is tolerating without pain.  She continues to ambulate with a cane for assistance but denies any recent falls.     She did have an episode of elevated blood pressure which she attributes to running out of her metoprolol.  Her nephrologist increased her dose from 25 mg to 50 mg daily which she is tolerating well.  She has been able to go back on all of her medications and blood pressure has since improved.     She continues to complain of low back pain and is followed by [facility] pain management.  A lumbar epidural is planned for next week. Her current medications include hydrocodone and gabapentin.     SOCIAL HISTORY  She is semi-retired and works as a .    Objective   Vital Signs:  /80 (BP Location: Left arm, Patient Position: Sitting, Cuff Size: Adult)   Pulse 56   Ht 157.5 cm (62\")   Wt 124 kg (272 lb 9.6 oz)   SpO2 97%   BMI 49.86 kg/m²   Estimated body mass index is 49.86 kg/m² as calculated from the following:    Height as of this encounter: 157.5 cm (62\").    Weight as of this encounter: 124 kg (272 lb 9.6 oz).    Class 3 Severe Obesity (BMI >=40). Obesity-related health conditions include the following: hypertension and dyslipidemias. Obesity is unchanged. BMI is is above average; BMI management plan is completed. We discussed portion control and increasing exercise.      Physical Exam  Constitutional:       Appearance: She is well-developed. She is not ill-appearing.   HENT:      " Head: Normocephalic.      Right Ear: Hearing, tympanic membrane and external ear normal.      Left Ear: Hearing, tympanic membrane and external ear normal.      Nose: Nose normal. No nasal deformity, mucosal edema or rhinorrhea.      Right Sinus: No maxillary sinus tenderness or frontal sinus tenderness.      Left Sinus: No maxillary sinus tenderness or frontal sinus tenderness.      Mouth/Throat:      Dentition: Normal dentition.   Eyes:      General: Lids are normal.         Right eye: No discharge.         Left eye: No discharge.      Conjunctiva/sclera: Conjunctivae normal.      Right eye: No exudate.     Left eye: No exudate.  Neck:      Thyroid: No thyroid mass or thyromegaly.      Vascular: No carotid bruit.      Trachea: Trachea normal.   Cardiovascular:      Rate and Rhythm: Regular rhythm.      Pulses: Normal pulses.      Heart sounds: Normal heart sounds. No murmur heard.  Pulmonary:      Effort: No respiratory distress.      Breath sounds: Normal breath sounds. No decreased breath sounds, wheezing, rhonchi or rales.   Abdominal:      General: Bowel sounds are normal.      Palpations: Abdomen is soft.      Tenderness: There is no abdominal tenderness.   Musculoskeletal:      Cervical back: Normal range of motion. No edema.      Comments: Ambulating with a cane for assistance   Lymphadenopathy:      Head:      Right side of head: No submental, submandibular, tonsillar, preauricular, posterior auricular or occipital adenopathy.      Left side of head: No submental, submandibular, tonsillar, preauricular, posterior auricular or occipital adenopathy.   Skin:     General: Skin is warm and dry.      Nails: There is no clubbing.   Neurological:      Mental Status: She is alert.   Psychiatric:         Behavior: Behavior is cooperative.        Physical Exam      Result Review :  The following data was reviewed by: ULICES Amezquita on 08/05/2024:  Common labs          3/26/2024    07:55 4/1/2024    10:27  4/2/2024    05:34   Common Labs   Glucose 105  155     BUN 22  34     Creatinine 1.56  1.55     Sodium 144  142     Potassium 4.2  5.1     Chloride 105  107     Calcium 8.4  6.6     Albumin 4.1      Total Bilirubin 0.3      Alkaline Phosphatase 131      AST (SGOT) 13      ALT (SGPT) 6      WBC 6.40      Hemoglobin 10.4  9.8  8.6    Hematocrit 32.4  30.8  26.3    Platelets 198      Hemoglobin A1C 5.20        Data reviewed : Recent hospitalization notes 4/1/24      Results  Imaging  X-ray of the right hip shows good alignment post surgery.             Assessment and Plan   Diagnoses and all orders for this visit:    1. Essential hypertension (Primary)  Assessment & Plan:  Hypertension is stable and controlled  Continue current treatment regimen.  Blood pressure will be reassessed in 6 months.  Continue Chlorthalidone, metoprolol and ramipril daily.  Blood pressure is excellent in the office today.    Orders:  -     metoprolol succinate XL (TOPROL-XL) 50 MG 24 hr tablet; Take 1 tablet by mouth Daily. Indications: High Blood Pressure Disorder  Dispense: 90 tablet; Refill: 1  -     CBC (No Diff)  -     Comprehensive Metabolic Panel  -     Lipid Panel  -     TSH    2. Coronary artery disease involving native coronary artery of native heart with unstable angina pectoris  Assessment & Plan:  Heart cath 9/16/2020: Mild nonobstructive coronary artery disease  She is currently managed on statin therapy and a beta-blocker.  She is not on aspirin due to use of Eliquis.  Denies chest pain or palpitations.      3. CKD (chronic kidney disease) stage 4, GFR 15-29 ml/min  Assessment & Plan:  Pt is followed by nephrology, kidney function has been stable      4. Primary osteoarthritis of right hip  Assessment & Plan:  She is doing well since her right hip replacement on April 1, advised to increase physical activities such as daily walking.      5. Elevated glucose  Assessment & Plan:  Her A1c was 5.2 with 3/2024 labs, recheck today.   Continue a low-carb, low sugar diet.    Orders:  -     Hemoglobin A1c    6. Postmenopausal  -     DEXA Bone Density Axial; Future    7. Encounter for screening mammogram for malignant neoplasm of breast  -     Mammo Screening Digital Tomosynthesis Bilateral With CAD; Future      Assessment & Plan    Her last colonoscopy was performed by Dr. Birch on 01/20/2020 with a recommendation for a repeat colonoscopy in 5 years. Her last bone density test in 2019 showed normal results. Her metoprolol prescription was adjusted to 50 mg once daily (instead of 25 mg twice a day).     She is out of Eliquis due to the pharmacy shortage, sample of 5 mg given which she may cut in half.          Follow Up   Return in about 6 months (around 2/5/2025) for wellness.  Patient was given instructions and counseling regarding her condition or for health maintenance advice. Please see specific information pulled into the AVS if appropriate.     Patient or patient representative verbalized consent for the use of Ambient Listening during the visit with  ULICES Amezquita for chart documentation. 8/5/2024  09:53 EDT  Answers submitted by the patient for this visit:  Primary Reason for Visit (Submitted on 8/4/2024)  What is the primary reason for your visit?: High Blood Pressure

## 2024-08-05 NOTE — ASSESSMENT & PLAN NOTE
Hypertension is stable and controlled  Continue current treatment regimen.  Blood pressure will be reassessed in 6 months.  Continue Chlorthalidone, metoprolol and ramipril daily.  Blood pressure is excellent in the office today.

## 2024-08-05 NOTE — ASSESSMENT & PLAN NOTE
She is doing well since her right hip replacement on April 1, advised to increase physical activities such as daily walking.

## 2024-08-29 RX ORDER — ALBUTEROL SULFATE 90 UG/1
AEROSOL, METERED RESPIRATORY (INHALATION)
Qty: 6.7 G | Refills: 1 | Status: SHIPPED | OUTPATIENT
Start: 2024-08-29

## 2024-09-29 DIAGNOSIS — N18.4 CKD (CHRONIC KIDNEY DISEASE) STAGE 4, GFR 15-29 ML/MIN: ICD-10-CM

## 2024-09-29 DIAGNOSIS — K21.9 GASTROESOPHAGEAL REFLUX DISEASE, UNSPECIFIED WHETHER ESOPHAGITIS PRESENT: ICD-10-CM

## 2024-09-30 RX ORDER — FAMOTIDINE 20 MG/1
20 TABLET, FILM COATED ORAL 2 TIMES DAILY
Qty: 180 TABLET | Refills: 1 | Status: SHIPPED | OUTPATIENT
Start: 2024-09-30

## 2024-09-30 RX ORDER — CALCITRIOL 0.25 UG/1
CAPSULE, LIQUID FILLED ORAL
Qty: 36 CAPSULE | Refills: 1 | Status: SHIPPED | OUTPATIENT
Start: 2024-09-30

## 2024-09-30 NOTE — TELEPHONE ENCOUNTER
Rx Refill Note  Requested Prescriptions     Pending Prescriptions Disp Refills    famotidine (PEPCID) 20 MG tablet [Pharmacy Med Name: FAMOTIDINE 20MG TABLETS] 180 tablet 1     Sig: TAKE 1 TABLET BY MOUTH TWICE DAILY    calcitriol (ROCALTROL) 0.25 MCG capsule [Pharmacy Med Name: CALCITRIOL 0.25MCG CAPSULES] 36 capsule 1     Sig: TAKE 1 CAPSULE BY MOUTH MONDAY, WEDNESDAY, AND FRIDAY      Last office visit with prescribing clinician: 8/5/2024   Last telemedicine visit with prescribing clinician: Visit date not found   Next office visit with prescribing clinician: 2/12/2025                         Would you like a call back once the refill request has been completed: [] Yes [] No    If the office needs to give you a call back, can they leave a voicemail: [] Yes [] No    Caren Astorga  09/30/24, 09:00 EDT

## 2024-11-12 ENCOUNTER — PATIENT MESSAGE (OUTPATIENT)
Dept: INTERNAL MEDICINE | Facility: CLINIC | Age: 74
End: 2024-11-12
Payer: MEDICARE

## 2024-11-12 DIAGNOSIS — N18.4 CKD (CHRONIC KIDNEY DISEASE) STAGE 4, GFR 15-29 ML/MIN: ICD-10-CM

## 2024-11-12 DIAGNOSIS — G25.81 RESTLESS LEGS: ICD-10-CM

## 2024-11-12 DIAGNOSIS — E79.0 HYPERURICEMIA: ICD-10-CM

## 2024-11-12 DIAGNOSIS — K21.9 GASTROESOPHAGEAL REFLUX DISEASE, UNSPECIFIED WHETHER ESOPHAGITIS PRESENT: ICD-10-CM

## 2024-11-13 RX ORDER — ROPINIROLE 1 MG/1
1 TABLET, FILM COATED ORAL 4 TIMES DAILY
Qty: 360 TABLET | Refills: 1 | Status: SHIPPED | OUTPATIENT
Start: 2024-11-13

## 2024-11-13 RX ORDER — SIMVASTATIN 40 MG
40 TABLET ORAL DAILY
Qty: 90 TABLET | Refills: 1 | Status: SHIPPED | OUTPATIENT
Start: 2024-11-13

## 2024-11-13 RX ORDER — CALCITRIOL 0.25 UG/1
CAPSULE, LIQUID FILLED ORAL
Qty: 36 CAPSULE | Refills: 1 | Status: SHIPPED | OUTPATIENT
Start: 2024-11-13

## 2024-11-13 RX ORDER — RAMIPRIL 10 MG/1
10 CAPSULE ORAL NIGHTLY
Qty: 90 CAPSULE | Refills: 1 | Status: SHIPPED | OUTPATIENT
Start: 2024-11-13

## 2024-11-13 RX ORDER — ALLOPURINOL 100 MG/1
100 TABLET ORAL DAILY
Qty: 90 TABLET | Refills: 1 | Status: SHIPPED | OUTPATIENT
Start: 2024-11-13

## 2024-11-13 RX ORDER — FAMOTIDINE 20 MG/1
20 TABLET, FILM COATED ORAL 2 TIMES DAILY
Qty: 180 TABLET | Refills: 1 | Status: SHIPPED | OUTPATIENT
Start: 2024-11-13

## 2024-11-13 RX ORDER — ERGOCALCIFEROL 1.25 MG/1
50000 CAPSULE, LIQUID FILLED ORAL WEEKLY
Qty: 12 CAPSULE | Refills: 1 | Status: SHIPPED | OUTPATIENT
Start: 2024-11-13

## 2024-11-20 ENCOUNTER — HOSPITAL ENCOUNTER (OUTPATIENT)
Dept: CARDIOLOGY | Facility: HOSPITAL | Age: 74
Discharge: HOME | End: 2024-11-20
Admitting: NURSE PRACTITIONER
Payer: MEDICARE

## 2024-11-20 ENCOUNTER — OFFICE VISIT (OUTPATIENT)
Dept: INTERNAL MEDICINE | Facility: CLINIC | Age: 74
End: 2024-11-20
Payer: MEDICARE

## 2024-11-20 VITALS
WEIGHT: 264.8 LBS | RESPIRATION RATE: 20 BRPM | HEIGHT: 62 IN | BODY MASS INDEX: 48.73 KG/M2 | HEART RATE: 91 BPM | TEMPERATURE: 98.1 F | DIASTOLIC BLOOD PRESSURE: 90 MMHG | OXYGEN SATURATION: 97 % | SYSTOLIC BLOOD PRESSURE: 130 MMHG

## 2024-11-20 DIAGNOSIS — M47.817 LUMBOSACRAL SPONDYLOSIS WITHOUT MYELOPATHY: Primary | Chronic | ICD-10-CM

## 2024-11-20 DIAGNOSIS — M79.604 RIGHT LEG PAIN: ICD-10-CM

## 2024-11-20 DIAGNOSIS — Z12.11 SCREENING FOR COLON CANCER: ICD-10-CM

## 2024-11-20 LAB
BH CV LOW VAS RIGHT LESSER SAPH VESSEL: 1
BH CV LOWER VASCULAR LEFT COMMON FEMORAL AUGMENT: NORMAL
BH CV LOWER VASCULAR LEFT COMMON FEMORAL COMPETENT: NORMAL
BH CV LOWER VASCULAR LEFT COMMON FEMORAL COMPRESS: NORMAL
BH CV LOWER VASCULAR LEFT COMMON FEMORAL PHASIC: NORMAL
BH CV LOWER VASCULAR LEFT COMMON FEMORAL SPONT: NORMAL
BH CV LOWER VASCULAR RIGHT COMMON FEMORAL AUGMENT: NORMAL
BH CV LOWER VASCULAR RIGHT COMMON FEMORAL COMPETENT: NORMAL
BH CV LOWER VASCULAR RIGHT COMMON FEMORAL COMPRESS: NORMAL
BH CV LOWER VASCULAR RIGHT COMMON FEMORAL PHASIC: NORMAL
BH CV LOWER VASCULAR RIGHT COMMON FEMORAL SPONT: NORMAL
BH CV LOWER VASCULAR RIGHT DISTAL FEMORAL COMPRESS: NORMAL
BH CV LOWER VASCULAR RIGHT GASTRONEMIUS COMPRESS: NORMAL
BH CV LOWER VASCULAR RIGHT GREATER SAPH AK COMPRESS: NORMAL
BH CV LOWER VASCULAR RIGHT LESSER SAPH COMPRESS: NORMAL
BH CV LOWER VASCULAR RIGHT LESSER SAPH THROMBUS: NORMAL
BH CV LOWER VASCULAR RIGHT MID FEMORAL AUGMENT: NORMAL
BH CV LOWER VASCULAR RIGHT MID FEMORAL COMPETENT: NORMAL
BH CV LOWER VASCULAR RIGHT MID FEMORAL COMPRESS: NORMAL
BH CV LOWER VASCULAR RIGHT MID FEMORAL PHASIC: NORMAL
BH CV LOWER VASCULAR RIGHT MID FEMORAL SPONT: NORMAL
BH CV LOWER VASCULAR RIGHT PERONEAL COMPRESS: NORMAL
BH CV LOWER VASCULAR RIGHT POPLITEAL AUGMENT: NORMAL
BH CV LOWER VASCULAR RIGHT POPLITEAL COMPETENT: NORMAL
BH CV LOWER VASCULAR RIGHT POPLITEAL COMPRESS: NORMAL
BH CV LOWER VASCULAR RIGHT POPLITEAL PHASIC: NORMAL
BH CV LOWER VASCULAR RIGHT POPLITEAL SPONT: NORMAL
BH CV LOWER VASCULAR RIGHT POSTERIOR TIBIAL COMPRESS: NORMAL
BH CV LOWER VASCULAR RIGHT PROFUNDA FEMORAL COMPRESS: NORMAL
BH CV LOWER VASCULAR RIGHT PROXIMAL FEMORAL COMPRESS: NORMAL
BH CV LOWER VASCULAR RIGHT SAPHENOFEMORAL JUNCTION COMPRESS: NORMAL
BH CV VAS PRELIMINARY FINDINGS SCRIPTING: 1

## 2024-11-20 PROCEDURE — 3075F SYST BP GE 130 - 139MM HG: CPT | Performed by: NURSE PRACTITIONER

## 2024-11-20 PROCEDURE — 96372 THER/PROPH/DIAG INJ SC/IM: CPT | Performed by: NURSE PRACTITIONER

## 2024-11-20 PROCEDURE — 1159F MED LIST DOCD IN RCRD: CPT | Performed by: NURSE PRACTITIONER

## 2024-11-20 PROCEDURE — 1160F RVW MEDS BY RX/DR IN RCRD: CPT | Performed by: NURSE PRACTITIONER

## 2024-11-20 PROCEDURE — 93971 EXTREMITY STUDY: CPT

## 2024-11-20 PROCEDURE — 99214 OFFICE O/P EST MOD 30 MIN: CPT | Performed by: NURSE PRACTITIONER

## 2024-11-20 PROCEDURE — 3080F DIAST BP >= 90 MM HG: CPT | Performed by: NURSE PRACTITIONER

## 2024-11-20 PROCEDURE — 1125F AMNT PAIN NOTED PAIN PRSNT: CPT | Performed by: NURSE PRACTITIONER

## 2024-11-20 RX ORDER — HYDROCODONE BITARTRATE AND ACETAMINOPHEN 7.5; 325 MG/1; MG/1
TABLET ORAL
COMMUNITY
Start: 2024-10-29

## 2024-11-20 RX ADMIN — CYANOCOBALAMIN 1000 MCG: 1000 INJECTION, SOLUTION INTRAMUSCULAR; SUBCUTANEOUS at 11:26

## 2024-11-20 NOTE — Clinical Note
Please notify pt if her right leg pain does not improve after epidural we will consider repeating her MRI of lumbar spine to further evaluate symptoms, thanks.

## 2024-11-24 PROBLEM — M79.604 RIGHT LEG PAIN: Status: ACTIVE | Noted: 2024-11-24

## 2024-11-24 NOTE — ASSESSMENT & PLAN NOTE
Reviewed results of MRI of lumbar spine from 2019 with patient, sx in right leg due to radicular sx from low back? She is scheduled for a lumbar epidural in December, consider repeating MRI if sx do not improve after epidural.

## 2024-11-24 NOTE — PROGRESS NOTES
"Chief Complaint  Extremity Weakness (Right Leg)  Subjective        Brooklyn Johns presents to [facility]  Extremity Weakness       History of Present Illness  The patient presents for evaluation of right leg pain and weakness.    She reports experiencing bruises on her right leg, the cause of which she is uncertain. She recalls an incident at work where her leg gave out while walking without her cane, causing her to lean against a wall to prevent a fall. Since then, she has been experiencing constant pain in her right leg. She is currently managed on Eliquis 2.5 mg bid due to hx of recurrent DVT. She denies missing any doses of medication.    She underwent right hip replacement 4/1/24 which she tolerated well. However,  she noticed a lack of strength in her right leg which her orthopedist attributed to prolonged limping and the need for her muscles to adjust to the new hip. The pain has since worsened. She experiences pain in the middle of her right leg but not in the knee itself. The pain is exacerbated by sitting and moving quickly and is particularly bothersome at night.     She reports no swelling, numbness, or tingling in the right leg. She has been performing leg raises and notes that she can lift her leg higher than before her hip surgery. Her last hip x-ray showed no issues. She reports no recent falls or injuries.    Additionally, she reports poor sleep quality.    Supplemental Information:  She is getting treatments for her back and is due for a back treatment on 12/11/2024.    Objective   Vital Signs:  /90 (BP Location: Left arm)   Pulse 91   Temp 98.1 °F (36.7 °C) (Oral)   Resp 20   Ht 157.5 cm (62\")   Wt 120 kg (264 lb 12.8 oz)   SpO2 97%   BMI 48.43 kg/m²   Estimated body mass index is 48.43 kg/m² as calculated from the following:    Height as of this encounter: 157.5 cm (62\").    Weight as of this encounter: 120 kg (264 lb 12.8 oz).            Physical " Exam  Constitutional:       Appearance: She is well-developed. She is not ill-appearing.   HENT:      Head: Normocephalic.      Right Ear: Hearing, tympanic membrane and external ear normal.      Left Ear: Hearing, tympanic membrane and external ear normal.      Nose: Nose normal. No nasal deformity, mucosal edema or rhinorrhea.      Right Sinus: No maxillary sinus tenderness or frontal sinus tenderness.      Left Sinus: No maxillary sinus tenderness or frontal sinus tenderness.      Mouth/Throat:      Dentition: Normal dentition.   Eyes:      General: Lids are normal.         Right eye: No discharge.         Left eye: No discharge.      Conjunctiva/sclera: Conjunctivae normal.      Right eye: No exudate.     Left eye: No exudate.  Neck:      Thyroid: No thyroid mass or thyromegaly.      Vascular: No carotid bruit.      Trachea: Trachea normal.   Cardiovascular:      Rate and Rhythm: Regular rhythm.      Pulses: Normal pulses.      Heart sounds: Normal heart sounds. No murmur heard.  Pulmonary:      Effort: No respiratory distress.      Breath sounds: Normal breath sounds. No decreased breath sounds, wheezing, rhonchi or rales.   Abdominal:      General: Bowel sounds are normal.      Palpations: Abdomen is soft.      Tenderness: There is no abdominal tenderness.   Musculoskeletal:      Cervical back: Normal range of motion. No edema.      Lumbar back: Tenderness present. Positive right straight leg raise test.   Lymphadenopathy:      Head:      Right side of head: No submental, submandibular, tonsillar, preauricular, posterior auricular or occipital adenopathy.      Left side of head: No submental, submandibular, tonsillar, preauricular, posterior auricular or occipital adenopathy.   Skin:     General: Skin is warm and dry.      Nails: There is no clubbing.   Neurological:      Mental Status: She is alert.      Sensory: Sensation is intact.      Motor: Motor function is intact.   Psychiatric:         Behavior:  Behavior is cooperative.        Physical Exam      Result Review :  The following data was reviewed by: ULICES Amezquita on 11/20/2024:  Common labs          4/1/2024    10:27 4/2/2024    05:34 8/5/2024    09:40   Common Labs   Glucose 155   110    BUN 34   25    Creatinine 1.55   1.58    Sodium 142   142    Potassium 5.1   4.7    Chloride 107   105    Calcium 6.6   8.4    Total Protein   5.9    Albumin   4.3    Total Bilirubin   0.4    Alkaline Phosphatase   162    AST (SGOT)   15    ALT (SGPT)   9    WBC   5.31    Hemoglobin 9.8  8.6  11.5    Hematocrit 30.8  26.3  35.9    Platelets   152    Total Cholesterol   151    Triglycerides   89    HDL Cholesterol   60    LDL Cholesterol    74    Hemoglobin A1C   5.90      Data reviewed : Radiologic studies MRI lumbar spine 6/18/2019      Results               Assessment and Plan   Diagnoses and all orders for this visit:    1. Lumbosacral spondylosis without myelopathy (Primary)  Assessment & Plan:  Reviewed results of MRI of lumbar spine from 2019 with patient, sx in right leg due to radicular sx from low back? She is scheduled for a lumbar epidural in December, consider repeating MRI if sx do not improve after epidural.      2. Right leg pain  Assessment & Plan:  Due to pain with weakness in right leg and intermittent swelling, will obtain venous doppler to rule out DVT. Continue Eliquis 2.5 mg bid.    Orders:  -     Duplex Venous Lower Extremity - Right CAR    3. Screening for colon cancer  -     Ambulatory Referral to General Surgery      Assessment & Plan    Health Maintenance.  A referral to Dr. Birch has been made for a colonoscopy, which is due in January 2025.         Follow Up   No follow-ups on file.  Patient was given instructions and counseling regarding her condition or for health maintenance advice. Please see specific information pulled into the AVS if appropriate.     Patient or patient representative verbalized consent for the use of Ambient  Listening during the visit with  ULICES Amezquita for chart documentation. 11/24/2024  14:37 EST  Answers submitted by the patient for this visit:  Primary Reason for Visit (Submitted on 11/19/2024)  What is the primary reason for your visit?: Extremity Pain  Lower Extremity Injury Questionnaire (Submitted on 11/19/2024)  Chief Complaint: Extremity pain  Injury: No

## 2024-11-24 NOTE — ASSESSMENT & PLAN NOTE
Due to pain with weakness in right leg and intermittent swelling, will obtain venous doppler to rule out DVT. Continue Eliquis 2.5 mg bid.

## 2024-11-29 ENCOUNTER — HOSPITAL ENCOUNTER (OUTPATIENT)
Dept: BONE DENSITY | Facility: HOSPITAL | Age: 74
Discharge: HOME | End: 2024-11-29
Payer: MEDICARE

## 2024-11-29 ENCOUNTER — HOSPITAL ENCOUNTER (OUTPATIENT)
Dept: MAMMOGRAPHY | Facility: HOSPITAL | Age: 74
Discharge: HOME | End: 2024-11-29
Payer: MEDICARE

## 2024-11-29 DIAGNOSIS — Z12.31 ENCOUNTER FOR SCREENING MAMMOGRAM FOR MALIGNANT NEOPLASM OF BREAST: ICD-10-CM

## 2024-11-29 DIAGNOSIS — Z78.0 POSTMENOPAUSAL: ICD-10-CM

## 2024-11-29 PROCEDURE — 77067 SCR MAMMO BI INCL CAD: CPT

## 2024-11-29 PROCEDURE — 77080 DXA BONE DENSITY AXIAL: CPT

## 2024-11-29 PROCEDURE — 77063 BREAST TOMOSYNTHESIS BI: CPT

## 2024-12-23 RX ORDER — ERGOCALCIFEROL 1.25 MG/1
50000 CAPSULE, LIQUID FILLED ORAL WEEKLY
Qty: 12 CAPSULE | Refills: 0 | Status: SHIPPED | OUTPATIENT
Start: 2024-12-23

## 2024-12-27 RX ORDER — DULOXETIN HYDROCHLORIDE 60 MG/1
CAPSULE, DELAYED RELEASE ORAL
Qty: 90 CAPSULE | Refills: 1 | Status: SHIPPED | OUTPATIENT
Start: 2024-12-27

## 2024-12-30 RX ORDER — RAMIPRIL 10 MG/1
CAPSULE ORAL
Qty: 90 CAPSULE | Refills: 1 | Status: SHIPPED | OUTPATIENT
Start: 2024-12-30

## 2025-01-02 ENCOUNTER — PREP FOR SURGERY (OUTPATIENT)
Dept: OTHER | Facility: HOSPITAL | Age: 75
End: 2025-01-02
Payer: MEDICARE

## 2025-01-02 DIAGNOSIS — Z86.0100 HISTORY OF COLON POLYPS: Primary | ICD-10-CM

## 2025-01-07 DIAGNOSIS — I10 ESSENTIAL HYPERTENSION: ICD-10-CM

## 2025-01-07 RX ORDER — CHLORTHALIDONE 25 MG/1
TABLET ORAL
Qty: 45 TABLET | Refills: 1 | Status: SHIPPED | OUTPATIENT
Start: 2025-01-07

## 2025-01-20 ENCOUNTER — PATIENT MESSAGE (OUTPATIENT)
Dept: INTERNAL MEDICINE | Facility: CLINIC | Age: 75
End: 2025-01-20
Payer: MEDICARE

## 2025-01-21 ENCOUNTER — LAB (OUTPATIENT)
Facility: HOSPITAL | Age: 75
End: 2025-01-21
Payer: MEDICARE

## 2025-01-21 ENCOUNTER — OFFICE VISIT (OUTPATIENT)
Dept: INTERNAL MEDICINE | Facility: CLINIC | Age: 75
End: 2025-01-21
Payer: MEDICARE

## 2025-01-21 ENCOUNTER — HOSPITAL ENCOUNTER (OUTPATIENT)
Facility: HOSPITAL | Age: 75
Discharge: HOME | End: 2025-01-21
Payer: MEDICARE

## 2025-01-21 VITALS
OXYGEN SATURATION: 99 % | HEART RATE: 63 BPM | WEIGHT: 282.8 LBS | DIASTOLIC BLOOD PRESSURE: 84 MMHG | TEMPERATURE: 98 F | SYSTOLIC BLOOD PRESSURE: 140 MMHG | BODY MASS INDEX: 52.04 KG/M2 | HEIGHT: 62 IN

## 2025-01-21 DIAGNOSIS — R60.0 EDEMA OF BOTH LOWER EXTREMITIES: ICD-10-CM

## 2025-01-21 DIAGNOSIS — R06.02 SHORTNESS OF BREATH: ICD-10-CM

## 2025-01-21 DIAGNOSIS — M47.16 OSTEOARTHRITIS OF SPINE WITH MYELOPATHY, LUMBOSACRAL REGION: ICD-10-CM

## 2025-01-21 DIAGNOSIS — J20.9 ACUTE BRONCHITIS, UNSPECIFIED ORGANISM: Primary | ICD-10-CM

## 2025-01-21 LAB
ALBUMIN SERPL-MCNC: 4 G/DL (ref 3.5–5.2)
ALBUMIN/GLOB SERPL: 1.7 G/DL
ALP SERPL-CCNC: 105 U/L (ref 39–117)
ALT SERPL W P-5'-P-CCNC: 8 U/L (ref 1–33)
ANION GAP SERPL CALCULATED.3IONS-SCNC: 8.1 MMOL/L (ref 5–15)
AST SERPL-CCNC: 17 U/L (ref 1–32)
BILIRUB SERPL-MCNC: 0.3 MG/DL (ref 0–1.2)
BUN SERPL-MCNC: 28 MG/DL (ref 8–23)
BUN/CREAT SERPL: 16.1 (ref 7–25)
CALCIUM SPEC-SCNC: 8.4 MG/DL (ref 8.6–10.5)
CHLORIDE SERPL-SCNC: 103 MMOL/L (ref 98–107)
CO2 SERPL-SCNC: 26.9 MMOL/L (ref 22–29)
CREAT SERPL-MCNC: 1.74 MG/DL (ref 0.57–1)
DEPRECATED RDW RBC AUTO: 47.4 FL (ref 37–54)
EGFRCR SERPLBLD CKD-EPI 2021: 30.5 ML/MIN/1.73
ERYTHROCYTE [DISTWIDTH] IN BLOOD BY AUTOMATED COUNT: 13.6 % (ref 12.3–15.4)
GLOBULIN UR ELPH-MCNC: 2.4 GM/DL
GLUCOSE SERPL-MCNC: 103 MG/DL (ref 65–99)
HCT VFR BLD AUTO: 33.2 % (ref 34–46.6)
HGB BLD-MCNC: 10.9 G/DL (ref 12–15.9)
MCH RBC QN AUTO: 31.1 PG (ref 26.6–33)
MCHC RBC AUTO-ENTMCNC: 32.8 G/DL (ref 31.5–35.7)
MCV RBC AUTO: 94.6 FL (ref 79–97)
NT-PROBNP SERPL-MCNC: 511 PG/ML (ref 0–900)
PLATELET # BLD AUTO: 144 10*3/MM3 (ref 140–450)
PMV BLD AUTO: 12 FL (ref 6–12)
POTASSIUM SERPL-SCNC: 4.9 MMOL/L (ref 3.5–5.2)
PROT SERPL-MCNC: 6.4 G/DL (ref 6–8.5)
RBC # BLD AUTO: 3.51 10*6/MM3 (ref 3.77–5.28)
SODIUM SERPL-SCNC: 138 MMOL/L (ref 136–145)
WBC NRBC COR # BLD AUTO: 6.4 10*3/MM3 (ref 3.4–10.8)

## 2025-01-21 PROCEDURE — 1160F RVW MEDS BY RX/DR IN RCRD: CPT | Performed by: NURSE PRACTITIONER

## 2025-01-21 PROCEDURE — 3079F DIAST BP 80-89 MM HG: CPT | Performed by: NURSE PRACTITIONER

## 2025-01-21 PROCEDURE — 83880 ASSAY OF NATRIURETIC PEPTIDE: CPT | Performed by: NURSE PRACTITIONER

## 2025-01-21 PROCEDURE — 1159F MED LIST DOCD IN RCRD: CPT | Performed by: NURSE PRACTITIONER

## 2025-01-21 PROCEDURE — 36415 COLL VENOUS BLD VENIPUNCTURE: CPT | Performed by: NURSE PRACTITIONER

## 2025-01-21 PROCEDURE — 85027 COMPLETE CBC AUTOMATED: CPT | Performed by: NURSE PRACTITIONER

## 2025-01-21 PROCEDURE — 1126F AMNT PAIN NOTED NONE PRSNT: CPT | Performed by: NURSE PRACTITIONER

## 2025-01-21 PROCEDURE — 80053 COMPREHEN METABOLIC PANEL: CPT | Performed by: NURSE PRACTITIONER

## 2025-01-21 PROCEDURE — G2211 COMPLEX E/M VISIT ADD ON: HCPCS | Performed by: NURSE PRACTITIONER

## 2025-01-21 PROCEDURE — 3077F SYST BP >= 140 MM HG: CPT | Performed by: NURSE PRACTITIONER

## 2025-01-21 PROCEDURE — 99214 OFFICE O/P EST MOD 30 MIN: CPT | Performed by: NURSE PRACTITIONER

## 2025-01-21 PROCEDURE — 71046 X-RAY EXAM CHEST 2 VIEWS: CPT

## 2025-01-21 RX ORDER — PREDNISONE 20 MG/1
20 TABLET ORAL DAILY
Qty: 5 TABLET | Refills: 0 | Status: SHIPPED | OUTPATIENT
Start: 2025-01-21 | End: 2025-01-26

## 2025-01-21 RX ORDER — FUROSEMIDE 40 MG/1
40 TABLET ORAL DAILY
Qty: 90 TABLET | Refills: 1 | Status: SHIPPED | OUTPATIENT
Start: 2025-01-21

## 2025-01-21 RX ORDER — FUROSEMIDE 40 MG/1
40 TABLET ORAL DAILY
Qty: 30 TABLET | Refills: 0 | Status: SHIPPED | OUTPATIENT
Start: 2025-01-21 | End: 2025-01-21

## 2025-01-21 RX ORDER — IPRATROPIUM BROMIDE AND ALBUTEROL SULFATE 2.5; .5 MG/3ML; MG/3ML
SOLUTION RESPIRATORY (INHALATION)
COMMUNITY

## 2025-01-21 RX ORDER — POTASSIUM CHLORIDE 600 MG/1
8 TABLET, FILM COATED, EXTENDED RELEASE ORAL DAILY
Qty: 30 TABLET | Refills: 0 | Status: SHIPPED | OUTPATIENT
Start: 2025-01-21 | End: 2025-02-04

## 2025-01-27 ENCOUNTER — TELEPHONE (OUTPATIENT)
Dept: INTERNAL MEDICINE | Facility: CLINIC | Age: 75
End: 2025-01-27
Payer: MEDICARE

## 2025-01-27 NOTE — TELEPHONE ENCOUNTER
----- Message from Radha House sent at 1/27/2025  7:32 AM EST -----  Please call patient with results: Her recent CXR did not show any acute abnormalities. Her labs continue to show a decreased kidney function and a low grade anemia. We will re-evaluate her symptoms and discuss her labs further at her 2/4/25 appointment.

## 2025-01-27 NOTE — TELEPHONE ENCOUNTER
Attempted to call patient, voicemail box is full Will post clarisse for reattempt    HUB TO READ    Radha states your recent CXR did not show any acute abnormalities. Labs continue to show a decreased kidney function and a low grade anemia. We will re-evaluate symptoms and discuss her labs further at her 2/4/25 appointment.

## 2025-02-03 PROBLEM — J20.9 ACUTE BRONCHITIS: Status: ACTIVE | Noted: 2025-02-03

## 2025-02-03 PROBLEM — R60.0 EDEMA OF BOTH LOWER EXTREMITIES: Status: ACTIVE | Noted: 2025-02-03

## 2025-02-03 NOTE — ASSESSMENT & PLAN NOTE
She has gained approximately 18 pounds since November 2024, which may be due to fluid retention or increased salt intake. She is advised to monitor her diet, particularly salt intake. Lasix (furosemide) 40 mg once daily will be prescribed to manage fluid retention. She is also advised to take potassium supplements to prevent hypokalemia. Blood work will be conducted to further investigate the cause of weight gain.

## 2025-02-03 NOTE — PROGRESS NOTES
Chief Complaint  Bronchitis (Lingering cough, chest congestion - ABX, tessalon, steroid prescribed by home visit with insurance//Trouble getting a deep breath, coughing at night) and Leg Pain (Not improving)  Subjective        Brooklyn Johns presents to [facility]  Shortness of Breath  This is a new problem. The current episode started 1 to 4 weeks ago. The problem occurs constantly. The problem has been coming and going. Associated symptoms include chest pain, headaches, leg swelling, orthopnea, PND, a sore throat, sputum production and wheezing. Pertinent negatives include no fever, hemoptysis, leg pain, swollen glands, syncope or vomiting. The symptoms are aggravated by emotional upset, exercise, any activity and lying flat. There is no history of asthma or COPD. There has been no fever. Most recent home oxygen level percentage is 96    History of Present Illness  The patient presents for evaluation of bronchitis, weight gain, and edema.    She was diagnosed with bronchitis approximately 3 to 4 weeks ago for which she received home-based treatment from [place] on 01/10/2024. The initial treatment regimen included doxycycline, steroids, and breathing treatments which provided some relief. However, her symptoms persisted, prompting a second course of antibiotics, Augmentin, which she has not yet completed. She reports an improvement in her condition since starting Augmentin but continues to experience difficulty in deep breathing. She also reports a dry cough, with occasional expectoration of sputum in the mornings. She does not experience shortness of breath at rest but feels unable to take deep breaths. She also reports severe wheezing and head congestion. She is currently on a regimen of DuoNeb, administered 4 times daily, which she finds beneficial.    She has observed weight gain over the past 1.5 months which she attributes to fluid retention. She has increased her water  "intake but notes eating more meals at her assisted living which are high in sodium. She has gained approximately 20 pounds since 11/2024. She has been less active due to leg issues and is considering physical therapy or a consultation with her hip specialist. She has previously found water-based physical therapy beneficial.    Objective   Vital Signs:  /84 (BP Location: Right arm, Patient Position: Sitting, Cuff Size: Large Adult)   Pulse 63   Temp 98 °F (36.7 °C)   Ht 157.5 cm (62\")   Wt 128 kg (282 lb 12.8 oz)   SpO2 99%   BMI 51.72 kg/m²   Estimated body mass index is 51.72 kg/m² as calculated from the following:    Height as of this encounter: 157.5 cm (62\").    Weight as of this encounter: 128 kg (282 lb 12.8 oz).            Physical Exam  Constitutional:       Appearance: She is well-developed. She is not ill-appearing.   HENT:      Head: Normocephalic.      Right Ear: Hearing, tympanic membrane and external ear normal.      Left Ear: Hearing, tympanic membrane and external ear normal.      Nose: Nose normal. No nasal deformity, mucosal edema or rhinorrhea.      Right Sinus: No maxillary sinus tenderness or frontal sinus tenderness.      Left Sinus: No maxillary sinus tenderness or frontal sinus tenderness.      Mouth/Throat:      Dentition: Normal dentition.   Eyes:      General: Lids are normal.         Right eye: No discharge.         Left eye: No discharge.      Conjunctiva/sclera: Conjunctivae normal.      Right eye: No exudate.     Left eye: No exudate.  Neck:      Thyroid: No thyroid mass or thyromegaly.      Vascular: No carotid bruit.      Trachea: Trachea normal.   Cardiovascular:      Rate and Rhythm: Regular rhythm.      Pulses: Normal pulses.      Heart sounds: Normal heart sounds. No murmur heard.  Pulmonary:      Effort: No respiratory distress.      Breath sounds: Normal breath sounds. No decreased breath sounds, wheezing, rhonchi or rales.   Abdominal:      General: Bowel sounds " are normal.      Palpations: Abdomen is soft.      Tenderness: There is no abdominal tenderness.   Musculoskeletal:      Cervical back: Normal range of motion. No edema.      Right lower le+      Left lower le+   Lymphadenopathy:      Head:      Right side of head: No submental, submandibular, tonsillar, preauricular, posterior auricular or occipital adenopathy.      Left side of head: No submental, submandibular, tonsillar, preauricular, posterior auricular or occipital adenopathy.   Skin:     General: Skin is warm and dry.      Nails: There is no clubbing.   Neurological:      Mental Status: She is alert.   Psychiatric:         Behavior: Behavior is cooperative.        Physical Exam      Vital Signs  Oxygen saturation is at 99 percent.    Result Review :  The following data was reviewed by: ULICES Amezquita on 2025:  Common labs          2024    05:34 2024    09:40 2025    13:22   Common Labs   Glucose  110  103    BUN  25  28    Creatinine  1.58  1.74    Sodium  142  138    Potassium  4.7  4.9    Chloride  105  103    Calcium  8.4  8.4    Total Protein  5.9     Albumin  4.3  4.0    Total Bilirubin  0.4  0.3    Alkaline Phosphatase  162  105    AST (SGOT)  15  17    ALT (SGPT)  9  8    WBC  5.31  6.40    Hemoglobin 8.6  11.5  10.9    Hematocrit 26.3  35.9  33.2    Platelets  152  144    Total Cholesterol  151     Triglycerides  89     HDL Cholesterol  60     LDL Cholesterol   74     Hemoglobin A1C  5.90            Results               Assessment and Plan   Diagnoses and all orders for this visit:    1. Acute bronchitis, unspecified organism (Primary)  Assessment & Plan:  She reports persistent symptoms despite initial treatment with doxycycline, steroids, and breathing treatments. She has noticed improvement with Augmentin but still experiences difficulty breathing deeply and a dry cough. A chest x-ray will be ordered to evaluate her lungs. She will continue her current regimen of  Augmentin and DuoNeb (ipratropium and albuterol) 4 times a day. Prednisone 20 mg once daily for 5 days will be prescribed to address potential bronchospasm. Blood work will be conducted to rule out congestive heart failure.      2. Shortness of breath  -     XR Chest 2 View  -     CBC (No Diff)  -     Comprehensive Metabolic Panel  -     proBNP  -     predniSONE (DELTASONE) 20 MG tablet; Take 1 tablet by mouth Daily for 5 days.  Dispense: 5 tablet; Refill: 0    3. Edema of both lower extremities  Assessment & Plan:  She has gained approximately 18 pounds since November 2024, which may be due to fluid retention or increased salt intake. She is advised to monitor her diet, particularly salt intake. Lasix (furosemide) 40 mg once daily will be prescribed to manage fluid retention. She is also advised to take potassium supplements to prevent hypokalemia. Blood work will be conducted to further investigate the cause of weight gain.    Orders:  -     Discontinue: furosemide (LASIX) 40 MG tablet; Take 1 tablet by mouth Daily. Indications: Edema  Dispense: 30 tablet; Refill: 0  -     potassium chloride (Klor-Con) 8 MEQ CR tablet; Take 1 tablet by mouth Daily.  Dispense: 30 tablet; Refill: 0    4. Osteoarthritis of spine with myelopathy, lumbosacral region  Assessment & Plan:  She c/o persistent low back pain with lower extremity weakness, followed by pain mgmt. She notes decrease mobility-will begin PT and continue to monitor.    Orders:  -     Ambulatory Referral to Physical Therapy for Evaluation & Treatment      Assessment & Plan           Follow Up   Return in about 2 weeks (around 2/4/2025).  Patient was given instructions and counseling regarding her condition or for health maintenance advice. Please see specific information pulled into the AVS if appropriate.     Patient or patient representative verbalized consent for the use of Ambient Listening during the visit with  ULICES Amezquita for chart documentation.  2/3/2025  06:54 EST  Answers submitted by the patient for this visit:  Primary Reason for Visit (Submitted on 1/20/2025)  What is the primary reason for your visit?: Shortness of Breath  Shortness of Breath Questionnaire (Submitted on 1/20/2025)  Chief Complaint: Shortness of breath  chest congestion: Yes  dry cough: Yes  nasal congestion: Yes

## 2025-02-03 NOTE — ASSESSMENT & PLAN NOTE
She reports persistent symptoms despite initial treatment with doxycycline, steroids, and breathing treatments. She has noticed improvement with Augmentin but still experiences difficulty breathing deeply and a dry cough. A chest x-ray will be ordered to evaluate her lungs. She will continue her current regimen of Augmentin and DuoNeb (ipratropium and albuterol) 4 times a day. Prednisone 20 mg once daily for 5 days will be prescribed to address potential bronchospasm. Blood work will be conducted to rule out congestive heart failure.

## 2025-02-03 NOTE — ASSESSMENT & PLAN NOTE
She c/o persistent low back pain with lower extremity weakness, followed by pain mgmt. She notes decrease mobility-will begin PT and continue to monitor.

## 2025-02-04 ENCOUNTER — OFFICE VISIT (OUTPATIENT)
Dept: INTERNAL MEDICINE | Facility: CLINIC | Age: 75
End: 2025-02-04
Payer: MEDICARE

## 2025-02-04 VITALS
WEIGHT: 278 LBS | BODY MASS INDEX: 51.16 KG/M2 | HEIGHT: 62 IN | SYSTOLIC BLOOD PRESSURE: 148 MMHG | HEART RATE: 80 BPM | DIASTOLIC BLOOD PRESSURE: 68 MMHG | OXYGEN SATURATION: 94 %

## 2025-02-04 DIAGNOSIS — R60.0 EDEMA OF BOTH LOWER EXTREMITIES: ICD-10-CM

## 2025-02-04 DIAGNOSIS — J40 BRONCHITIS: Primary | ICD-10-CM

## 2025-02-09 DIAGNOSIS — I10 ESSENTIAL HYPERTENSION: Chronic | ICD-10-CM

## 2025-02-10 RX ORDER — METOPROLOL SUCCINATE 50 MG/1
50 TABLET, EXTENDED RELEASE ORAL DAILY
Qty: 90 TABLET | Refills: 1 | Status: SHIPPED | OUTPATIENT
Start: 2025-02-10

## 2025-02-16 NOTE — PROGRESS NOTES
"Chief Complaint  Bronchitis (2 week follow-up) and Shortness of Breath  Subjective        Brooklyn Johns presents to [facility]  Cough  This is a recurrent problem. The current episode started 1 to 4 weeks ago. The problem has been improving. The problem occurs every few hours. The cough is Productive of yellow sputum. Associated symptoms include ear congestion, nasal congestion, postnasal drip, rhinorrhea, a sore throat, shortness of breath and wheezing. Pertinent negatives include no chest pain, chills, ear pain, fever, headaches, heartburn, hemoptysis, myalgias, rash, sweats or weight loss. The symptoms are aggravated by exercise. Her past medical history is significant for bronchitis.     History of Present Illness  The patient presents for evaluation of shortness of breath, cough, and fluid retention.    She reports persistent dyspnea and a chronic cough, which she perceives as having improved slightly. She experiences a productive cough that is dry upon awakening but becomes more productive as the day progresses, particularly following nebulizer treatments. She has completed a course of Augmentin and prednisone. She does not experience chest tightness at rest but reports difficulty in taking deep breaths. She also notes a decrease in lung capacity over the past few years, which she attributes to a previous bronchial infection. She finds relief from her symptoms with nebulizer treatments. She experiences mild shortness of breath when lying down at night.    She has noticed an increase in urinary frequency. She reports no significant edema, except when she is unable to elevate her feet during work hours, which results in swelling. She has not observed any edema at bedtime.    Objective   Vital Signs:  /68 (BP Location: Left arm, Patient Position: Sitting, Cuff Size: Adult)   Pulse 80   Ht 157.5 cm (62\")   Wt 126 kg (278 lb)   SpO2 94%   BMI 50.85 kg/m²   Estimated " "body mass index is 50.85 kg/m² as calculated from the following:    Height as of this encounter: 157.5 cm (62\").    Weight as of this encounter: 126 kg (278 lb).            Physical Exam  HENT:      Head: Normocephalic.      Right Ear: External ear normal. Tympanic membrane is bulging.      Left Ear: External ear normal. Tympanic membrane is bulging.      Nose: Nose normal.      Mouth/Throat:      Pharynx: Posterior oropharyngeal erythema present.   Eyes:      General:         Right eye: No discharge.         Left eye: No discharge.      Conjunctiva/sclera: Conjunctivae normal.   Cardiovascular:      Pulses: Normal pulses.      Heart sounds: Normal heart sounds. No murmur heard.  Pulmonary:      Effort: No respiratory distress.      Breath sounds: Normal breath sounds. No wheezing, rhonchi or rales.   Musculoskeletal:      Cervical back: Normal range of motion.      Right lower leg: Edema present.      Left lower leg: Edema present.   Lymphadenopathy:      Cervical: No cervical adenopathy.   Skin:     General: Skin is warm and dry.   Neurological:      Mental Status: She is alert.        Physical Exam      Vital Signs  Patient's weight is 278. Blood pressure is 130/70.    Result Review :  The following data was reviewed by: ULICES Amezquita on 02/04/2025:  Common labs          4/2/2024    05:34 8/5/2024    09:40 1/21/2025    13:22   Common Labs   Glucose  110  103    BUN  25  28    Creatinine  1.58  1.74    Sodium  142  138    Potassium  4.7  4.9    Chloride  105  103    Calcium  8.4  8.4    Albumin  4.3  4.0    Total Bilirubin  0.4  0.3    Alkaline Phosphatase  162  105    AST (SGOT)  15  17    ALT (SGPT)  9  8    WBC  5.31  6.40    Hemoglobin 8.6  11.5  10.9    Hematocrit 26.3  35.9  33.2    Platelets  152  144    Total Cholesterol  151     Triglycerides  89     HDL Cholesterol  60     LDL Cholesterol   74     Hemoglobin A1C  5.90       Data reviewed : Radiologic studies CXR 1/21/25      Results  Laboratory " Studies  BNP was normal. Kidney function was slightly decreased. Liver enzymes were normal. Sodium and potassium levels were normal. Blood count was slightly low. Potassium level was 4.9.    Imaging  Chest x-ray showed heart was mildly enlarged, a mild scar at the left lung base with no change from March 2024, no congestive heart failure, no buildup of fluid, and no pneumonia.             Assessment and Plan   Diagnoses and all orders for this visit:    1. Bronchitis (Primary)    2. Edema of both lower extremities      Assessment & Plan  Bronchitis  Her respiratory function has shown improvement, with a decrease in shortness of breath. The chest x-ray from January 21, 2025, revealed a mildly enlarged heart, likely due to long-standing hypertension. A stable scar at the left lung base was noted, unchanged since March 2024. There was no evidence of congestive heart failure or fluid accumulation. The BNP levels were within normal range.     She reports a persistent cough that is productive during the day, especially after using the nebulizer, and dry in the morning. She completed a course of Augmentin and prednisone. She will continue with the nebulizer treatments to manage the cough. RTC if sx worsen.    Fluid retention.  She has lost 4 pounds, indicating some fluid offload. She reports increased urination, which is a positive sign. Her weight has increased from 272 in August 2024 to 278 today, but this could be attributed to dietary changes and water weight. Her potassium levels were satisfactory at 4.9. She will discontinue Lasix and only take it as needed on days when she experiences increased swelling or weight gain. She will continue with chlorthalidone, which also aids in fluid offload. She will not require potassium supplements unless she resumes Lasix. Continue to monitor sodium intake (noties previous high intake).         Follow Up   Return if symptoms worsen or fail to improve, for Next scheduled follow  up.  Patient was given instructions and counseling regarding her condition or for health maintenance advice. Please see specific information pulled into the AVS if appropriate.     Patient or patient representative verbalized consent for the use of Ambient Listening during the visit with  ULICES Amezquita for chart documentation. 2/16/2025  17:46 EST

## 2025-02-18 DIAGNOSIS — R60.0 EDEMA OF BOTH LOWER EXTREMITIES: ICD-10-CM

## 2025-02-18 RX ORDER — POTASSIUM CHLORIDE 600 MG/1
8 TABLET, FILM COATED, EXTENDED RELEASE ORAL DAILY
Qty: 30 TABLET | Refills: 0 | OUTPATIENT
Start: 2025-02-18

## 2025-02-21 ENCOUNTER — TELEPHONE (OUTPATIENT)
Dept: SURGERY | Facility: CLINIC | Age: 75
End: 2025-02-21
Payer: MEDICARE

## 2025-02-26 RX ORDER — FUROSEMIDE 20 MG/1
20 TABLET ORAL DAILY PRN
COMMUNITY

## 2025-02-27 ENCOUNTER — HOSPITAL ENCOUNTER (OUTPATIENT)
Facility: HOSPITAL | Age: 75
Setting detail: HOSPITAL OUTPATIENT SURGERY
Discharge: HOME | End: 2025-02-27
Attending: SURGERY | Admitting: SURGERY
Payer: MEDICARE

## 2025-02-27 ENCOUNTER — ANESTHESIA (OUTPATIENT)
Dept: GASTROENTEROLOGY | Facility: HOSPITAL | Age: 75
End: 2025-02-27
Payer: MEDICARE

## 2025-02-27 ENCOUNTER — ANESTHESIA EVENT (OUTPATIENT)
Dept: GASTROENTEROLOGY | Facility: HOSPITAL | Age: 75
End: 2025-02-27
Payer: MEDICARE

## 2025-02-27 VITALS
SYSTOLIC BLOOD PRESSURE: 151 MMHG | RESPIRATION RATE: 13 BRPM | OXYGEN SATURATION: 92 % | DIASTOLIC BLOOD PRESSURE: 91 MMHG | HEIGHT: 65 IN | WEIGHT: 282 LBS | HEART RATE: 89 BPM | BODY MASS INDEX: 46.98 KG/M2

## 2025-02-27 PROCEDURE — G0121 COLON CA SCRN NOT HI RSK IND: HCPCS | Performed by: SURGERY

## 2025-02-27 PROCEDURE — 25010000002 PROPOFOL 1000 MG/100ML EMULSION: Performed by: STUDENT IN AN ORGANIZED HEALTH CARE EDUCATION/TRAINING PROGRAM

## 2025-02-27 PROCEDURE — 25010000002 LIDOCAINE 2% SOLUTION: Performed by: STUDENT IN AN ORGANIZED HEALTH CARE EDUCATION/TRAINING PROGRAM

## 2025-02-27 PROCEDURE — S0260 H&P FOR SURGERY: HCPCS | Performed by: SURGERY

## 2025-02-27 PROCEDURE — 25010000002 PROPOFOL 200 MG/20ML EMULSION: Performed by: STUDENT IN AN ORGANIZED HEALTH CARE EDUCATION/TRAINING PROGRAM

## 2025-02-27 PROCEDURE — 25810000003 LACTATED RINGERS PER 1000 ML: Performed by: STUDENT IN AN ORGANIZED HEALTH CARE EDUCATION/TRAINING PROGRAM

## 2025-02-27 PROCEDURE — 25810000003 LACTATED RINGERS PER 1000 ML: Performed by: SURGERY

## 2025-02-27 RX ORDER — SODIUM CHLORIDE, SODIUM LACTATE, POTASSIUM CHLORIDE, CALCIUM CHLORIDE 600; 310; 30; 20 MG/100ML; MG/100ML; MG/100ML; MG/100ML
INJECTION, SOLUTION INTRAVENOUS CONTINUOUS PRN
Status: DISCONTINUED | OUTPATIENT
Start: 2025-02-27 | End: 2025-02-27 | Stop reason: SURG

## 2025-02-27 RX ORDER — LIDOCAINE HYDROCHLORIDE 10 MG/ML
0.5 INJECTION, SOLUTION INFILTRATION; PERINEURAL ONCE AS NEEDED
Status: DISCONTINUED | OUTPATIENT
Start: 2025-02-27 | End: 2025-02-27 | Stop reason: HOSPADM

## 2025-02-27 RX ORDER — SODIUM CHLORIDE 0.9 % (FLUSH) 0.9 %
10 SYRINGE (ML) INJECTION AS NEEDED
Status: DISCONTINUED | OUTPATIENT
Start: 2025-02-27 | End: 2025-02-27 | Stop reason: HOSPADM

## 2025-02-27 RX ORDER — PROPOFOL 10 MG/ML
INJECTION, EMULSION INTRAVENOUS CONTINUOUS PRN
Status: DISCONTINUED | OUTPATIENT
Start: 2025-02-27 | End: 2025-02-27 | Stop reason: SURG

## 2025-02-27 RX ORDER — LIDOCAINE HYDROCHLORIDE 20 MG/ML
INJECTION, SOLUTION INFILTRATION; PERINEURAL AS NEEDED
Status: DISCONTINUED | OUTPATIENT
Start: 2025-02-27 | End: 2025-02-27 | Stop reason: SURG

## 2025-02-27 RX ORDER — SODIUM CHLORIDE, SODIUM LACTATE, POTASSIUM CHLORIDE, CALCIUM CHLORIDE 600; 310; 30; 20 MG/100ML; MG/100ML; MG/100ML; MG/100ML
1000 INJECTION, SOLUTION INTRAVENOUS CONTINUOUS
Status: DISCONTINUED | OUTPATIENT
Start: 2025-02-27 | End: 2025-02-27 | Stop reason: HOSPADM

## 2025-02-27 RX ORDER — PROPOFOL 10 MG/ML
INJECTION, EMULSION INTRAVENOUS AS NEEDED
Status: DISCONTINUED | OUTPATIENT
Start: 2025-02-27 | End: 2025-02-27 | Stop reason: SURG

## 2025-02-27 RX ADMIN — SODIUM CHLORIDE, SODIUM LACTATE, POTASSIUM CHLORIDE, AND CALCIUM CHLORIDE 1000 ML: 600; 310; 30; 20 INJECTION, SOLUTION INTRAVENOUS at 09:56

## 2025-02-27 RX ADMIN — LIDOCAINE HYDROCHLORIDE 60 MG: 20 INJECTION, SOLUTION INFILTRATION; PERINEURAL at 10:06

## 2025-02-27 RX ADMIN — PROPOFOL 140 MCG/KG/MIN: 10 INJECTION, EMULSION INTRAVENOUS at 10:06

## 2025-02-27 RX ADMIN — SODIUM CHLORIDE, POTASSIUM CHLORIDE, SODIUM LACTATE AND CALCIUM CHLORIDE: 600; 310; 30; 20 INJECTION, SOLUTION INTRAVENOUS at 10:07

## 2025-02-27 RX ADMIN — PROPOFOL INJECTABLE EMULSION 50 MG: 10 INJECTION, EMULSION INTRAVENOUS at 10:06

## 2025-02-27 NOTE — ANESTHESIA POSTPROCEDURE EVALUATION
Patient: Brooklyn Johns    Procedure Summary       Date: 02/27/25 Room / Location:  [facility]    Anesthesia Start: 1001 Anesthesia Stop: 1034    Procedure: COLONOSCOPY to cecum Diagnosis:       History of colon polyps      (History of colon polyps [Z86.0100])    Surgeons: Jessee Birch Jr., MD Provider: Seymour Tolliver MD    Anesthesia Type: MAC ASA Status: 3            Anesthesia Type: MAC    Vitals  Vitals Value Taken Time   /83 02/27/25 1032   Temp     Pulse 97 02/27/25 1034   Resp     SpO2 98 % 02/27/25 1034   Vitals shown include unfiled device data.        Post Anesthesia Care and Evaluation    Pain management: adequate    Airway patency: patent  Anesthetic complications: No anesthetic complications  PONV Status: controlled  Cardiovascular status: blood pressure returned to baseline and acceptable  Respiratory status: acceptable  Hydration status: acceptable

## 2025-02-27 NOTE — OP NOTE
Surgeon:     Jessee Birch Jr., M.D.    Preoperative Diagnosis:     History of colon polyps    Postoperative Diagnosis:     Diverticulosis    Procedure Performed:     Colonoscopy    Indications:     The patient is a pleasant 74-year-old female with a history of colon polyps.  She presents for screening colonoscopy high risk group.  The patient understands the indications, alternatives, risks, and benefits of the procedure and wishes to proceed.    Procedure:     The patient was identified, taken to the endoscopy suite, and place in the left side down decubitus position.  The patient underwent a MAC anesthesia and was appropriately monitored through the case by the anesthesia personnel.  A rectal exam was performed that was normal.  The colonoscope was introduced into the rectum and advanced very carefully to the cecum that was identified by the cecal strap, ileocecal valve, and the appendiceal orifice.  The scope was then slowly withdrawn with careful circumferential examination of the mucosa performed.  The bowel prep was good allowing adequate visualization of mucosal surfaces.  The scope was withdrawn.  The patient tolerated the procedure well and was transferred the recovery area in stable condition.     Findings:    There was diverticulosis involving the sigmoid colon with no other abnormalities.    Recommendations:     1.  High-fiber diet.  2.  Repeat colonoscopy in 5 years based on her history of colon polyps.    Jessee Birch Jr., M.D.

## 2025-02-27 NOTE — H&P
[facility]   HISTORY AND PHYSICAL    Patient Name: Brooklyn Johns  : 1950  MRN: [id number]  Primary Care Physician:  Radha House APRN  Date of admission: 2025    Subjective   Subjective     Chief Complaint: History of colon polyps    History of Present Illness    The patient is a pleasant 74-year-old female who has a history of colon polyps.  Her last colonoscopy was in 2019.  She has not had any significant GI symptoms since that time.    Review of Systems   Constitutional:  Negative for fatigue and fever.   Respiratory:  Negative for chest tightness and shortness of breath.    Cardiovascular:  Negative for chest pain and palpitations.   Gastrointestinal:  Negative for abdominal pain, blood in stool, constipation, diarrhea, nausea and vomiting.        Personal History     Past Medical History:   Diagnosis Date    Adductor tendinitis     ADHD (attention deficit hyperactivity disorder)     Allergic     Anemia     Ankle sprain     Anxiety     Arthritis of back 1970    Arthritis of neck     Asthma     Bronchitis     CAD (coronary artery disease)     Cataract     Removed by Dr Plasencia    Cervical disc disorder     Cholelithiasis     CTS (carpal tunnel syndrome)     Deep vein thrombosis . Again in     Degeneration of lumbar or lumbosacral intervertebral disc     Depression     GERD (gastroesophageal reflux disease)     Gout     Headache     Heart murmur     Hip arthrosis 2010    History of DVT (deep vein thrombosis)     Hyperlipidemia     Hypertension     Insomnia     Insomnia secondary to anxiety     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     Kidney disease     pt states stage 4 kidney disease    Knee swelling 1970    Low back pain 1980    Low back strain 1970    Lumbosacral disc disease 1970    Neck strain 1970    Obesity 1950    Osteoarthritis     Osteopenia     Periarthritis of shoulder     Renal insufficiency Dr. Pritchett.      Rhinitis, allergic     RLS (restless legs syndrome)     Rotator cuff syndrome     Sinus disorder     Sleep apnea     NO CPAP    Tear of meniscus of knee 1988       Past Surgical History:   Procedure Laterality Date    ANKLE FUSION Left     ANKLE OPEN REDUCTION INTERNAL FIXATION  1990??1995?    Triple orthodesis. Then ankle fusion. Dr. Wadsworth    APPENDECTOMY  1984    BARIATRIC SURGERY  1984    CARDIAC CATHETERIZATION  1999    CARDIAC CATHETERIZATION N/A 09/16/2020    Procedure: Coronary angiography;  Surgeon: Liliana Mae MD;  Location: [facility] CATH INVASIVE LOCATION;  Service: Cardiovascular;  Laterality: N/A;    CARDIAC CATHETERIZATION N/A 09/16/2020    Procedure: Left Heart Cath;  Surgeon: Liliana Mae MD;  Location: [facility] CATH INVASIVE LOCATION;  Service: Cardiovascular;  Laterality: N/A;    CHOLECYSTECTOMY      COLONOSCOPY  1990    COLONOSCOPY N/A 12/27/2019    Procedure: COLONOSCOPY INTO CECUM WITH COLD BX POLYPECTOMY;  Surgeon: Jessee Birch Jr., MD;  Location: [facility] ENDOSCOPY;  Service: General    CORONARY ANGIOPLASTY WITH STENT PLACEMENT      CORONARY STENT PLACEMENT      EYE SURGERY      GASTRIC BYPASS      HYSTERECTOMY      Total    JOINT REPLACEMENT      Left knee 2015, Right knee 2015    KNEE ACL RECONSTRUCTION      MOHS SURGERY Left 12/07/2020    left cheek    OOPHORECTOMY      SINUS SURGERY      x 2    TOTAL HIP ARTHROPLASTY Right 04/01/2024    Procedure: RIGHT TOTAL HIP ARTHROPLASTY - ANTERIOR;  Surgeon: Khang Gomez II, MD;  [place] [facility] [place] [facility] [place]  Service: Orthopedics;  Laterality: Right;    TRIGGER POINT INJECTION         Family History: family history includes Arthritis in her father, mother, and sister; Breast cancer in her mother, sister, and sister; Cancer in her father, maternal aunt, mother, sister, sister, and sister; Diabetes in her paternal uncle, sister, and sister; Heart disease in her sister and sister; Hyperlipidemia in her father and mother; Hypertension in her  sister and sister; Lung cancer in her father; Stroke in her mother. Otherwise pertinent FHx was reviewed and not pertinent to current issue.    Social History:  reports that she has never smoked. She has never used smokeless tobacco. She reports that she does not currently use alcohol after a past usage of about 1.0 standard drink of alcohol per week. She reports that she does not currently use drugs after having used the following drugs: Hydrocodone. Frequency: 3.00 times per week.    Home Medications:  DULoxetine, HYDROcodone-acetaminophen, Needle (Disp), Syringe/Needle (Disp), allopurinol, apixaban, calcitriol, calcium citrate, chlorthalidone, cyanocobalamin, famotidine, furosemide, gabapentin, ipratropium-albuterol, metoprolol succinate XL, rOPINIRole, ramipril, simvastatin, sodium bicarbonate, and vitamin D    Allergies:  No Known Allergies    Objective    Objective     Vitals:   Heart Rate:  [77] 77  Resp:  [16] 16  BP: (117)/(80) 117/80    Physical Exam  Constitutional:       Appearance: Normal appearance. She is well-developed. She is not toxic-appearing.   Eyes:      General: No scleral icterus.  Pulmonary:      Effort: Pulmonary effort is normal. No respiratory distress.   Abdominal:      Palpations: Abdomen is soft.      Tenderness: There is no abdominal tenderness.   Skin:     General: Skin is warm and dry.   Neurological:      Mental Status: She is alert and oriented to person, place, and time.   Psychiatric:         Behavior: Behavior normal.         Thought Content: Thought content normal.         Judgment: Judgment normal.           Assessment & Plan   Assessment / Plan     Brief Patient Summary:  Brooklyn Johns is a 74 y.o. female who has a history of colon polyps.    Active Hospital Problems:  Active Hospital Problems    Diagnosis     **History of colon polyps      Plan: Colonoscopy.  The patient understands the indications, alternatives, risks, and benefits of the procedure and wishes to  proceed.       Jessee Birch Jr., MD

## 2025-02-27 NOTE — ANESTHESIA PREPROCEDURE EVALUATION
Anesthesia Evaluation     Patient summary reviewed and Nursing notes reviewed   NPO Solid Status: > 8 hours  NPO Liquid Status: > 2 hours           Airway   Mallampati: II  TM distance: <3 FB  Neck ROM: full  Comment: Good ROM neck  Dental - normal exam     Pulmonary - normal exam    breath sounds clear to auscultation  (+) asthma,recent URI resolved, sleep apnea    ROS comment: No CPAP  Cardiovascular - normal exam    ECG reviewed  Rhythm: regular  Rate: normal    (+) hypertension 2 medications or greater, valvular problems/murmurs murmur, cardiac stents (25 years ago) , DVT resolved, hyperlipidemia  (-) CAD, angina    ROS comment: EF 60% by ECHO 8/16/no significant CAD by cath 8/20    Neuro/Psych  (+) headaches, numbness, psychiatric history Anxiety    ROS Comment: RLS  GI/Hepatic/Renal/Endo    (+) obesity, morbid obesity, GERD, renal disease- CRI    ROS Comment: Hx gastric bypass    Musculoskeletal     (+) back pain      ROS comment: Cervical and lumbar DDD  Abdominal   (+) obese   Substance History      OB/GYN          Other   arthritis, blood dyscrasia anemia,   history of cancer      Other Comment: BCCA face                Anesthesia Plan    ASA 3     MAC     intravenous induction     Anesthetic plan, risks, benefits, and alternatives have been provided, discussed and informed consent has been obtained with: patient.    CODE STATUS:

## 2025-02-27 NOTE — DISCHARGE INSTRUCTIONS
For the next 24 hours patient needs to be with a responsible adult.    For THE REST OF TODAY DO NOT drive, operate machinery, appliances, drink alcohol, make important decisions or sign legal documents.    Start with a light or bland diet if you are feeling sick to your stomach otherwise advance to regular diet as tolerated.    Follow recommendations on procedure report if provided by your doctor.    Call Dr Birch for problems [phone]     Problems may include but not limited to: large amounts of bleeding, trouble breathing, repeated vomiting, severe unrelieved pain, fever or chills.      If biopsies or polyps were taken, MD will call you with the results in about 7 days. If you don't hear from the MD in 2 weeks, call the number above.  
ambulatory

## 2025-03-22 DIAGNOSIS — N18.4 CKD (CHRONIC KIDNEY DISEASE) STAGE 4, GFR 15-29 ML/MIN: ICD-10-CM

## 2025-03-24 RX ORDER — CALCITRIOL 0.25 UG/1
CAPSULE, LIQUID FILLED ORAL
Qty: 36 CAPSULE | Refills: 1 | Status: SHIPPED | OUTPATIENT
Start: 2025-03-24

## 2025-03-24 RX ORDER — ERGOCALCIFEROL 1.25 MG/1
50000 CAPSULE, LIQUID FILLED ORAL WEEKLY
Qty: 12 CAPSULE | Refills: 1 | Status: SHIPPED | OUTPATIENT
Start: 2025-03-24

## 2025-03-27 DIAGNOSIS — E79.0 HYPERURICEMIA: ICD-10-CM

## 2025-03-27 RX ORDER — ALLOPURINOL 100 MG/1
100 TABLET ORAL DAILY
Qty: 90 TABLET | Refills: 1 | Status: SHIPPED | OUTPATIENT
Start: 2025-03-27

## 2025-04-01 DIAGNOSIS — K21.9 GASTROESOPHAGEAL REFLUX DISEASE, UNSPECIFIED WHETHER ESOPHAGITIS PRESENT: ICD-10-CM

## 2025-04-01 DIAGNOSIS — E83.51 HYPOCALCEMIA: ICD-10-CM

## 2025-04-02 DIAGNOSIS — R60.0 EDEMA OF BOTH LOWER EXTREMITIES: ICD-10-CM

## 2025-04-02 RX ORDER — FUROSEMIDE 20 MG/1
20 TABLET ORAL DAILY PRN
Qty: 90 TABLET | Refills: 1 | Status: SHIPPED | OUTPATIENT
Start: 2025-04-02

## 2025-04-02 RX ORDER — IBUPROFEN 200 MG
950 CAPSULE ORAL 2 TIMES DAILY
Qty: 180 TABLET | Refills: 1 | OUTPATIENT
Start: 2025-04-02

## 2025-04-02 RX ORDER — FAMOTIDINE 20 MG/1
20 TABLET, FILM COATED ORAL 2 TIMES DAILY
Qty: 180 TABLET | Refills: 1 | Status: SHIPPED | OUTPATIENT
Start: 2025-04-02

## 2025-04-02 RX ORDER — POTASSIUM CHLORIDE 600 MG/1
8 TABLET, FILM COATED, EXTENDED RELEASE ORAL DAILY
Qty: 90 TABLET | Refills: 1 | Status: SHIPPED | OUTPATIENT
Start: 2025-04-02

## 2025-04-02 RX ORDER — ERGOCALCIFEROL 1.25 MG/1
50000 CAPSULE, LIQUID FILLED ORAL WEEKLY
Qty: 12 CAPSULE | Refills: 1 | Status: SHIPPED | OUTPATIENT
Start: 2025-04-02

## 2025-04-02 RX ORDER — SODIUM BICARBONATE 650 MG/1
650 TABLET ORAL 2 TIMES DAILY
Qty: 180 TABLET | Refills: 1 | OUTPATIENT
Start: 2025-04-02

## 2025-04-08 ENCOUNTER — OFFICE VISIT (OUTPATIENT)
Dept: INTERNAL MEDICINE | Facility: CLINIC | Age: 75
End: 2025-04-08
Payer: MEDICARE

## 2025-04-08 VITALS
WEIGHT: 278 LBS | HEIGHT: 65 IN | OXYGEN SATURATION: 99 % | BODY MASS INDEX: 46.32 KG/M2 | HEART RATE: 75 BPM | TEMPERATURE: 97.9 F | DIASTOLIC BLOOD PRESSURE: 82 MMHG | SYSTOLIC BLOOD PRESSURE: 134 MMHG

## 2025-04-08 DIAGNOSIS — N18.4 CHRONIC KIDNEY DISEASE, STAGE IV (SEVERE): ICD-10-CM

## 2025-04-08 DIAGNOSIS — E53.8 B12 DEFICIENCY: Chronic | ICD-10-CM

## 2025-04-08 DIAGNOSIS — R73.09 ELEVATED GLUCOSE: Primary | ICD-10-CM

## 2025-04-08 DIAGNOSIS — R60.0 EDEMA OF BOTH LOWER EXTREMITIES: ICD-10-CM

## 2025-04-08 DIAGNOSIS — E79.0 HYPERURICEMIA: Chronic | ICD-10-CM

## 2025-04-08 DIAGNOSIS — J20.8 VIRAL BRONCHITIS: ICD-10-CM

## 2025-04-08 DIAGNOSIS — E78.2 MIXED HYPERLIPIDEMIA: Chronic | ICD-10-CM

## 2025-04-08 PROBLEM — J20.9 ACUTE BRONCHITIS: Status: RESOLVED | Noted: 2025-02-03 | Resolved: 2025-04-08

## 2025-04-08 RX ORDER — PREDNISONE 10 MG/1
TABLET ORAL
Qty: 36 TABLET | Refills: 0 | Status: SHIPPED | OUTPATIENT
Start: 2025-04-08 | End: 2025-04-24

## 2025-04-08 RX ADMIN — CYANOCOBALAMIN 1000 MCG: 1000 INJECTION, SOLUTION INTRAMUSCULAR; SUBCUTANEOUS at 12:36

## 2025-04-09 LAB
ALBUMIN SERPL-MCNC: 4.3 G/DL (ref 3.5–5.2)
ALBUMIN/GLOB SERPL: 2 G/DL
ALP SERPL-CCNC: 124 U/L (ref 39–117)
ALT SERPL-CCNC: 8 U/L (ref 1–33)
AST SERPL-CCNC: 14 U/L (ref 1–32)
BILIRUB SERPL-MCNC: 0.3 MG/DL (ref 0–1.2)
BUN SERPL-MCNC: 29 MG/DL (ref 8–23)
BUN/CREAT SERPL: 14 (ref 7–25)
CALCIUM SERPL-MCNC: 8.6 MG/DL (ref 8.6–10.5)
CHLORIDE SERPL-SCNC: 104 MMOL/L (ref 98–107)
CHOLEST SERPL-MCNC: 172 MG/DL (ref 0–200)
CO2 SERPL-SCNC: 27 MMOL/L (ref 22–29)
CREAT SERPL-MCNC: 2.07 MG/DL (ref 0.57–1)
EGFRCR SERPLBLD CKD-EPI 2021: 24.7 ML/MIN/1.73
ERYTHROCYTE [DISTWIDTH] IN BLOOD BY AUTOMATED COUNT: 12.8 % (ref 12.3–15.4)
GLOBULIN SER CALC-MCNC: 2.2 GM/DL
GLUCOSE SERPL-MCNC: 76 MG/DL (ref 65–99)
HBA1C MFR BLD: 6 % (ref 4.8–5.6)
HCT VFR BLD AUTO: 36.3 % (ref 34–46.6)
HDLC SERPL-MCNC: 56 MG/DL (ref 40–60)
HGB BLD-MCNC: 11.9 G/DL (ref 12–15.9)
LDLC SERPL CALC-MCNC: 93 MG/DL (ref 0–100)
MCH RBC QN AUTO: 31.6 PG (ref 26.6–33)
MCHC RBC AUTO-ENTMCNC: 32.8 G/DL (ref 31.5–35.7)
MCV RBC AUTO: 96.5 FL (ref 79–97)
PLATELET # BLD AUTO: 174 10*3/MM3 (ref 140–450)
POTASSIUM SERPL-SCNC: 5.1 MMOL/L (ref 3.5–5.2)
PROT SERPL-MCNC: 6.5 G/DL (ref 6–8.5)
RBC # BLD AUTO: 3.76 10*6/MM3 (ref 3.77–5.28)
SODIUM SERPL-SCNC: 144 MMOL/L (ref 136–145)
TRIGL SERPL-MCNC: 130 MG/DL (ref 0–150)
URATE SERPL-MCNC: 7.1 MG/DL (ref 2.4–5.7)
VIT B12 SERPL-MCNC: 256 PG/ML (ref 211–946)
VLDLC SERPL CALC-MCNC: 23 MG/DL (ref 5–40)
WBC # BLD AUTO: 7.81 10*3/MM3 (ref 3.4–10.8)

## 2025-04-13 DIAGNOSIS — K21.9 GASTROESOPHAGEAL REFLUX DISEASE, UNSPECIFIED WHETHER ESOPHAGITIS PRESENT: ICD-10-CM

## 2025-04-13 DIAGNOSIS — E83.51 HYPOCALCEMIA: ICD-10-CM

## 2025-04-14 RX ORDER — IBUPROFEN 200 MG
950 CAPSULE ORAL 2 TIMES DAILY
Qty: 180 TABLET | Refills: 1 | Status: SHIPPED | OUTPATIENT
Start: 2025-04-14

## 2025-04-14 RX ORDER — SODIUM BICARBONATE 650 MG/1
TABLET ORAL
Qty: 180 TABLET | Refills: 1 | Status: SHIPPED | OUTPATIENT
Start: 2025-04-14

## 2025-04-27 PROBLEM — E87.5 HYPERKALEMIA: Status: RESOLVED | Noted: 2022-09-18 | Resolved: 2025-04-27

## 2025-04-27 NOTE — ASSESSMENT & PLAN NOTE
She will lower her dose of Lasix to 20 mg daily. If she experiences increased fluid retention, an additional dose may be taken. If symptoms persist, the dosage may be increased back to 40 mg as needed.  She is also advised to use compression stockings and follow a low-sodium diet.

## 2025-04-27 NOTE — ASSESSMENT & PLAN NOTE
She is currently managed on simvastatin which she tolerates without myalgias, recheck lipid panel.

## 2025-04-27 NOTE — PROGRESS NOTES
Chief Complaint  Medicare Wellness-subsequent, Follow-up (B12 shot today? ), Shortness of Breath, and Med Management (40MG TO 20MG OF LASIX?)  Subjective        Brooklyn Johns presents to [facility]  History of Present Illness  History of Present Illness  The patient presents for evaluation of edema, bronchospasm, dizziness, and health maintenance.    She reports a reduction in edema, which she attributes to the administration of Lasix. She was previously on a 40 mg dose but has since been reduced to 20 mg. Despite this, she continues to take the 40 mg dose, with the most recent administration being yesterday. She had a period of not taking the medication last week, as it was prescribed on an as-needed basis. However, she experienced a recurrence of symptoms after approximately 4 to 5 days of discontinuation. She also notes that her diet includes a high intake of salt, which she believes may be contributing to her condition. She plans to discuss this with her dietitian.    She has been experiencing increased chest tightness with cough for the past 3 to 4 days. She has been using DuoNeb via nebulizer four times daily for the past few days, up from her usual twice-daily usage. She does not report any significant head congestion but does report a persistent runny nose. She also reports mild wheezing.    She has not received a COVID-19 booster vaccine and does not plan to do so. She underwent a colonoscopy recently, which yielded normal results. She is due for another colonoscopy in 5 years.    She reports experiencing dizziness when lying down or standing up too quickly. She tries to drink plenty of water.    Supplemental Information  She has initiated physical therapy at home, which has resulted in significant improvement in her joint pain. She reports no recent gout flares.    Objective   Vital Signs:  /82 (BP Location: Left arm, Patient Position: Sitting, Cuff Size: Large  "Adult)   Pulse 75   Temp 97.9 °F (36.6 °C)   Ht 165.1 cm (65\")   Wt 126 kg (278 lb)   SpO2 99%   BMI 46.26 kg/m²   Estimated body mass index is 46.26 kg/m² as calculated from the following:    Height as of this encounter: 165.1 cm (65\").    Weight as of this encounter: 126 kg (278 lb).            Physical Exam  Constitutional:       Appearance: She is well-developed. She is not ill-appearing.   HENT:      Head: Normocephalic.      Right Ear: Hearing, tympanic membrane and external ear normal.      Left Ear: Hearing, tympanic membrane and external ear normal.      Nose: Nose normal. No nasal deformity, mucosal edema or rhinorrhea.      Right Sinus: No maxillary sinus tenderness or frontal sinus tenderness.      Left Sinus: No maxillary sinus tenderness or frontal sinus tenderness.      Mouth/Throat:      Dentition: Normal dentition.   Eyes:      General: Lids are normal.         Right eye: No discharge.         Left eye: No discharge.      Conjunctiva/sclera: Conjunctivae normal.      Right eye: No exudate.     Left eye: No exudate.  Neck:      Thyroid: No thyroid mass or thyromegaly.      Vascular: No carotid bruit.      Trachea: Trachea normal.   Cardiovascular:      Rate and Rhythm: Regular rhythm.      Pulses: Normal pulses.      Heart sounds: Normal heart sounds. No murmur heard.  Pulmonary:      Effort: No respiratory distress.      Breath sounds: Wheezing present. No decreased breath sounds, rhonchi or rales.   Abdominal:      General: Bowel sounds are normal.      Palpations: Abdomen is soft.      Tenderness: There is no abdominal tenderness.   Musculoskeletal:      Cervical back: Normal range of motion. No edema.      Right lower leg: Edema present.      Left lower leg: Edema present.   Lymphadenopathy:      Head:      Right side of head: No submental, submandibular, tonsillar, preauricular, posterior auricular or occipital adenopathy.      Left side of head: No submental, submandibular, tonsillar, " preauricular, posterior auricular or occipital adenopathy.   Skin:     General: Skin is warm and dry.      Nails: There is no clubbing.   Neurological:      Mental Status: She is alert.   Psychiatric:         Behavior: Behavior is cooperative.            Result Review :  The following data was reviewed by: ULICES Amezquita on 04/08/2025:  Common labs          8/5/2024    09:40 1/21/2025    13:22 4/8/2025    12:14   Common Labs   Glucose 110  103  76    BUN 25  28  29    Creatinine 1.58  1.74  2.07    Sodium 142  138  144    Potassium 4.7  4.9  5.1    Chloride 105  103  104    Calcium 8.4  8.4  8.6    Albumin 4.3  4.0  4.3    Total Bilirubin 0.4  0.3  0.3    Alkaline Phosphatase 162  105  124    AST (SGOT) 15  17  14    ALT (SGPT) 9  8  8    WBC 5.31  6.40  7.81    Hemoglobin 11.5  10.9  11.9    Hematocrit 35.9  33.2  36.3    Platelets 152  144  174    Total Cholesterol 151   172    Triglycerides 89   130    HDL Cholesterol 60   56    LDL Cholesterol  74   93    Hemoglobin A1C 5.90   6.00    Uric Acid   7.1      Data reviewed : GI studies colonoscopy 2/27/25      Results  Laboratory Studies  Kidney function was slightly decreased at 30. Blood sugar was slightly elevated.             Assessment and Plan   Diagnoses and all orders for this visit:    1. Elevated glucose (Primary)  Assessment & Plan:  Continue a low-carb, low sugar diet; recheck A1c today.    Orders:  -     Hemoglobin A1c    2. B12 deficiency  Assessment & Plan:  Her B12 levels will be evaluated prior to administering the injection. If the levels are satisfactory, a transition to oral B12 supplementation will be considered.    Orders:  -     Vitamin B12    3. Chronic kidney disease, stage IV (severe)  Assessment & Plan:  Follow-up with nephrology as overdue, recheck labs today.    Orders:  -     CBC (No Diff)  -     Comprehensive Metabolic Panel    4. Hyperuricemia  Assessment & Plan:  She denies recent gout levels, recheck uric acid.    Orders:  -      Uric Acid    5. Mixed hyperlipidemia  Assessment & Plan:  She is currently managed on simvastatin which she tolerates without myalgias, recheck lipid panel.    Orders:  -     Lipid Panel    6. Viral bronchitis  Comments:  Treated with tapering prednisone and continue to use nebulizer.  Follow-up with office if symptoms do not improve.  Orders:  -     predniSONE (DELTASONE) 10 MG tablet; 1 tab qidx 3d then 1 tab tid x3d then 1 tab bid x5d then 1 tab qd x5d  Dispense: 36 tablet; Refill: 0    7. Edema of both lower extremities  Assessment & Plan:  She will lower her dose of Lasix to 20 mg daily. If she experiences increased fluid retention, an additional dose may be taken. If symptoms persist, the dosage may be increased back to 40 mg as needed.  She is also advised to use compression stockings and follow a low-sodium diet.          Assessment & Plan           Follow Up   Return in about 4 months (around 8/8/2025).  Patient was given instructions and counseling regarding her condition or for health maintenance advice. Please see specific information pulled into the AVS if appropriate.     Patient or patient representative verbalized consent for the use of Ambient Listening during the visit with  ULICES Amezquita for chart documentation. 4/27/2025  10:13 EDT

## 2025-04-27 NOTE — ASSESSMENT & PLAN NOTE
Her B12 levels will be evaluated prior to administering the injection. If the levels are satisfactory, a transition to oral B12 supplementation will be considered.

## 2025-05-16 ENCOUNTER — PATIENT MESSAGE (OUTPATIENT)
Dept: INTERNAL MEDICINE | Facility: CLINIC | Age: 75
End: 2025-05-16
Payer: MEDICARE

## 2025-05-16 DIAGNOSIS — I82.409 RECURRENT ACUTE DEEP VEIN THROMBOSIS (DVT) OF LOWER EXTREMITY, UNSPECIFIED LATERALITY: ICD-10-CM

## 2025-05-16 DIAGNOSIS — G25.81 RESTLESS LEGS: ICD-10-CM

## 2025-05-16 DIAGNOSIS — I10 ESSENTIAL HYPERTENSION: ICD-10-CM

## 2025-05-16 RX ORDER — CHLORTHALIDONE 25 MG/1
TABLET ORAL
Qty: 45 TABLET | Refills: 1 | Status: SHIPPED | OUTPATIENT
Start: 2025-05-16

## 2025-05-16 RX ORDER — ROPINIROLE 1 MG/1
1 TABLET, FILM COATED ORAL 4 TIMES DAILY
Qty: 360 TABLET | Refills: 1 | OUTPATIENT
Start: 2025-05-16

## 2025-05-16 RX ORDER — ROPINIROLE 1 MG/1
TABLET, FILM COATED ORAL
Qty: 360 TABLET | Refills: 1 | Status: SHIPPED | OUTPATIENT
Start: 2025-05-16

## 2025-06-25 ENCOUNTER — TELEPHONE (OUTPATIENT)
Dept: INTERNAL MEDICINE | Facility: CLINIC | Age: 75
End: 2025-06-25
Payer: MEDICARE

## 2025-06-30 RX ORDER — DULOXETIN HYDROCHLORIDE 60 MG/1
CAPSULE, DELAYED RELEASE ORAL
Qty: 90 CAPSULE | Refills: 1 | Status: SHIPPED | OUTPATIENT
Start: 2025-06-30

## 2025-06-30 RX ORDER — RAMIPRIL 10 MG/1
CAPSULE ORAL
Qty: 90 CAPSULE | Refills: 1 | Status: SHIPPED | OUTPATIENT
Start: 2025-06-30

## 2025-08-12 ENCOUNTER — OFFICE VISIT (OUTPATIENT)
Dept: INTERNAL MEDICINE | Facility: CLINIC | Age: 75
End: 2025-08-12
Payer: MEDICARE

## 2025-08-12 VITALS
HEIGHT: 65 IN | HEART RATE: 73 BPM | BODY MASS INDEX: 48.48 KG/M2 | DIASTOLIC BLOOD PRESSURE: 86 MMHG | SYSTOLIC BLOOD PRESSURE: 132 MMHG | WEIGHT: 291 LBS | OXYGEN SATURATION: 97 %

## 2025-08-12 DIAGNOSIS — R60.0 EDEMA OF BOTH LOWER EXTREMITIES: ICD-10-CM

## 2025-08-12 DIAGNOSIS — I10 ESSENTIAL HYPERTENSION: Chronic | ICD-10-CM

## 2025-08-12 DIAGNOSIS — Z00.00 MEDICARE ANNUAL WELLNESS VISIT, SUBSEQUENT: Primary | ICD-10-CM

## 2025-08-12 DIAGNOSIS — N18.4 CHRONIC KIDNEY DISEASE, STAGE IV (SEVERE): Chronic | ICD-10-CM

## 2025-08-12 DIAGNOSIS — M47.817 LUMBOSACRAL SPONDYLOSIS WITHOUT MYELOPATHY: ICD-10-CM

## 2025-08-12 DIAGNOSIS — N25.81 SECONDARY HYPERPARATHYROIDISM: Chronic | ICD-10-CM

## 2025-08-12 DIAGNOSIS — E79.0 HYPERURICEMIA: Chronic | ICD-10-CM

## 2025-08-12 LAB
ALBUMIN SERPL-MCNC: 4 G/DL (ref 3.5–5.2)
ALBUMIN/GLOB SERPL: 2 G/DL
ALP SERPL-CCNC: 116 U/L (ref 39–117)
ALT SERPL-CCNC: 10 U/L (ref 1–33)
AST SERPL-CCNC: 16 U/L (ref 1–32)
BASOPHILS # BLD AUTO: 0.03 10*3/MM3 (ref 0–0.2)
BASOPHILS NFR BLD AUTO: 0.5 % (ref 0–1.5)
BILIRUB SERPL-MCNC: 0.2 MG/DL (ref 0–1.2)
BUN SERPL-MCNC: 26 MG/DL (ref 8–23)
BUN/CREAT SERPL: 15.7 (ref 7–25)
CALCIUM SERPL-MCNC: 8.5 MG/DL (ref 8.6–10.5)
CHLORIDE SERPL-SCNC: 108 MMOL/L (ref 98–107)
CO2 SERPL-SCNC: 28.9 MMOL/L (ref 22–29)
CREAT SERPL-MCNC: 1.66 MG/DL (ref 0.57–1)
EGFRCR SERPLBLD CKD-EPI 2021: 32.2 ML/MIN/1.73
EOSINOPHIL # BLD AUTO: 0.16 10*3/MM3 (ref 0–0.4)
EOSINOPHIL NFR BLD AUTO: 2.7 % (ref 0.3–6.2)
ERYTHROCYTE [DISTWIDTH] IN BLOOD BY AUTOMATED COUNT: 13.3 % (ref 12.3–15.4)
GLOBULIN SER CALC-MCNC: 2 GM/DL
GLUCOSE SERPL-MCNC: 105 MG/DL (ref 65–99)
HCT VFR BLD AUTO: 35.1 % (ref 34–46.6)
HGB BLD-MCNC: 10.9 G/DL (ref 12–15.9)
IMM GRANULOCYTES # BLD AUTO: 0.02 10*3/MM3 (ref 0–0.05)
IMM GRANULOCYTES NFR BLD AUTO: 0.3 % (ref 0–0.5)
LYMPHOCYTES # BLD AUTO: 1.6 10*3/MM3 (ref 0.7–3.1)
LYMPHOCYTES NFR BLD AUTO: 26.8 % (ref 19.6–45.3)
MCH RBC QN AUTO: 29.6 PG (ref 26.6–33)
MCHC RBC AUTO-ENTMCNC: 31.1 G/DL (ref 31.5–35.7)
MCV RBC AUTO: 95.4 FL (ref 79–97)
MONOCYTES # BLD AUTO: 0.6 10*3/MM3 (ref 0.1–0.9)
MONOCYTES NFR BLD AUTO: 10.1 % (ref 5–12)
NEUTROPHILS # BLD AUTO: 3.56 10*3/MM3 (ref 1.7–7)
NEUTROPHILS NFR BLD AUTO: 59.6 % (ref 42.7–76)
NRBC BLD AUTO-RTO: 0 /100 WBC (ref 0–0.2)
PLATELET # BLD AUTO: 144 10*3/MM3 (ref 140–450)
POTASSIUM SERPL-SCNC: 5.3 MMOL/L (ref 3.5–5.2)
PROT SERPL-MCNC: 6 G/DL (ref 6–8.5)
RBC # BLD AUTO: 3.68 10*6/MM3 (ref 3.77–5.28)
SODIUM SERPL-SCNC: 147 MMOL/L (ref 136–145)
TSH SERPL DL<=0.005 MIU/L-ACNC: 4.22 UIU/ML (ref 0.27–4.2)
URATE SERPL-MCNC: 5.5 MG/DL (ref 2.4–5.7)
WBC # BLD AUTO: 5.97 10*3/MM3 (ref 3.4–10.8)

## 2025-08-16 DIAGNOSIS — E79.0 HYPERURICEMIA: ICD-10-CM

## 2025-08-17 PROBLEM — N18.4 CHRONIC KIDNEY DISEASE, STAGE IV (SEVERE): Chronic | Status: ACTIVE | Noted: 2023-08-30

## 2025-08-17 PROBLEM — M79.604 RIGHT LEG PAIN: Status: RESOLVED | Noted: 2024-11-24 | Resolved: 2025-08-17

## 2025-08-17 PROBLEM — Z86.0100 HISTORY OF COLON POLYPS: Status: RESOLVED | Noted: 2025-01-02 | Resolved: 2025-08-17

## 2025-08-17 PROBLEM — N18.4 CKD (CHRONIC KIDNEY DISEASE) STAGE 4, GFR 15-29 ML/MIN: Chronic | Status: RESOLVED | Noted: 2019-10-20 | Resolved: 2025-08-17

## 2025-08-17 PROBLEM — Z96.649 HIP JOINT REPLACEMENT STATUS: Status: RESOLVED | Noted: 2024-04-01 | Resolved: 2025-08-17

## 2025-08-18 RX ORDER — ALLOPURINOL 100 MG/1
100 TABLET ORAL DAILY
Qty: 90 TABLET | Refills: 1 | Status: SHIPPED | OUTPATIENT
Start: 2025-08-18

## (undated) DEVICE — SINGLE-USE BIOPSY FORCEPS: Brand: RADIAL JAW 4

## (undated) DEVICE — TUBING, SUCTION, 1/4" X 10', STRAIGHT: Brand: MEDLINE

## (undated) DEVICE — TBG PENCL TELESCP MEGADYNE SMOKE EVAC 10FT

## (undated) DEVICE — SOL ISO/ALC 70PCT 4OZ

## (undated) DEVICE — CATH DIAG IMPULSE FR4 5F 100CM

## (undated) DEVICE — GW EMR FIX EXCHG J STD .035 3MM 260CM

## (undated) DEVICE — 450 ML BOTTLE OF 0.05% CHLORHEXIDINE GLUCONATE IN 99.95% STERILE WATER FOR IRRIGATION, USP AND APPLICATOR.: Brand: IRRISEPT ANTIMICROBIAL WOUND LAVAGE

## (undated) DEVICE — THE STERILE LIGHT HANDLE COVER IS USED WITH STERIS SURGICAL LIGHTING AND VISUALIZATION SYSTEMS.

## (undated) DEVICE — ADAPT CLN BIOGUARD AIR/H2O DISP

## (undated) DEVICE — SENSR O2 OXIMAX FNGR A/ 18IN NONSTR

## (undated) DEVICE — GLV SURG SIGNATURE ESSENTIAL PF LTX SZ8.5

## (undated) DEVICE — SOL NACL 0.9PCT 100ML SGL

## (undated) DEVICE — CATH VENT MIV RADL PIG ST TIP 5F 110CM

## (undated) DEVICE — GLIDESHEATH SLENDER STAINLESS STEEL KIT: Brand: GLIDESHEATH SLENDER

## (undated) DEVICE — CANN NASL CO2 TRULINK W/O2 A/

## (undated) DEVICE — KT ORCA ORCAPOD DISP STRL

## (undated) DEVICE — 3M™ IOBAN™ 2 ANTIMICROBIAL INCISE DRAPE 6640EZ: Brand: IOBAN™ 2

## (undated) DEVICE — PK CATH CARD 40

## (undated) DEVICE — SUT ETHIB 2 CV V37 MS/4 30IN MX69G

## (undated) DEVICE — PK ANT HIP 40

## (undated) DEVICE — MAT FLR ABSORBENT LG 4FT 10 2.5FT

## (undated) DEVICE — APPL CHLORAPREP HI/LITE 26ML ORNG

## (undated) DEVICE — CANN O2 ETCO2 FITS ALL CONN CO2 SMPL A/ 7IN DISP LF

## (undated) DEVICE — CATH DIAG IMPULSE FL3.5 5F 100CM

## (undated) DEVICE — PATIENT RETURN ELECTRODE, SINGLE-USE, CONTACT QUALITY MONITORING, ADULT, WITH 9FT CORD, FOR PATIENTS WEIGING OVER 33LBS. (15KG): Brand: MEGADYNE

## (undated) DEVICE — THE TORRENT IRRIGATION SCOPE CONNECTOR IS USED WITH THE TORRENT IRRIGATION TUBING TO PROVIDE IRRIGATION FLUIDS SUCH AS STERILE WATER DURING GASTROINTESTINAL ENDOSCOPIC PROCEDURES WHEN USED IN CONJUNCTION WITH AN IRRIGATION PUMP (OR ELECTROSURGICAL UNIT).: Brand: TORRENT

## (undated) DEVICE — NEEDLE, QUINCKE, 20GX3.5": Brand: MEDLINE

## (undated) DEVICE — GLV SURG PREMIERPRO ORTHO LTX PF SZ8.5 BRN

## (undated) DEVICE — TRAP FLD MINIVAC MEGADYNE 100ML

## (undated) DEVICE — KT MANIFLD CARDIAC

## (undated) DEVICE — RECIPROCATING BLADE HEAVY DUTY LONG, OFFSET  (77.6 X 0.77 X 11.2MM)

## (undated) DEVICE — KT VLV BIOGUARD SXN BIOP AIR/H20 CONN 4PC DISP

## (undated) DEVICE — LN SMPL CO2 SHTRM SD STREAM W/M LUER